# Patient Record
Sex: FEMALE | Race: BLACK OR AFRICAN AMERICAN | NOT HISPANIC OR LATINO | Employment: OTHER | URBAN - METROPOLITAN AREA
[De-identification: names, ages, dates, MRNs, and addresses within clinical notes are randomized per-mention and may not be internally consistent; named-entity substitution may affect disease eponyms.]

---

## 2018-06-23 ENCOUNTER — HOSPITAL ENCOUNTER (OUTPATIENT)
Facility: HOSPITAL | Age: 73
Discharge: HOME | End: 2018-06-26
Attending: INTERNAL MEDICINE | Admitting: INTERNAL MEDICINE
Payer: MEDICARE

## 2018-06-23 DIAGNOSIS — K92.2 GASTROINTESTINAL HEMORRHAGE, UNSPECIFIED GASTROINTESTINAL HEMORRHAGE TYPE: ICD-10-CM

## 2018-06-23 DIAGNOSIS — D50.0 IRON DEFICIENCY ANEMIA DUE TO CHRONIC BLOOD LOSS: Primary | ICD-10-CM

## 2018-06-23 DIAGNOSIS — D62 ACUTE BLOOD LOSS ANEMIA: ICD-10-CM

## 2018-06-23 DIAGNOSIS — R06.00 DYSPNEA: ICD-10-CM

## 2018-06-23 DIAGNOSIS — D64.9 ANEMIA: ICD-10-CM

## 2018-06-23 LAB
ABO + RH BLD: NORMAL
ALBUMIN SERPL BCP-MCNC: 3.5 G/DL
ALP SERPL-CCNC: 57 U/L
ALT SERPL W/O P-5'-P-CCNC: 12 U/L
ANION GAP SERPL CALC-SCNC: 10 MMOL/L
ANISOCYTOSIS BLD QL SMEAR: ABNORMAL
AST SERPL-CCNC: 28 U/L
BASOPHILS # BLD AUTO: ABNORMAL K/UL
BASOPHILS NFR BLD: 0 %
BILIRUB SERPL-MCNC: 0.1 MG/DL
BLD GP AB SCN CELLS X3 SERPL QL: NORMAL
BLD PROD TYP BPU: NORMAL
BLD PROD TYP BPU: NORMAL
BLOOD UNIT EXPIRATION DATE: NORMAL
BLOOD UNIT EXPIRATION DATE: NORMAL
BLOOD UNIT TYPE CODE: 6200
BLOOD UNIT TYPE CODE: 6200
BLOOD UNIT TYPE: NORMAL
BLOOD UNIT TYPE: NORMAL
BUN SERPL-MCNC: 8 MG/DL
CALCIUM SERPL-MCNC: 8.6 MG/DL
CHLORIDE SERPL-SCNC: 108 MMOL/L
CO2 SERPL-SCNC: 19 MMOL/L
CODING SYSTEM: NORMAL
CODING SYSTEM: NORMAL
CREAT SERPL-MCNC: 1.4 MG/DL
DIFFERENTIAL METHOD: ABNORMAL
DISPENSE STATUS: NORMAL
DISPENSE STATUS: NORMAL
EOSINOPHIL # BLD AUTO: ABNORMAL K/UL
EOSINOPHIL NFR BLD: 2 %
ERYTHROCYTE [DISTWIDTH] IN BLOOD BY AUTOMATED COUNT: 21.1 %
EST. GFR  (AFRICAN AMERICAN): 43.3 ML/MIN/1.73 M^2
EST. GFR  (NON AFRICAN AMERICAN): 37.6 ML/MIN/1.73 M^2
GLUCOSE SERPL-MCNC: 135 MG/DL
HCT VFR BLD AUTO: 15.7 %
HGB BLD-MCNC: 4.8 G/DL
HYPOCHROMIA BLD QL SMEAR: ABNORMAL
IMM GRANULOCYTES # BLD AUTO: ABNORMAL K/UL
IMM GRANULOCYTES NFR BLD AUTO: ABNORMAL %
INR PPP: 1.2
INR PPP: 1.2
IRON SERPL-MCNC: 7 UG/DL
LYMPHOCYTES # BLD AUTO: ABNORMAL K/UL
LYMPHOCYTES NFR BLD: 26 %
MCH RBC QN AUTO: 25.7 PG
MCHC RBC AUTO-ENTMCNC: 30.6 G/DL
MCV RBC AUTO: 84 FL
MONOCYTES # BLD AUTO: ABNORMAL K/UL
MONOCYTES NFR BLD: 1 %
NEUTROPHILS # BLD AUTO: ABNORMAL K/UL
NEUTROPHILS NFR BLD: 71 %
NRBC BLD-RTO: 0 /100 WBC
NUM UNITS TRANS PACKED RBC: NORMAL
NUM UNITS TRANS PACKED RBC: NORMAL
PLATELET # BLD AUTO: 513 K/UL
PMV BLD AUTO: 10 FL
POCT GLUCOSE: 172 MG/DL (ref 70–110)
POLYCHROMASIA BLD QL SMEAR: ABNORMAL
POTASSIUM SERPL-SCNC: 3.7 MMOL/L
PROT SERPL-MCNC: 7.3 G/DL
PROTHROMBIN TIME: 13.7 SEC
PROTHROMBIN TIME: 14.1 SEC
RBC # BLD AUTO: 1.87 M/UL
SATURATED IRON: 1 %
SODIUM SERPL-SCNC: 137 MMOL/L
TOTAL IRON BINDING CAPACITY: 491 UG/DL
TRANSFERRIN SERPL-MCNC: 332 MG/DL
VIT B12 SERPL-MCNC: 261 PG/ML
WBC # BLD AUTO: 9.81 K/UL

## 2018-06-23 PROCEDURE — 36430 TRANSFUSION BLD/BLD COMPNT: CPT

## 2018-06-23 PROCEDURE — 82728 ASSAY OF FERRITIN: CPT

## 2018-06-23 PROCEDURE — 99285 EMERGENCY DEPT VISIT HI MDM: CPT | Mod: 25

## 2018-06-23 PROCEDURE — G0378 HOSPITAL OBSERVATION PER HR: HCPCS

## 2018-06-23 PROCEDURE — 25000003 PHARM REV CODE 250: Performed by: INTERNAL MEDICINE

## 2018-06-23 PROCEDURE — 86920 COMPATIBILITY TEST SPIN: CPT

## 2018-06-23 PROCEDURE — 80053 COMPREHEN METABOLIC PANEL: CPT

## 2018-06-23 PROCEDURE — 71045 X-RAY EXAM CHEST 1 VIEW: CPT | Mod: 26,,, | Performed by: RADIOLOGY

## 2018-06-23 PROCEDURE — 85610 PROTHROMBIN TIME: CPT | Mod: 91

## 2018-06-23 PROCEDURE — P9016 RBC LEUKOCYTES REDUCED: HCPCS

## 2018-06-23 PROCEDURE — 83540 ASSAY OF IRON: CPT

## 2018-06-23 PROCEDURE — 63600175 PHARM REV CODE 636 W HCPCS: Performed by: INTERNAL MEDICINE

## 2018-06-23 PROCEDURE — 96374 THER/PROPH/DIAG INJ IV PUSH: CPT

## 2018-06-23 PROCEDURE — 71045 X-RAY EXAM CHEST 1 VIEW: CPT | Mod: TC,FY

## 2018-06-23 PROCEDURE — 36415 COLL VENOUS BLD VENIPUNCTURE: CPT

## 2018-06-23 PROCEDURE — 82607 VITAMIN B-12: CPT

## 2018-06-23 PROCEDURE — 96361 HYDRATE IV INFUSION ADD-ON: CPT

## 2018-06-23 PROCEDURE — 85025 COMPLETE CBC W/AUTO DIFF WBC: CPT

## 2018-06-23 PROCEDURE — 93005 ELECTROCARDIOGRAM TRACING: CPT

## 2018-06-23 PROCEDURE — 86850 RBC ANTIBODY SCREEN: CPT

## 2018-06-23 RX ORDER — FUROSEMIDE 10 MG/ML
20 INJECTION INTRAMUSCULAR; INTRAVENOUS ONCE
Status: COMPLETED | OUTPATIENT
Start: 2018-06-23 | End: 2018-06-23

## 2018-06-23 RX ORDER — LOSARTAN POTASSIUM 50 MG/1
50 TABLET ORAL DAILY
Status: ON HOLD | COMMUNITY
End: 2018-11-03

## 2018-06-23 RX ORDER — SODIUM CHLORIDE 9 MG/ML
1000 INJECTION, SOLUTION INTRAVENOUS
Status: COMPLETED | OUTPATIENT
Start: 2018-06-23 | End: 2018-06-23

## 2018-06-23 RX ORDER — POTASSIUM CHLORIDE 20 MEQ/1
20 TABLET, EXTENDED RELEASE ORAL ONCE
Status: COMPLETED | OUTPATIENT
Start: 2018-06-23 | End: 2018-06-23

## 2018-06-23 RX ORDER — SODIUM CHLORIDE 9 MG/ML
INJECTION, SOLUTION INTRAVENOUS CONTINUOUS
Status: DISCONTINUED | OUTPATIENT
Start: 2018-06-23 | End: 2018-06-26 | Stop reason: HOSPADM

## 2018-06-23 RX ORDER — ATORVASTATIN CALCIUM 20 MG/1
20 TABLET, FILM COATED ORAL DAILY
Status: ON HOLD | COMMUNITY
End: 2019-02-10 | Stop reason: HOSPADM

## 2018-06-23 RX ORDER — SODIUM CHLORIDE 9 MG/ML
1000 INJECTION, SOLUTION INTRAVENOUS
Status: DISCONTINUED | OUTPATIENT
Start: 2018-06-23 | End: 2018-06-23

## 2018-06-23 RX ORDER — HYDROCODONE BITARTRATE AND ACETAMINOPHEN 500; 5 MG/1; MG/1
TABLET ORAL
Status: DISCONTINUED | OUTPATIENT
Start: 2018-06-23 | End: 2018-06-26 | Stop reason: HOSPADM

## 2018-06-23 RX ORDER — AMLODIPINE BESYLATE 5 MG/1
5 TABLET ORAL DAILY
Status: ON HOLD | COMMUNITY
End: 2018-11-03

## 2018-06-23 RX ORDER — SUCRALFATE 1 G/1
1 TABLET ORAL
Status: DISCONTINUED | OUTPATIENT
Start: 2018-06-23 | End: 2018-06-26

## 2018-06-23 RX ADMIN — POTASSIUM CHLORIDE 20 MEQ: 1500 TABLET, EXTENDED RELEASE ORAL at 07:06

## 2018-06-23 RX ADMIN — SUCRALFATE 1 G: 1 TABLET ORAL at 09:06

## 2018-06-23 RX ADMIN — SODIUM CHLORIDE 1000 ML: 0.9 INJECTION, SOLUTION INTRAVENOUS at 11:06

## 2018-06-23 RX ADMIN — FUROSEMIDE 20 MG: 10 INJECTION, SOLUTION INTRAMUSCULAR; INTRAVENOUS at 09:06

## 2018-06-23 RX ADMIN — SUCRALFATE 1 G: 1 TABLET ORAL at 07:06

## 2018-06-23 NOTE — SUBJECTIVE & OBJECTIVE
Past Medical History:   Diagnosis Date    Cancer     kidney    Dementia     Encounter for blood transfusion     High cholesterol 2018    Hypertension     No Medication     Renal disorder     Wears dentures     Uppers       Past Surgical History:   Procedure Laterality Date     SECTION      EYE SURGERY      Rt eye Catarac    HYSTERECTOMY      KIDNEY SURGERY Left 2018    Left kidney removed due to cancer    lasix surgery         Review of patient's allergies indicates:   Allergen Reactions    Codeine Other (See Comments)     Pt shakes and hallucinates    Pcn [penicillins] Anxiety and Other (See Comments)       No current facility-administered medications on file prior to encounter.      No current outpatient prescriptions on file prior to encounter.     Family History     None        Social History Main Topics    Smoking status: Current Every Day Smoker     Packs/day: 1.00     Years: 50.00    Smokeless tobacco: Not on file      Comment: Trying to QUIT    Alcohol use 3.6 oz/week     6 Cans of beer per week      Comment: 0 past 2 weeks    Drug use: No    Sexual activity: No     Review of Systems   Constitutional: Positive for fatigue. Negative for activity change, appetite change, chills, diaphoresis, fever and unexpected weight change.   HENT: Negative for congestion, drooling, hearing loss and trouble swallowing.    Eyes: Negative for pain and visual disturbance.   Respiratory: Positive for shortness of breath. Negative for apnea, cough, choking, chest tightness and wheezing.    Cardiovascular: Negative for chest pain, palpitations and leg swelling.   Gastrointestinal: Negative for abdominal distention, abdominal pain, blood in stool, constipation, diarrhea, nausea and vomiting.   Endocrine: Negative for polydipsia, polyphagia and polyuria.   Genitourinary: Negative for difficulty urinating, dysuria and flank pain.   Musculoskeletal: Negative for arthralgias, back pain, gait  problem, joint swelling, neck pain and neck stiffness.   Skin: Negative for color change, rash and wound.   Allergic/Immunologic: Negative for environmental allergies, food allergies and immunocompromised state.   Neurological: Negative for dizziness, syncope, weakness, numbness and headaches.   Hematological: Negative for adenopathy.   Psychiatric/Behavioral: Negative for agitation, confusion and sleep disturbance. The patient is not nervous/anxious.    allergy and immuno negative history  Objective:     Vital Signs (Most Recent):  Temp: 97.5 °F (36.4 °C) (06/23/18 1504)  Pulse: 70 (06/23/18 1504)  Resp: 18 (06/23/18 1504)  BP: 135/63 (06/23/18 1504)  SpO2: 100 % (06/23/18 1504) Vital Signs (24h Range):  Temp:  [96.6 °F (35.9 °C)-98.5 °F (36.9 °C)] 97.5 °F (36.4 °C)  Pulse:  [65-76] 70  Resp:  [6-27] 18  SpO2:  [96 %-100 %] 100 %  BP: ()/(49-67) 135/63     Weight: 84.4 kg (186 lb)  Body mass index is 26.69 kg/m².    Physical Exam   Constitutional: She is oriented to person, place, and time. She appears well-developed and well-nourished.   HENT:   Head: Normocephalic and atraumatic.   Right Ear: External ear normal.   Left Ear: External ear normal.   Nose: Nose normal.   Eyes: Conjunctivae, EOM and lids are normal. Pupils are equal, round, and reactive to light.   Neck: Trachea normal, normal range of motion and full passive range of motion without pain. Neck supple.   Cardiovascular: Normal rate, regular rhythm, S1 normal, S2 normal, normal heart sounds, intact distal pulses and normal pulses.    Pulmonary/Chest: Effort normal and breath sounds normal.   Abdominal: Soft. Normal aorta and bowel sounds are normal.   Musculoskeletal: Normal range of motion.   Neurological: She is alert and oriented to person, place, and time. She has normal reflexes.   Skin: Skin is warm, dry and intact. There is pallor.   Psychiatric: Her speech is normal and behavior is normal. Cognition and memory are normal.   Moderate  dementia   Nursing note and vitals reviewed.        CRANIAL NERVES     CN III, IV, VI   Pupils are equal, round, and reactive to light.  Extraocular motions are normal.        Significant Labs: All pertinent labs within the past 24 hours have been reviewed.    Significant Imaging: I have reviewed and interpreted all pertinent imaging results/findings within the past 24 hours.

## 2018-06-23 NOTE — HOSPITAL COURSE
Blood,IVFs,surgery consult,monitor labs,protonix and carafate   6/24 awaiting labs after four units and surgery consult     6/25/18: Patient for EGD/ colonoscopy today                Plan dependent on results    6/26/18:  No obvious source of bleeding noted by Dr. Son.                   Discharge to home on Protonix and Carafate                 fU with PCP in 3-4 days

## 2018-06-23 NOTE — ED PROVIDER NOTES
Encounter Date: 2018       History     Chief Complaint   Patient presents with    Loss of Consciousness     Unresponsive in route to hospital    Dizziness     Patient was brought in by her daughter where she had sudden weakness and collapse in the passenger seat of a car.  She stated she was getting weak initially and started sweating profusely then passed out.  She did not complain of any chest pain or any other symptoms. Patient is not diabetic she does have a history of high blood pressure and high cholesterol is on medication for these.  She has not complained of any chest pain. She was seen in the automobile she was very pale she was brought to the emergency from.  She was oriented although week of speaking and moving all 4 extremities. Initial blood pressure was 90 over 60.  She was placed in Trendelenburg or she probably felt better had improvement in her color and elevated blood pressure to 106 over 60.  She is not known if she has any black tarry stools she does have mild dementia according to the daughter.  She lives alone although she is looked after by the family on a daily basis.  She has no history of GI bleed in the past.          Review of patient's allergies indicates:   Allergen Reactions    Codeine Other (See Comments)     Pt shakes and hallucinates    Pcn [penicillins] Anxiety and Other (See Comments)     Past Medical History:   Diagnosis Date    Cancer     Dementia     Encounter for blood transfusion     Hypertension     No Medication     Wears dentures     Uppers     Past Surgical History:   Procedure Laterality Date     SECTION      EYE SURGERY      Rt eye Catarac    HYSTERECTOMY      lasix surgery       No family history on file.  Social History   Substance Use Topics    Smoking status: Current Every Day Smoker     Packs/day: 1.00     Years: 50.00    Smokeless tobacco: Not on file      Comment: Trying to QUIT    Alcohol use 7.2 oz/week     12 Cans of beer per week       Comment: 0 past 2 weeks     Review of Systems   Constitutional: Negative for fever.   HENT: Negative for sore throat.    Respiratory: Negative for shortness of breath.    Cardiovascular: Negative for chest pain.   Gastrointestinal: Negative for nausea.   Genitourinary: Negative for dysuria.   Musculoskeletal: Negative for back pain.   Skin: Negative for rash.   Neurological: Negative for weakness.   Hematological: Does not bruise/bleed easily.   All other systems reviewed and are negative.      Physical Exam     Initial Vitals [06/23/18 1150]   BP Pulse Resp Temp SpO2   (!) 89/60 70 18 98.5 °F (36.9 °C) 100 %      MAP       --         Physical Exam    Vitals reviewed.  Constitutional: Vital signs are normal. She appears well-developed and well-nourished. She is diaphoretic. She is active and cooperative.   HENT:   Head: Normocephalic and atraumatic.   Eyes: Conjunctivae and lids are normal. Lids are everted and swept, no foreign bodies found.   Neck: Trachea normal, normal range of motion and full passive range of motion without pain. Neck supple.   Cardiovascular: Normal rate, regular rhythm, S1 normal, S2 normal, normal heart sounds, intact distal pulses and normal pulses.  No extrasystoles are present.   Pulmonary/Chest: Breath sounds normal.   Abdominal: Soft. Normal appearance and bowel sounds are normal.   Musculoskeletal: Normal range of motion.   Neurological: She is alert. She has normal reflexes. GCS eye subscore is 4. GCS verbal subscore is 5. GCS motor subscore is 6.   Skin: Skin is intact. Capillary refill takes less than 2 seconds. There is pallor.   Patient's skin is cool and pale on admission.   Psychiatric: She has a normal mood and affect. Her speech is normal and behavior is normal. Cognition and memory are normal.         ED Course   Procedures  Labs Reviewed   POCT GLUCOSE - Abnormal; Notable for the following:        Result Value    POCT Glucose 172 (*)     All other components within  normal limits   CBC W/ AUTO DIFFERENTIAL   COMPREHENSIVE METABOLIC PANEL   PROTIME-INR   TYPE & SCREEN     EKG Readings: (Independently Interpreted)   Initial Reading: No STEMI. Rhythm: Normal Sinus Rhythm. Ectopy: No Ectopy. Conduction: Normal. ST Segments: Normal ST Segments. T Waves: Normal. Axis: Normal. Clinical Impression: Sinus Bradycardia       Imaging Results    None             Additional MDM:   GI Bleeding: Stool - Hemoccult: Positive. Location of Bleeding: unknown.   Severity: The patient is orthostatic and requires fluid resuscitation. Consultants: Internal Medicine. The patient's condition was felt to be stable. Patient with severe anemia with hematocrit of 15% and hemoglobin of 4 will be transfused with 2 units of red blood cells and admitted to the hospital.  Dr. Son was notified for possible endoscopy and Dr. Rosario was consulted for admission and transfusion.  The patient's other chemistries were basically normal including the BUN and creatinine suggesting this may be a chronic anemia.                   Clinical Impression:   The primary encounter diagnosis was Iron deficiency anemia due to chronic blood loss. Diagnoses of Anemia, Gastrointestinal hemorrhage, unspecified gastrointestinal hemorrhage type, and Dyspnea were also pertinent to this visit.                             Miguel Lou MD  06/23/18 6368

## 2018-06-23 NOTE — ED TRIAGE NOTES
"1150 Pt arrived via pov with family. Family states,"Pt went unresponsive en route." Pt drowsy but responsive upon assistance from vehicle.  "

## 2018-06-23 NOTE — H&P
[facility] Medicine  History & Physical    Patient Name: Oriana Tobar  MRN: [id number]  Admission Date: 2018  Attending Physician: Buck Hudson MD   Primary Care Provider: Carine Baltazar NP         Patient information was obtained from patient, relative(s) and ER records.     Subjective:     Principal Problem:Acute blood loss anemia    Chief Complaint:   Chief Complaint   Patient presents with    Loss of Consciousness     Unresponsive in route to hospital    Dizziness        HPI: Daughter brought to the ER after patient became weak and passed out. Patient very poor historian with moderate dementia. Hx of black stools given after interview one week ago,patient has been asked this question multiple by care medical personal. H/H 4.8/15.7    Past Medical History:   Diagnosis Date    Cancer     kidney    Dementia     Encounter for blood transfusion     High cholesterol 2018    Hypertension     No Medication     Renal disorder     Wears dentures     Uppers       Past Surgical History:   Procedure Laterality Date     SECTION      EYE SURGERY      Rt eye Catarac    HYSTERECTOMY      KIDNEY SURGERY Left 2018    Left kidney removed due to cancer    lasix surgery         Review of patient's allergies indicates:   Allergen Reactions    Codeine Other (See Comments)     Pt shakes and hallucinates    Pcn [penicillins] Anxiety and Other (See Comments)       No current facility-administered medications on file prior to encounter.      No current outpatient prescriptions on file prior to encounter.     Family History     None        Social History Main Topics    Smoking status: Current Every Day Smoker     Packs/day: 1.00     Years: 50.00    Smokeless tobacco: Not on file      Comment: Trying to QUIT    Alcohol use 3.6 oz/week     6 Cans of beer per week      Comment: 0 past 2 weeks    Drug use: No    Sexual activity: No      Review of Systems   Constitutional: Positive for fatigue. Negative for activity change, appetite change, chills, diaphoresis, fever and unexpected weight change.   HENT: Negative for congestion, drooling, hearing loss and trouble swallowing.    Eyes: Negative for pain and visual disturbance.   Respiratory: Positive for shortness of breath. Negative for apnea, cough, choking, chest tightness and wheezing.    Cardiovascular: Negative for chest pain, palpitations and leg swelling.   Gastrointestinal: Negative for abdominal distention, abdominal pain, blood in stool, constipation, diarrhea, nausea and vomiting.   Endocrine: Negative for polydipsia, polyphagia and polyuria.   Genitourinary: Negative for difficulty urinating, dysuria and flank pain.   Musculoskeletal: Negative for arthralgias, back pain, gait problem, joint swelling, neck pain and neck stiffness.   Skin: Negative for color change, rash and wound.   Allergic/Immunologic: Negative for environmental allergies, food allergies and immunocompromised state.   Neurological: Negative for dizziness, syncope, weakness, numbness and headaches.   Hematological: Negative for adenopathy.   Psychiatric/Behavioral: Negative for agitation, confusion and sleep disturbance. The patient is not nervous/anxious.    allergy and immuno negative history  Objective:     Vital Signs (Most Recent):  Temp: 97.5 °F (36.4 °C) (06/23/18 1504)  Pulse: 70 (06/23/18 1504)  Resp: 18 (06/23/18 1504)  BP: 135/63 (06/23/18 1504)  SpO2: 100 % (06/23/18 1504) Vital Signs (24h Range):  Temp:  [96.6 °F (35.9 °C)-98.5 °F (36.9 °C)] 97.5 °F (36.4 °C)  Pulse:  [65-76] 70  Resp:  [6-27] 18  SpO2:  [96 %-100 %] 100 %  BP: ()/(49-67) 135/63     Weight: 84.4 kg (186 lb)  Body mass index is 26.69 kg/m².    Physical Exam   Constitutional: She is oriented to person, place, and time. She appears well-developed and well-nourished.   HENT:   Head: Normocephalic and atraumatic.   Right Ear: External  ear normal.   Left Ear: External ear normal.   Nose: Nose normal.   Eyes: Conjunctivae, EOM and lids are normal. Pupils are equal, round, and reactive to light.   Neck: Trachea normal, normal range of motion and full passive range of motion without pain. Neck supple.   Cardiovascular: Normal rate, regular rhythm, S1 normal, S2 normal, normal heart sounds, intact distal pulses and normal pulses.    Pulmonary/Chest: Effort normal and breath sounds normal.   Abdominal: Soft. Normal aorta and bowel sounds are normal.   Musculoskeletal: Normal range of motion.   Neurological: She is alert and oriented to person, place, and time. She has normal reflexes.   Skin: Skin is warm, dry and intact. There is pallor.   Psychiatric: Her speech is normal and behavior is normal. Cognition and memory are normal.   Moderate dementia   Nursing note and vitals reviewed.        CRANIAL NERVES     CN III, IV, VI   Pupils are equal, round, and reactive to light.  Extraocular motions are normal.        Significant Labs: All pertinent labs within the past 24 hours have been reviewed.    Significant Imaging: I have reviewed and interpreted all pertinent imaging results/findings within the past 24 hours.    Assessment/Plan:     * Acute blood loss anemia    Upper GI blood loss anemia,transfuse,IVFs,surgery consult,monitor labs            VTE Risk Mitigation     None             Buck Hudson MD  Department of Hospital Medicine   [facility] - Med Surg

## 2018-06-23 NOTE — HPI
Daughter brought to the ER after patient became weak and passed out. Patient very poor historian with moderate dementia. Hx of black stools given after interview one week ago,patient has been asked this question multiple by care medical personal. H/H 4.8/15.7

## 2018-06-24 LAB
ANION GAP SERPL CALC-SCNC: 8 MMOL/L
BASOPHILS # BLD AUTO: 0.07 K/UL
BASOPHILS NFR BLD: 0.8 %
BLD PROD TYP BPU: NORMAL
BLD PROD TYP BPU: NORMAL
BLOOD UNIT EXPIRATION DATE: NORMAL
BLOOD UNIT EXPIRATION DATE: NORMAL
BLOOD UNIT TYPE CODE: 6200
BLOOD UNIT TYPE CODE: 6200
BLOOD UNIT TYPE: NORMAL
BLOOD UNIT TYPE: NORMAL
BUN SERPL-MCNC: 8 MG/DL
CALCIUM SERPL-MCNC: 8.3 MG/DL
CHLORIDE SERPL-SCNC: 110 MMOL/L
CO2 SERPL-SCNC: 21 MMOL/L
CODING SYSTEM: NORMAL
CODING SYSTEM: NORMAL
CREAT SERPL-MCNC: 1.2 MG/DL
DIFFERENTIAL METHOD: ABNORMAL
DISPENSE STATUS: NORMAL
DISPENSE STATUS: NORMAL
EOSINOPHIL # BLD AUTO: 0.1 K/UL
EOSINOPHIL NFR BLD: 1.2 %
ERYTHROCYTE [DISTWIDTH] IN BLOOD BY AUTOMATED COUNT: 17.2 %
EST. GFR  (AFRICAN AMERICAN): 52.2 ML/MIN/1.73 M^2
EST. GFR  (NON AFRICAN AMERICAN): 45.3 ML/MIN/1.73 M^2
FERRITIN SERPL-MCNC: 11 NG/ML
GLUCOSE SERPL-MCNC: 115 MG/DL
HCT VFR BLD AUTO: 29.1 %
HGB BLD-MCNC: 9.9 G/DL
IMM GRANULOCYTES # BLD AUTO: 0.02 K/UL
IMM GRANULOCYTES NFR BLD AUTO: 0.2 %
LYMPHOCYTES # BLD AUTO: 2.3 K/UL
LYMPHOCYTES NFR BLD: 25.4 %
MCH RBC QN AUTO: 28.3 PG
MCHC RBC AUTO-ENTMCNC: 34 G/DL
MCV RBC AUTO: 83 FL
MONOCYTES # BLD AUTO: 0.8 K/UL
MONOCYTES NFR BLD: 8.8 %
NEUTROPHILS # BLD AUTO: 5.8 K/UL
NEUTROPHILS NFR BLD: 63.6 %
NRBC BLD-RTO: 0 /100 WBC
NUM UNITS TRANS PACKED RBC: NORMAL
NUM UNITS TRANS PACKED RBC: NORMAL
PLATELET # BLD AUTO: 365 K/UL
PMV BLD AUTO: 9.3 FL
POTASSIUM SERPL-SCNC: 3.9 MMOL/L
RBC # BLD AUTO: 3.5 M/UL
SODIUM SERPL-SCNC: 139 MMOL/L
WBC # BLD AUTO: 9.09 K/UL

## 2018-06-24 PROCEDURE — 80048 BASIC METABOLIC PNL TOTAL CA: CPT

## 2018-06-24 PROCEDURE — 36415 COLL VENOUS BLD VENIPUNCTURE: CPT

## 2018-06-24 PROCEDURE — 36430 TRANSFUSION BLD/BLD COMPNT: CPT

## 2018-06-24 PROCEDURE — P9016 RBC LEUKOCYTES REDUCED: HCPCS

## 2018-06-24 PROCEDURE — 25000003 PHARM REV CODE 250: Performed by: SURGERY

## 2018-06-24 PROCEDURE — 94761 N-INVAS EAR/PLS OXIMETRY MLT: CPT

## 2018-06-24 PROCEDURE — 99204 OFFICE O/P NEW MOD 45 MIN: CPT | Mod: ICN,,, | Performed by: SURGERY

## 2018-06-24 PROCEDURE — 25000003 PHARM REV CODE 250: Performed by: INTERNAL MEDICINE

## 2018-06-24 PROCEDURE — G0378 HOSPITAL OBSERVATION PER HR: HCPCS

## 2018-06-24 PROCEDURE — 85025 COMPLETE CBC W/AUTO DIFF WBC: CPT

## 2018-06-24 RX ORDER — POLYETHYLENE GLYCOL 3350, SODIUM SULFATE ANHYDROUS, SODIUM BICARBONATE, SODIUM CHLORIDE, POTASSIUM CHLORIDE 236; 22.74; 6.74; 5.86; 2.97 G/4L; G/4L; G/4L; G/4L; G/4L
4000 POWDER, FOR SOLUTION ORAL ONCE
Status: COMPLETED | OUTPATIENT
Start: 2018-06-24 | End: 2018-06-24

## 2018-06-24 RX ADMIN — POLYETHYLENE GLYCOL 3350, SODIUM SULFATE ANHYDROUS, SODIUM BICARBONATE, SODIUM CHLORIDE, POTASSIUM CHLORIDE 4000 ML: 236; 22.74; 6.74; 5.86; 2.97 POWDER, FOR SOLUTION ORAL at 02:06

## 2018-06-24 RX ADMIN — SUCRALFATE 1 G: 1 TABLET ORAL at 06:06

## 2018-06-24 RX ADMIN — SUCRALFATE 1 G: 1 TABLET ORAL at 09:06

## 2018-06-24 NOTE — ASSESSMENT & PLAN NOTE
Upper GI blood loss anemia,transfuse,IVFs,surgery consult,monitor labs  6/24 awaiting lab and consults,stable with no new complaints

## 2018-06-24 NOTE — PROGRESS NOTES
[facility] Medicine  Progress Note    Patient Name: Oriana Tobar  MRN: [id number]  Patient Class: OP- Observation   Admission Date: 6/23/2018  Length of Stay: 0 days  Attending Physician: Buck Hudson MD  Primary Care Provider: Carine Baltazar NP        Subjective:     Principal Problem:Acute blood loss anemia    HPI:  Daughter brought to the ER after patient became weak and passed out. Patient very poor historian with moderate dementia. Hx of black stools given after interview one week ago,patient has been asked this question multiple by care medical personal. H/H 4.8/15.7    Hospital Course:  Blood,IVFs,surgery consult,monitor labs,protonix and carafate   6/24 awaiting labs after four units and surgery consult     Interval History: no new complaints stable awiating lab and consult    Review of Systems   Constitutional: Negative for activity change, appetite change, chills, diaphoresis, fatigue, fever and unexpected weight change.   HENT: Negative for congestion, drooling, hearing loss and trouble swallowing.    Eyes: Negative for pain and visual disturbance.   Respiratory: Negative.  Negative for apnea, cough, choking, chest tightness, shortness of breath and wheezing.    Cardiovascular: Negative for chest pain, palpitations and leg swelling.   Gastrointestinal: Negative for abdominal distention, abdominal pain, blood in stool, constipation, diarrhea, nausea and vomiting.   Endocrine: Negative for polydipsia, polyphagia and polyuria.   Genitourinary: Negative for difficulty urinating, dysuria and flank pain.   Musculoskeletal: Negative for arthralgias, back pain, gait problem, joint swelling, neck pain and neck stiffness.   Skin: Negative for color change, rash and wound.   Allergic/Immunologic: Negative for environmental allergies, food allergies and immunocompromised state.   Neurological: Negative for dizziness, syncope, weakness, numbness and  headaches.   Hematological: Negative for adenopathy.   Psychiatric/Behavioral: Negative for agitation, confusion and sleep disturbance. The patient is not nervous/anxious.      Objective:     Vital Signs (Most Recent):  Temp: 97.2 °F (36.2 °C) (06/24/18 0719)  Pulse: 69 (06/24/18 0719)  Resp: 17 (06/24/18 0719)  BP: 136/71 (06/24/18 0719)  SpO2: 100 % (06/24/18 0719) Vital Signs (24h Range):  Temp:  [96.3 °F (35.7 °C)-98.9 °F (37.2 °C)] 97.2 °F (36.2 °C)  Pulse:  [61-78] 69  Resp:  [6-27] 17  SpO2:  [96 %-100 %] 100 %  BP: ()/(49-71) 136/71     Weight: 84.4 kg (186 lb)  Body mass index is 26.69 kg/m².    Intake/Output Summary (Last 24 hours) at 06/24/18 0741  Last data filed at 06/24/18 0615   Gross per 24 hour   Intake          4226.33 ml   Output             1175 ml   Net          3051.33 ml      Physical Exam   Constitutional: She is oriented to person, place, and time. She appears well-developed and well-nourished.   HENT:   Head: Normocephalic and atraumatic.   Right Ear: External ear normal.   Left Ear: External ear normal.   Nose: Nose normal.   Eyes: Conjunctivae, EOM and lids are normal. Pupils are equal, round, and reactive to light.   Neck: Trachea normal, normal range of motion and full passive range of motion without pain. Neck supple.   Cardiovascular: Normal rate, regular rhythm, S1 normal, S2 normal, normal heart sounds, intact distal pulses and normal pulses.    Pulmonary/Chest: Effort normal and breath sounds normal.   Abdominal: Soft. Normal aorta and bowel sounds are normal.   Musculoskeletal: Normal range of motion.   Neurological: She is alert and oriented to person, place, and time. She has normal reflexes.   Skin: Skin is warm, dry and intact.   Psychiatric: She has a normal mood and affect. Her speech is normal and behavior is normal. Judgment and thought content normal. Cognition and memory are normal.   Nursing note and vitals reviewed.      Significant Labs: All pertinent labs  within the past 24 hours have been reviewed.    Significant Imaging: I have reviewed and interpreted all pertinent imaging results/findings within the past 24 hours.    Assessment/Plan:      * Acute blood loss anemia    Upper GI blood loss anemia,transfuse,IVFs,surgery consult,monitor labs  6/24 awaiting lab and consults,stable with no new complaints            VTE Risk Mitigation     None              Buck Hudson MD  Department of Hospital Medicine   [facility] - Med Surg

## 2018-06-24 NOTE — CONSULTS
[facility] - Med Surg  General Surgery  Consult Note    Patient Name: Oriana Tobar  MRN: [id number]  Admission Date: 6/23/2018  Attending Physician: Buck Hudson MD   Consult Physician: Torres Son MD  Primary Care Provider: Carine Baltazar NP    Patient information was obtained from patient.     Subjective:     Chief Complaint/Reason for Admission: Symptomatic severe Iron deficiency Anemia requiring blood tx    History of Present Illness:  Oriana Tobar is a 72 y.o. female with a history of kidney cancer status post radical left nephrectomy with segmental colectomy of the splenic flexure back in 2016, dementia, hypertension, hypercholesterolemia presented to the hospital last night due to weakness and a passing out episode.  The patient is a very poor historian given the dementia.  Per the family on room patient has had blood per rectum for the last month.  Dark color.  No significant weight loss.  Last scopes were years ago and she is unsure of the findings.  Patient is unsure of why I colectomy was required at the time of nephrectomy.  Per the chart, the left renal mass was invading the colon which required resection at the time of radical nephrectomy.  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  Node negative. Patient is was admitted to the hospitalist service last night transfused with 4 units of red blood cells with appropriate response.  I as a surgeon on duty this morning was called in consultation for severe iron deficiency anemia which was symptomatic resulting in syncopal episode requiring 4 units of blood.    Review of patient's allergies indicates:   Allergen Reactions    Codeine Other (See Comments)     Pt shakes and hallucinates    Pcn [penicillins] Anxiety and Other (See Comments)       Past Medical History:   Diagnosis Date    Cancer     kidney     Dementia     Encounter for blood transfusion     High cholesterol 2018    Hypertension     No Medication     Renal disorder     Wears dentures     Uppers     Past Surgical History:   Procedure Laterality Date     SECTION      EYE SURGERY      Rt eye Catarac    HYSTERECTOMY      KIDNEY SURGERY Left 2018    Left kidney removed due to cancer    lasix surgery       Family History     None        Social History Main Topics    Smoking status: Current Every Day Smoker     Packs/day: 1.00     Years: 50.00    Smokeless tobacco: Not on file      Comment: Trying to QUIT    Alcohol use 3.6 oz/week     6 Cans of beer per week      Comment: 0 past 2 weeks    Drug use: No    Sexual activity: No     Review of Systems   Constitutional: Negative for activity change, appetite change, chills and fever.   HENT: Negative for congestion, dental problem and ear discharge.    Eyes: Negative for discharge and itching.   Respiratory: Negative for apnea, choking and chest tightness.    Cardiovascular: Negative for chest pain and leg swelling.   Gastrointestinal: Positive for anal bleeding. Negative for abdominal distention, abdominal pain, constipation, diarrhea and nausea.   Endocrine: Negative for cold intolerance and heat intolerance.   Genitourinary: Negative for difficulty urinating and dyspareunia.   Musculoskeletal: Negative for arthralgias and back pain.   Skin: Negative for color change and pallor.   Neurological: Positive for syncope and weakness. Negative for dizziness and facial asymmetry.   Hematological: Negative for adenopathy. Does not bruise/bleed easily.   Psychiatric/Behavioral: Negative for agitation and behavioral problems.     Objective:     Vital Signs (Most Recent):  Temp: 97.2 °F (36.2 °C) (18)  Pulse: 69 (18)  Resp: 17 (18)  BP: 136/71 (18)  SpO2: 100 % (18) Vital Signs (24h Range):  Temp:  [96.3 °F (35.7 °C)-98.9 °F (37.2 °C)]  97.2 °F (36.2 °C)  Pulse:  [61-78] 69  Resp:  [6-27] 17  SpO2:  [96 %-100 %] 100 %  BP: ()/(49-71) 136/71     Weight: 84.4 kg (186 lb)  Body mass index is 26.69 kg/m².    Physical Exam   Constitutional: She is oriented to person, place, and time. She appears well-developed and well-nourished. No distress.   HENT:   Head: Normocephalic and atraumatic.   Eyes: EOM are normal. Pupils are equal, round, and reactive to light.   Neck: Normal range of motion. Neck supple. No thyromegaly present.   Cardiovascular: Normal rate and regular rhythm.    Pulmonary/Chest: Effort normal and breath sounds normal.   Abdominal: Soft. Bowel sounds are normal. She exhibits no distension. There is no tenderness.       Musculoskeletal: Normal range of motion. She exhibits no edema or deformity.   Neurological: She is alert and oriented to person, place, and time. No cranial nerve deficit.   Skin: Skin is warm. Capillary refill takes less than 2 seconds. No erythema.   Psychiatric: She has a normal mood and affect. Her behavior is normal.       Significant Labs:  CBC:   Recent Labs  Lab 06/24/18  0825   WBC 9.09   RBC 3.50*   HGB 9.9*   HCT 29.1*   *   MCV 83   MCH 28.3   MCHC 34.0     BMP:   Recent Labs  Lab 06/24/18  0825   *      K 3.9      CO2 21*   BUN 8   CREATININE 1.2   CALCIUM 8.3*     CMP:   Recent Labs  Lab 06/23/18  1209 06/24/18  0825   * 115*   CALCIUM 8.6* 8.3*   ALBUMIN 3.5  --    PROT 7.3  --     139   K 3.7 3.9   CO2 19* 21*    110   BUN 8 8   CREATININE 1.4 1.2   ALKPHOS 57  --    ALT 12  --    AST 28  --    BILITOT 0.1  --      LFTs:   Recent Labs  Lab 06/23/18  1209   ALT 12   AST 28   ALKPHOS 57   BILITOT 0.1   PROT 7.3   ALBUMIN 3.5     Coagulation:   Recent Labs  Lab 06/23/18  1443   LABPROT 14.1*   INR 1.2     Specimen (12h ago through future)    None        No results for input(s): COLORU, CLARITYU, SPECGRAV, PHUR, PROTEINUA, GLUCOSEU, BILIRUBINCON, BLOODU, WBCU,  RBCU, BACTERIA, MUCUS, NITRITE, LEUKOCYTESUR, UROBILINOGEN, HYALINECASTS in the last 168 hours.    Significant Diagnostics:  CXR: I have reviewed all pertinent results/findings within the past 24 hours and my personal findings are:  No acute cardiopulmonary process seen on chest x-ray yesterday.    Assessment:   Oriana Tobar is a 72 y.o. female who presents with Acute blood loss anemia.    Active Diagnoses:    Diagnosis Date Noted POA    PRINCIPAL PROBLEM:  Acute blood loss anemia [D62] 01/14/2016 Yes    Anemia [D64.9] 01/27/2016 Yes      Problems Resolved During this Admission:    Diagnosis Date Noted Date Resolved POA     VTE Risk Mitigation     None          Medical Decision Making/Plan:  Patient has what appears to be severe acute iron deficiency anemia, likely of GI source given bloody stools for the last 1 month.  Patient has syncopal episode related to the severe anemia which brought her to the hospital.  Medical recommendations are for EGD and colonoscopy to rule out GI as a source for severe anemia.  Risk and benefits of EGD colonoscopy were discussed in depth with patient and family.  Will plan for prep today with GoLYTELY, clear liquid diet NPO after midnight and for EGD colonoscopy tomorrow.    Risk and benefits of EGD were discussed in depth.  Risk of EGD were discussed to include bleeding as well as perforation.  Patient understands that if the above procedural risks were to occur, he could need further intervention to include but not exclude a blood transfusion, repeat procedure, admission to the hospital, or even surgery which would likely require transfer to a higher level of care.   Risks and benefits of Colonoscopy were discussed in depth in clinic as well.  From a procedural standpoint, we discuss the benefits of colonoscopy to be finding colon cancers at early stages, including polyps which can be endoscopically resectable, to finding early stage colon cancers which can be better  treated with current medical and surgical therapies in order to give patients a longer survival, if found in these early stages.  From a standpoint of risks, the risk of bleeding and perforation of the colon were discussed.  I personally discussed that if complications of bleeding or perforation were to occur, the patient could need as little as a blood transfusion and as much as continued hospital admission, repeat procedure, or even surgery.  Total time spent today 45 min of which greater than half of that time spent in face-to-face counseling.    Torres Son MD  General Surgery  [facility] - Med Surg

## 2018-06-24 NOTE — PLAN OF CARE
Problem: Fall Risk (Adult)  Intervention: Monitor/Assist with Self Care   06/24/18 0144   Activity   Activity Assistance Provided assistance, 1 person   Daily Care Interventions   Self-Care Promotion independence encouraged   Functional Level Current   Ambulation 2 - assistive person   Transferring 2 - assistive person   Bathing 2 - assistive person   Dressing 0 - independent   Eating 0 - independent   Communication 0 - understands/communicates without difficulty   Swallowing 0 - swallows foods/liquids without difficulty

## 2018-06-24 NOTE — SUBJECTIVE & OBJECTIVE
Interval History: no new complaints stable awiating lab and consult    Review of Systems   Constitutional: Negative for activity change, appetite change, chills, diaphoresis, fatigue, fever and unexpected weight change.   HENT: Negative for congestion, drooling, hearing loss and trouble swallowing.    Eyes: Negative for pain and visual disturbance.   Respiratory: Negative.  Negative for apnea, cough, choking, chest tightness, shortness of breath and wheezing.    Cardiovascular: Negative for chest pain, palpitations and leg swelling.   Gastrointestinal: Negative for abdominal distention, abdominal pain, blood in stool, constipation, diarrhea, nausea and vomiting.   Endocrine: Negative for polydipsia, polyphagia and polyuria.   Genitourinary: Negative for difficulty urinating, dysuria and flank pain.   Musculoskeletal: Negative for arthralgias, back pain, gait problem, joint swelling, neck pain and neck stiffness.   Skin: Negative for color change, rash and wound.   Allergic/Immunologic: Negative for environmental allergies, food allergies and immunocompromised state.   Neurological: Negative for dizziness, syncope, weakness, numbness and headaches.   Hematological: Negative for adenopathy.   Psychiatric/Behavioral: Negative for agitation, confusion and sleep disturbance. The patient is not nervous/anxious.      Objective:     Vital Signs (Most Recent):  Temp: 97.2 °F (36.2 °C) (06/24/18 0719)  Pulse: 69 (06/24/18 0719)  Resp: 17 (06/24/18 0719)  BP: 136/71 (06/24/18 0719)  SpO2: 100 % (06/24/18 0719) Vital Signs (24h Range):  Temp:  [96.3 °F (35.7 °C)-98.9 °F (37.2 °C)] 97.2 °F (36.2 °C)  Pulse:  [61-78] 69  Resp:  [6-27] 17  SpO2:  [96 %-100 %] 100 %  BP: ()/(49-71) 136/71     Weight: 84.4 kg (186 lb)  Body mass index is 26.69 kg/m².    Intake/Output Summary (Last 24 hours) at 06/24/18 0741  Last data filed at 06/24/18 0615   Gross per 24 hour   Intake          4226.33 ml   Output             1175 ml   Net           3051.33 ml      Physical Exam   Constitutional: She is oriented to person, place, and time. She appears well-developed and well-nourished.   HENT:   Head: Normocephalic and atraumatic.   Right Ear: External ear normal.   Left Ear: External ear normal.   Nose: Nose normal.   Eyes: Conjunctivae, EOM and lids are normal. Pupils are equal, round, and reactive to light.   Neck: Trachea normal, normal range of motion and full passive range of motion without pain. Neck supple.   Cardiovascular: Normal rate, regular rhythm, S1 normal, S2 normal, normal heart sounds, intact distal pulses and normal pulses.    Pulmonary/Chest: Effort normal and breath sounds normal.   Abdominal: Soft. Normal aorta and bowel sounds are normal.   Musculoskeletal: Normal range of motion.   Neurological: She is alert and oriented to person, place, and time. She has normal reflexes.   Skin: Skin is warm, dry and intact.   Psychiatric: She has a normal mood and affect. Her speech is normal and behavior is normal. Judgment and thought content normal. Cognition and memory are normal.   Nursing note and vitals reviewed.      Significant Labs: All pertinent labs within the past 24 hours have been reviewed.    Significant Imaging: I have reviewed and interpreted all pertinent imaging results/findings within the past 24 hours.

## 2018-06-25 ENCOUNTER — ANESTHESIA (OUTPATIENT)
Dept: SURGERY | Facility: HOSPITAL | Age: 73
End: 2018-06-25
Payer: MEDICARE

## 2018-06-25 ENCOUNTER — ANESTHESIA EVENT (OUTPATIENT)
Dept: SURGERY | Facility: HOSPITAL | Age: 73
End: 2018-06-25
Payer: MEDICARE

## 2018-06-25 ENCOUNTER — SURGERY (OUTPATIENT)
Age: 73
End: 2018-06-25

## 2018-06-25 PROBLEM — D50.0 IRON DEFICIENCY ANEMIA DUE TO CHRONIC BLOOD LOSS: Status: ACTIVE | Noted: 2018-06-25

## 2018-06-25 PROCEDURE — 88305 TISSUE EXAM BY PATHOLOGIST: CPT | Mod: 26,,, | Performed by: PATHOLOGY

## 2018-06-25 PROCEDURE — 45384 COLONOSCOPY W/LESION REMOVAL: CPT | Mod: ,,, | Performed by: SURGERY

## 2018-06-25 PROCEDURE — 96361 HYDRATE IV INFUSION ADD-ON: CPT

## 2018-06-25 PROCEDURE — 37000009 HC ANESTHESIA EA ADD 15 MINS: Performed by: SURGERY

## 2018-06-25 PROCEDURE — 43239 EGD BIOPSY SINGLE/MULTIPLE: CPT | Performed by: SURGERY

## 2018-06-25 PROCEDURE — 43239 EGD BIOPSY SINGLE/MULTIPLE: CPT | Mod: 51,,, | Performed by: SURGERY

## 2018-06-25 PROCEDURE — 88305 TISSUE EXAM BY PATHOLOGIST: CPT | Mod: 59 | Performed by: PATHOLOGY

## 2018-06-25 PROCEDURE — 63600175 PHARM REV CODE 636 W HCPCS: Performed by: NURSE ANESTHETIST, CERTIFIED REGISTERED

## 2018-06-25 PROCEDURE — 99213 OFFICE O/P EST LOW 20 MIN: CPT | Mod: 57,,, | Performed by: SURGERY

## 2018-06-25 PROCEDURE — 25000003 PHARM REV CODE 250: Performed by: INTERNAL MEDICINE

## 2018-06-25 PROCEDURE — 45384 COLONOSCOPY W/LESION REMOVAL: CPT | Performed by: SURGERY

## 2018-06-25 PROCEDURE — 37000008 HC ANESTHESIA 1ST 15 MINUTES: Performed by: SURGERY

## 2018-06-25 PROCEDURE — 88342 IMHCHEM/IMCYTCHM 1ST ANTB: CPT | Mod: 26,,, | Performed by: PATHOLOGY

## 2018-06-25 PROCEDURE — 27201012 HC FORCEPS, HOT/COLD, DISP: Performed by: SURGERY

## 2018-06-25 PROCEDURE — D9220A PRA ANESTHESIA: Mod: CRNA,,, | Performed by: NURSE ANESTHETIST, CERTIFIED REGISTERED

## 2018-06-25 PROCEDURE — G0378 HOSPITAL OBSERVATION PER HR: HCPCS

## 2018-06-25 PROCEDURE — D9220A PRA ANESTHESIA: Mod: ANES,,, | Performed by: ANESTHESIOLOGY

## 2018-06-25 RX ORDER — MIDAZOLAM HYDROCHLORIDE 1 MG/ML
INJECTION, SOLUTION INTRAMUSCULAR; INTRAVENOUS
Status: DISCONTINUED | OUTPATIENT
Start: 2018-06-25 | End: 2018-06-25

## 2018-06-25 RX ORDER — PROPOFOL 10 MG/ML
VIAL (ML) INTRAVENOUS
Status: DISCONTINUED | OUTPATIENT
Start: 2018-06-25 | End: 2018-06-25

## 2018-06-25 RX ORDER — MEPERIDINE HYDROCHLORIDE 50 MG/ML
INJECTION INTRAMUSCULAR; INTRAVENOUS; SUBCUTANEOUS
Status: DISCONTINUED | OUTPATIENT
Start: 2018-06-25 | End: 2018-06-25

## 2018-06-25 RX ORDER — DIPHENHYDRAMINE HYDROCHLORIDE 50 MG/ML
12.5 INJECTION INTRAMUSCULAR; INTRAVENOUS
Status: DISCONTINUED | OUTPATIENT
Start: 2018-06-25 | End: 2018-06-25

## 2018-06-25 RX ORDER — ONDANSETRON 2 MG/ML
4 INJECTION INTRAMUSCULAR; INTRAVENOUS EVERY 10 MIN PRN
Status: DISCONTINUED | OUTPATIENT
Start: 2018-06-25 | End: 2018-06-25

## 2018-06-25 RX ORDER — SODIUM CHLORIDE, SODIUM LACTATE, POTASSIUM CHLORIDE, CALCIUM CHLORIDE 600; 310; 30; 20 MG/100ML; MG/100ML; MG/100ML; MG/100ML
125 INJECTION, SOLUTION INTRAVENOUS CONTINUOUS
Status: DISCONTINUED | OUTPATIENT
Start: 2018-06-25 | End: 2018-06-25

## 2018-06-25 RX ADMIN — PROPOFOL 50 MG: 10 INJECTION, EMULSION INTRAVENOUS at 04:06

## 2018-06-25 RX ADMIN — SODIUM CHLORIDE: 9 INJECTION, SOLUTION INTRAVENOUS at 04:06

## 2018-06-25 RX ADMIN — MEPERIDINE HYDROCHLORIDE 50 MG: 50 INJECTION INTRAMUSCULAR; INTRAVENOUS; SUBCUTANEOUS at 03:06

## 2018-06-25 RX ADMIN — PROPOFOL 20 MG: 10 INJECTION, EMULSION INTRAVENOUS at 03:06

## 2018-06-25 RX ADMIN — SODIUM CHLORIDE: 9 INJECTION, SOLUTION INTRAVENOUS at 09:06

## 2018-06-25 RX ADMIN — SUCRALFATE 1 G: 1 TABLET ORAL at 09:06

## 2018-06-25 RX ADMIN — PROPOFOL 50 MG: 10 INJECTION, EMULSION INTRAVENOUS at 03:06

## 2018-06-25 RX ADMIN — MIDAZOLAM HYDROCHLORIDE 2 MG: 1 INJECTION, SOLUTION INTRAMUSCULAR; INTRAVENOUS at 03:06

## 2018-06-25 RX ADMIN — SODIUM CHLORIDE: 9 INJECTION, SOLUTION INTRAVENOUS at 03:06

## 2018-06-25 RX ADMIN — PROPOFOL 30 MG: 10 INJECTION, EMULSION INTRAVENOUS at 03:06

## 2018-06-25 NOTE — PLAN OF CARE
06/25/18 1551   HOWELL Message   Medicare Outpatient and Observation Notification regarding financial responsibility Given to patient/caregiver;Explained to patient/caregiver;Signed/date by patient/caregiver   Date HOWELL was signed 06/25/18   Time HOWELL was signed [id number]

## 2018-06-25 NOTE — ANESTHESIA PREPROCEDURE EVALUATION
06/25/2018  Oriana Tobar is a 72 y.o., female.    Anesthesia Evaluation    I have reviewed the Patient Summary Reports.    I have reviewed the Nursing Notes.   I have reviewed the Medications.     Review of Systems  Anesthesia Hx:  No problems with previous Anesthesia  Neg history of prior surgery. Denies Family Hx of Anesthesia complications.   Denies Personal Hx of Anesthesia complications.   Social:  Smoker    Hematology/Oncology:  Hematology Normal   Oncology Normal     EENT/Dental:EENT/Dental Normal   Cardiovascular:   Hypertension, well controlled    Pulmonary:  Pulmonary Normal    Renal/:   Chronic Renal Disease    Musculoskeletal:  Musculoskeletal Normal    Neurological:  Dementia severe    Dermatological:  Skin Normal    Psych:   Psychiatric History Dementia         Physical Exam  General:  Well nourished    Airway/Jaw/Neck:  AIRWAY FINDINGS: Normal      Eyes/Ears/Nose:  EYES/EARS/NOSE FINDINGS: Normal   Dental:  DENTAL FINDINGS: Normal   Chest/Lungs:  Chest/Lungs Clear    Heart/Vascular:  Heart Findings: Normal Heart murmur: negative Vascular Findings: Normal    Abdomen:  Abdomen Findings: Normal    Musculoskeletal:  Musculoskeletal Findings: Normal   Skin:  Skin Findings: Normal    Mental Status:  Mental Status Findings:  Confusion         Anesthesia Plan  Type of Anesthesia, risks & benefits discussed:  Anesthesia Type:  MAC  Patient's Preference:   Intra-op Monitoring Plan: standard ASA monitors  Intra-op Monitoring Plan Comments:   Post Op Pain Control Plan:   Post Op Pain Control Plan Comments:   Induction:   IV  Beta Blocker:  Patient is not currently on a Beta-Blocker (No further documentation required).       Informed Consent: Patient understands risks and agrees with Anesthesia plan.  Questions answered. Anesthesia consent signed with patient.  ASA Score: 3     Day of Surgery  Review of History & Physical:    H&P update referred to the provider.         Ready For Surgery From Anesthesia Perspective.

## 2018-06-25 NOTE — PLAN OF CARE
"   06/25/18 1545   Discharge Assessment   Assessment Type Discharge Planning Assessment   Confirmed/corrected address and phone number on facesheet? Yes   Assessment information obtained from? Patient   Expected Length of Stay (days) 2   Communicated expected length of stay with patient/caregiver yes   Prior to hospitilization cognitive status: Alert/Oriented  (Patient appears to be a poor historian stating "I don't remember things sometimes")   Prior to hospitalization functional status: Independent   Current cognitive status: ( Patient appears to be a poor historian stating "I don't remember things sometimes")   Current Functional Status: Independent   Lives With child(samy), adult   Able to Return to Prior Arrangements yes   Is patient able to care for self after discharge? Yes  (Daughter assists patient with care)   Who are your caregiver(s) and their phone number(s)? Khang Wesley [phone] (per patient)   Patient's perception of discharge disposition home or selfcare   Readmission Within The Last 30 Days no previous admission in last 30 days   Patient currently being followed by outpatient case management? No   Patient currently receives any other outside agency services? No   Equipment Currently Used at Home none   Do you have any problems affording any of your prescribed medications? No   Is the patient taking medications as prescribed? yes   Does the patient have transportation home? Yes   Does the patient receive services at the Coumadin Clinic? No   Discharge Plan A Home   Patient/Family In Agreement With Plan yes   CM can not validate answers as patient appears somewhat confused. CM will try to round when family present to ensure correctness of information provided.  "

## 2018-06-25 NOTE — ANESTHESIA POSTPROCEDURE EVALUATION
"Anesthesia Post Evaluation    Patient: Oriana Tobar    Procedure(s) Performed: Procedure(s) (LRB):  COLONOSCOPY (N/A)  EGD (ESOPHAGOGASTRODUODENOSCOPY) (N/A)    Final Anesthesia Type: MAC  Patient location during evaluation: PACU  Patient participation: Yes- Able to Participate  Level of consciousness: awake and alert  Post-procedure vital signs: reviewed and stable  Pain management: adequate  Airway patency: patent  PONV status at discharge: No PONV  Anesthetic complications: no      Cardiovascular status: blood pressure returned to baseline  Respiratory status: unassisted  Hydration status: euvolemic  Follow-up not needed.        Visit Vitals  /73   Pulse 60   Temp 36.6 °C (97.9 °F) (Oral)   Resp 12   Ht 5' 10" (1.778 m)   Wt 84.4 kg (186 lb)   SpO2 98%   Breastfeeding? No   BMI 26.69 kg/m²       Pain/Randolph Score: Pain Assessment Performed: Yes (6/25/2018  4:57 PM)  Presence of Pain: denies (6/25/2018  4:57 PM)  Randolph Score: 10 (6/25/2018  4:55 PM)      "

## 2018-06-25 NOTE — PLAN OF CARE
Patient's daughter was called to update on patient status. Daughter said she was in the patient's room and had no questions.

## 2018-06-25 NOTE — PROGRESS NOTES
Patient without further bleeding overnight. Hes feeling better no weakness or syncope. Patient is to go for EGD and colonoscopy this morning. Treatment will depend on results.    Physical exam: lungs clear to Auscultation, heart regular rate and rhythm withsystolic ejection murmur Great to over six. Abdomen soft nontender nondistended, extremities without edema or cyanosis neurological exam vocally intact no gross motor or sensory  deficits.  Labs reviewed and stable. Hemoglobin and Amy could stable with no significant drop overnight.    Impression: acute blood loss anemia                         Plan:  patient is to go for EGD and colonoscopy to have a white bleeding source.  Recheck labs in the a.m. to evaluate further blood loss   2. Will recheck labs in the a.m. will recheck labs in the a.m.  3. Further treatment based on surgical findings aalna borden    I would like to thank Dr. Son for his consult in this case.    CARLOS AMBRIZ M.D. progress note June 25, 2018

## 2018-06-25 NOTE — NURSING
PT DISORIENTED AND CONFUSED THROUGHOUT THE NIGHT.  UNABLE TO UNDERSTAND IV LINE AND PURPOSE OF RECEIVING FLUIDS.  FLUIDS WERE STOPPED AT THE END OF DAY SHIFT DUE TO FREQUENCY OF OUTPUT S/P GOLYTELY.  0.9% NaCL AT 75mL/HR RESTARTED AT 0430.  PT TOLERATED WITHOUT COMPLAINT.

## 2018-06-25 NOTE — PLAN OF CARE
Problem: Fall Risk (Adult)  Intervention: Safety Promotion/Fall Prevention   06/25/18 0256   Safety Interventions   Safety Promotion/Fall Prevention assistive device/personal item within reach;diversional activities provided;side rails raised x 2

## 2018-06-25 NOTE — NURSING
PT RETURNED FROM PACU VIA STRETCHER, VS WNL, DAUGHTER AT SIDE, PT SITTING UP EATING DINNER TRAY, YAHIR WELL. CALL LIGHT WITHIN REACH. SSRN

## 2018-06-25 NOTE — PLAN OF CARE
06/23/18 1542   HOWELL Message   Medicare Outpatient and Observation Notification regarding financial responsibility Given to patient/caregiver;Explained to patient/caregiver;Signed/date by patient/caregiver   Date HOWELL was signed 06/23/18   Time HOWELL was signed [id number]

## 2018-06-25 NOTE — TRANSFER OF CARE
"Anesthesia Transfer of Care Note    Patient: Oriana Tobar    Procedure(s) Performed: Procedure(s) (LRB):  COLONOSCOPY (N/A)  EGD (ESOPHAGOGASTRODUODENOSCOPY) (N/A)    Patient location: PACU    Anesthesia Type: MAC    Transport from OR: Transported from OR on room air with adequate spontaneous ventilation    Post pain: adequate analgesia    Post assessment: no apparent anesthetic complications and tolerated procedure well    Post vital signs: stable    Level of consciousness: awake, alert and oriented    Nausea/Vomiting: no nausea/vomiting    Complications: none    Transfer of care protocol was followed      Last vitals:   Visit Vitals  BP (!) 160/70 (BP Location: Left arm, Patient Position: Lying)   Pulse 60   Temp 36.6 °C (97.9 °F) (Oral)   Resp 18   Ht 5' 10" (1.778 m)   Wt 84.4 kg (186 lb)   SpO2 100%   Breastfeeding? No   BMI 26.69 kg/m²     "

## 2018-06-25 NOTE — PROGRESS NOTES
[facility] - Med Surg  General Surgery  Daily Note    Patient Name: Oriana Tobar  MRN: [id number]  Admission Date: 6/23/2018  Attending Physician: Buck Hudson MD   Consult Physician: Torres Son MD  Primary Care Provider: Carine Baltazar NP    Subjective:     Principle Problem: Acute blood loss anemia    Last 24 hour history:  6/25/18  Patient tolerated p.o. bowel prep yesterday well.  She is running clean this morning.  No reported blood in bowel movements.  Ready for double endoscopy today for symptomatic severe acute blood loss anemia with iron deficiency.  No new complaints    Objective:     Vital Signs (Most Recent):  Temp: 97.9 °F (36.6 °C) (06/25/18 1148)  Pulse: 60 (06/25/18 1148)  Resp: 18 (06/25/18 1148)  BP: (!) 160/70 (06/25/18 1148)  SpO2: 100 % (06/25/18 1148) Vital Signs (24h Range):  Temp:  [96.4 °F (35.8 °C)-98 °F (36.7 °C)] 97.9 °F (36.6 °C)  Pulse:  [58-73] 60  Resp:  [15-18] 18  SpO2:  [97 %-100 %] 100 %  BP: (128-190)/(70-78) 160/70     Intake/Output last 24 hours:    Intake/Output Summary (Last 24 hours) at 06/25/18 1324  Last data filed at 06/24/18 2353   Gross per 24 hour   Intake              400 ml   Output                1 ml   Net              399 ml       I/O last 3 completed shifts:  In: 3906.3 [P.O.:1760; I.V.:750; Blood:1396.3]  Out: 1176 [Urine:1176]  No intake/output data recorded.    Weight: 84.4 kg (186 lb)  Body mass index is 26.69 kg/m².    Gen: Wd Wn female currently in NAD  Heent: Nc/At, MMM  Eyes: Perrl, Eomi  Cv: RRR, no  M/g/r  Lung: Non-labored breathing, clear bilaterally  Abd: Soft, non-tender, non-distended    Significant Labs:  CBC:   Recent Labs  Lab 06/24/18  0825   WBC 9.09   RBC 3.50*   HGB 9.9*   HCT 29.1*   *   MCV 83   MCH 28.3   MCHC 34.0     BMP:   Recent Labs  Lab 06/24/18  0825   *      K 3.9      CO2 21*   BUN 8   CREATININE 1.2   CALCIUM 8.3*     CMP:   Recent Labs  Lab  06/23/18  1209 06/24/18  0825   * 115*   CALCIUM 8.6* 8.3*   ALBUMIN 3.5  --    PROT 7.3  --     139   K 3.7 3.9   CO2 19* 21*    110   BUN 8 8   CREATININE 1.4 1.2   ALKPHOS 57  --    ALT 12  --    AST 28  --    BILITOT 0.1  --      LFTs:   Recent Labs  Lab 06/23/18  1209   ALT 12   AST 28   ALKPHOS 57   BILITOT 0.1   PROT 7.3   ALBUMIN 3.5     Coagulation:   Recent Labs  Lab 06/23/18  1443   LABPROT 14.1*   INR 1.2     Specimen (12h ago through future)    None        No results for input(s): COLORU, CLARITYU, SPECGRAV, PHUR, PROTEINUA, GLUCOSEU, BILIRUBINCON, BLOODU, WBCU, RBCU, BACTERIA, MUCUS, NITRITE, LEUKOCYTESUR, UROBILINOGEN, HYALINECASTS in the last 168 hours.    Cultures:    Microbiology Results (last 7 days)     ** No results found for the last 168 hours. **          Assessment:   Oriana Tobar is a 72 y.o. female who presents with Acute blood loss anemia.    Active Diagnoses:    Diagnosis Date Noted POA    PRINCIPAL PROBLEM:  Acute blood loss anemia [D62] 01/14/2016 Yes    Anemia [D64.9] 01/27/2016 Yes      Problems Resolved During this Admission:    Diagnosis Date Noted Date Resolved POA     VTE Risk Mitigation     None          Medical Decision Making/Plan:  Plan to proceed to the OR today with EGD and colonoscopy for acute blood loss anemia with iron deficiency in symptomatic require 4 units of red blood cells.  Patient tolerated prep well yesterday hemodynamically stable and hemoglobin hematocrit stable.  Risk and benefits have been discussed, please see note from yesterday.  To OR today.    Torres Son MD  General Surgery  [facility] - Med Surg

## 2018-06-26 LAB
ALBUMIN SERPL BCP-MCNC: 2.9 G/DL
ALP SERPL-CCNC: 51 U/L
ALT SERPL W/O P-5'-P-CCNC: 10 U/L
ANION GAP SERPL CALC-SCNC: 3 MMOL/L
AST SERPL-CCNC: 17 U/L
BASOPHILS # BLD AUTO: 0.08 K/UL
BASOPHILS NFR BLD: 0.8 %
BILIRUB SERPL-MCNC: 0.3 MG/DL
BUN SERPL-MCNC: 9 MG/DL
CALCIUM SERPL-MCNC: 8.2 MG/DL
CHLORIDE SERPL-SCNC: 112 MMOL/L
CO2 SERPL-SCNC: 22 MMOL/L
CREAT SERPL-MCNC: 1 MG/DL
DIFFERENTIAL METHOD: ABNORMAL
EOSINOPHIL # BLD AUTO: 0.3 K/UL
EOSINOPHIL NFR BLD: 2.5 %
ERYTHROCYTE [DISTWIDTH] IN BLOOD BY AUTOMATED COUNT: 18.5 %
EST. GFR  (AFRICAN AMERICAN): >60 ML/MIN/1.73 M^2
EST. GFR  (NON AFRICAN AMERICAN): 56.4 ML/MIN/1.73 M^2
GLUCOSE SERPL-MCNC: 100 MG/DL
HCT VFR BLD AUTO: 28.6 %
HGB BLD-MCNC: 9.2 G/DL
IMM GRANULOCYTES # BLD AUTO: 0.03 K/UL
IMM GRANULOCYTES NFR BLD AUTO: 0.3 %
LYMPHOCYTES # BLD AUTO: 2.6 K/UL
LYMPHOCYTES NFR BLD: 25.4 %
MCH RBC QN AUTO: 27.8 PG
MCHC RBC AUTO-ENTMCNC: 32.2 G/DL
MCV RBC AUTO: 86 FL
MONOCYTES # BLD AUTO: 1 K/UL
MONOCYTES NFR BLD: 9.3 %
NEUTROPHILS # BLD AUTO: 6.4 K/UL
NEUTROPHILS NFR BLD: 61.7 %
NRBC BLD-RTO: 0 /100 WBC
PLATELET # BLD AUTO: 340 K/UL
PMV BLD AUTO: 9.8 FL
POTASSIUM SERPL-SCNC: 4.1 MMOL/L
PROT SERPL-MCNC: 6.1 G/DL
RBC # BLD AUTO: 3.31 M/UL
SODIUM SERPL-SCNC: 137 MMOL/L
WBC # BLD AUTO: 10.38 K/UL

## 2018-06-26 PROCEDURE — 25000003 PHARM REV CODE 250: Performed by: INTERNAL MEDICINE

## 2018-06-26 PROCEDURE — 85025 COMPLETE CBC W/AUTO DIFF WBC: CPT

## 2018-06-26 PROCEDURE — 36415 COLL VENOUS BLD VENIPUNCTURE: CPT

## 2018-06-26 PROCEDURE — G0378 HOSPITAL OBSERVATION PER HR: HCPCS

## 2018-06-26 PROCEDURE — 80053 COMPREHEN METABOLIC PANEL: CPT

## 2018-06-26 RX ORDER — SUCRALFATE 1 G/10ML
1 SUSPENSION ORAL
Qty: 1 BOTTLE | Refills: 11 | Status: SHIPPED | OUTPATIENT
Start: 2018-06-26 | End: 2019-09-18 | Stop reason: SDUPTHER

## 2018-06-26 RX ORDER — SUCRALFATE 1 G/10ML
1 SUSPENSION ORAL
Status: DISCONTINUED | OUTPATIENT
Start: 2018-06-26 | End: 2018-06-26 | Stop reason: HOSPADM

## 2018-06-26 RX ORDER — PANTOPRAZOLE SODIUM 40 MG/1
40 TABLET, DELAYED RELEASE ORAL DAILY
Qty: 30 TABLET | Refills: 11 | Status: SHIPPED | OUTPATIENT
Start: 2018-06-26 | End: 2019-06-26

## 2018-06-26 RX ADMIN — SUCRALFATE 1 G: 1 TABLET ORAL at 06:06

## 2018-06-26 RX ADMIN — SUCRALFATE 1 G: 1 SUSPENSION ORAL at 11:06

## 2018-06-26 NOTE — PLAN OF CARE
Problem: Fall Risk (Adult)  Intervention: Safety Promotion/Fall Prevention   06/26/18 0250   Safety Interventions   Safety Promotion/Fall Prevention assistive device/personal item within reach;commode/urinal/bedpan at bedside;Fall Risk reviewed with patient/family;high risk medications identified;lighting adjusted;medications reviewed;nonskid shoes/socks when out of bed;side rails raised x 2;instructed to call staff for mobility

## 2018-06-26 NOTE — PROGRESS NOTES
[facility] Medicine  Progress Note    Patient Name: Oriana Tobar  MRN: [id number]  Patient Class: OP- Observation   Admission Date: 6/23/2018  Length of Stay: 0 days  Attending Physician: Buck Hudson MD  Primary Care Provider: Carine Baltazar NP        Subjective:     Principal Problem:Acute blood loss anemia    HPI:  Daughter brought to the ER after patient became weak and passed out. Patient very poor historian with moderate dementia. Hx of black stools given after interview one week ago,patient has been asked this question multiple by care medical personal. H/H 4.8/15.7    Hospital Course:  Blood,IVFs,surgery consult,monitor labs,protonix and carafate   6/24 awaiting labs after four units and surgery consult     6/25/18: Patient for EGD/ colonoscopy today                Plan dependent on results    6/26/18:  No obvious source of bleeding noted by Dr. Son.                   Discharge to home on Protonix and Carafate                 fU with PCP in 3-4 days    Interval History: Patient ready for discharge.  No obvious source found for bleeding on double scope    Review of Systems   Constitutional: Negative for activity change, appetite change, chills, diaphoresis, fatigue, fever and unexpected weight change.   HENT: Negative for congestion, drooling, hearing loss and trouble swallowing.    Eyes: Negative for pain and visual disturbance.   Respiratory: Negative.  Negative for apnea, cough, choking, chest tightness, shortness of breath and wheezing.    Cardiovascular: Negative for chest pain, palpitations and leg swelling.   Gastrointestinal: Negative for abdominal distention, abdominal pain, blood in stool, constipation, diarrhea, nausea and vomiting.   Endocrine: Negative for polydipsia, polyphagia and polyuria.   Genitourinary: Negative for difficulty urinating, dysuria and flank pain.   Musculoskeletal: Negative for arthralgias, back pain, gait  problem, joint swelling, neck pain and neck stiffness.   Skin: Negative for color change, rash and wound.   Allergic/Immunologic: Negative for environmental allergies, food allergies and immunocompromised state.   Neurological: Negative for dizziness, syncope, weakness, numbness and headaches.   Hematological: Negative for adenopathy.   Psychiatric/Behavioral: Negative for agitation, confusion and sleep disturbance. The patient is not nervous/anxious.      Objective:     Vital Signs (Most Recent):  Temp: 97.6 °F (36.4 °C) (06/26/18 0717)  Pulse: 67 (06/26/18 0717)  Resp: 16 (06/26/18 0717)  BP: (!) 147/70 (06/26/18 0717)  SpO2: 97 % (06/26/18 1015) Vital Signs (24h Range):  Temp:  [96.1 °F (35.6 °C)-98.3 °F (36.8 °C)] 97.6 °F (36.4 °C)  Pulse:  [53-76] 67  Resp:  [11-19] 16  SpO2:  [97 %-100 %] 97 %  BP: (119-153)/(63-73) 147/70     Weight: 84.4 kg (186 lb)  Body mass index is 26.69 kg/m².    Intake/Output Summary (Last 24 hours) at 06/26/18 1319  Last data filed at 06/26/18 1200   Gross per 24 hour   Intake             1873 ml   Output                0 ml   Net             1873 ml      Physical Exam   Constitutional: She is oriented to person, place, and time. She appears well-developed and well-nourished.   HENT:   Head: Normocephalic and atraumatic.   Right Ear: External ear normal.   Left Ear: External ear normal.   Nose: Nose normal.   Eyes: Conjunctivae, EOM and lids are normal. Pupils are equal, round, and reactive to light.   Neck: Trachea normal, normal range of motion and full passive range of motion without pain. Neck supple.   Cardiovascular: Normal rate, regular rhythm, S1 normal, S2 normal, normal heart sounds, intact distal pulses and normal pulses.    Pulmonary/Chest: Effort normal and breath sounds normal.   Abdominal: Soft. Normal aorta and bowel sounds are normal.   Musculoskeletal: Normal range of motion.   Neurological: She is alert and oriented to person, place, and time. She has normal  reflexes.   Skin: Skin is warm, dry and intact.   Psychiatric: She has a normal mood and affect. Her speech is normal and behavior is normal. Judgment and thought content normal. Cognition and memory are normal.   Nursing note and vitals reviewed.      Significant Labs: All pertinent labs within the past 24 hours have been reviewed.    Significant Imaging: I have reviewed and interpreted all pertinent imaging results/findings within the past 24 hours.    Assessment/Plan:      * Acute blood loss anemia    Upper GI blood loss anemia,transfuse,IVFs,surgery consult,monitor labs  6/24 awaiting lab and consults,stable with no new complaints    6/25/18 :  For EGD and Colonoscopy today    6/26/18:  No obvious source of bleeding found.                 Will discharge on Protonix and Carafate                 FU with PCp in 1 week            VTE Risk Mitigation     None              Tito Mathews MD  Department of Hospital Medicine   [facility] - Med Surg

## 2018-06-26 NOTE — NURSING
PT D/C PER MD ORDER, IV SITE REMOVED WITH CATH INTACT, GAUZE AND TAPE APPLIED, PT YAHIR WELL. D/C INSTRUCTIONS AND RX REVIEWED WITH DAUGHTER AT SIDE, VERB UNDERSTOOD. PT AWAITING RIDE TO HOME. SSRN

## 2018-06-26 NOTE — ASSESSMENT & PLAN NOTE
Upper GI blood loss anemia,transfuse,IVFs,surgery consult,monitor labs  6/24 awaiting lab and consults,stable with no new complaints    6/25/18 :  For EGD and Colonoscopy today    6/26/18:  No obvious source of bleeding found.                 Will discharge on Protonix and Carafate                 FU with PCp in 1 week

## 2018-06-26 NOTE — SUBJECTIVE & OBJECTIVE
Interval History: Patient ready for discharge.  No obvious source found for bleeding on double scope    Review of Systems   Constitutional: Negative for activity change, appetite change, chills, diaphoresis, fatigue, fever and unexpected weight change.   HENT: Negative for congestion, drooling, hearing loss and trouble swallowing.    Eyes: Negative for pain and visual disturbance.   Respiratory: Negative.  Negative for apnea, cough, choking, chest tightness, shortness of breath and wheezing.    Cardiovascular: Negative for chest pain, palpitations and leg swelling.   Gastrointestinal: Negative for abdominal distention, abdominal pain, blood in stool, constipation, diarrhea, nausea and vomiting.   Endocrine: Negative for polydipsia, polyphagia and polyuria.   Genitourinary: Negative for difficulty urinating, dysuria and flank pain.   Musculoskeletal: Negative for arthralgias, back pain, gait problem, joint swelling, neck pain and neck stiffness.   Skin: Negative for color change, rash and wound.   Allergic/Immunologic: Negative for environmental allergies, food allergies and immunocompromised state.   Neurological: Negative for dizziness, syncope, weakness, numbness and headaches.   Hematological: Negative for adenopathy.   Psychiatric/Behavioral: Negative for agitation, confusion and sleep disturbance. The patient is not nervous/anxious.      Objective:     Vital Signs (Most Recent):  Temp: 97.6 °F (36.4 °C) (06/26/18 0717)  Pulse: 67 (06/26/18 0717)  Resp: 16 (06/26/18 0717)  BP: (!) 147/70 (06/26/18 0717)  SpO2: 97 % (06/26/18 1015) Vital Signs (24h Range):  Temp:  [96.1 °F (35.6 °C)-98.3 °F (36.8 °C)] 97.6 °F (36.4 °C)  Pulse:  [53-76] 67  Resp:  [11-19] 16  SpO2:  [97 %-100 %] 97 %  BP: (119-153)/(63-73) 147/70     Weight: 84.4 kg (186 lb)  Body mass index is 26.69 kg/m².    Intake/Output Summary (Last 24 hours) at 06/26/18 1319  Last data filed at 06/26/18 1200   Gross per 24 hour   Intake             1873 ml    Output                0 ml   Net             1873 ml      Physical Exam   Constitutional: She is oriented to person, place, and time. She appears well-developed and well-nourished.   HENT:   Head: Normocephalic and atraumatic.   Right Ear: External ear normal.   Left Ear: External ear normal.   Nose: Nose normal.   Eyes: Conjunctivae, EOM and lids are normal. Pupils are equal, round, and reactive to light.   Neck: Trachea normal, normal range of motion and full passive range of motion without pain. Neck supple.   Cardiovascular: Normal rate, regular rhythm, S1 normal, S2 normal, normal heart sounds, intact distal pulses and normal pulses.    Pulmonary/Chest: Effort normal and breath sounds normal.   Abdominal: Soft. Normal aorta and bowel sounds are normal.   Musculoskeletal: Normal range of motion.   Neurological: She is alert and oriented to person, place, and time. She has normal reflexes.   Skin: Skin is warm, dry and intact.   Psychiatric: She has a normal mood and affect. Her speech is normal and behavior is normal. Judgment and thought content normal. Cognition and memory are normal.   Nursing note and vitals reviewed.      Significant Labs: All pertinent labs within the past 24 hours have been reviewed.    Significant Imaging: I have reviewed and interpreted all pertinent imaging results/findings within the past 24 hours.

## 2018-06-26 NOTE — [employer] PATIENT INSTRUCTIONS
Discharge Summary/Instructions after an Endoscopic Procedure  Patient Name: Oriana Tobar  Patient MRN: [id number]  Patient YOB: 1945 Monday, June 25, 2018  Torres Son MD  RESTRICTIONS:  During your procedure today, you received medications for sedation.  These   medications may affect your judgment, balance and coordination.  Therefore,   for 24 hours, you have the following restrictions:   - DO NOT drive a car, operate machinery, make legal/financial decisions,   sign important papers or drink alcohol.    ACTIVITY:  Today: no heavy lifting, straining or running due to procedural   sedation/anesthesia.  The following day: return to full activity including work.  DIET:  Eat and drink normally unless instructed otherwise.     TREATMENT FOR COMMON SIDE EFFECTS:  - Mild abdominal pain, nausea, belching, bloating or excessive gas:  rest,   eat lightly and use a heating pad.  - Sore Throat: treat with throat lozenges and/or gargle with warm salt   water.  - Because air was used during the procedure, expelling large amounts of air   from your rectum or belching is normal.  - If a bowel prep was taken, you may not have a bowel movement for 1-3 days.    This is normal.  SYMPTOMS TO WATCH FOR AND REPORT TO YOUR PHYSICIAN:  1. Abdominal pain or bloating, other than gas cramps.  2. Chest pain.  3. Back pain.  4. Signs of infection such as: chills or fever occurring within 24 hours   after the procedure.  5. Rectal bleeding, which would show as bright red, maroon, or black stools.   (A tablespoon of blood from the rectum is not serious, especially if   hemorrhoids are present.)  6. Vomiting.  7. Weakness or dizziness.  GO DIRECTLY TO THE NEAREST EMERGENCY ROOM IF YOU HAVE ANY OF THE FOLLOWING:      Difficulty breathing              Chills and/or fever over 101 F   Persistent vomiting and/or vomiting blood   Severe abdominal pain   Severe chest pain   Black, tarry stools   Bleeding- more than one  tablespoon   Any other symptom or condition that you feel may need urgent attention  Your doctor recommends these additional instructions:  If any biopsies were taken, your doctors clinic will contact you in 1 to 2   weeks with any results.  - Repeat colonoscopy in 5 years for surveillance.   - Return to my office in 2 weeks.   - [facility] for [facility].  For questions, problems or results please call your physician - Torres Son MD at Work:  [phone].  [facility] EMERGENCY ROOM PHONE NUMBER: [phone]  IF A COMPLICATION OR EMERGENCY SITUATION ARISES AND YOU ARE UNABLE TO REACH   YOUR PHYSICIAN - GO DIRECTLY TO THE EMERGENCY ROOM.  MD Torres Zhong MD  6/25/2018 4:39:37 PM  This report has been verified and signed electronically.  [employer]

## 2018-06-26 NOTE — [employer] PATIENT INSTRUCTIONS
Discharge Summary/Instructions after an Endoscopic Procedure  Patient Name: Oriana Tobar  Patient MRN: [id number]  Patient YOB: 1945 Monday, June 25, 2018  Torres Son MD  RESTRICTIONS:  During your procedure today, you received medications for sedation.  These   medications may affect your judgment, balance and coordination.  Therefore,   for 24 hours, you have the following restrictions:   - DO NOT drive a car, operate machinery, make legal/financial decisions,   sign important papers or drink alcohol.    ACTIVITY:  Today: no heavy lifting, straining or running due to procedural   sedation/anesthesia.  The following day: return to full activity including work.  DIET:  Eat and drink normally unless instructed otherwise.     TREATMENT FOR COMMON SIDE EFFECTS:  - Mild abdominal pain, nausea, belching, bloating or excessive gas:  rest,   eat lightly and use a heating pad.  - Sore Throat: treat with throat lozenges and/or gargle with warm salt   water.  - Because air was used during the procedure, expelling large amounts of air   from your rectum or belching is normal.  - If a bowel prep was taken, you may not have a bowel movement for 1-3 days.    This is normal.  SYMPTOMS TO WATCH FOR AND REPORT TO YOUR PHYSICIAN:  1. Abdominal pain or bloating, other than gas cramps.  2. Chest pain.  3. Back pain.  4. Signs of infection such as: chills or fever occurring within 24 hours   after the procedure.  5. Rectal bleeding, which would show as bright red, maroon, or black stools.   (A tablespoon of blood from the rectum is not serious, especially if   hemorrhoids are present.)  6. Vomiting.  7. Weakness or dizziness.  GO DIRECTLY TO THE NEAREST EMERGENCY ROOM IF YOU HAVE ANY OF THE FOLLOWING:      Difficulty breathing              Chills and/or fever over 101 F   Persistent vomiting and/or vomiting blood   Severe abdominal pain   Severe chest pain   Black, tarry stools   Bleeding- more than one  tablespoon   Any other symptom or condition that you feel may need urgent attention  Your doctor recommends these additional instructions:  If any biopsies were taken, your doctors clinic will contact you in 1 to 2   weeks with any results.  - [facility].   - Resume previous diet.   - Use Protonix (pantoprazole) 40 mg PO daily.   - Await pathology results.   - Return to my office in 2 weeks.  For questions, problems or results please call your physician - Torres Son MD at Work:  [phone].  [facility] EMERGENCY ROOM PHONE NUMBER: [phone]  IF A COMPLICATION OR EMERGENCY SITUATION ARISES AND YOU ARE UNABLE TO REACH   YOUR PHYSICIAN - GO DIRECTLY TO THE EMERGENCY ROOM.  MD Torres Zhong MD  6/25/2018 4:37:28 PM  This report has been verified and signed electronically.  [employer]

## 2018-06-26 NOTE — PLAN OF CARE
Problem: Patient Care Overview  Goal: Plan of Care Review  Outcome: Ongoing (interventions implemented as appropriate)  Pt is progressing with plan of care. Frequent rounds made to assess pain and safety. Pt OOB with assistance. Bed locked and at lowest position. Side rails up x 2. Call light within reach. Will continue to monitor.

## 2018-06-26 NOTE — DISCHARGE SUMMARY
[facility] Medicine  Discharge Summary      Patient Name: Oriana Tobar  MRN: [id number]  Admission Date: 6/23/2018  Hospital Length of Stay: 0 days  Discharge Date and Time:  06/26/2018 1:29 PM  Attending Physician: Adin Tejada MD   Discharging Provider: Tito Mathews MD  Primary Care Provider: Carine Baltazar NP      HPI:   Daughter brought to the ER after patient became weak and passed out. Patient very poor historian with moderate dementia. Hx of black stools given after interview one week ago,patient has been asked this question multiple by care medical personal. H/H 4.8/15.7    Procedure(s) (LRB):  COLONOSCOPY (N/A)  EGD (ESOPHAGOGASTRODUODENOSCOPY) (N/A)      Hospital Course:   Blood,IVFs,surgery consult,monitor labs,protonix and carafate   6/24 awaiting labs after four units and surgery consult     6/25/18: Patient for EGD/ colonoscopy today                Plan dependent on results    6/26/18:  No obvious source of bleeding noted by Dr. Son.                   Discharge to home on Protonix and Carafate                 fU with PCP in 3-4 days     Consults:   Consults         Status Ordering Provider     Inpatient consult to General Surgery  Once     Provider:  Torres Son MD    Completed ADIN TEJADA.          * Acute blood loss anemia    Upper GI blood loss anemia,transfuse,IVFs,surgery consult,monitor labs  6/24 awaiting lab and consults,stable with no new complaints    6/25/18 :  For EGD and Colonoscopy today    6/26/18:  No obvious source of bleeding found.                 Will discharge on Protonix and Carafate                 FU with PCp in 1 week            Final Active Diagnoses:    Diagnosis Date Noted POA    PRINCIPAL PROBLEM:  Acute blood loss anemia [D62] 01/14/2016 Yes    Iron deficiency anemia due to chronic blood loss [D50.0] 06/25/2018 Yes    Anemia [D64.9] 01/27/2016 Yes      Problems Resolved During  this Admission:    Diagnosis Date Noted Date Resolved POA       Discharged Condition: stable    Disposition:     Follow Up:  Follow-up Information     Carine Baltazar NP.    Specialty:  Family Medicine  Contact information:  [address] [place] [address] [address]  [place] [address] [phone]             Torres Son MD. Go on 7/11/2018.    Specialty:  General Surgery  Why:  appt time is 11:15  Contact information:  [address] [place] [address]  [phone]             Torres Son MD In 1 week.    Specialty:  General Surgery  Contact information:  [address] [place] [address]  [phone]                 Patient Instructions:   No discharge procedures on file.    Significant Diagnostic Studies: Labs: All labs within the past 24 hours have been reviewed    Pending Diagnostic Studies:     Procedure Component Value Units Date/Time    EKG 12-LEAD [[id number]]     Order Status:  Sent Lab Status:  No result          Medications:  Reconciled Home Medications:      Medication List      START taking these medications    pantoprazole 40 MG tablet  Commonly known as:  PROTONIX  Take 1 tablet (40 mg total) by mouth once daily.     sucralfate 100 mg/mL suspension  Commonly known as:  CARAFATE  Take 10 mLs (1 g total) by mouth 4 (four) times daily before meals and nightly.        CONTINUE taking these medications    amLODIPine 5 MG tablet  Commonly known as:  NORVASC  Take 5 mg by mouth once daily.     atorvastatin 20 MG tablet  Commonly known as:  LIPITOR  Take 20 mg by mouth once daily.     losartan 50 MG tablet  Commonly known as:  COZAAR  Take 50 mg by mouth once daily.            Indwelling Lines/Drains at time of discharge:   Lines/Drains/Airways     Airway                 Airway - Non-Surgical 06/25/18 1548 Nasal Cannula less than 1 day                Time spent on the discharge of patient: 40 minutes  Patient was seen and examined on the date of discharge and determined to be  suitable for discharge.         Tito Mathews MD  Department of Hospital Medicine  [facility] - Med Surg

## 2018-06-26 NOTE — PLAN OF CARE
06/26/18 1425   Final Note   Assessment Type Final Discharge Note   Discharge Disposition Home   What phone number can be called within the next 1-3 days to see how you are doing after discharge? [phone]   Hospital Follow Up  Appt(s) scheduled? Yes   Discharge plans and expectations educations in teach back method with documentation complete? Yes   CM rounded to advise patient of follow up appt date and time pt verbalized understanding. TERRIE spoke with her in wheelchair in hallway as she was preparing to leave Tammy Rn escorting pt to PO with family

## 2018-07-10 VITALS
OXYGEN SATURATION: 97 % | BODY MASS INDEX: 26.63 KG/M2 | TEMPERATURE: 98 F | SYSTOLIC BLOOD PRESSURE: 147 MMHG | HEART RATE: 67 BPM | DIASTOLIC BLOOD PRESSURE: 70 MMHG | HEIGHT: 70 IN | RESPIRATION RATE: 16 BRPM | WEIGHT: 186 LBS

## 2018-07-11 ENCOUNTER — TELEPHONE (OUTPATIENT)
Dept: GASTROENTEROLOGY | Facility: CLINIC | Age: 73
End: 2018-07-11

## 2018-07-11 ENCOUNTER — OFFICE VISIT (OUTPATIENT)
Dept: SURGERY | Facility: CLINIC | Age: 73
End: 2018-07-11
Payer: MEDICARE

## 2018-07-11 VITALS
WEIGHT: 134 LBS | HEART RATE: 82 BPM | BODY MASS INDEX: 20.31 KG/M2 | HEIGHT: 68 IN | TEMPERATURE: 98 F | OXYGEN SATURATION: 99 % | DIASTOLIC BLOOD PRESSURE: 63 MMHG | SYSTOLIC BLOOD PRESSURE: 115 MMHG | RESPIRATION RATE: 18 BRPM

## 2018-07-11 DIAGNOSIS — K92.1 HEMATOCHEZIA: ICD-10-CM

## 2018-07-11 DIAGNOSIS — K29.70 GASTRITIS, PRESENCE OF BLEEDING UNSPECIFIED, UNSPECIFIED CHRONICITY, UNSPECIFIED GASTRITIS TYPE: ICD-10-CM

## 2018-07-11 DIAGNOSIS — D12.6 TUBULAR ADENOMA OF COLON: Primary | ICD-10-CM

## 2018-07-11 PROCEDURE — 99213 OFFICE O/P EST LOW 20 MIN: CPT | Mod: S$GLB,,, | Performed by: SURGERY

## 2018-07-11 PROCEDURE — 3074F SYST BP LT 130 MM HG: CPT | Mod: CPTII,S$GLB,, | Performed by: SURGERY

## 2018-07-11 PROCEDURE — 3078F DIAST BP <80 MM HG: CPT | Mod: CPTII,S$GLB,, | Performed by: SURGERY

## 2018-07-11 NOTE — PROGRESS NOTES
[facility]  Follow-up    Subjective:       Patient ID: Oriana Tobar is a 73 y.o. female.    Chief Complaint: Follow-up (EGD Cscope)      HPI:  Oriana Tobar is a 73 y.o. female presents today for follow-up examination after EGD and colonoscopy after admission to the hospital for a gastrointestinal bleed with severe anemia requiring 4 units of red blood cell transfusion.  Patient underwent EGD and colonoscopy.  Gastritis was found in the antrum of the stomach.  Biopsy negative for Helicobacter pylori species.  Lower endoscopy showed 1 colon polyp, pathology consistent with tubular adenoma.  Patient presents today for repeat examination.  Since there hospital [facility] the patient had a recurrent episode of bleeding which resulted in admission to [facility] where double endoscopy was done once again which showed no evidence of active bleeding per the family.  Patient stated the doctors told her she needed a pill camera to evaluate her small intestine.    Review of Systems   Constitutional: Negative for activity change, appetite change, chills and fever.   HENT: Negative for congestion, dental problem and ear discharge.    Eyes: Negative for discharge and itching.   Respiratory: Negative for apnea, choking and chest tightness.    Cardiovascular: Negative for chest pain and leg swelling.   Gastrointestinal: Negative for abdominal distention, abdominal pain, anal bleeding, constipation, diarrhea and nausea.   Endocrine: Negative for cold intolerance and heat intolerance.   Genitourinary: Negative for difficulty urinating and dyspareunia.   Musculoskeletal: Negative for arthralgias and back pain.   Skin: Negative for color change and pallor.   Neurological: Negative for dizziness and facial asymmetry.   Hematological: Negative for adenopathy. Does not bruise/bleed easily.   Psychiatric/Behavioral: Negative for agitation and behavioral problems.       Objective:      Vitals:     "07/11/18 0819   BP: 115/63   Pulse: 82   Resp: 18   Temp: 97.5 °F (36.4 °C)   TempSrc: Oral   SpO2: 99%   Weight: 60.8 kg (134 lb)   Height: 5' 8" (1.727 m)     Physical Exam   Constitutional: She is oriented to person, place, and time. She appears well-developed and well-nourished. No distress.   HENT:   Head: Normocephalic and atraumatic.   Eyes: EOM are normal. Pupils are equal, round, and reactive to light.   Neck: Normal range of motion. Neck supple. No thyromegaly present.   Cardiovascular: Normal rate and regular rhythm.    Pulmonary/Chest: Effort normal and breath sounds normal.   Abdominal: Soft. Bowel sounds are normal. She exhibits no distension. There is no tenderness.   Musculoskeletal: Normal range of motion. She exhibits no edema or deformity.   Neurological: She is alert and oriented to person, place, and time. No cranial nerve deficit.   Skin: Skin is warm. Capillary refill takes less than 2 seconds. No erythema.   Psychiatric: She has a normal mood and affect. Her behavior is normal.       Lab Review:   CBC:   Lab Results   Component Value Date    WBC 10.38 06/26/2018    RBC 3.31 (L) 06/26/2018    HGB 9.2 (L) 06/26/2018    HCT 28.6 (L) 06/26/2018    HCT 23 (L) 01/27/2016     06/26/2018     BMP:   Lab Results   Component Value Date     06/26/2018     06/26/2018    K 4.1 06/26/2018     (H) 06/26/2018    CO2 22 (L) 06/26/2018    BUN 9 06/26/2018    CREATININE 1.0 06/26/2018    CALCIUM 8.2 (L) 06/26/2018     Diagnostics Review: Pathology reviewed from hospital stay. One tubular adenoma of the right colon.  Gastritis of th stomach without evidence of H pylori     Assessment:       1. Tubular adenoma of colon    2. Gastritis, presence of bleeding unspecified, unspecified chronicity, unspecified gastritis type        Plan:   Tubular adenoma of colon    Gastritis, presence of bleeding unspecified, unspecified chronicity, unspecified gastritis type      Medical Decision " Making/Counseling:  Patient has had a recurrent episode of hematochezia resulting in another hospital perform an EGD and colonoscopy which were negative. She also received 2 more units of blood.  She at this point is in need of a pill camera to evaluate her small intestine.  We here at [facility] do not have the ability for PillCams.  I will get the patient referred out to a sister [facility] for evaluation of pill camera given 2 episodes of GI bleed without evidence on EGD or colonoscopy of active bleeding.  From a standpoint of the tubular adenoma the colon, the patient will need a 5 year follow-up colonoscopy.  From a standpoint of the gastritis, continue pantoprazole and sucralfate therapy.  Follow up with surgery in [place]

## 2018-07-13 ENCOUNTER — OFFICE VISIT (OUTPATIENT)
Dept: GASTROENTEROLOGY | Facility: CLINIC | Age: 73
End: 2018-07-13
Payer: MEDICARE

## 2018-07-13 VITALS
WEIGHT: 176 LBS | HEART RATE: 86 BPM | SYSTOLIC BLOOD PRESSURE: 160 MMHG | HEIGHT: 68 IN | RESPIRATION RATE: 16 BRPM | BODY MASS INDEX: 26.67 KG/M2 | DIASTOLIC BLOOD PRESSURE: 81 MMHG

## 2018-07-13 DIAGNOSIS — K92.1 HEMATOCHEZIA: ICD-10-CM

## 2018-07-13 DIAGNOSIS — D50.0 IRON DEFICIENCY ANEMIA DUE TO CHRONIC BLOOD LOSS: Primary | ICD-10-CM

## 2018-07-13 DIAGNOSIS — D62 ACUTE BLOOD LOSS ANEMIA: ICD-10-CM

## 2018-07-13 DIAGNOSIS — F03.90 DEMENTIA WITHOUT BEHAVIORAL DISTURBANCE, UNSPECIFIED DEMENTIA TYPE: ICD-10-CM

## 2018-07-13 DIAGNOSIS — K57.31 DIVERTICULOSIS OF LARGE INTESTINE WITH HEMORRHAGE: ICD-10-CM

## 2018-07-13 PROCEDURE — 99999 PR PBB SHADOW E&M-EST. PATIENT-LVL III: CPT | Mod: PBBFAC,,, | Performed by: INTERNAL MEDICINE

## 2018-07-13 PROCEDURE — 3077F SYST BP >= 140 MM HG: CPT | Mod: CPTII,S$GLB,, | Performed by: INTERNAL MEDICINE

## 2018-07-13 PROCEDURE — 3079F DIAST BP 80-89 MM HG: CPT | Mod: CPTII,S$GLB,, | Performed by: INTERNAL MEDICINE

## 2018-07-13 PROCEDURE — 99204 OFFICE O/P NEW MOD 45 MIN: CPT | Mod: S$GLB,,, | Performed by: INTERNAL MEDICINE

## 2018-07-13 NOTE — PROGRESS NOTES
"Subjective:       Patient ID: Oriana Tobar is a 73 y.o. female.    This patient is new to me.  Referring MD:  Dr. Son for anemia.      Chief Complaint: Anemia    Patient seen for anemia, recent onset, acute, severe, associated with hematochezia per the patient's family member who related the history and was present for the interview.  She had onset of this a couple of weeks ago and had 2 episodes of hematochezia.  This has since resolved.  She was evaluated and transfused at [facility] and EGD and colonoscopy were done per notes and images by Dr. Son which were reviewed independently.  EGD showed gastritis. She had one adenoma and nonbleeding diverticulosis noted on colonoscopy.  Referral sent for small bowel evaluation for anemia.  No other acute complaints.  Patient has dementia at baseline but is able to answer yes/no questions.      Review of Systems   Constitutional: Negative for chills, fatigue and fever.   HENT: Negative for sore throat and trouble swallowing.    Respiratory: Negative for cough, shortness of breath and wheezing.    Cardiovascular: Negative for chest pain and palpitations.   Gastrointestinal: Positive for blood in stool. Negative for abdominal pain, nausea and vomiting.   Genitourinary: Negative for dysuria and hematuria.   Musculoskeletal: Negative for arthralgias and myalgias.   Skin: Negative for color change and rash.   Neurological: Negative for dizziness and headaches.   Hematological: Negative for adenopathy.   Psychiatric/Behavioral: Positive for confusion. The patient is not nervous/anxious.    All other systems reviewed and are negative.      Objective:       Vitals:    07/13/18 0838   BP: (!) 160/81   Pulse: 86   Resp: 16   Weight: 79.8 kg (176 lb)   Height: 5' 8" (1.727 m)       Physical Exam   Constitutional: She appears well-developed and well-nourished.   HENT:   Head: Normocephalic and atraumatic.   Eyes: Pupils are equal, round, and " reactive to light. No scleral icterus.   Neck: Normal range of motion.   Cardiovascular: Normal rate and regular rhythm.    No murmur heard.  Pulmonary/Chest: Effort normal and breath sounds normal. She has no wheezes.   Abdominal: Soft. Bowel sounds are normal. She exhibits no distension. There is no tenderness.   Musculoskeletal: She exhibits no edema or tenderness.   Lymphadenopathy:     She has no cervical adenopathy.   Neurological: She is alert.   + dementia     Skin: Skin is warm and dry. No rash noted.         Lab Results   Component Value Date    WBC 10.38 06/26/2018    HGB 9.2 (L) 06/26/2018    HCT 28.6 (L) 06/26/2018    MCV 86 06/26/2018     06/26/2018       Lab Results   Component Value Date    IRON 7 (L) 06/23/2018    TIBC 491 (H) 06/23/2018    FERRITIN 11 (L) 06/23/2018       Old records from Dr. Son reviewed and are as summarized above in the HPI.      Further history was obtained from the patient's family member who was present throughout the interview and states that she has not had further bleeding since above episode.  History is otherwise as above in the HPI.         Assessment:       1. Iron deficiency anemia due to chronic blood loss    2. Acute blood loss anemia    3. Diverticulosis of large intestine with hemorrhage    4. Hematochezia    5. Dementia without behavioral disturbance, unspecified dementia type        Plan:       1.  Schedule pillcam to complete workup of SONIA.  If negative, then source of anemia was likely hemorrhage secondary to diverticulosis.  2.  High fiber diet  3.  Further recommendations to follow after above.  4.  Communication will be sent to the referring MD, Dr. Son regarding my assessment and plan on this patient via EPIC.

## 2018-07-13 NOTE — LETTER
July 13, 2018      Torres Son MD  [address] [place] [address]           [facility] Gastro[facility]  [address]  [facility] [address]  Phone: [phone]          Patient: Oriana Tobar   MR Number: [id number]   YOB: 1945   Date of Visit: 7/13/2018       Dear Dr. Torres Son:    Thank you for referring Oriana Tobar to me for evaluation. Attached you will find relevant portions of my assessment and plan of care.    If you have questions, please do not hesitate to call me. I look forward to following Oriana Tobar along with you.    Sincerely,    Rocco Ross MD    Enclosure  CC:  No Recipients    If you would like to receive this communication electronically, please contact [email or web address] or [phone] to request more information on [facility] access.    For providers and/or their staff who would like to refer a patient to [facility], please contact us through our one-stop-shop provider referral line, [facility], at [phone].    If you feel you have received this communication in error or would no longer like to receive these types of communications, please e-mail [email or web address]

## 2018-08-06 ENCOUNTER — HOSPITAL ENCOUNTER (OUTPATIENT)
Facility: HOSPITAL | Age: 73
Discharge: HOME | End: 2018-08-06
Attending: INTERNAL MEDICINE | Admitting: INTERNAL MEDICINE
Payer: MEDICARE

## 2018-08-06 ENCOUNTER — SURGERY (OUTPATIENT)
Age: 73
End: 2018-08-06

## 2018-08-06 VITALS
WEIGHT: 176 LBS | RESPIRATION RATE: 17 BRPM | HEIGHT: 68 IN | OXYGEN SATURATION: 100 % | DIASTOLIC BLOOD PRESSURE: 67 MMHG | TEMPERATURE: 98 F | HEART RATE: 74 BPM | SYSTOLIC BLOOD PRESSURE: 142 MMHG | BODY MASS INDEX: 26.67 KG/M2

## 2018-08-06 DIAGNOSIS — D50.0 IRON DEFICIENCY ANEMIA DUE TO CHRONIC BLOOD LOSS: Primary | ICD-10-CM

## 2018-08-06 DIAGNOSIS — K92.1 HEMATOCHEZIA: ICD-10-CM

## 2018-08-06 PROCEDURE — 25000003 PHARM REV CODE 250: Performed by: INTERNAL MEDICINE

## 2018-08-06 PROCEDURE — 91110 GI TRC IMG INTRAL ESOPH-ILE: CPT | Mod: 26,,, | Performed by: INTERNAL MEDICINE

## 2018-08-06 PROCEDURE — 91110 GI TRC IMG INTRAL ESOPH-ILE: CPT | Performed by: INTERNAL MEDICINE

## 2018-08-06 RX ORDER — DEXTROMETHORPHAN/PSEUDOEPHED 2.5-7.5/.8
20 DROPS ORAL ONCE
Status: COMPLETED | OUTPATIENT
Start: 2018-08-06 | End: 2018-08-06

## 2018-08-06 RX ORDER — DEXTROMETHORPHAN/PSEUDOEPHED 2.5-7.5/.8
DROPS ORAL
Status: DISCONTINUED
Start: 2018-08-06 | End: 2018-08-06 | Stop reason: HOSPADM

## 2018-08-06 RX ADMIN — SIMETHICONE 20 MG: 20 SUSPENSION/ DROPS ORAL at 07:08

## 2018-08-07 NOTE — PROVATION PATIENT INSTRUCTIONS
Discharge Summary/Instructions after an Endoscopic Procedure  Patient Name: Oriana Tobar  Patient MRN: [id number]  Patient YOB: 1945 Monday, August 06, 2018  Rocco Ventura MD  RESTRICTIONS:  During your procedure today, you received medications for sedation.  These   medications may affect your judgment, balance and coordination.  Therefore,   for 24 hours, you have the following restrictions:   - DO NOT drive a car, operate machinery, make legal/financial decisions,   sign important papers or drink alcohol.    ACTIVITY:  Today: no heavy lifting, straining or running due to procedural   sedation/anesthesia.  The following day: return to full activity including work.  DIET:  Eat and drink normally unless instructed otherwise.     TREATMENT FOR COMMON SIDE EFFECTS:  - Mild abdominal pain, nausea, belching, bloating or excessive gas:  rest,   eat lightly and use a heating pad.  - Sore Throat: treat with throat lozenges and/or gargle with warm salt   water.  - Because air was used during the procedure, expelling large amounts of air   from your rectum or belching is normal.  - If a bowel prep was taken, you may not have a bowel movement for 1-3 days.    This is normal.  SYMPTOMS TO WATCH FOR AND REPORT TO YOUR PHYSICIAN:  1. Abdominal pain or bloating, other than gas cramps.  2. Chest pain.  3. Back pain.  4. Signs of infection such as: chills or fever occurring within 24 hours   after the procedure.  5. Rectal bleeding, which would show as bright red, maroon, or black stools.   (A tablespoon of blood from the rectum is not serious, especially if   hemorrhoids are present.)  6. Vomiting.  7. Weakness or dizziness.  GO DIRECTLY TO THE NEAREST EMERGENCY ROOM IF YOU HAVE ANY OF THE FOLLOWING:      Difficulty breathing              Chills and/or fever over 101 F   Persistent vomiting and/or vomiting blood   Severe abdominal pain   Severe chest pain   Black, tarry stools   Bleeding- more than one  tablespoon   Any other symptom or condition that you feel may need urgent attention  Your doctor recommends these additional instructions:  If any biopsies were taken, your doctors clinic will contact you in 1 to 2   weeks with any results.  1.  Will contact patient to review results and order cross sectional   imaging  2.  Consider referral to [facility] for deep enteroscopy to varinder site,   followed by surgical referral if no evidence of metastasis.  3.  Further recommendations to follow after above.  For questions, problems or results please call your physician - Rocco Ventura MD at Work:  [phone].  [facility], EMERGENCY ROOM PHONE NUMBER: [phone]  IF A COMPLICATION OR EMERGENCY SITUATION ARISES AND YOU ARE UNABLE TO REACH   YOUR PHYSICIAN - GO DIRECTLY TO THE EMERGENCY ROOM.  Rocco Ventura MD  8/7/2018 11:53:08 AM  This report has been verified and signed electronically.  [employer]

## 2018-08-08 ENCOUNTER — TELEPHONE (OUTPATIENT)
Dept: GASTROENTEROLOGY | Facility: CLINIC | Age: 73
End: 2018-08-08

## 2018-08-08 DIAGNOSIS — D49.0 SMALL BOWEL TUMOR: Primary | ICD-10-CM

## 2018-08-08 NOTE — TELEPHONE ENCOUNTER
Spoke with patient's daughter regarding findings on capsule endoscopy.  Also spoke with Dr. Santos regarding optimal imaging study to obtain.  Plan is for CT with PO and IV contrast.  Following this, and depending on findings, may need to refer to [facility] for enteroscopy.

## 2018-08-13 ENCOUNTER — HOSPITAL ENCOUNTER (OUTPATIENT)
Dept: RADIOLOGY | Facility: HOSPITAL | Age: 73
Discharge: HOME | End: 2018-08-13
Attending: INTERNAL MEDICINE
Payer: MEDICARE

## 2018-08-13 DIAGNOSIS — R93.3 ABNORMAL FINDING ON GI TRACT IMAGING: Primary | ICD-10-CM

## 2018-08-13 DIAGNOSIS — D49.0 SMALL BOWEL TUMOR: ICD-10-CM

## 2018-08-13 PROCEDURE — 74177 CT ABD & PELVIS W/CONTRAST: CPT | Mod: 26,,, | Performed by: RADIOLOGY

## 2018-08-13 PROCEDURE — 74177 CT ABD & PELVIS W/CONTRAST: CPT | Mod: TC

## 2018-08-13 PROCEDURE — 25500020 PHARM REV CODE 255: Performed by: INTERNAL MEDICINE

## 2018-08-13 RX ADMIN — IOHEXOL 30 ML: 300 INJECTION, SOLUTION INTRAVENOUS at 10:08

## 2018-08-13 RX ADMIN — IOHEXOL 50 ML: 350 INJECTION, SOLUTION INTRAVENOUS at 12:08

## 2018-08-14 ENCOUNTER — TELEPHONE (OUTPATIENT)
Dept: GASTROENTEROLOGY | Facility: CLINIC | Age: 73
End: 2018-08-14

## 2018-08-20 ENCOUNTER — TELEPHONE (OUTPATIENT)
Dept: GASTROENTEROLOGY | Facility: CLINIC | Age: 73
End: 2018-08-20

## 2018-08-21 ENCOUNTER — TELEPHONE (OUTPATIENT)
Dept: GASTROENTEROLOGY | Facility: CLINIC | Age: 73
End: 2018-08-21

## 2018-08-21 DIAGNOSIS — K63.89 MESENTERIC MASS: Primary | ICD-10-CM

## 2018-08-21 NOTE — TELEPHONE ENCOUNTER
"Spoke with patient's daughter by phone regarding results of CT scan.  Recommended CT guided biopsy of LUQ lesion after discussion with radiology as well as referral to surgical oncology at [facility].  She understands and agrees.     She did voice her concern and displeasure that the results of the CT scan were released so soon after being done and "before the doctor even had a chance to look at it."  She stated that she found that this caused a lot of anxiety and feels like "getting an email with information like that (on her birthday) could make someone want to kill themselves."  I discussed it with her at length and she understands that this is a system setting and that time was required to discuss the case with consultants and radiology and to formulate a plan before calling about it.  She thanked me and stated that she understood and that she was in agreement with the plan as discussed.    "

## 2018-08-22 ENCOUNTER — TELEPHONE (OUTPATIENT)
Dept: GASTROENTEROLOGY | Facility: CLINIC | Age: 73
End: 2018-08-22

## 2018-08-22 NOTE — TELEPHONE ENCOUNTER
Left message for patient to call office for ct scheduling and referral to surgical oncology at [facility]

## 2018-08-22 NOTE — TELEPHONE ENCOUNTER
----- Message from Yana Anaya sent at 8/22/2018  4:38 PM CDT -----  Returning your call.  Please call Tonny at [phone]

## 2018-08-24 DIAGNOSIS — K63.89 MESENTERIC MASS: Primary | ICD-10-CM

## 2018-08-24 NOTE — NURSING
Attempted to contact pt to go over instructions for procedure scheduled thursday Aug 30th. No answer/ LMOM will try again.

## 2018-08-28 ENCOUNTER — TELEPHONE (OUTPATIENT)
Dept: RADIOLOGY | Facility: HOSPITAL | Age: 73
End: 2018-08-28

## 2018-08-28 NOTE — NURSING
Attempted to contact patient to review pre procedure instructions for up coming procedure this week. Left voice mail for patient to contact radiology nurse.

## 2018-08-30 ENCOUNTER — HOSPITAL ENCOUNTER (OUTPATIENT)
Dept: RADIOLOGY | Facility: HOSPITAL | Age: 73
Discharge: HOME | End: 2018-08-30
Attending: RADIOLOGY
Payer: MEDICARE

## 2018-08-30 ENCOUNTER — HOSPITAL ENCOUNTER (OUTPATIENT)
Facility: HOSPITAL | Age: 73
Discharge: HOME | End: 2018-08-31
Attending: INTERNAL MEDICINE | Admitting: INTERNAL MEDICINE
Payer: MEDICARE

## 2018-08-30 ENCOUNTER — HOSPITAL ENCOUNTER (OUTPATIENT)
Dept: RADIOLOGY | Facility: HOSPITAL | Age: 73
Discharge: HOME | End: 2018-08-30
Attending: INTERNAL MEDICINE
Payer: MEDICARE

## 2018-08-30 VITALS
TEMPERATURE: 99 F | DIASTOLIC BLOOD PRESSURE: 59 MMHG | HEIGHT: 70 IN | WEIGHT: 186 LBS | SYSTOLIC BLOOD PRESSURE: 124 MMHG | BODY MASS INDEX: 26.63 KG/M2 | HEART RATE: 68 BPM | RESPIRATION RATE: 18 BRPM | OXYGEN SATURATION: 94 %

## 2018-08-30 DIAGNOSIS — C64.9 RENAL CELL CARCINOMA, UNSPECIFIED LATERALITY: ICD-10-CM

## 2018-08-30 DIAGNOSIS — I10 ESSENTIAL HYPERTENSION: Primary | ICD-10-CM

## 2018-08-30 DIAGNOSIS — F03.90 DEMENTIA WITHOUT BEHAVIORAL DISTURBANCE, UNSPECIFIED DEMENTIA TYPE: ICD-10-CM

## 2018-08-30 DIAGNOSIS — N28.89 KIDNEY MASS: Primary | ICD-10-CM

## 2018-08-30 DIAGNOSIS — Z90.5 STATUS POST NEPHRECTOMY: ICD-10-CM

## 2018-08-30 DIAGNOSIS — K63.89 MESENTERIC MASS: ICD-10-CM

## 2018-08-30 DIAGNOSIS — R58 RETROPERITONEAL HEMORRHAGE: ICD-10-CM

## 2018-08-30 DIAGNOSIS — R58 HEMORRHAGE: ICD-10-CM

## 2018-08-30 DIAGNOSIS — D50.0 IRON DEFICIENCY ANEMIA DUE TO CHRONIC BLOOD LOSS: ICD-10-CM

## 2018-08-30 LAB
ABO + RH BLD: NORMAL
ANISOCYTOSIS BLD QL SMEAR: ABNORMAL
BASOPHILS NFR BLD: 0 %
BLD GP AB SCN CELLS X3 SERPL QL: NORMAL
CK MB SERPL-MCNC: 0.5 NG/ML
CK MB SERPL-MCNC: 0.6 NG/ML
CK MB SERPL-RTO: 1.1 %
CK MB SERPL-RTO: 1.1 %
CK SERPL-CCNC: 46 U/L
CK SERPL-CCNC: 56 U/L
DACRYOCYTES BLD QL SMEAR: ABNORMAL
DIFFERENTIAL METHOD: ABNORMAL
EOSINOPHIL NFR BLD: 1 %
ERYTHROCYTE [DISTWIDTH] IN BLOOD BY AUTOMATED COUNT: 19.2 %
FOLATE SERPL-MCNC: 19.6 NG/ML
HCT VFR BLD AUTO: 25.1 %
HCT VFR BLD AUTO: 26.2 %
HGB BLD-MCNC: 7.6 G/DL
HGB BLD-MCNC: 7.7 G/DL
HYPOCHROMIA BLD QL SMEAR: ABNORMAL
IRON SERPL-MCNC: 94 UG/DL
LYMPHOCYTES NFR BLD: 17 %
MCH RBC QN AUTO: 22.7 PG
MCHC RBC AUTO-ENTMCNC: 29.6 G/DL
MCV RBC AUTO: 77 FL
MONOCYTES NFR BLD: 1 %
NEUTROPHILS NFR BLD: 81 %
OVALOCYTES BLD QL SMEAR: ABNORMAL
PLATELET # BLD AUTO: 579 K/UL
PLATELET BLD QL SMEAR: ABNORMAL
PMV BLD AUTO: 7.5 FL
POIKILOCYTOSIS BLD QL SMEAR: SLIGHT
RBC # BLD AUTO: 3.41 M/UL
SATURATED IRON: 27 %
SCHISTOCYTES BLD QL SMEAR: ABNORMAL
SCHISTOCYTES BLD QL SMEAR: PRESENT
TARGETS BLD QL SMEAR: ABNORMAL
TOTAL IRON BINDING CAPACITY: 348 UG/DL
TRANSFERRIN SERPL-MCNC: 235 MG/DL
TROPONIN I SERPL DL<=0.01 NG/ML-MCNC: <0.006 NG/ML
TROPONIN I SERPL DL<=0.01 NG/ML-MCNC: <0.006 NG/ML
TSH SERPL DL<=0.005 MIU/L-ACNC: 1.22 UIU/ML
VIT B12 SERPL-MCNC: 705 PG/ML
WBC # BLD AUTO: 11.2 K/UL

## 2018-08-30 PROCEDURE — 86901 BLOOD TYPING SEROLOGIC RH(D): CPT

## 2018-08-30 PROCEDURE — 85014 HEMATOCRIT: CPT | Mod: 91

## 2018-08-30 PROCEDURE — 85018 HEMOGLOBIN: CPT

## 2018-08-30 PROCEDURE — 85027 COMPLETE CBC AUTOMATED: CPT | Mod: 91

## 2018-08-30 PROCEDURE — 84443 ASSAY THYROID STIM HORMONE: CPT

## 2018-08-30 PROCEDURE — G0379 DIRECT REFER HOSPITAL OBSERV: HCPCS

## 2018-08-30 PROCEDURE — 82553 CREATINE MB FRACTION: CPT

## 2018-08-30 PROCEDURE — 82607 VITAMIN B-12: CPT

## 2018-08-30 PROCEDURE — 99220 PR INITIAL OBSERVATION CARE,LEVL III: CPT | Mod: ,,, | Performed by: INTERNAL MEDICINE

## 2018-08-30 PROCEDURE — 86920 COMPATIBILITY TEST SPIN: CPT

## 2018-08-30 PROCEDURE — 88342 IMHCHEM/IMCYTCHM 1ST ANTB: CPT | Mod: 26,,, | Performed by: PATHOLOGY

## 2018-08-30 PROCEDURE — 74176 CT ABD & PELVIS W/O CONTRAST: CPT | Mod: TC

## 2018-08-30 PROCEDURE — 82550 ASSAY OF CK (CPK): CPT

## 2018-08-30 PROCEDURE — 49180 BIOPSY ABDOMINAL MASS: CPT | Mod: ,,, | Performed by: RADIOLOGY

## 2018-08-30 PROCEDURE — 36415 COLL VENOUS BLD VENIPUNCTURE: CPT

## 2018-08-30 PROCEDURE — 77012 CT SCAN FOR NEEDLE BIOPSY: CPT | Mod: 26,,, | Performed by: RADIOLOGY

## 2018-08-30 PROCEDURE — 88305 TISSUE EXAM BY PATHOLOGIST: CPT | Performed by: PATHOLOGY

## 2018-08-30 PROCEDURE — 74176 CT ABD & PELVIS W/O CONTRAST: CPT | Mod: 26,,, | Performed by: RADIOLOGY

## 2018-08-30 PROCEDURE — 77012 CT SCAN FOR NEEDLE BIOPSY: CPT | Mod: TC

## 2018-08-30 PROCEDURE — 25000003 PHARM REV CODE 250: Performed by: INTERNAL MEDICINE

## 2018-08-30 PROCEDURE — 85007 BL SMEAR W/DIFF WBC COUNT: CPT | Mod: 91

## 2018-08-30 PROCEDURE — 63600175 PHARM REV CODE 636 W HCPCS

## 2018-08-30 PROCEDURE — 25000003 PHARM REV CODE 250: Performed by: NURSE PRACTITIONER

## 2018-08-30 PROCEDURE — 83540 ASSAY OF IRON: CPT

## 2018-08-30 PROCEDURE — 25000003 PHARM REV CODE 250

## 2018-08-30 PROCEDURE — 82746 ASSAY OF FOLIC ACID SERUM: CPT

## 2018-08-30 PROCEDURE — G0378 HOSPITAL OBSERVATION PER HR: HCPCS

## 2018-08-30 PROCEDURE — 88341 IMHCHEM/IMCYTCHM EA ADD ANTB: CPT | Mod: 26,,, | Performed by: PATHOLOGY

## 2018-08-30 PROCEDURE — 84484 ASSAY OF TROPONIN QUANT: CPT

## 2018-08-30 RX ORDER — PANTOPRAZOLE SODIUM 40 MG/1
40 TABLET, DELAYED RELEASE ORAL DAILY
Status: DISCONTINUED | OUTPATIENT
Start: 2018-08-31 | End: 2018-08-31 | Stop reason: HOSPADM

## 2018-08-30 RX ORDER — FERROUS SULFATE 325(65) MG
325 TABLET, DELAYED RELEASE (ENTERIC COATED) ORAL DAILY
Status: DISCONTINUED | OUTPATIENT
Start: 2018-08-31 | End: 2018-08-31 | Stop reason: HOSPADM

## 2018-08-30 RX ORDER — ATORVASTATIN CALCIUM 20 MG/1
20 TABLET, FILM COATED ORAL DAILY
Status: DISCONTINUED | OUTPATIENT
Start: 2018-08-31 | End: 2018-08-31 | Stop reason: HOSPADM

## 2018-08-30 RX ORDER — SUCRALFATE 1 G/10ML
1 SUSPENSION ORAL
Status: DISCONTINUED | OUTPATIENT
Start: 2018-08-30 | End: 2018-08-31 | Stop reason: HOSPADM

## 2018-08-30 RX ORDER — MIDAZOLAM HYDROCHLORIDE 2 MG/2ML
1 INJECTION, SOLUTION INTRAMUSCULAR; INTRAVENOUS
Status: DISCONTINUED | OUTPATIENT
Start: 2018-08-30 | End: 2018-08-31 | Stop reason: HOSPADM

## 2018-08-30 RX ORDER — LIDOCAINE HYDROCHLORIDE 10 MG/ML
INJECTION, SOLUTION EPIDURAL; INFILTRATION; INTRACAUDAL; PERINEURAL
Status: COMPLETED
Start: 2018-08-30 | End: 2018-08-30

## 2018-08-30 RX ORDER — FERROUS SULFATE 325(65) MG
325 TABLET, DELAYED RELEASE (ENTERIC COATED) ORAL DAILY
COMMUNITY
End: 2019-10-30 | Stop reason: SDUPTHER

## 2018-08-30 RX ORDER — ACETAMINOPHEN 325 MG/1
650 TABLET ORAL EVERY 6 HOURS PRN
Status: DISCONTINUED | OUTPATIENT
Start: 2018-08-30 | End: 2018-08-31 | Stop reason: HOSPADM

## 2018-08-30 RX ORDER — FENTANYL CITRATE 50 UG/ML
INJECTION, SOLUTION INTRAMUSCULAR; INTRAVENOUS
Status: COMPLETED
Start: 2018-08-30 | End: 2018-08-30

## 2018-08-30 RX ORDER — HYDROCODONE BITARTRATE AND ACETAMINOPHEN 500; 5 MG/1; MG/1
TABLET ORAL
Status: DISCONTINUED | OUTPATIENT
Start: 2018-08-30 | End: 2018-08-31 | Stop reason: HOSPADM

## 2018-08-30 RX ORDER — FENTANYL CITRATE 50 UG/ML
25 INJECTION, SOLUTION INTRAMUSCULAR; INTRAVENOUS
Status: DISCONTINUED | OUTPATIENT
Start: 2018-08-30 | End: 2018-08-31 | Stop reason: HOSPADM

## 2018-08-30 RX ORDER — MIDAZOLAM HYDROCHLORIDE 1 MG/ML
INJECTION, SOLUTION INTRAMUSCULAR; INTRAVENOUS
Status: COMPLETED
Start: 2018-08-30 | End: 2018-08-30

## 2018-08-30 RX ADMIN — MIDAZOLAM HYDROCHLORIDE 1 MG: 2 INJECTION, SOLUTION INTRAMUSCULAR; INTRAVENOUS at 11:08

## 2018-08-30 RX ADMIN — LIDOCAINE HYDROCHLORIDE: 10 INJECTION, SOLUTION EPIDURAL; INFILTRATION; INTRACAUDAL; PERINEURAL at 11:08

## 2018-08-30 RX ADMIN — SUCRALFATE 1 G: 1 SUSPENSION ORAL at 09:08

## 2018-08-30 RX ADMIN — FENTANYL CITRATE 25 MCG: 50 INJECTION INTRAMUSCULAR; INTRAVENOUS at 11:08

## 2018-08-30 RX ADMIN — SUCRALFATE 1 G: 1 SUSPENSION ORAL at 05:08

## 2018-08-30 RX ADMIN — FENTANYL CITRATE 25 MCG: 50 INJECTION, SOLUTION INTRAMUSCULAR; INTRAVENOUS at 11:08

## 2018-08-30 RX ADMIN — MIDAZOLAM HYDROCHLORIDE 1 MG: 1 INJECTION INTRAMUSCULAR; INTRAVENOUS at 11:08

## 2018-08-30 RX ADMIN — ACETAMINOPHEN 650 MG: 325 TABLET, FILM COATED ORAL at 09:08

## 2018-08-30 NOTE — H&P
PCP: Carine Baltazar NP    History & Physical    Chief Complaint: Retroperitoneal hemorrhage s/p CT guided retroperitoneal mass biopsy    History of Present Illness:  Patient is a 73 y.o. female admitted to Hospitalist Service from Radiology department with complaint of retroperitoneal hemorrhage s/p CT guided retroperitoneal mass biopsy. Patient reportedly has past medical history significant for Renal cell carcinoma s/p left nephrectomy, dementia, hypertension, hyperlipidemia, Chronic Kidney disease stage 3. Patient reportedly had metastatic lesions in the retro-peritoneaum and lungs  Which are being evaluated. Post procedure, patient's blood pressure dropped to 86/50 mmHg. A CT abdomen showed Left retroperitoneal mass status post biopsy, with mild to moderate post biopsy hemorrhage within the left upper quadrant. Patient was evaluated by Dr. Murillo who recommended hospital admission and observation. Patient denied chest pain, shortness of breath, abdominal pain, nausea, vomiting, headache, vision changes, focal neuro-deficits, cough or fever.    Past Medical History:   Diagnosis Date    Cancer     kidney    Dementia     Encounter for blood transfusion     High cholesterol 2018    Hypertension     No Medication     Renal disorder     Wears dentures     Uppers     Past Surgical History:   Procedure Laterality Date     SECTION      EYE SURGERY      Rt eye Catarac    HYSTERECTOMY      KIDNEY SURGERY Left 2018    Left kidney removed due to cancer    lasix surgery       No family history on file.  Social History     Tobacco Use    Smoking status: Current Every Day Smoker     Packs/day: 1.00     Years: 50.00     Pack years: 50.00    Tobacco comment: Trying to QUIT   Substance Use Topics    Alcohol use: Yes     Alcohol/week: 3.6 oz     Types: 6 Cans of beer per week     Comment: two days ago a drinkbeer    Drug use: No      Review of patient's allergies indicates:   Allergen  Reactions    Codeine Other (See Comments)     Pt shakes and hallucinates    Pcn [penicillins] Anxiety and Other (See Comments)     PTA Medications   Medication Sig    amLODIPine (NORVASC) 5 MG tablet Take 5 mg by mouth once daily.    atorvastatin (LIPITOR) 20 MG tablet Take 20 mg by mouth once daily.    ferrous sulfate 325 (65 FE) MG EC tablet Take 325 mg by mouth once daily.    losartan (COZAAR) 50 MG tablet Take 50 mg by mouth once daily.    pantoprazole (PROTONIX) 40 MG tablet Take 1 tablet (40 mg total) by mouth once daily.    sucralfate (CARAFATE) 100 mg/mL suspension Take 10 mLs (1 g total) by mouth 4 (four) times daily before meals and nightly.     Review of Systems:  Constitutional: no fever + possible chills  Eyes: no visual changes  Ears, nose, mouth, throat, and face: no nasal congestion or sore throat  Respiratory: no cough or shorness of breath  Cardiovascular: no chest pain or palpitations. + low BP  Gastrointestinal: no nausea or vomiting, no abdominal pain or change in bowel habits  Genitourinary: no hematuria or dysuria  Integument/breast: no rash or pruritis  Hematologic/lymphatic: no easy bruising or lymphadenopathy  Musculoskeletal: no arthralgias or myalgias  Neurological: no seizures or tremors.  Behavioral/Psych: no auditory or visual hallucinations  Endocrine: no heat or cold intolerance     OBJECTIVE:     Vital Signs (Most Recent)  Temp: 97.3 °F (36.3 °C) (08/30/18 1430)  Pulse: 66 (08/30/18 1430)  Resp: (!) 34 (08/30/18 1430)  BP: (!) 155/70 (08/30/18 1430)  SpO2: 100 % (08/30/18 1430)    Physical Exam:  General appearance: well developed, appears stated age  Head: normocephalic, atraumatic  Eyes:  conjunctivae/corneas clear. PERRL.  Nose: Nares normal. Septum midline.  Throat: lips, mucosa, and tongue normal; teeth and gums normal, no throat erythema.  Neck: supple, symmetrical, trachea midline, no JVD and thyroid not enlarged, symmetric, no tenderness/mass/nodules  Lungs:  clear  to auscultation bilaterally and normal respiratory effort  Chest wall: no tenderness  Heart: regular rate and rhythm, S1, S2 normal, no murmur, click, rub or gallop  Abdomen: soft, non-tender non-distented; bowel sounds normal; no masses,  no organomegaly  Extremities: no cyanosis, clubbing or edema. Left flank band-aid dressing in place. No skin discoloration of flank.  Pulses: 2+ and symmetric  Skin: Skin color, texture, turgor normal. No rashes or lesions.  Lymph nodes: Cervical, supraclavicular, and axillary nodes normal.  Neurologic: Normal strength and tone. No focal numbness or weakness. CNII-XII intact.      Laboratory:   CBC:   Recent Labs   Lab  08/30/18   0850  08/30/18   1315   WBC  8.60   --    RBC  3.73*   --    HGB  8.6*  7.6*   HCT  28.6*  25.1*   PLT  632*   --    MCV  77*   --    MCH  23.1*   --    MCHC  30.2*   --      CMP: No results for input(s): GLU, CALCIUM, ALBUMIN, PROT, NA, K, CO2, CL, BUN, CREATININE, ALKPHOS, ALT, AST, BILITOT in the last 168 hours.  Coagulation:   Recent Labs   Lab  08/30/18   0850   LABPROT  11.3   INR  1.1     Diagnostic Results:  CT abdomen and pelvis without contrast:  Left retroperitoneal mass status post biopsy, with mild to moderate post biopsy hemorrhage within the left upper quadrant.  Right adnexal mass suspicious for ovarian neoplasm.  Left ovarian dermoid.  Possible additional left adnexal mass which is separate from the dermoid is also concerning for neoplasm.  Two mesenteric masses suspicious for neoplastic deposits.  Numerous pulmonary nodules which are nonspecific however concerning for pulmonary metastatic disease given the abdominopelvic findings.  Multiple renal hypodensities some of which represent cysts while others are indeterminate and close attention on follow-up recommended.  Status post left nephrectomy.    CT guided retroperitoneal mass biopsy:  Status post CT-guided biopsy of a left retroperitoneal mass.  Small retroperitoneal hematoma  occurred.    Assessment/Plan:     Active Hospital Problems    Diagnosis  POA    *Retroperitoneal hemorrhage [R58] s/p CT guided retroperitoneal hemorrhage  Yes    Mesenteric mass [K63.9] - possible metastatic disease  Renal cell cancer [C64.9] s/p nephrectomy  Discussed with Dr. Santos.   Admit to ICU for closer monitoring.  Tele-monitoring.  CBC q 6 hrs.  Type and screen. Transfuse as needed.  Hold ASA.    Yes    Iron deficiency anemia due to chronic blood loss [D50.0]  Check Iron TIBC B12 and Folate.  Transfuse 2 units of pRBC.    Yes          Hypertension [I10]  Hold anti-HTN agents.  .  Yes    Dementia [F03.90]  Dementia is controlled currently. Continue home dementia meds and non-pharmacologic interventions to prevent delirium (No VS between 11PM-5AM, activity during day, opening blinds, providing glasses/hearing aids, and up in chair during daytime). Use PRN anti-psychotics to prevent behavior of self harm during sundowning, and avoid narcotics and benzos unless absolutely necessary. PRN anti-psychotics prescribed to avoid self harm behaviors.  Yes     DVT prophylaxis: Use SCD and TEDs. No anticoagulation due to retroperitoneal hemorrhage    Discussed with family members, answered all the questions.      Sol Lewis MD  Department of Hospital Medicine   [facility]

## 2018-08-30 NOTE — PLAN OF CARE
Problem: Patient Care Overview  Goal: Plan of Care Review  Outcome: Revised  Care plan reviewed and revised.  Patient safety maintained, patient uses call light for assistance. Bed alarm is on.  Patient has not been hypotensive or bradycardic since arrival to the ICU.   No new bleeding noted to bandage on left flank area.

## 2018-08-30 NOTE — PROGRESS NOTES
Patient arrived to ICU via gurney. Report received from Maribell SINGH, patient had gotten a Retroperitoneal Biopsy of a lesion on left flank area, became hypotensive and bradycardic.   Noted bandage to left flank area with very small amount of bleeding, not actively bleeding at this time.  Patient is confused to time/year/president.   Daughters were at bedside and Password is [place].   Daughter Maryjane phone number is [phone].   Patient VSS, watching TV, call light in reach. No belongings, no cell phone, no jewelry, no purse noted accompanying patient.

## 2018-08-30 NOTE — H&P
Radiology History & Physical      SUBJECTIVE:     Chief Complaint: abdominal mass    History of Present Illness:  Oriana Tobar is a 73 y.o. female who presents for left abdominal mass CT guided biopsy.  Past Medical History:   Diagnosis Date    Cancer     kidney    Dementia     Encounter for blood transfusion     High cholesterol 2018    Hypertension     No Medication     Renal disorder     Wears dentures     Uppers     Past Surgical History:   Procedure Laterality Date     SECTION      EYE SURGERY      Rt eye Catarac    HYSTERECTOMY      KIDNEY SURGERY Left 2018    Left kidney removed due to cancer    lasix surgery         Home Meds:   Prior to Admission medications    Medication Sig Start Date End Date Taking? Authorizing Provider   amLODIPine (NORVASC) 5 MG tablet Take 5 mg by mouth once daily.   Yes Historical Provider, MD   atorvastatin (LIPITOR) 20 MG tablet Take 20 mg by mouth once daily.   Yes Historical Provider, MD   ferrous sulfate 325 (65 FE) MG EC tablet Take 325 mg by mouth once daily.   Yes Historical Provider, MD   losartan (COZAAR) 50 MG tablet Take 50 mg by mouth once daily.   Yes Historical Provider, MD   pantoprazole (PROTONIX) 40 MG tablet Take 1 tablet (40 mg total) by mouth once daily. 18 Yes Tito Mathews MD   sucralfate (CARAFATE) 100 mg/mL suspension Take 10 mLs (1 g total) by mouth 4 (four) times daily before meals and nightly. 18   Tito Mathews MD     Anticoagulants/Antiplatelets: no anticoagulation    Allergies:   Review of patient's allergies indicates:   Allergen Reactions    Codeine Other (See Comments)     Pt shakes and hallucinates    Pcn [penicillins] Anxiety and Other (See Comments)     Sedation History:  no adverse reactions    Review of Systems:   Hematological: no known coagulopathies  Respiratory: no shortness of breath  Cardiovascular: negative  no chest pain  Gastrointestinal: no abdominal  pain  Genito-Urinary: negative  no dysuria  Musculoskeletal: negative  Neurological: no TIA or stroke symptoms  negative         OBJECTIVE:     Vital Signs (Most Recent)  Temp: 98.6 °F (37 °C) (08/30/18 0910)  Pulse: 83 (08/30/18 0910)  Resp: 16 (08/30/18 0910)  BP: (!) 142/71 (08/30/18 0910)    Physical Exam:  ASA: 2  Mallampati: 2    General: no acute distress  HEENT: normocephalic, atraumatic  Chest: unlabored breathing  Heart: regular heart rate  Abdomen: nondistended  Extremity: moves all extremities    Laboratory  Lab Results   Component Value Date    INR 1.1 08/30/2018       Lab Results   Component Value Date    WBC 8.60 08/30/2018    HGB 8.6 (L) 08/30/2018    HCT 28.6 (L) 08/30/2018    MCV 77 (L) 08/30/2018     (H) 08/30/2018      Lab Results   Component Value Date     (H) 08/13/2018     08/13/2018    K 3.4 (L) 08/13/2018     08/13/2018    CO2 25 08/13/2018    BUN <5 (L) 08/13/2018    CREATININE 1.1 08/13/2018    CREATININE 1.2 08/13/2018    CALCIUM 8.7 08/13/2018    MG 1.3 (L) 01/28/2016    ALT 10 06/26/2018    AST 17 06/26/2018    ALBUMIN 2.9 (L) 06/26/2018    BILITOT 0.3 06/26/2018       ASSESSMENT/PLAN:     Sedation Plan: moderate IV sedation  Patient will undergo CT guided left abdominal mass biopsy.    @SIG@

## 2018-08-30 NOTE — PROGRESS NOTES
Radiology notified that pt dizzy, nauseated and pulse and blood pressure lower than during procedure

## 2018-08-30 NOTE — OP NOTE
Radiology Post-Procedure Note    Pre Op Diagnosis: left retroperitoneal mass  Post Op Diagnosis: Same    Procedure: CT guided biopsy    Procedure performed by: Ramin Santos    Written Informed Consent Obtained: Yes  Specimen Removed: YES 2 18G core specimens were obtained  Estimated Blood Loss: less than 100     Findings:   Left retroperitoneal mass status post biopsy.  Small post-biopsy hematoma.    Patient tolerated procedure well.    @SIG@

## 2018-08-30 NOTE — NURSING
Received a phone call from Ramya, [facility] post op nurse, stating patient's blood pressure and heart rate dropped, patient also c/o nausea.  Notified .    1248- Arrived to [facility] post op, Dr. Santos at bedside. Patient's heart rate 48, SBP 80's. Patient awake, following commands, no complaints of pain voiced.      1250- patient transferred to CT for STAT CT abdomen and pelvis without contrast, and STAT H&H ordered. Patient connected to cardiac monitor, see flow sheet for vital signs.  Small post biopsy hematoma present,  notified , recommended to closely monitor patient over night.  notified, patient admitted to ICU for close observation of vital signs and H&H.     1350- Vital signs stable, patient more alert, family at bedside. Transferred via stretcher to ICU room 516, bedside report given to MIKE Rivera.

## 2018-08-30 NOTE — NURSING
Pt arrived via stretcher from DSU to CT room 1 for CT guided left upper intraperitoneal lesion biopsy . Procedure explained and consent obtained by Dr. Santos. Time out performed. Pt received Fentanyl 25 mcg and Versed 1 mg IV. Vital signs monitored and remained stable for duration of procedure. Biopsy collected and specimen submitted to lab for Pathology. Post CT abdomen stable. Bandage applied to patient's left lateral abdomen. CDI- Report called to post op nurse Ramya. Patient  transported back to [facility] post op for recovery.

## 2018-08-31 VITALS
SYSTOLIC BLOOD PRESSURE: 148 MMHG | RESPIRATION RATE: 22 BRPM | TEMPERATURE: 98 F | WEIGHT: 160.94 LBS | HEART RATE: 77 BPM | OXYGEN SATURATION: 100 % | DIASTOLIC BLOOD PRESSURE: 72 MMHG | BODY MASS INDEX: 22.53 KG/M2 | HEIGHT: 71 IN

## 2018-08-31 LAB
ALBUMIN SERPL BCP-MCNC: 2.7 G/DL
ALP SERPL-CCNC: 76 U/L
ALT SERPL W/O P-5'-P-CCNC: 6 U/L
ANION GAP SERPL CALC-SCNC: 8 MMOL/L
ANISOCYTOSIS BLD QL SMEAR: SLIGHT
AST SERPL-CCNC: 11 U/L
BASOPHILS # BLD AUTO: ABNORMAL K/UL
BASOPHILS NFR BLD: 0 %
BILIRUB SERPL-MCNC: 2 MG/DL
BUN SERPL-MCNC: 6 MG/DL
CALCIUM SERPL-MCNC: 8.8 MG/DL
CHLORIDE SERPL-SCNC: 107 MMOL/L
CHOLEST SERPL-MCNC: 116 MG/DL
CHOLEST/HDLC SERPL: 3 {RATIO}
CK MB SERPL-MCNC: 0.5 NG/ML
CK MB SERPL-RTO: 1 %
CK SERPL-CCNC: 48 U/L
CO2 SERPL-SCNC: 26 MMOL/L
CREAT SERPL-MCNC: 0.9 MG/DL
DIFFERENTIAL METHOD: ABNORMAL
EOSINOPHIL # BLD AUTO: ABNORMAL K/UL
EOSINOPHIL NFR BLD: 1 %
EOSINOPHIL NFR BLD: 1 %
EOSINOPHIL NFR BLD: 2 %
ERYTHROCYTE [DISTWIDTH] IN BLOOD BY AUTOMATED COUNT: 18.9 %
ERYTHROCYTE [DISTWIDTH] IN BLOOD BY AUTOMATED COUNT: 18.9 %
ERYTHROCYTE [DISTWIDTH] IN BLOOD BY AUTOMATED COUNT: 19.8 %
EST. GFR  (AFRICAN AMERICAN): >60 ML/MIN/1.73 M^2
EST. GFR  (NON AFRICAN AMERICAN): >60 ML/MIN/1.73 M^2
GLUCOSE SERPL-MCNC: 96 MG/DL
HCT VFR BLD AUTO: 22.9 %
HCT VFR BLD AUTO: 33.4 %
HCT VFR BLD AUTO: 33.4 %
HDLC SERPL-MCNC: 39 MG/DL
HDLC SERPL: 33.6 %
HGB BLD-MCNC: 10.4 G/DL
HGB BLD-MCNC: 10.4 G/DL
HGB BLD-MCNC: 7 G/DL
LDLC SERPL CALC-MCNC: 63.2 MG/DL
LYMPHOCYTES # BLD AUTO: ABNORMAL K/UL
LYMPHOCYTES NFR BLD: 15 %
LYMPHOCYTES NFR BLD: 15 %
LYMPHOCYTES NFR BLD: 23 %
MAGNESIUM SERPL-MCNC: 1.8 MG/DL
MCH RBC QN AUTO: 23 PG
MCH RBC QN AUTO: 24.5 PG
MCH RBC QN AUTO: 24.5 PG
MCHC RBC AUTO-ENTMCNC: 30.4 G/DL
MCHC RBC AUTO-ENTMCNC: 31.1 G/DL
MCHC RBC AUTO-ENTMCNC: 31.1 G/DL
MCV RBC AUTO: 76 FL
MCV RBC AUTO: 79 FL
MCV RBC AUTO: 79 FL
MONOCYTES # BLD AUTO: ABNORMAL K/UL
MONOCYTES NFR BLD: 0 %
MONOCYTES NFR BLD: 0 %
MONOCYTES NFR BLD: 5 %
NEUTROPHILS NFR BLD: 67 %
NEUTROPHILS NFR BLD: 76 %
NEUTROPHILS NFR BLD: 76 %
NEUTS BAND NFR BLD MANUAL: 3 %
NEUTS BAND NFR BLD MANUAL: 8 %
NEUTS BAND NFR BLD MANUAL: 8 %
NONHDLC SERPL-MCNC: 77 MG/DL
NRBC BLD-RTO: 0 /100 WBC
OVALOCYTES BLD QL SMEAR: ABNORMAL
PHOSPHATE SERPL-MCNC: 3 MG/DL
PLATELET # BLD AUTO: 459 K/UL
PLATELET # BLD AUTO: 459 K/UL
PLATELET # BLD AUTO: 493 K/UL
PLATELET BLD QL SMEAR: ABNORMAL
PMV BLD AUTO: 7.5 FL
PMV BLD AUTO: 7.5 FL
PMV BLD AUTO: 7.7 FL
POIKILOCYTOSIS BLD QL SMEAR: SLIGHT
POTASSIUM SERPL-SCNC: 3.3 MMOL/L
PROT SERPL-MCNC: 6.4 G/DL
RBC # BLD AUTO: 3.02 M/UL
RBC # BLD AUTO: 4.24 M/UL
RBC # BLD AUTO: 4.24 M/UL
SODIUM SERPL-SCNC: 141 MMOL/L
TRIGL SERPL-MCNC: 69 MG/DL
WBC # BLD AUTO: 8.3 K/UL
WBC # BLD AUTO: 8.3 K/UL
WBC # BLD AUTO: 9.8 K/UL

## 2018-08-31 PROCEDURE — 85007 BL SMEAR W/DIFF WBC COUNT: CPT

## 2018-08-31 PROCEDURE — 80061 LIPID PANEL: CPT

## 2018-08-31 PROCEDURE — G0378 HOSPITAL OBSERVATION PER HR: HCPCS

## 2018-08-31 PROCEDURE — 83735 ASSAY OF MAGNESIUM: CPT

## 2018-08-31 PROCEDURE — 25000003 PHARM REV CODE 250: Performed by: INTERNAL MEDICINE

## 2018-08-31 PROCEDURE — 99900104 DSU ONLY-NO CHARGE-EA ADD'L HR (STAT): Performed by: INTERNAL MEDICINE

## 2018-08-31 PROCEDURE — 82550 ASSAY OF CK (CPK): CPT

## 2018-08-31 PROCEDURE — 99217 PR OBSERVATION CARE DISCHARGE: CPT | Mod: ,,, | Performed by: INTERNAL MEDICINE

## 2018-08-31 PROCEDURE — P9016 RBC LEUKOCYTES REDUCED: HCPCS

## 2018-08-31 PROCEDURE — 84100 ASSAY OF PHOSPHORUS: CPT

## 2018-08-31 PROCEDURE — 36430 TRANSFUSION BLD/BLD COMPNT: CPT

## 2018-08-31 PROCEDURE — 85027 COMPLETE CBC AUTOMATED: CPT

## 2018-08-31 PROCEDURE — 99900103 DSU ONLY-NO CHARGE-INITIAL HR (STAT): Performed by: INTERNAL MEDICINE

## 2018-08-31 PROCEDURE — 80053 COMPREHEN METABOLIC PANEL: CPT

## 2018-08-31 PROCEDURE — 82553 CREATINE MB FRACTION: CPT

## 2018-08-31 RX ORDER — POTASSIUM CHLORIDE 20 MEQ/1
40 TABLET, EXTENDED RELEASE ORAL ONCE
Status: DISCONTINUED | OUTPATIENT
Start: 2018-08-31 | End: 2018-08-31 | Stop reason: HOSPADM

## 2018-08-31 RX ADMIN — SUCRALFATE 1 G: 1 SUSPENSION ORAL at 06:08

## 2018-08-31 NOTE — DISCHARGE SUMMARY
Discharge Summary  Hospital Medicine    Admit Date: 8/30/2018    Date and Time: 8/31/201810:48 AM    Discharge Attending Physician: Sol Lewis MD    Primary Care Physician: Carine Baltazar NP    Diagnoses:  Active Hospital Problems    Diagnosis  POA    *Retroperitoneal hemorrhage [R58] s/p CT guided retroperitoneal hemorrhage  Unknown    Mesenteric mass [K63.9] - possible metastatic disease  Yes    Iron deficiency anemia due to chronic blood loss [D50.0]  Acute blood loss anemia s/p 2 PRBC  Yes    Renal cell cancer [C64.9] s/p nephrectomy  Yes    Hypertension [I10]  Yes    Dementia [F03.90]  Yes        Discharged Condition: Good    Hospital Course:   Patient is a 73 y.o. female admitted to Hospitalist Service from Radiology department with complaint of retroperitoneal hemorrhage s/p CT guided retroperitoneal mass biopsy. Patient reportedly has past medical history significant for Renal cell carcinoma s/p left nephrectomy, dementia, hypertension, hyperlipidemia, Chronic Kidney disease stage 3. Patient reportedly had metastatic lesions in the retro-peritoneaum and lungs  Which are being evaluated. Post procedure, patient's blood pressure dropped to 86/50 mmHg. A CT abdomen showed Left retroperitoneal mass status post biopsy, with mild to moderate post biopsy hemorrhage within the left upper quadrant. Patient was evaluated by Dr. Murillo who recommended hospital admission and observation. Patient denied chest pain, shortness of breath, abdominal pain, nausea, vomiting, headache, vision changes, focal neuro-deficits, cough or fever. Patient was admitted to Hospitalist medicine service. Patient was managed and monitored in ICU. Patient received 2 units of PRBC. H/H is stable. Patient is without any symptoms. No tachycardia noted. BP normalized. Patient ambulating without any problems. Home health to be resumed who will check H/H on Monday and next week. Patient to continue close follow up with her oncologist  next week. Patient anxious to be discharged. Patient was discharged home in stable condition with following discharge plan of care.     Consults: None    Significant Diagnostic Studies:   CT abdomen and pelvis without contrast:  Left retroperitoneal mass status post biopsy, with mild to moderate post biopsy hemorrhage within the left upper quadrant.  Right adnexal mass suspicious for ovarian neoplasm.  Left ovarian dermoid.  Possible additional left adnexal mass which is separate from the dermoid is also concerning for neoplasm.  Two mesenteric masses suspicious for neoplastic deposits.  Numerous pulmonary nodules which are nonspecific however concerning for pulmonary metastatic disease given the abdominopelvic findings.  Multiple renal hypodensities some of which represent cysts while others are indeterminate and close attention on follow-up recommended.  Status post left nephrectomy.     CT guided retroperitoneal mass biopsy:  Status post CT-guided biopsy of a left retroperitoneal mass.  Small retroperitoneal hematoma occurred.    Microbiology Results (last 7 days)     ** No results found for the last 168 hours. **        Special Treatments/Procedures: as above  Disposition: Home or Self Care    Medications:  Reconciled Home Medications:   Current Discharge Medication List      CONTINUE these medications which have NOT CHANGED    Details   amLODIPine (NORVASC) 5 MG tablet Take 5 mg by mouth once daily.      atorvastatin (LIPITOR) 20 MG tablet Take 20 mg by mouth once daily.      ferrous sulfate 325 (65 FE) MG EC tablet Take 325 mg by mouth once daily.      losartan (COZAAR) 50 MG tablet Take 50 mg by mouth once daily.      pantoprazole (PROTONIX) 40 MG tablet Take 1 tablet (40 mg total) by mouth once daily.  Qty: 30 tablet, Refills: 11      sucralfate (CARAFATE) 100 mg/mL suspension Take 10 mLs (1 g total) by mouth 4 (four) times daily before meals and nightly.  Qty: 1 Bottle, Refills: 11           Discharge  Procedure Orders   Referral to Home health   Referral Priority: Routine Referral Type: Home Health   Referral Reason: Specialty Services Required   Requested Specialty: Home Health Services   Number of Visits Requested: 1     Diet Cardiac     Other restrictions (specify):   Order Comments: PLEASE OBSERVE FALL PRECAUTIONS     Call MD for:   Order Comments: For worsening symptoms, chest pain, shortness of breath, increased abdominal pain, high grade fever, stroke or stroke like symptoms, immediately go to the nearest Emergency Room or call 911 as soon as possible.     Follow-up Information     Carine Baltazar NP.    Specialty:  Family Medicine  Contact information:  [address] [place] [address] Dr  [place] [address] [phone]             Please follow up.    Contact information:  Follow up wioth your oncologist next week. Avioid use of NSAIDs such as Aspirin, motrin, Aleve, Ibuprofen for new week.

## 2018-08-31 NOTE — PLAN OF CARE
Problem: Patient Care Overview  Goal: Plan of Care Review  Outcome: Ongoing (interventions implemented as appropriate)  Alert. Vital signs stable. Out of bed x2 to void. Packed rbc X 2 units given. Tolerated well. Slept well during the night. Abdomen soft. Plan. Monitor vital signs. Repeat lab. D/C home if stable

## 2018-08-31 NOTE — PROGRESS NOTES
Order to d/c albarado catheter when discharged, patient did not have a albarado catheter.   Discharge instructions and follow up reviewed and discussed with patient and family member. Patient is getting dressed at this time. IV has been removed.

## 2018-09-03 LAB
BLD PROD TYP BPU: NORMAL
BLD PROD TYP BPU: NORMAL
BLOOD UNIT EXPIRATION DATE: NORMAL
BLOOD UNIT EXPIRATION DATE: NORMAL
BLOOD UNIT TYPE CODE: 6200
BLOOD UNIT TYPE CODE: 6200
BLOOD UNIT TYPE: NORMAL
BLOOD UNIT TYPE: NORMAL
CODING SYSTEM: NORMAL
CODING SYSTEM: NORMAL
DISPENSE STATUS: NORMAL
DISPENSE STATUS: NORMAL
NUM UNITS TRANS PACKED RBC: NORMAL
NUM UNITS TRANS PACKED RBC: NORMAL

## 2018-09-06 ENCOUNTER — PATIENT MESSAGE (OUTPATIENT)
Dept: GASTROENTEROLOGY | Facility: CLINIC | Age: 73
End: 2018-09-06

## 2018-09-07 ENCOUNTER — TELEPHONE (OUTPATIENT)
Dept: SURGERY | Facility: CLINIC | Age: 73
End: 2018-09-07

## 2018-09-11 ENCOUNTER — TELEPHONE (OUTPATIENT)
Dept: GASTROENTEROLOGY | Facility: CLINIC | Age: 73
End: 2018-09-11

## 2018-09-11 NOTE — TELEPHONE ENCOUNTER
Attempted to contact patient's family again regarding CT findings.  Left message with a family member for Parisa to call me back.  Will await her call to discuss results.

## 2018-09-12 DIAGNOSIS — C64.9 MALIGNANT NEOPLASM OF KIDNEY, UNSPECIFIED LATERALITY: Primary | ICD-10-CM

## 2018-09-17 ENCOUNTER — TELEPHONE (OUTPATIENT)
Dept: HEMATOLOGY/ONCOLOGY | Facility: CLINIC | Age: 73
End: 2018-09-17

## 2018-09-17 NOTE — TELEPHONE ENCOUNTER
Patient called at both numbers. Primary not working and Home no voicemail. I will continue to call patient.

## 2018-09-19 ENCOUNTER — PATIENT MESSAGE (OUTPATIENT)
Dept: GASTROENTEROLOGY | Facility: CLINIC | Age: 73
End: 2018-09-19

## 2018-09-28 ENCOUNTER — HOSPITAL ENCOUNTER (EMERGENCY)
Facility: HOSPITAL | Age: 73
Discharge: HOME | End: 2018-09-28
Attending: EMERGENCY MEDICINE
Payer: MEDICARE

## 2018-09-28 VITALS
HEIGHT: 71 IN | HEART RATE: 76 BPM | BODY MASS INDEX: 23.1 KG/M2 | DIASTOLIC BLOOD PRESSURE: 63 MMHG | OXYGEN SATURATION: 100 % | SYSTOLIC BLOOD PRESSURE: 110 MMHG | TEMPERATURE: 98 F | WEIGHT: 165 LBS | RESPIRATION RATE: 18 BRPM

## 2018-09-28 DIAGNOSIS — N39.0 URINARY TRACT INFECTION WITHOUT HEMATURIA, SITE UNSPECIFIED: Primary | ICD-10-CM

## 2018-09-28 DIAGNOSIS — D64.9 ANEMIA, UNSPECIFIED TYPE: ICD-10-CM

## 2018-09-28 LAB
ABO + RH BLD: NORMAL
ANION GAP SERPL CALC-SCNC: 13 MMOL/L
ANISOCYTOSIS BLD QL SMEAR: ABNORMAL
BACTERIA #/AREA URNS HPF: ABNORMAL /HPF
BASOPHILS # BLD AUTO: ABNORMAL K/UL
BASOPHILS NFR BLD: 0 %
BILIRUB UR QL STRIP: NEGATIVE
BLD GP AB SCN CELLS X3 SERPL QL: NORMAL
BUN SERPL-MCNC: 11 MG/DL
CALCIUM SERPL-MCNC: 8.9 MG/DL
CHLORIDE SERPL-SCNC: 102 MMOL/L
CLARITY UR: CLEAR
CO2 SERPL-SCNC: 20 MMOL/L
COLOR UR: YELLOW
CREAT SERPL-MCNC: 1.3 MG/DL
DIFFERENTIAL METHOD: ABNORMAL
EOSINOPHIL # BLD AUTO: ABNORMAL K/UL
EOSINOPHIL NFR BLD: 2 %
ERYTHROCYTE [DISTWIDTH] IN BLOOD BY AUTOMATED COUNT: 18 %
EST. GFR  (AFRICAN AMERICAN): 47 ML/MIN/1.73 M^2
EST. GFR  (NON AFRICAN AMERICAN): 40.8 ML/MIN/1.73 M^2
GLUCOSE SERPL-MCNC: 137 MG/DL
GLUCOSE UR QL STRIP: NEGATIVE
HCT VFR BLD AUTO: 27.8 %
HGB BLD-MCNC: 8.3 G/DL
HGB UR QL STRIP: NEGATIVE
IMM GRANULOCYTES # BLD AUTO: ABNORMAL K/UL
IMM GRANULOCYTES NFR BLD AUTO: ABNORMAL %
KETONES UR QL STRIP: NEGATIVE
LEUKOCYTE ESTERASE UR QL STRIP: ABNORMAL
LYMPHOCYTES # BLD AUTO: ABNORMAL K/UL
LYMPHOCYTES NFR BLD: 21 %
MCH RBC QN AUTO: 24 PG
MCHC RBC AUTO-ENTMCNC: 29.9 G/DL
MCV RBC AUTO: 80 FL
MICROSCOPIC COMMENT: ABNORMAL
MONOCYTES # BLD AUTO: ABNORMAL K/UL
MONOCYTES NFR BLD: 4 %
NEUTROPHILS # BLD AUTO: ABNORMAL K/UL
NEUTROPHILS NFR BLD: 72 %
NEUTS BAND NFR BLD MANUAL: 1 %
NITRITE UR QL STRIP: NEGATIVE
NRBC BLD-RTO: 0 /100 WBC
PH UR STRIP: 6 [PH] (ref 5–8)
PLATELET # BLD AUTO: 471 K/UL
PLATELET BLD QL SMEAR: ABNORMAL
PMV BLD AUTO: 9.4 FL
POIKILOCYTOSIS BLD QL SMEAR: SLIGHT
POLYCHROMASIA BLD QL SMEAR: ABNORMAL
POTASSIUM SERPL-SCNC: 3.9 MMOL/L
PROT UR QL STRIP: NEGATIVE
RBC # BLD AUTO: 3.46 M/UL
RBC #/AREA URNS HPF: 4 /HPF (ref 0–4)
SODIUM SERPL-SCNC: 135 MMOL/L
SP GR UR STRIP: 1.01 (ref 1–1.03)
SQUAMOUS #/AREA URNS HPF: 6 /HPF
URN SPEC COLLECT METH UR: ABNORMAL
UROBILINOGEN UR STRIP-ACNC: NEGATIVE EU/DL
WBC # BLD AUTO: 8.82 K/UL
WBC #/AREA URNS HPF: 100 /HPF (ref 0–5)

## 2018-09-28 PROCEDURE — 80048 BASIC METABOLIC PNL TOTAL CA: CPT

## 2018-09-28 PROCEDURE — 85025 COMPLETE CBC W/AUTO DIFF WBC: CPT

## 2018-09-28 PROCEDURE — 99283 EMERGENCY DEPT VISIT LOW MDM: CPT | Mod: 25

## 2018-09-28 PROCEDURE — 96365 THER/PROPH/DIAG IV INF INIT: CPT

## 2018-09-28 PROCEDURE — 36415 COLL VENOUS BLD VENIPUNCTURE: CPT

## 2018-09-28 PROCEDURE — 63600175 PHARM REV CODE 636 W HCPCS: Performed by: EMERGENCY MEDICINE

## 2018-09-28 PROCEDURE — 25000003 PHARM REV CODE 250: Performed by: EMERGENCY MEDICINE

## 2018-09-28 PROCEDURE — 86850 RBC ANTIBODY SCREEN: CPT

## 2018-09-28 PROCEDURE — 87086 URINE CULTURE/COLONY COUNT: CPT

## 2018-09-28 PROCEDURE — 96361 HYDRATE IV INFUSION ADD-ON: CPT

## 2018-09-28 PROCEDURE — 81000 URINALYSIS NONAUTO W/SCOPE: CPT

## 2018-09-28 RX ORDER — CIPROFLOXACIN 500 MG/1
500 TABLET ORAL 2 TIMES DAILY
Qty: 20 TABLET | Refills: 0 | Status: SHIPPED | OUTPATIENT
Start: 2018-09-28 | End: 2018-10-08

## 2018-09-28 RX ADMIN — CEFTRIAXONE 1 G: 1 INJECTION, SOLUTION INTRAVENOUS at 12:09

## 2018-09-28 RX ADMIN — SODIUM CHLORIDE 1000 ML: 9 INJECTION, SOLUTION INTRAVENOUS at 10:09

## 2018-09-28 NOTE — ED PROVIDER NOTES
Encounter Date: 2018       History     Chief Complaint   Patient presents with    Dizziness    Fatigue     73-year-old female with history of renal cancer presents with complaints of fatigue and near syncope with progressive symptoms causing concern for anemia.    She had a postprocedural anemia during biopsy in the past weeks requiring transfusion of packed red blood cells    She denies any fever chills  Denies any chest pain shortness breath productive cough  Denies any abdominal pain nausea vomiting diarrhea  She does have some dementia that prevents complete reliance upon review of systems    Poor intake due to above           Review of patient's allergies indicates:   Allergen Reactions    Codeine Other (See Comments)     Pt shakes and hallucinates    Pcn [penicillins] Anxiety and Other (See Comments)     Past Medical History:   Diagnosis Date    Cancer     kidney    Dementia     Encounter for blood transfusion     High cholesterol 2018    Hypertension     No Medication     Renal disorder     Wears dentures     Uppers     Past Surgical History:   Procedure Laterality Date     SECTION      COLONOSCOPY N/A 2018    Procedure: COLONOSCOPY;  Surgeon: Torres Son MD;  Location: [facility];  Service: Endoscopy;  Laterality: N/A;    COLONOSCOPY N/A 2018    [facility]formed [facility] END[facility]    CYSTOSCOPY WITH RETROGRADE PYELOGRAM Left 1/15/2016    Performed by Maribell Chacon MD at [facility] OR    EGD (ESOPHAGOGASTRODUODENOSCOPY) N/A 2018    [facility] by [facility] at [facility]    ESOPHAGOGASTRODUODENOSCOPY N/A 2018    Procedure: EGD (ESOPHAGOGASTRODUODENOSCOPY);  Surgeon: Torres Son MD;  Location: [facility];  Service: Endoscopy;  Laterality: N/A;    EYE SURGERY      Rt eye Catarac    HYSTERECTOMY      IMAGING, GI TRACT, INTRALUMINAL, VIA CAPSULE N/A 2018    Performed by Rocco Ross MD at [facility]  ENDO    INTRALUMINAL GASTROINTESTINAL TRACT IMAGING VIA CAPSULE N/A 8/6/2018    Procedure: IMAGING, GI TRACT, INTRALUMINAL, VIA CAPSULE;  Surgeon: Rocco Ross MD;  Location: [facility];  Service: Endoscopy;  Laterality: N/A;    KIDNEY SURGERY Left 06/23/2018    Left kidney removed due to cancer    lasix surgery      NEPHRECTOMY-RADICAL   Left 1/27/2016    Performed by [facility] OR    REPAIR-HERNIA-VENTRAL N/A 1/27/2016    Performed by Marco A Randhawa MD at [facility] OR    RESECTION-BOWEL  1/27/2016    Performed by Marco A [facility]hawa MD at [facility] OR    URETEROSCOPY  1/15/2016    Performed by [facility]ell Chacon [facility] at [facility] OR     History reviewed. No pertinent family history.  Social History     Tobacco Use    Smoking status: Current Every Day Smoker     Packs/day: 1.00     Years: 50.00     Pack years: 50.00    Smokeless tobacco: Never Used    Tobacco comment: Trying to QUIT   Substance Use Topics    Alcohol use: Yes     Alcohol/week: 3.6 oz     Types: 6 Cans of beer per week     Comment: two days ago a drinkbeer    Drug use: No     Review of Systems   Unable to perform ROS: Dementia       Physical Exam     Initial Vitals [09/28/18 0927]   BP Pulse Resp Temp SpO2   110/61 89 18 97.9 °F (36.6 °C) 99 %      MAP       --         Physical Exam    Nursing note and vitals reviewed.  Constitutional: She appears well-developed and well-nourished. She is not diaphoretic. No distress.   HENT:   Head: Normocephalic and atraumatic.   Nose: Nose normal.   Mouth/Throat: Mucous membranes are dry. No oropharyngeal exudate.   Eyes: Conjunctivae and EOM are normal. Pupils are equal, round, and reactive to light. Right eye exhibits no discharge. Left eye exhibits no discharge. No scleral icterus.   Neck: Normal range of motion. Neck supple.   Cardiovascular: Normal rate, regular rhythm, normal heart sounds and intact distal pulses. Exam reveals no gallop and no friction rub.    No murmur  heard.  Pulmonary/Chest: Breath sounds normal. No respiratory distress. She has no wheezes. She has no rhonchi. She has no rales.   Abdominal: Soft. Bowel sounds are normal. She exhibits no distension and no mass. There is no tenderness. There is no rebound and no guarding.   Musculoskeletal: Normal range of motion. She exhibits no edema or tenderness.   Lymphadenopathy:     She has no cervical adenopathy.   Neurological: She is alert. No cranial nerve deficit or sensory deficit. GCS score is 15. GCS eye subscore is 4. GCS verbal subscore is 5. GCS motor subscore is 6.   Skin: Skin is warm and dry. Capillary refill takes less than 2 seconds. No rash noted. No erythema. No pallor.         ED Course   Procedures  Labs Reviewed   CBC W/ AUTO DIFFERENTIAL - Abnormal; Notable for the following components:       Result Value    RBC 3.46 (*)     Hemoglobin 8.3 (*)     Hematocrit 27.8 (*)     MCV 80 (*)     MCH 24.0 (*)     MCHC 29.9 (*)     RDW 18.0 (*)     Platelets 471 (*)     Platelet Estimate Increased (*)     All other components within normal limits   BASIC METABOLIC PANEL - Abnormal; Notable for the following components:    Sodium 135 (*)     CO2 20 (*)     Glucose 137 (*)     eGFR if  47.0 (*)     eGFR if non  40.8 (*)     All other components within normal limits   URINALYSIS, REFLEX TO URINE CULTURE - Abnormal; Notable for the following components:    Leukocytes, UA 3+ (*)     All other components within normal limits    Narrative:     Preferred Collection Type->Urine, Clean Catch   URINALYSIS MICROSCOPIC - Abnormal; Notable for the following components:    WBC,  (*)     Bacteria, UA Many (*)     All other components within normal limits    Narrative:     Preferred Collection Type->Urine, Clean Catch   CULTURE, URINE   TYPE & SCREEN          Imaging Results    None          Medical Decision Making:   ED Management:    Anemia - chronic and likely related to underlying  neoplasm  UTI -     In ED patient received 1 L of IV fluids  1 g of IV Rocephin    Patient is stable to discharge home on Cipro pending urine culture    She is ambulated to the to the restroom - without difficulty    The family understands plan of care expect resolution and follow-up                      Clinical Impression:   The primary encounter diagnosis was Urinary tract infection without hematuria, site unspecified. A diagnosis of Anemia, unspecified type was also pertinent to this visit.                             Davy Eddy MD  09/28/18 3618

## 2018-09-30 LAB
BACTERIA UR CULT: NORMAL
BACTERIA UR CULT: NORMAL

## 2018-10-10 ENCOUNTER — TELEPHONE (OUTPATIENT)
Dept: CARDIOTHORACIC SURGERY | Facility: CLINIC | Age: 73
End: 2018-10-10

## 2018-10-10 NOTE — TELEPHONE ENCOUNTER
Spoke with pt and referred call to daughter to reschedule appointment.  Unable to schedule at this time.

## 2018-10-11 ENCOUNTER — TELEPHONE (OUTPATIENT)
Dept: HEMATOLOGY/ONCOLOGY | Facility: CLINIC | Age: 73
End: 2018-10-11

## 2018-10-16 ENCOUNTER — TELEPHONE (OUTPATIENT)
Dept: HEMATOLOGY/ONCOLOGY | Facility: CLINIC | Age: 73
End: 2018-10-16

## 2018-10-17 ENCOUNTER — HOSPITAL ENCOUNTER (OUTPATIENT)
Facility: HOSPITAL | Age: 73
Discharge: HOME HEALTH | End: 2018-10-18
Attending: EMERGENCY MEDICINE | Admitting: INTERNAL MEDICINE
Payer: MEDICARE

## 2018-10-17 DIAGNOSIS — D64.9 SYMPTOMATIC ANEMIA: Primary | ICD-10-CM

## 2018-10-17 PROBLEM — C16.9 GASTRIC CARCINOMA: Status: ACTIVE | Noted: 2018-10-17

## 2018-10-17 LAB
ABO + RH BLD: NORMAL
ALBUMIN SERPL BCP-MCNC: 2.8 G/DL
ALP SERPL-CCNC: 57 U/L
ALT SERPL W/O P-5'-P-CCNC: 7 U/L
ANION GAP SERPL CALC-SCNC: 6 MMOL/L
AST SERPL-CCNC: 15 U/L
BASOPHILS # BLD AUTO: ABNORMAL K/UL
BASOPHILS NFR BLD: 0 %
BILIRUB SERPL-MCNC: 0.2 MG/DL
BILIRUB UR QL STRIP: NEGATIVE
BLD GP AB SCN CELLS X3 SERPL QL: NORMAL
BUN SERPL-MCNC: 14 MG/DL
CALCIUM SERPL-MCNC: 8.3 MG/DL
CHLORIDE SERPL-SCNC: 110 MMOL/L
CLARITY UR: CLEAR
CO2 SERPL-SCNC: 24 MMOL/L
COLOR UR: YELLOW
CREAT SERPL-MCNC: 1.2 MG/DL
DIFFERENTIAL METHOD: ABNORMAL
EOSINOPHIL # BLD AUTO: ABNORMAL K/UL
EOSINOPHIL NFR BLD: 1 %
ERYTHROCYTE [DISTWIDTH] IN BLOOD BY AUTOMATED COUNT: 17.2 %
EST. GFR  (AFRICAN AMERICAN): 51.8 ML/MIN/1.73 M^2
EST. GFR  (NON AFRICAN AMERICAN): 44.9 ML/MIN/1.73 M^2
GLUCOSE SERPL-MCNC: 116 MG/DL
GLUCOSE UR QL STRIP: NEGATIVE
HCT VFR BLD AUTO: 16.7 %
HGB BLD-MCNC: 4.8 G/DL
HGB UR QL STRIP: NEGATIVE
IMM GRANULOCYTES # BLD AUTO: ABNORMAL K/UL
IMM GRANULOCYTES NFR BLD AUTO: ABNORMAL %
KETONES UR QL STRIP: NEGATIVE
LEUKOCYTE ESTERASE UR QL STRIP: ABNORMAL
LYMPHOCYTES # BLD AUTO: ABNORMAL K/UL
LYMPHOCYTES NFR BLD: 27 %
MCH RBC QN AUTO: 23.4 PG
MCHC RBC AUTO-ENTMCNC: 28.7 G/DL
MCV RBC AUTO: 82 FL
MICROSCOPIC COMMENT: NORMAL
MONOCYTES # BLD AUTO: ABNORMAL K/UL
MONOCYTES NFR BLD: 0 %
NEUTROPHILS NFR BLD: 68 %
NEUTS BAND NFR BLD MANUAL: 4 %
NITRITE UR QL STRIP: NEGATIVE
NRBC BLD-RTO: 0 /100 WBC
PH UR STRIP: 7 [PH] (ref 5–8)
PLATELET # BLD AUTO: 538 K/UL
PMV BLD AUTO: 9.7 FL
POCT GLUCOSE: 140 MG/DL (ref 70–110)
POTASSIUM SERPL-SCNC: 3.3 MMOL/L
PROT SERPL-MCNC: 6.3 G/DL
PROT UR QL STRIP: NEGATIVE
RBC # BLD AUTO: 2.05 M/UL
SODIUM SERPL-SCNC: 140 MMOL/L
SP GR UR STRIP: 1.01 (ref 1–1.03)
SQUAMOUS #/AREA URNS HPF: 1 /HPF
URN SPEC COLLECT METH UR: ABNORMAL
UROBILINOGEN UR STRIP-ACNC: NEGATIVE EU/DL
WBC # BLD AUTO: 11 K/UL
WBC #/AREA URNS HPF: 3 /HPF (ref 0–5)

## 2018-10-17 PROCEDURE — 85027 COMPLETE CBC AUTOMATED: CPT

## 2018-10-17 PROCEDURE — 36430 TRANSFUSION BLD/BLD COMPNT: CPT

## 2018-10-17 PROCEDURE — 86920 COMPATIBILITY TEST SPIN: CPT | Mod: 59

## 2018-10-17 PROCEDURE — 63600175 PHARM REV CODE 636 W HCPCS

## 2018-10-17 PROCEDURE — 25000003 PHARM REV CODE 250

## 2018-10-17 PROCEDURE — 82962 GLUCOSE BLOOD TEST: CPT

## 2018-10-17 PROCEDURE — 36415 COLL VENOUS BLD VENIPUNCTURE: CPT

## 2018-10-17 PROCEDURE — 99285 EMERGENCY DEPT VISIT HI MDM: CPT | Mod: 25

## 2018-10-17 PROCEDURE — G0378 HOSPITAL OBSERVATION PER HR: HCPCS

## 2018-10-17 PROCEDURE — 25000003 PHARM REV CODE 250: Performed by: EMERGENCY MEDICINE

## 2018-10-17 PROCEDURE — 80053 COMPREHEN METABOLIC PANEL: CPT

## 2018-10-17 PROCEDURE — 86901 BLOOD TYPING SEROLOGIC RH(D): CPT

## 2018-10-17 PROCEDURE — 25000003 PHARM REV CODE 250: Performed by: INTERNAL MEDICINE

## 2018-10-17 PROCEDURE — 81000 URINALYSIS NONAUTO W/SCOPE: CPT

## 2018-10-17 PROCEDURE — 96361 HYDRATE IV INFUSION ADD-ON: CPT

## 2018-10-17 PROCEDURE — P9016 RBC LEUKOCYTES REDUCED: HCPCS

## 2018-10-17 PROCEDURE — 85007 BL SMEAR W/DIFF WBC COUNT: CPT

## 2018-10-17 PROCEDURE — C9113 INJ PANTOPRAZOLE SODIUM, VIA: HCPCS

## 2018-10-17 RX ORDER — SUCRALFATE 1 G/10ML
1 SUSPENSION ORAL
Status: DISCONTINUED | OUTPATIENT
Start: 2018-10-17 | End: 2018-10-18 | Stop reason: HOSPADM

## 2018-10-17 RX ORDER — ONDANSETRON 2 MG/ML
4 INJECTION INTRAMUSCULAR; INTRAVENOUS EVERY 6 HOURS PRN
Status: DISCONTINUED | OUTPATIENT
Start: 2018-10-17 | End: 2018-10-18 | Stop reason: HOSPADM

## 2018-10-17 RX ORDER — SODIUM CHLORIDE 9 MG/ML
INJECTION, SOLUTION INTRAVENOUS CONTINUOUS
Status: DISCONTINUED | OUTPATIENT
Start: 2018-10-17 | End: 2018-10-18 | Stop reason: HOSPADM

## 2018-10-17 RX ORDER — FERROUS SULFATE 220 (44)/5
325 ELIXIR ORAL 2 TIMES DAILY
Status: DISCONTINUED | OUTPATIENT
Start: 2018-10-17 | End: 2018-10-18 | Stop reason: HOSPADM

## 2018-10-17 RX ORDER — AMLODIPINE BESYLATE 2.5 MG/1
5 TABLET ORAL DAILY
Status: DISCONTINUED | OUTPATIENT
Start: 2018-10-18 | End: 2018-10-18 | Stop reason: HOSPADM

## 2018-10-17 RX ORDER — LOSARTAN POTASSIUM 25 MG/1
50 TABLET ORAL DAILY
Status: DISCONTINUED | OUTPATIENT
Start: 2018-10-18 | End: 2018-10-18 | Stop reason: HOSPADM

## 2018-10-17 RX ORDER — ATORVASTATIN CALCIUM 10 MG/1
20 TABLET, FILM COATED ORAL DAILY
Status: DISCONTINUED | OUTPATIENT
Start: 2018-10-18 | End: 2018-10-18 | Stop reason: HOSPADM

## 2018-10-17 RX ORDER — FERROUS SULFATE 300 MG/5ML
LIQUID (ML) ORAL
Status: COMPLETED
Start: 2018-10-17 | End: 2018-10-17

## 2018-10-17 RX ORDER — HYDROCODONE BITARTRATE AND ACETAMINOPHEN 500; 5 MG/1; MG/1
TABLET ORAL
Status: DISCONTINUED | OUTPATIENT
Start: 2018-10-17 | End: 2018-10-18 | Stop reason: HOSPADM

## 2018-10-17 RX ORDER — PANTOPRAZOLE SODIUM 40 MG/10ML
INJECTION, POWDER, LYOPHILIZED, FOR SOLUTION INTRAVENOUS
Status: COMPLETED
Start: 2018-10-17 | End: 2018-10-17

## 2018-10-17 RX ADMIN — SODIUM CHLORIDE, POTASSIUM CHLORIDE, SODIUM LACTATE AND CALCIUM CHLORIDE 1000 ML: 600; 310; 30; 20 INJECTION, SOLUTION INTRAVENOUS at 04:10

## 2018-10-17 RX ADMIN — SODIUM CHLORIDE: 0.9 INJECTION, SOLUTION INTRAVENOUS at 06:10

## 2018-10-17 RX ADMIN — MINERAL SUPPLEMENT IRON 300 MG / 5 ML STRENGTH LIQUID 100 PER BOX UNFLAVORED 300 MG: at 08:10

## 2018-10-17 RX ADMIN — PANTOPRAZOLE SODIUM: 40 INJECTION, POWDER, FOR SOLUTION INTRAVENOUS at 09:10

## 2018-10-17 RX ADMIN — SUCRALFATE 1 G: 1 SUSPENSION ORAL at 08:10

## 2018-10-17 NOTE — HOSPITAL COURSE
Admit to telemetry for transfusion of 3 units packed red blood cells otherwise treat symptomatically and repeat labs in the a.m..  \  10/18/2018 this patient tolerated her transfusion well.  She is sitting up on bedside in no acute distress.  Eating and tolerating her diet and has tolerated transfusion 3 units packed red blood cells well.  She denies shortness of breath denies chest pain denies diaphoresis in the denies palpitations.  Patient will be discharged home for follow-up with her hematologist in 2 weeks.  Will attempt to get this appointment sooner at [facility].

## 2018-10-17 NOTE — SUBJECTIVE & OBJECTIVE
Past Medical History:   Diagnosis Date    Cancer     kidney    Dementia     Encounter for blood transfusion     High cholesterol 2018    Hypertension     No Medication     Renal disorder     Wears dentures     Uppers       Past Surgical History:   Procedure Laterality Date     SECTION      COLONOSCOPY N/A 2018    Procedure: COLONOSCOPY;  Surgeon: Torres Son MD;  Location: [facility];  Service: Endoscopy;  Laterality: N/A;    COLONOSCOPY N/A 2018    Performed by [facility] Son [facility] at [facility] ENDO    CYSTOSCOPY WITH RETROGRADE PYELOGRAM Left 1/15/2016    Performed by Maribell [facility]acon [facility] at [facility] OR    EGD (ESOPHAGOGASTRODUODENOSCOPY) N/A 2018    Performed by [facility] Son [facility] at [facility] ENDO    ESOPHAGOGASTRODUODENOSCOPY N/A 2018    Procedure: EGD (ESOPHAGOGASTRODUODENOSCOPY);  Surgeon: Torres Son MD;  Location: [facility];  Service: Endoscopy;  Laterality: N/A;    EYE SURGERY      Rt eye Catarac    HYSTERECTOMY      IMAGING, GI TRACT, INTRALUMINAL, VIA CAPSULE N/A 2018    Performed by [facility] at [facility]    INTRALUMINAL GASTROINTESTINAL TRACT IMAGING VIA CAPSULE N/A 2018    Procedure: IMAGING, GI TRACT, INTRALUMINAL, VIA CAPSULE;  Surgeon: Rocco Ross MD;  Location: [facility];  Service: Endoscopy;  Laterality: N/A;    KIDNEY SURGERY Left 2018    Left kidney removed due to cancer    lasix surgery      NEPHRECTOMY-RADICAL   Left 2016    Performed by [facility] at [facility]    REPAIR-HERNIA-VENTRAL N/A 2016    Performed by Marco A Randhawa MD at [facility] OR    RESECTION-BOWEL  2016    Performed by Marco A [facility]awa MD at [facility] OR    URETEROSCOPY  1/15/2016    Performed by [facility]ell [facility]acon MD at [facility] OR       Review of patient's allergies indicates:   Allergen Reactions    Codeine Other (See Comments)     Pt shakes and hallucinates    Pcn [penicillins] Anxiety and  Other (See Comments)       No current facility-administered medications on file prior to encounter.      Current Outpatient Medications on File Prior to Encounter   Medication Sig    amLODIPine (NORVASC) 5 MG tablet Take 5 mg by mouth once daily.    atorvastatin (LIPITOR) 20 MG tablet Take 20 mg by mouth once daily.    ferrous sulfate 325 (65 FE) MG EC tablet Take 325 mg by mouth once daily.    losartan (COZAAR) 50 MG tablet Take 50 mg by mouth once daily.    pantoprazole (PROTONIX) 40 MG tablet Take 1 tablet (40 mg total) by mouth once daily.    sucralfate (CARAFATE) 100 mg/mL suspension Take 10 mLs (1 g total) by mouth 4 (four) times daily before meals and nightly.     Family History     None        Tobacco Use    Smoking status: Current Every Day Smoker     Packs/day: 1.00     Years: 50.00     Pack years: 50.00    Smokeless tobacco: Never Used    Tobacco comment: Trying to QUIT   Substance and Sexual Activity    Alcohol use: Yes     Alcohol/week: 3.6 oz     Types: 6 Cans of beer per week     Comment: two days ago a drinkbeer    Drug use: No    Sexual activity: No     Review of Systems   Constitutional: Positive for fatigue. Negative for activity change, appetite change and fever.   HENT: Negative for congestion, ear discharge, mouth sores, nosebleeds, rhinorrhea, sinus pressure, sinus pain and tinnitus.    Eyes: Negative.  Negative for pain, redness and itching.   Respiratory: Positive for chest tightness and shortness of breath. Negative for apnea, cough, choking, wheezing and stridor.    Cardiovascular: Positive for palpitations. Negative for chest pain and leg swelling.   Gastrointestinal: Positive for blood in stool, diarrhea and nausea. Negative for abdominal distention, abdominal pain, anal bleeding, constipation and vomiting.   Endocrine: Negative.    Genitourinary: Negative for difficulty urinating, dysuria, flank pain, frequency and urgency.   Musculoskeletal: Negative for arthralgias, back  pain, gait problem and myalgias.   Skin: Negative for color change and pallor.   Allergic/Immunologic: Negative.    Neurological: Positive for dizziness, weakness and light-headedness. Negative for facial asymmetry and headaches.   Hematological: Negative for adenopathy. Does not bruise/bleed easily.   Psychiatric/Behavioral: Positive for confusion. The patient is nervous/anxious.      Objective:     Vital Signs (Most Recent):  Temp: 97.8 °F (36.6 °C) (10/17/18 1733)  Pulse: 75 (10/17/18 1733)  Resp: 18 (10/17/18 1733)  BP: 121/61 (10/17/18 1733)  SpO2: 100 % (10/17/18 1733) Vital Signs (24h Range):  Temp:  [97.8 °F (36.6 °C)-98 °F (36.7 °C)] 97.8 °F (36.6 °C)  Pulse:  [75-88] 75  Resp:  [18-20] 18  SpO2:  [100 %] 100 %  BP: (114-121)/(55-61) 121/61     Weight: 74.8 kg (165 lb)  Body mass index is 23.01 kg/m².    Physical Exam   Constitutional: She is oriented to person, place, and time. She appears well-developed and well-nourished.   HENT:   Head: Normocephalic and atraumatic.   Eyes: EOM are normal. Pupils are equal, round, and reactive to light.   Neck: Normal range of motion. Neck supple. No tracheal deviation present. No thyromegaly present.   Cardiovascular: Normal rate, regular rhythm and normal heart sounds.   Pulmonary/Chest: Effort normal and breath sounds normal. No respiratory distress. She has no wheezes.   Abdominal: Soft. Bowel sounds are normal. She exhibits no distension. There is no tenderness. There is no rebound and no guarding.   Musculoskeletal: Normal range of motion.   Lymphadenopathy:     She has no cervical adenopathy.   Neurological: She is alert and oriented to person, place, and time.   Skin: Skin is warm and dry. Capillary refill takes less than 2 seconds.   Psychiatric: She has a normal mood and affect. Her behavior is normal. Judgment and thought content normal.   Nursing note and vitals reviewed.        CRANIAL NERVES     CN III, IV, VI   Pupils are equal, round, and reactive to  light.  Extraocular motions are normal.        Significant Labs: All pertinent labs within the past 24 hours have been reviewed.    Significant Imaging: I have reviewed and interpreted all pertinent imaging results/findings within the past 24 hours.

## 2018-10-17 NOTE — H&P
[facility] - ED  Hospital Medicine  History & Physical    Patient Name: Oriana Tobar  MRN: [id number]  Admission Date: 10/17/2018  Attending Physician: Tito Mathews MD  Primary Care Provider: Carine Baltazar NP         Patient information was obtained from patient and ER records.     Subjective:     Principal Problem:Symptomatic anemia    Chief Complaint:   Chief Complaint   Patient presents with    Dizziness    Weakness        HPI: This is a 73-year-old female presented to the emergency department complaining of dizziness and weakness today.  Patient denies any pain and states through her daughter that she woke up today and felt weak and became dizzy and then was brought to the emergency department for evaluation.  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  The patient has undergone recent biopsy and also had of complications of a small retroperitoneal bleed which was drained by CT-guided means.  Patient is schedule with her hematologist on November 11th to determine treatment for this gastric carcinoma.  Current lab shows a hemoglobin of 4.8 hematocrit 16.7 and white blood cell count was 11.  Two weeks ago hemoglobin hematocrit were 8.3 and 27.8 patient also complains of some melenic stool which was noted home but no bright red blood per rectum.  She denies any nausea and vomiting.  Patient otherwise has a past medical history of gastric carcinoma renal mass hypokalemia hypertension, dementia, partial colectomy, partial left nephrectomy, iron deficiency anemia, hematochezia, retroperitoneal hemorrhage, and dementia.  Patient will be admitted for transfusion of 3 units packed red blood cells and repeat labs in the a.m..  Surgery will be consulted for subsequent need for any intervention.  However I feel like with a known diagnosis of gastric carcinoma this is the most likely source.  She is scheduled for  appointment on  for this    Past Medical History:   Diagnosis Date    Cancer     kidney    Dementia     Encounter for blood transfusion     High cholesterol 2018    Hypertension     No Medication     Renal disorder     Wears dentures     Uppers       Past Surgical History:   Procedure Laterality Date     SECTION      COLONOSCOPY N/A 2018    Procedure: COLONOSCOPY;  Surgeon: Torres Son MD;  Location: [facility];  Service: Endoscopy;  Laterality: N/A;    COLONOSCOPY N/A 2018    Performed by Torres Son MD at [facility] ENDO    CYSTOSCOPY WITH RETROGRADE PYELOGRAM Left 1/15/2016    Performed by [facility]ell [facility]acon MD at [facility] OR    EGD (ESOPHAGOGASTRODUODENOSCOPY) N/A 2018    Performed by Torres Son MD at [facility] ENDO    ESOPHAGOGASTRODUODENOSCOPY N/A 2018    Procedure: EGD (ESOPHAGOGASTRODUODENOSCOPY);  Surgeon: Torres Son MD;  Location: [facility];  Service: Endoscopy;  Laterality: N/A;    EYE SURGERY      Rt eye Catarac    HYSTERECTOMY      IMAGING, GI TRACT, INTRALUMINAL, VIA CAPSULE N/A 2018    Performed by Rocco Ross MD at [facility] ENDO    INTRALUMINAL GASTROINTESTINAL TRACT IMAGING VIA CAPSULE N/A 2018    Procedure: IMAGING, GI TRACT, INTRALUMINAL, VIA CAPSULE;  Surgeon: Rocco Ross MD;  Location: [facility];  Service: Endoscopy;  Laterality: N/A;    KIDNEY SURGERY Left 2018    Left kidney removed due to cancer    lasix surgery      NEPHRECTOMY-RADICAL   Left 2016    Performed by [facility]acon MD at [facility] OR    REPAIR-HERNIA-VENTRAL N/A 2016    Performed by Marco A Randhawa MD at [facility] OR    RESECTION-BOWEL  2016    Performed by Marco A [facility]hawa MD at [facility] OR    URETEROSCOPY  1/15/2016    Performed by [facility]ell [facility]acon MD at [facility] OR       Review of patient's allergies indicates:   Allergen Reactions    Codeine Other (See Comments)     Pt shakes and  hallucinates    Pcn [penicillins] Anxiety and Other (See Comments)       No current facility-administered medications on file prior to encounter.      Current Outpatient Medications on File Prior to Encounter   Medication Sig    amLODIPine (NORVASC) 5 MG tablet Take 5 mg by mouth once daily.    atorvastatin (LIPITOR) 20 MG tablet Take 20 mg by mouth once daily.    ferrous sulfate 325 (65 FE) MG EC tablet Take 325 mg by mouth once daily.    losartan (COZAAR) 50 MG tablet Take 50 mg by mouth once daily.    pantoprazole (PROTONIX) 40 MG tablet Take 1 tablet (40 mg total) by mouth once daily.    sucralfate (CARAFATE) 100 mg/mL suspension Take 10 mLs (1 g total) by mouth 4 (four) times daily before meals and nightly.     Family History     None        Tobacco Use    Smoking status: Current Every Day Smoker     Packs/day: 1.00     Years: 50.00     Pack years: 50.00    Smokeless tobacco: Never Used    Tobacco comment: Trying to QUIT   Substance and Sexual Activity    Alcohol use: Yes     Alcohol/week: 3.6 oz     Types: 6 Cans of beer per week     Comment: two days ago a drinkbeer    Drug use: No    Sexual activity: No     Review of Systems   Constitutional: Positive for fatigue. Negative for activity change, appetite change and fever.   HENT: Negative for congestion, ear discharge, mouth sores, nosebleeds, rhinorrhea, sinus pressure, sinus pain and tinnitus.    Eyes: Negative.  Negative for pain, redness and itching.   Respiratory: Positive for chest tightness and shortness of breath. Negative for apnea, cough, choking, wheezing and stridor.    Cardiovascular: Positive for palpitations. Negative for chest pain and leg swelling.   Gastrointestinal: Positive for blood in stool, diarrhea and nausea. Negative for abdominal distention, abdominal pain, anal bleeding, constipation and vomiting.   Endocrine: Negative.    Genitourinary: Negative for difficulty urinating, dysuria, flank pain, frequency and urgency.    Musculoskeletal: Negative for arthralgias, back pain, gait problem and myalgias.   Skin: Negative for color change and pallor.   Allergic/Immunologic: Negative.    Neurological: Positive for dizziness, weakness and light-headedness. Negative for facial asymmetry and headaches.   Hematological: Negative for adenopathy. Does not bruise/bleed easily.   Psychiatric/Behavioral: Positive for confusion. The patient is nervous/anxious.      Objective:     Vital Signs (Most Recent):  Temp: 97.8 °F (36.6 °C) (10/17/18 1733)  Pulse: 75 (10/17/18 1733)  Resp: 18 (10/17/18 1733)  BP: 121/61 (10/17/18 1733)  SpO2: 100 % (10/17/18 1733) Vital Signs (24h Range):  Temp:  [97.8 °F (36.6 °C)-98 °F (36.7 °C)] 97.8 °F (36.6 °C)  Pulse:  [75-88] 75  Resp:  [18-20] 18  SpO2:  [100 %] 100 %  BP: (114-121)/(55-61) 121/61     Weight: 74.8 kg (165 lb)  Body mass index is 23.01 kg/m².    Physical Exam   Constitutional: She is oriented to person, place, and time. She appears well-developed and well-nourished.   HENT:   Head: Normocephalic and atraumatic.   Eyes: EOM are normal. Pupils are equal, round, and reactive to light.   Neck: Normal range of motion. Neck supple. No tracheal deviation present. No thyromegaly present.   Cardiovascular: Normal rate, regular rhythm and normal heart sounds.   Pulmonary/Chest: Effort normal and breath sounds normal. No respiratory distress. She has no wheezes.   Abdominal: Soft. Bowel sounds are normal. She exhibits no distension. There is no tenderness. There is no rebound and no guarding.   Musculoskeletal: Normal range of motion.   Lymphadenopathy:     She has no cervical adenopathy.   Neurological: She is alert and oriented to person, place, and time.   Skin: Skin is warm and dry. Capillary refill takes less than 2 seconds.   Psychiatric: She has a normal mood and affect. Her behavior is normal. Judgment and thought content normal.   Nursing note and vitals reviewed.        CRANIAL NERVES     CN III, IV,  VI   Pupils are equal, round, and reactive to light.  Extraocular motions are normal.        Significant Labs: All pertinent labs within the past 24 hours have been reviewed.    Significant Imaging: I have reviewed and interpreted all pertinent imaging results/findings within the past 24 hours.    Assessment/Plan:     * Symptomatic anemia    Admit for transfusion of 3 units packed red blood cells  Consult general surgery non emergently for need for any further diagnostic or therapeutic procedure.  Recheck labs in the morning after transfusion  Treat symptomatically or as clinically indicated by clinical course.  Continue home medications            VTE Risk Mitigation (From admission, onward)    None             Tito Mathews MD  Department of Hospital Medicine   [facility] - ED

## 2018-10-17 NOTE — ASSESSMENT & PLAN NOTE
Admit for transfusion of 3 units packed red blood cells  Consult general surgery non emergently for need for any further diagnostic or therapeutic procedure.  Recheck labs in the morning after transfusion  Treat symptomatically or as clinically indicated by clinical course.  Continue home medications

## 2018-10-17 NOTE — NURSING
Arrived to the floor at 1755 via W/C escorted by MIKE Conroy. 1st unit of blood is running. NADN. Vitals taken

## 2018-10-17 NOTE — HPI
This is a 73-year-old female presented to the emergency department complaining of dizziness and weakness today.  Patient denies any pain and states through her daughter that she woke up today and felt weak and became dizzy and then was brought to the emergency department for evaluation.  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  The patient has undergone recent biopsy and also had of complications of a small retroperitoneal bleed which was drained by CT-guided means.  Patient is schedule with her hematologist on November 11th to determine treatment for this gastric carcinoma.  Current lab shows a hemoglobin of 4.8 hematocrit 16.7 and white blood cell count was 11.  Two weeks ago hemoglobin hematocrit were 8.3 and 27.8 patient also complains of some melenic stool which was noted home but no bright red blood per rectum.  She denies any nausea and vomiting.  Patient otherwise has a past medical history of gastric carcinoma renal mass hypokalemia hypertension, dementia, partial colectomy, partial left nephrectomy, iron deficiency anemia, hematochezia, retroperitoneal hemorrhage, and dementia.  Patient will be admitted for transfusion of 3 units packed red blood cells and repeat labs in the a.m..  Surgery will be consulted for subsequent need for any intervention.  However I feel like with a known diagnosis of gastric carcinoma this is the most likely source.  She is scheduled for appointment on November 11th for this

## 2018-10-18 VITALS
WEIGHT: 168.81 LBS | DIASTOLIC BLOOD PRESSURE: 63 MMHG | OXYGEN SATURATION: 100 % | BODY MASS INDEX: 23.63 KG/M2 | HEART RATE: 67 BPM | SYSTOLIC BLOOD PRESSURE: 138 MMHG | HEIGHT: 71 IN | TEMPERATURE: 98 F | RESPIRATION RATE: 18 BRPM

## 2018-10-18 LAB
ALBUMIN SERPL BCP-MCNC: 2.3 G/DL
ALP SERPL-CCNC: 42 U/L
ALT SERPL W/O P-5'-P-CCNC: 7 U/L
ANION GAP SERPL CALC-SCNC: 5 MMOL/L
AST SERPL-CCNC: 12 U/L
BASOPHILS # BLD AUTO: 0.06 K/UL
BASOPHILS NFR BLD: 0.5 %
BILIRUB SERPL-MCNC: 0.7 MG/DL
BLD PROD TYP BPU: NORMAL
BLOOD UNIT EXPIRATION DATE: NORMAL
BLOOD UNIT TYPE CODE: 6200
BLOOD UNIT TYPE: NORMAL
BUN SERPL-MCNC: 14 MG/DL
CALCIUM SERPL-MCNC: 7.6 MG/DL
CHLORIDE SERPL-SCNC: 112 MMOL/L
CO2 SERPL-SCNC: 23 MMOL/L
CODING SYSTEM: NORMAL
CREAT SERPL-MCNC: 1 MG/DL
DIFFERENTIAL METHOD: ABNORMAL
DISPENSE STATUS: NORMAL
EOSINOPHIL # BLD AUTO: 0.2 K/UL
EOSINOPHIL NFR BLD: 1.4 %
ERYTHROCYTE [DISTWIDTH] IN BLOOD BY AUTOMATED COUNT: 15.8 %
EST. GFR  (AFRICAN AMERICAN): >60 ML/MIN/1.73 M^2
EST. GFR  (NON AFRICAN AMERICAN): 56 ML/MIN/1.73 M^2
GLUCOSE SERPL-MCNC: 88 MG/DL
HCT VFR BLD AUTO: 22.9 %
HGB BLD-MCNC: 7.3 G/DL
IMM GRANULOCYTES # BLD AUTO: 0.04 K/UL
IMM GRANULOCYTES NFR BLD AUTO: 0.4 %
LYMPHOCYTES # BLD AUTO: 2.7 K/UL
LYMPHOCYTES NFR BLD: 24.3 %
MCH RBC QN AUTO: 25.9 PG
MCHC RBC AUTO-ENTMCNC: 31.9 G/DL
MCV RBC AUTO: 81 FL
MONOCYTES # BLD AUTO: 1 K/UL
MONOCYTES NFR BLD: 8.9 %
NEUTROPHILS # BLD AUTO: 7.1 K/UL
NEUTROPHILS NFR BLD: 64.5 %
NRBC BLD-RTO: 0 /100 WBC
NUM UNITS TRANS PACKED RBC: NORMAL
PLATELET # BLD AUTO: 382 K/UL
PMV BLD AUTO: 9.6 FL
POTASSIUM SERPL-SCNC: 3.3 MMOL/L
PROT SERPL-MCNC: 5.1 G/DL
RBC # BLD AUTO: 2.82 M/UL
SODIUM SERPL-SCNC: 140 MMOL/L
WBC # BLD AUTO: 11.07 K/UL

## 2018-10-18 PROCEDURE — 36415 COLL VENOUS BLD VENIPUNCTURE: CPT

## 2018-10-18 PROCEDURE — 25000003 PHARM REV CODE 250: Performed by: INTERNAL MEDICINE

## 2018-10-18 PROCEDURE — 80053 COMPREHEN METABOLIC PANEL: CPT

## 2018-10-18 PROCEDURE — 96374 THER/PROPH/DIAG INJ IV PUSH: CPT

## 2018-10-18 PROCEDURE — 90662 IIV NO PRSV INCREASED AG IM: CPT | Performed by: INTERNAL MEDICINE

## 2018-10-18 PROCEDURE — G0378 HOSPITAL OBSERVATION PER HR: HCPCS

## 2018-10-18 PROCEDURE — C9113 INJ PANTOPRAZOLE SODIUM, VIA: HCPCS | Performed by: INTERNAL MEDICINE

## 2018-10-18 PROCEDURE — 36430 TRANSFUSION BLD/BLD COMPNT: CPT

## 2018-10-18 PROCEDURE — 85025 COMPLETE CBC W/AUTO DIFF WBC: CPT

## 2018-10-18 PROCEDURE — 90471 IMMUNIZATION ADMIN: CPT | Performed by: INTERNAL MEDICINE

## 2018-10-18 PROCEDURE — P9016 RBC LEUKOCYTES REDUCED: HCPCS

## 2018-10-18 PROCEDURE — 63600175 PHARM REV CODE 636 W HCPCS: Performed by: INTERNAL MEDICINE

## 2018-10-18 PROCEDURE — G0008 ADMIN INFLUENZA VIRUS VAC: HCPCS | Performed by: INTERNAL MEDICINE

## 2018-10-18 RX ADMIN — ATORVASTATIN CALCIUM 20 MG: 10 TABLET, FILM COATED ORAL at 09:10

## 2018-10-18 RX ADMIN — LOSARTAN POTASSIUM 50 MG: 25 TABLET ORAL at 09:10

## 2018-10-18 RX ADMIN — DEXTROSE MONOHYDRATE 8 MG/HR: 50 INJECTION, SOLUTION INTRAVENOUS at 07:10

## 2018-10-18 RX ADMIN — AMLODIPINE BESYLATE 5 MG: 2.5 TABLET ORAL at 09:10

## 2018-10-18 RX ADMIN — INFLUENZA A VIRUS A/MICHIGAN/45/2015 X-275 (H1N1) ANTIGEN (FORMALDEHYDE INACTIVATED), INFLUENZA A VIRUS A/SINGAPORE/INFIMH-16-0019/2016 IVR-186 (H3N2) ANTIGEN (FORMALDEHYDE INACTIVATED), AND INFLUENZA B VIRUS B/MARYLAND/15/2016 BX-69A (A B/COLORADO/6/2017-LIKE VIRUS) ANTIGEN (FORMALDEHYDE INACTIVATED) 0.5 ML: 60; 60; 60 INJECTION, SUSPENSION INTRAMUSCULAR at 11:10

## 2018-10-18 RX ADMIN — SUCRALFATE 1 G: 1 SUSPENSION ORAL at 07:10

## 2018-10-18 NOTE — PLAN OF CARE
10/18/18 1028   Final Note   Assessment Type Final Discharge Note   Discharge Disposition Home-Health   What phone number can be called within the next 1-3 days to see how you are doing after discharge? [phone]   Hospital Follow Up  Appt(s) scheduled? Yes   Discharge plans and expectations educations in teach back method with documentation complete? Yes   Verbal & written follow up appointments given to patient. Attempt to move appointment sooner with Dr Louis but doctor is on vacation. States she's on a waiting list for a sooner appointment. Patient & granddaughter notified. Demonstrated understanding by verbal feedback. Home health set up with [facility]. Denies any other discharge needs at this time.

## 2018-10-18 NOTE — ED PROVIDER NOTES
Encounter Date: 10/17/2018       History     Chief Complaint   Patient presents with    Dizziness    Weakness     73-year-old female with complaints of weakness dizziness and black tarry stool  Onset of symptoms have been in the past 2 days  In past month she has been diagnosed with a gastrointestinal carcinoma and has had prior symptomatic anemia requiring multiple transfusions  Additionally she has had urinary tract infections  Patient's history of dementia precludes review of systems and she provides little history of present illness          Review of patient's allergies indicates:   Allergen Reactions    Codeine Other (See Comments)     Pt shakes and hallucinates    Pcn [penicillins] Anxiety and Other (See Comments)     Past Medical History:   Diagnosis Date    Cancer     kidney    Dementia     Encounter for blood transfusion     High cholesterol 2018    Hypertension     No Medication     Renal disorder     Wears dentures     Uppers     Past Surgical History:   Procedure Laterality Date     SECTION      COLONOSCOPY N/A 2018    Procedure: COLONOSCOPY;  Surgeon: Torres Son MD;  Location: [facility];  Service: Endoscopy;  Laterality: N/A;    COLONOSCOPY N/A 2018    [facility] ENDO    CYSTOSCOPY WITH RETROGRADE PYELOGRAM Left 1/15/2016    Performed by [facility] at [facility] OR    EGD (ESOPHAGOGASTRODUODENOSCOPY) N/A 2018    Performed by [facility] at [facility] ENDO    ESOPHAGOGASTRODUODENOSCOPY N/A 2018    Procedure: EGD (ESOPHAGOGASTRODUODENOSCOPY);  Surgeon: Torres Son MD;  Location: [facility];  Service: Endoscopy;  Laterality: N/A;    EYE SURGERY      Rt eye Catarac    HYSTERECTOMY      IMAGING, GI TRACT, INTRALUMINAL, VIA CAPSULE N/A 2018    [facility]    INTRALUMINAL GASTROINTESTINAL TRACT IMAGING VIA CAPSULE N/A 2018    Procedure:  IMAGING, GI TRACT, INTRALUMINAL, VIA CAPSULE;  Surgeon: Rocco Ross MD;  Location: [facility] ENDO;  Service: Endoscopy;  Laterality: N/A;    KIDNEY SURGERY Left 06/23/2018    Left kidney removed due to cancer    lasix surgery      NEPHRECTOMY-RADICAL   Left 1/27/2016    [facility] OR    REPAIR-HERNIA-VENTRAL N/A 1/27/2016    [facility] by Marco A [facility]awa MD at [facility] OR    RESECTION-BOWEL  1/27/2016    [facility] by [facility] MD at [facility] OR    URETEROSCOPY  1/15/2016    Performed by [facility] at [facility] OR     History reviewed. No pertinent family history.  Social History     Tobacco Use    Smoking status: Current Every Day Smoker     Packs/day: 1.00     Years: 50.00     Pack years: 50.00    Smokeless tobacco: Never Used    Tobacco comment: Trying to QUIT   Substance Use Topics    Alcohol use: Yes     Alcohol/week: 3.6 oz     Types: 6 Cans of beer per week     Comment: two days ago a drinkbeer    Drug use: No     Review of Systems   Unable to perform ROS: Dementia       Physical Exam     Initial Vitals [10/17/18 1513]   BP Pulse Resp Temp SpO2   (!) 114/55 88 20 98 °F (36.7 °C) 100 %      MAP       --         Physical Exam    Nursing note and vitals reviewed.  Constitutional: She appears well-developed and well-nourished.   HENT:   Head: Normocephalic and atraumatic.   Mouth/Throat: Mucous membranes are dry.   Cardiovascular: Normal rate, regular rhythm and normal heart sounds.   Pulmonary/Chest: Breath sounds normal.   Abdominal: Soft. She exhibits no distension and no mass. There is no tenderness. There is no rebound and no guarding.   Musculoskeletal: Normal range of motion. She exhibits no edema or tenderness.   Neurological: She is alert. She has normal strength. GCS score is 15. GCS eye subscore is 4. GCS verbal subscore is 5. GCS motor subscore is 6.   Skin: Skin is dry. Capillary refill takes less than 2 seconds.         ED Course    Procedures  Labs Reviewed   CBC W/ AUTO DIFFERENTIAL - Abnormal; Notable for the following components:       Result Value    RBC 2.05 (*)     Hemoglobin 4.8 (*)     Hematocrit 16.7 (*)     MCH 23.4 (*)     MCHC 28.7 (*)     RDW 17.2 (*)     Platelets 538 (*)     Mono% 0.0 (*)     All other components within normal limits    Narrative:        hgb/hct critical result(s) called and verbal readback obtained   from   sruthi @[facility], 10/17/2018 17:15   COMPREHENSIVE METABOLIC PANEL - Abnormal; Notable for the following components:    Potassium 3.3 (*)     Glucose 116 (*)     Calcium 8.3 (*)     Albumin 2.8 (*)     ALT 7 (*)     Anion Gap 6 (*)     eGFR if  51.8 (*)     eGFR if non  44.9 (*)     All other components within normal limits   POCT GLUCOSE - Abnormal; Notable for the following components:    POCT Glucose 140 (*)     All other components within normal limits   TYPE & SCREEN          Imaging Results    None          Medical Decision Making:   Initial Assessment:   History gastrointestinal carcinoma  Symptomatic anemia  Dementia    Patient is in need of admission for packed red blood cell transfusion  Her daughter provides consent  She is admitted to the hospitalist in stable condition  Clinical Tests:   Lab Tests: Ordered and Reviewed  Other:   I have discussed this case with another health care provider.       <> Summary of the Discussion: Dr Garcia         Blood will be transfused on the floor                   Clinical Impression:   The encounter diagnosis was Symptomatic anemia.      Disposition:   Disposition: Admitted  Condition: Serious                        Davy Eddy MD  10/18/18 0703

## 2018-10-18 NOTE — PLAN OF CARE
10/18/18 1045   HOWELL Message   Medicare Outpatient and Observation Notification regarding financial responsibility Given to patient/caregiver;Explained to patient/caregiver;Signed/date by patient/caregiver   Date HOWELL was signed 10/18/18   Time HOWELL was signed 0972

## 2018-10-18 NOTE — PLAN OF CARE
Problem: Patient Care Overview  Goal: Plan of Care Review  Outcome: Ongoing (interventions implemented as appropriate)  LATE ENTRY

## 2018-10-18 NOTE — NURSING
1132-DISCHARGE INSTRUCTIONS PROVIDED AND REVIEW WITH PT AND PT DAUGHTER AND GRANDDAUGHTER. BOTH VOICED UNDERSTANDING. ARLETTERN   1140-D/C 22G TO LEFT HAND, 20G TO RIGHT HAND, CATHS INTACT, PRESSURE DRESSING APPLIED TO SITES. PT TOLERATED WELL. MIKE CHONG   1150-DISCHARGE PT AT THIS TIME PER MD ORDERS. ASSISTED IN WHEELCHAIR, FAMILY AT SIDE. PT DENIES ANY COMPLAINTS. RESP EVEN AND UNLABORED. NAD NOTED. NO FURTHER NEEDS NOTED. PT DENIES COMPLAINTS. MIKE CHONG

## 2018-10-18 NOTE — PLAN OF CARE
10/18/18 1025   Post-Acute Status   Post-Acute Authorization Home Health/Hospice   Home Health/Hospice Status Referrals Sent   Spoke to Leann with [facility]. Clinicals & orders faxed. States someone will call her tomorrow to set up appointment.

## 2018-10-18 NOTE — DISCHARGE SUMMARY
[facility]  Hospital Medicine  Discharge Summary      Patient Name: Oriana Tobar  MRN: [id number]  Admission Date: 10/17/2018  Hospital Length of Stay: 0 days  Discharge Date and Time:  10/18/2018 9:38 AM  Attending Physician: Tito Mathews MD   Discharging Provider: Tito Mathews MD  Primary Care Provider: Carine Baltazar NP      HPI:   This is a 73-year-old female presented to the emergency department complaining of dizziness and weakness today.  Patient denies any pain and states through her daughter that she woke up today and felt weak and became dizzy and then was brought to the emergency department for evaluation.  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  The patient has undergone recent biopsy and also had of complications of a small retroperitoneal bleed which was drained by CT-guided means.  Patient is schedule with her hematologist on November 11th to determine treatment for this gastric carcinoma.  Current lab shows a hemoglobin of 4.8 hematocrit 16.7 and white blood cell count was 11.  Two weeks ago hemoglobin hematocrit were 8.3 and 27.8 patient also complains of some melenic stool which was noted home but no bright red blood per rectum.  She denies any nausea and vomiting.  Patient otherwise has a past medical history of gastric carcinoma renal mass hypokalemia hypertension, dementia, partial colectomy, partial left nephrectomy, iron deficiency anemia, hematochezia, retroperitoneal hemorrhage, and dementia.  Patient will be admitted for transfusion of 3 units packed red blood cells and repeat labs in the a.m..  Surgery will be consulted for subsequent need for any intervention.  However I feel like with a known diagnosis of gastric carcinoma this is the most likely source.  She is scheduled for appointment on November 11th for this    * No surgery found *      Hospital Course:    Admit to telemetry for transfusion of 3 units packed red blood cells otherwise treat symptomatically and repeat labs in the a.m..  \ /18/2018 this patient tolerated her transfusion well.  She is sitting up on bedside in no acute distress.  Eating and tolerating her diet and has tolerated transfusion 3 units packed red blood cells well.  She denies shortness of breath denies chest pain denies diaphoresis in the denies palpitations.  Patient will be discharged home for follow-up with her hematologist in 2 weeks.  Will attempt to get this appointment sooner at [facility].     Consults:     No new Assessment & Plan notes have been filed under this hospital service since the last note was generated.  Service: Hospital Medicine    Final Active Diagnoses:    Diagnosis Date Noted POA    PRINCIPAL PROBLEM:  Symptomatic anemia [D64.9] 10/17/2018 Yes    Gastric carcinoma [C16.9] 10/17/2018 Unknown      Problems Resolved During this Admission:       Discharged Condition: good    Disposition:     Follow Up:    Patient Instructions:      CBC auto differential   Standing Status: Future Standing Exp. Date: 12/17/19   Order Comments: CBC weekly for the next 3 weeks     Skilled Nurse to evaluate patient and develop plan of care to be approved by MD     Skilled Nurse to complete comprehensive assessment including vital signs   Order Comments: Instruct on disease process and signs and symptoms of complications to report to MD. Review/verify medication list sent home with the patient at time of discharge and instruct patient/caregiver as needed. Frequency may be adjusted depending on start of care date.       Significant Diagnostic Studies: Labs: All labs within the past 24 hours have been reviewed    Pending Diagnostic Studies:     None         Medications:  Reconciled Home Medications:      Medication List      CONTINUE taking these medications    amLODIPine 5 MG tablet  Commonly known as:  NORVASC  Take 5 mg by mouth once  daily.     atorvastatin 20 MG tablet  Commonly known as:  LIPITOR  Take 20 mg by mouth once daily.     ferrous sulfate 325 (65 FE) MG EC tablet  Take 325 mg by mouth once daily.     losartan 50 MG tablet  Commonly known as:  COZAAR  Take 50 mg by mouth once daily.     pantoprazole 40 MG tablet  Commonly known as:  PROTONIX  Take 1 tablet (40 mg total) by mouth once daily.     sucralfate 100 mg/mL suspension  Commonly known as:  CARAFATE  Take 10 mLs (1 g total) by mouth 4 (four) times daily before meals and nightly.            Indwelling Lines/Drains at time of discharge:   Lines/Drains/Airways     Airway                 Airway - Non-Surgical 06/25/18 1548 Nasal Cannula 114 days                Time spent on the discharge of patient: 30 minutes  Patient was seen and examined on the date of discharge and determined to be suitable for discharge.         Tito Mathews MD  Department of Hospital Medicine  [facility] - Med Surg

## 2018-10-18 NOTE — PLAN OF CARE
10/18/18 0953   Discharge Assessment   Assessment Type Discharge Planning Assessment   Confirmed/corrected address and phone number on facesheet? Yes   Assessment information obtained from? Patient   Expected Length of Stay (days) 2   Communicated expected length of stay with patient/caregiver yes   Prior to hospitilization cognitive status: Alert/Oriented   Prior to hospitalization functional status: Independent   Current cognitive status: Alert/Oriented   Current Functional Status: Independent   Lives With other relative(s)   Able to Return to Prior Arrangements yes   Is patient able to care for self after discharge? Yes   Who are your caregiver(s) and their phone number(s)? Zoey Pardo granddaughter [phone] or Parisa Tobar daughter [phone]   Patient's perception of discharge disposition home health   Readmission Within The Last 30 Days no previous admission in last 30 days   Patient currently being followed by outpatient case management? Yes   If yes, name of outpatient case management following: insurance company assigned oupatient case management   Patient currently receives any other outside agency services? Yes   How many hours a day does the patient receive services? 5   Name and contact number of agency or person providing outside services Nursing Management   Is it the patient/care giver preference to resume care with the current outside agency? Yes   Equipment Currently Used at Home none   Do you have any problems affording any of your prescribed medications? No   Is the patient taking medications as prescribed? yes   Does the patient have transportation home? Yes   Transportation Available family or friend will provide   Does the patient receive services at the [facility]? No   Discharge Plan A Home Health   Patient/Family In Agreement With Plan yes   Patient lives at home with her granddaughter who helps take care of her. She also gets services from Nursing Management Mon-Fri  from 2-7pm. States would like home health to have home health; thinks she used [facility] for her . Denies any other discharge needs at this time.

## 2018-10-22 ENCOUNTER — HOSPITAL ENCOUNTER (INPATIENT)
Facility: HOSPITAL | Age: 73
LOS: 3 days | Discharge: HOME | DRG: 812 | End: 2018-10-25
Attending: EMERGENCY MEDICINE | Admitting: INTERNAL MEDICINE
Payer: MEDICARE

## 2018-10-22 DIAGNOSIS — E87.6 HYPOKALEMIA: ICD-10-CM

## 2018-10-22 DIAGNOSIS — D64.9 ANEMIA, UNSPECIFIED TYPE: Primary | ICD-10-CM

## 2018-10-22 DIAGNOSIS — R53.83 FATIGUE, UNSPECIFIED TYPE: ICD-10-CM

## 2018-10-22 DIAGNOSIS — D64.9 SYMPTOMATIC ANEMIA: ICD-10-CM

## 2018-10-22 DIAGNOSIS — D50.0 IRON DEFICIENCY ANEMIA DUE TO CHRONIC BLOOD LOSS: ICD-10-CM

## 2018-10-22 DIAGNOSIS — F03.90 DEMENTIA WITHOUT BEHAVIORAL DISTURBANCE, UNSPECIFIED DEMENTIA TYPE: ICD-10-CM

## 2018-10-22 DIAGNOSIS — R53.83 FATIGUE: ICD-10-CM

## 2018-10-22 DIAGNOSIS — I10 ESSENTIAL HYPERTENSION: ICD-10-CM

## 2018-10-22 DIAGNOSIS — Z90.5 STATUS POST NEPHRECTOMY: ICD-10-CM

## 2018-10-22 DIAGNOSIS — C64.9 RENAL CELL CARCINOMA, UNSPECIFIED LATERALITY: ICD-10-CM

## 2018-10-22 DIAGNOSIS — D62 ACUTE BLOOD LOSS ANEMIA: ICD-10-CM

## 2018-10-22 LAB
ABO + RH BLD: NORMAL
ALBUMIN SERPL BCP-MCNC: 2.7 G/DL
ALP SERPL-CCNC: 44 U/L
ALT SERPL W/O P-5'-P-CCNC: 6 U/L
ANION GAP SERPL CALC-SCNC: 8 MMOL/L
AST SERPL-CCNC: 7 U/L
BASOPHILS NFR BLD: 0 %
BILIRUB SERPL-MCNC: 0.2 MG/DL
BLD GP AB SCN CELLS X3 SERPL QL: NORMAL
BLD PROD TYP BPU: NORMAL
BLOOD UNIT EXPIRATION DATE: NORMAL
BLOOD UNIT TYPE CODE: 6200
BLOOD UNIT TYPE: NORMAL
BUN SERPL-MCNC: 29 MG/DL
CALCIUM SERPL-MCNC: 8.9 MG/DL
CHLORIDE SERPL-SCNC: 109 MMOL/L
CO2 SERPL-SCNC: 22 MMOL/L
CODING SYSTEM: NORMAL
CREAT SERPL-MCNC: 1 MG/DL
DIFFERENTIAL METHOD: ABNORMAL
DISPENSE STATUS: NORMAL
EOSINOPHIL NFR BLD: 1 %
ERYTHROCYTE [DISTWIDTH] IN BLOOD BY AUTOMATED COUNT: 17.9 %
EST. GFR  (AFRICAN AMERICAN): >60 ML/MIN/1.73 M^2
EST. GFR  (NON AFRICAN AMERICAN): 56 ML/MIN/1.73 M^2
GLUCOSE SERPL-MCNC: 110 MG/DL
HCT VFR BLD AUTO: 18.3 %
HGB BLD-MCNC: 5.7 G/DL
INR PPP: 1.1
LYMPHOCYTES NFR BLD: 24 %
MCH RBC QN AUTO: 25.9 PG
MCHC RBC AUTO-ENTMCNC: 31.4 G/DL
MCV RBC AUTO: 83 FL
MONOCYTES NFR BLD: 4 %
NEUTROPHILS NFR BLD: 71 %
NEUTS BAND NFR BLD MANUAL: 1 %
NUM UNITS TRANS PACKED RBC: NORMAL
PLATELET # BLD AUTO: 498 K/UL
PLATELET BLD QL SMEAR: ABNORMAL
PMV BLD AUTO: 7.3 FL
POTASSIUM SERPL-SCNC: 3.5 MMOL/L
PROT SERPL-MCNC: 5.9 G/DL
PROTHROMBIN TIME: 10.6 SEC
RBC # BLD AUTO: 2.22 M/UL
SODIUM SERPL-SCNC: 139 MMOL/L
WBC # BLD AUTO: 15 K/UL

## 2018-10-22 PROCEDURE — 93010 ELECTROCARDIOGRAM REPORT: CPT | Mod: ,,, | Performed by: INTERNAL MEDICINE

## 2018-10-22 PROCEDURE — P9016 RBC LEUKOCYTES REDUCED: HCPCS

## 2018-10-22 PROCEDURE — 86850 RBC ANTIBODY SCREEN: CPT

## 2018-10-22 PROCEDURE — 85007 BL SMEAR W/DIFF WBC COUNT: CPT

## 2018-10-22 PROCEDURE — 36430 TRANSFUSION BLD/BLD COMPNT: CPT

## 2018-10-22 PROCEDURE — 94761 N-INVAS EAR/PLS OXIMETRY MLT: CPT

## 2018-10-22 PROCEDURE — 99223 1ST HOSP IP/OBS HIGH 75: CPT | Mod: AI,,, | Performed by: INTERNAL MEDICINE

## 2018-10-22 PROCEDURE — 86920 COMPATIBILITY TEST SPIN: CPT

## 2018-10-22 PROCEDURE — 99222 1ST HOSP IP/OBS MODERATE 55: CPT | Mod: ,,, | Performed by: INTERNAL MEDICINE

## 2018-10-22 PROCEDURE — 80053 COMPREHEN METABOLIC PANEL: CPT

## 2018-10-22 PROCEDURE — 85610 PROTHROMBIN TIME: CPT

## 2018-10-22 PROCEDURE — 85027 COMPLETE CBC AUTOMATED: CPT

## 2018-10-22 PROCEDURE — 27000221 HC OXYGEN, UP TO 24 HOURS

## 2018-10-22 PROCEDURE — 36415 COLL VENOUS BLD VENIPUNCTURE: CPT

## 2018-10-22 PROCEDURE — 63600175 PHARM REV CODE 636 W HCPCS: Performed by: INTERNAL MEDICINE

## 2018-10-22 PROCEDURE — 93005 ELECTROCARDIOGRAM TRACING: CPT

## 2018-10-22 PROCEDURE — 20000000 HC ICU ROOM

## 2018-10-22 PROCEDURE — 25000003 PHARM REV CODE 250: Performed by: EMERGENCY MEDICINE

## 2018-10-22 PROCEDURE — 99285 EMERGENCY DEPT VISIT HI MDM: CPT | Mod: 25

## 2018-10-22 RX ORDER — PANTOPRAZOLE SODIUM 40 MG/10ML
40 INJECTION, POWDER, LYOPHILIZED, FOR SOLUTION INTRAVENOUS DAILY
Status: DISCONTINUED | OUTPATIENT
Start: 2018-10-23 | End: 2018-10-25 | Stop reason: HOSPADM

## 2018-10-22 RX ORDER — ONDANSETRON 2 MG/ML
4 INJECTION INTRAMUSCULAR; INTRAVENOUS EVERY 8 HOURS PRN
Status: DISCONTINUED | OUTPATIENT
Start: 2018-10-22 | End: 2018-10-25 | Stop reason: HOSPADM

## 2018-10-22 RX ORDER — FUROSEMIDE 10 MG/ML
20 INJECTION INTRAMUSCULAR; INTRAVENOUS
Status: DISCONTINUED | OUTPATIENT
Start: 2018-10-22 | End: 2018-10-25 | Stop reason: HOSPADM

## 2018-10-22 RX ORDER — ACETAMINOPHEN 325 MG/1
650 TABLET ORAL EVERY 6 HOURS PRN
Status: DISCONTINUED | OUTPATIENT
Start: 2018-10-22 | End: 2018-10-25 | Stop reason: HOSPADM

## 2018-10-22 RX ORDER — AMOXICILLIN 250 MG
1 CAPSULE ORAL DAILY PRN
Status: DISCONTINUED | OUTPATIENT
Start: 2018-10-22 | End: 2018-10-25 | Stop reason: HOSPADM

## 2018-10-22 RX ADMIN — FUROSEMIDE 20 MG: 10 INJECTION, SOLUTION INTRAMUSCULAR; INTRAVENOUS at 11:10

## 2018-10-22 RX ADMIN — SODIUM CHLORIDE 1000 ML: 0.9 INJECTION, SOLUTION INTRAVENOUS at 01:10

## 2018-10-22 NOTE — PROGRESS NOTES
Attempted to place a second IV with no success. Will wait till after blood transfusion to try again.

## 2018-10-22 NOTE — H&P
PCP: Carine Baltazar NP    History & Physical    Chief Complaint: Black stools and dizziness    History of Present Illness:  Patient is a 73 y.o. female admitted to Hospitalist Service from [facility] ER with complaints of dizziness and black stools. Patient reportedly has past medical history significant for Renal cell carcinoma s/p left nephrectomy, dementia, hypertension, hyperlipidemia, Chronic Kidney disease stage 3 and small bowel tumor. Patient recently underwent retroperitoneal hemorrhage s/p CT guided retroperitoneal mass biopsy (on 18).  Patient reportedly had metastatic lesions in the retro-peritoneaum and lungs. Patient was recently hospitalized at [facility] on 10-17-18-10-18-18 for symptomatic anemia dn received 3 units of PRBC. Part of the history obtained from the daughter. Patient is weak and dizzy for few days and regularly having black bowel movement. Patient denied any abdominal pain. Patient follows with Dr. Ross, Patient denied chest pain, shortness of breath, headache, vision changes, focal neuro-deficits, cough or fever.    Past Medical History:   Diagnosis Date    Cancer     kidney    Dementia     Encounter for blood transfusion     High cholesterol 2018    Hypertension     No Medication     Renal disorder     Wears dentures     Uppers     Past Surgical History:   Procedure Laterality Date     SECTION      COLONOSCOPY N/A 2018    Procedure: COLONOSCOPY;  Surgeon: Torres Son MD;  Location: [facility];  Service: Endoscopy;  Laterality: N/A;    COLONOSCOPY N/A 2018    Performed by Torres Son MD at [facility] ENDO    CYSTOSCOPY WITH RETROGRADE PYELOGRAM Left 1/15/2016    Performed by [facility]ell [facility]acon [facility] at [facility]    EGD (ESOPHAGOGASTRODUODENOSCOPY) N/A 2018    Performed by Torres Son [facility] at [facility] ENDO    ESOPHAGOGASTRODUODENOSCOPY N/A 2018    Procedure: EGD  "(ESOPHAGOGASTRODUODENOSCOPY);  Surgeon: Torres Son MD;  [facility] END[facility];  Service: Endoscopy;  Laterality: N/A;    EYE SURGERY      Rt eye Catarac    HYSTERECTOMY      IMAGING, GI TRACT, INTRALUMINAL, VIA CAPSULE N/A 8/6/2018    Performed by Rocco Ross MD at [facility] ENDO    INTRALUMINAL GASTROINTESTINAL TRACT IMAGING VIA CAPSULE N/A 8/6/2018    Procedure: IMAGING, GI TRACT, INTRALUMINAL, VIA CAPSULE;  Surgeon: Rocco Ross MD;  Location: [facility] ENDO;  Service: Endoscopy;  Laterality: N/A;    KIDNEY SURGERY Left 06/23/2018    Left kidney removed due to cancer    lasix surgery      NEPHRECTOMY-RADICAL   Left 1/27/2016    Performed by [facility]ell [facility]acon [facility] at [facility] OR    REPAIR-HERNIA-VENTRAL N/A 1/27/2016    Performed by Marco A Randhawa MD at [facility] OR    RESECTION-BOWEL  1/27/2016    Performed by Marco A Randhawa MD at [facility] OR    URETEROSCOPY  1/15/2016    Performed by Maribell Chacon MD at [facility] OR     History reviewed. No pertinent family history.  Social History     Tobacco Use    Smoking status: Current Every Day Smoker     Packs/day: 1.00     Years: 50.00     Pack years: 50.00    Smokeless tobacco: Never Used    Tobacco comment: Trying to QUIT   Substance Use Topics    Alcohol use: Yes     Alcohol/week: 1.8 oz     Types: 3 Cans of beer per week     Comment: "about three beer a day"    Drug use: No      Review of patient's allergies indicates:   Allergen Reactions    Codeine Other (See Comments)     Pt shakes and hallucinates    Pcn [penicillins] Anxiety and Other (See Comments)     PTA Medications   Medication Sig    amLODIPine (NORVASC) 5 MG tablet Take 5 mg by mouth once daily.    atorvastatin (LIPITOR) 20 MG tablet Take 20 mg by mouth once daily.    ferrous sulfate 325 (65 FE) MG EC tablet Take 325 mg by mouth once daily.    losartan (COZAAR) 50 MG tablet Take 50 mg by mouth once daily.    pantoprazole (PROTONIX) 40 MG tablet Take 1 tablet (40 mg total) by " mouth once daily.    sucralfate (CARAFATE) 100 mg/mL suspension Take 10 mLs (1 g total) by mouth 4 (four) times daily before meals and nightly.     Review of Systems: Limited due to dementia.  Constitutional: no fever or chills  Eyes: no visual changes  Ears, nose, mouth, throat, and face: no nasal congestion or sore throat  Respiratory: no cough or shorness of breath  Cardiovascular: no chest pain or palpitations  Gastrointestinal: see HPI  Genitourinary: no hematuria or dysuria  Integument/breast: no rash or pruritis  Hematologic/lymphatic: no easy bruising or lymphadenopathy  Musculoskeletal: no arthralgias or myalgias  Neurological: no seizures or tremors.  Behavioral/Psych: no auditory or visual hallucinations  Endocrine: no heat or cold intolerance     OBJECTIVE:     Vital Signs (Most Recent)  Temp: 98.2 °F (36.8 °C) (10/22/18 1545)  Pulse: 69 (10/22/18 1615)  Resp: 17 (10/22/18 1615)  BP: (!) 115/59 (10/22/18 1615)  SpO2: 100 % (10/22/18 1615)    Physical Exam:  General appearance: well developed, appears stated age  Head: normocephalic, atraumatic  Eyes: Pallor conjunctivae/corneas clear. PERRL.  Nose: Nares normal. Septum midline.  Throat: lips, mucosa, and tongue normal; teeth and gums normal, no throat erythema.  Neck: supple, symmetrical, trachea midline, no JVD and thyroid not enlarged, symmetric, no tenderness/mass/nodules  Lungs:  clear to auscultation bilaterally and normal respiratory effort  Chest wall: no tenderness  Heart: regular rate and rhythm, S1, S2 normal, no murmur, click, rub or gallop  Abdomen: soft, non-tender non-distented; bowel sounds normal; no masses,  no organomegaly  Extremities: no cyanosis, clubbing or edema.   Pulses: 2+ and symmetric  Skin: Skin color, texture, turgor normal. No rashes or lesions.  Lymph nodes: Cervical, supraclavicular, and axillary nodes normal.  Neurologic: Normal strength and tone. No focal numbness or weakness. CNII-XII intact.      Laboratory:   CBC:    Recent Labs   Lab 10/22/18  1210   WBC 15.00*   RBC 2.22*   HGB 5.7*   HCT 18.3*   *   MCV 83   MCH 25.9*   MCHC 31.4*     CMP:   Recent Labs   Lab 10/22/18  1301      CALCIUM 8.9   ALBUMIN 2.7*   PROT 5.9*      K 3.5   CO2 22*      BUN 29*   CREATININE 1.0   ALKPHOS 44*   ALT 6*   AST 7*   BILITOT 0.2     Coagulation:   Recent Labs   Lab 10/22/18  1210   LABPROT 10.6   INR 1.1     Recent Labs   Lab 10/17/18  2140   COLORU Yellow   SPECGRAV 1.010   PHUR 7.0   PROTEINUA Negative   NITRITE Negative   LEUKOCYTESUR Trace*   UROBILINOGEN Negative     Diagnostic Results:   Capsule endoscopy (08-06-18):  1. Small bowel passage time as above  2. Apparent small bowel tumor, likely in the mid jejunum, as noted above with no active bleeding.    Colonoscopy (06-25-18):  Diverticulosis in the ascending colon. There was no evidence of diverticular bleeding.  One 5 mm polyp in the ascending colon, removed with a hot biopsy forceps. Resected and retrieved.    EGD (06-25-18):  - Normal esophagus.                        - Gastritis. Biopsied.                        - Normal duodenal bulb and second portion of the                         duodenum.    Assessment/Plan:      Severe symptomatic anemia  Iron deficiency anemia due to chronic blood loss [D50.0]  Acute GI blood loss anemia  Small bowel tumor  Admit to ICU.  Check Iron TIBC B12 and Folate.  Transfuse 4 units of pRBC. Follow CBC.  Consult GI specialist and general surgeon.     Yes    SHAMAR  Continue IVF hydration and follow BMP.      Yes    Mesenteric mass [K63.9] - metastatic disease  Renal cell cancer [C64.9] s/p nephrectomy  Under care of oncologist.   Yes              Hypertension [I10]  Hold anti-HTN agents.    Moderate Malnutrition  Nutrition consulted. Encourage maximal PO intake. Diet supplementation ordered per nutrition approval. Will encourage PO and monitor closely for weight changes.  .   Yes    Dementia [F03.90]  Dementia is  controlled currently. Continue home dementia meds and non-pharmacologic interventions to prevent delirium (No VS between 11PM-5AM, activity during day, opening blinds, providing glasses/hearing aids, and up in chair during daytime). Use PRN anti-psychotics to prevent behavior of self harm during sundowning, and avoid narcotics and benzos unless absolutely necessary. PRN anti-psychotics prescribed to avoid self harm behaviors.   Yes     DVT prophylaxis: Use SCD and TEDs. Avoiding anticoagulation due to GI bleeding.    Discussed with patient's daughter who confirms full code status.    Sol Lewis MD  Department of Hospital Medicine   [facility]

## 2018-10-22 NOTE — PROGRESS NOTES
Patient arrived from the ED via gurney, assisted to ICU bed, is awake alert and oriented, with some forgetfulness noted. .

## 2018-10-22 NOTE — ED NOTES
LOC: The patient is awake, alert and aware of environment with an appropriate affect, the patient is oriented x 3 and speaking appropriately.  APPEARANCE: Patient resting comfortably and in no acute distress, patient is clean and well groomed, patient's clothing is properly fastened.  SKIN: The skin is warm and dry, patient has normal skin turgor and moist mucus membranes, skin intact, no breakdown or brusing noted.  MUSKULOSKELETAL: Patient moving all extremities well, no obvious swelling or deformities noted.  RESPIRATORY: Airway is open and patent, respirations are spontaneous, patient has a normal effort and rate. Breath sounds are clear and equal bilaterally.  CARDIAC: Normal heart sounds. No peripheral edema.  ABDOMEN: Soft and tender to palpation, no distention noted. Bowel sounds present. Bloody stool with melena noted by family  NEURO: No neuro deficits, hand grasp equal, no drift noted, no facial droop noted. Speech is clear.

## 2018-10-22 NOTE — PLAN OF CARE
Problem: Patient Care Overview  Goal: Plan of Care Review  Outcome: Ongoing (interventions implemented as appropriate)  Care plan reviewed and revised.  Patient safety maintained.   No active bleeding noted, and no melana since admission. Denies abdominal pain.   Patient on second unit of PRBC's out of 4 to be given. No adverse reaction.\  Dr. Ross consulted.  Dr. Randhawa consulted.

## 2018-10-22 NOTE — PROGRESS NOTES
Dr. Lewis updated on consults. Patient to remain NPO and 2 more units of blood to be added for total of 4 units of blood to be given.

## 2018-10-22 NOTE — ED PROVIDER NOTES
Encounter Date: 10/22/2018    SCRIBE #1 NOTE: IMarleny, am scribing for, and in the presence of, Branden Castellon MD.       History     Chief Complaint   Patient presents with    Dizziness    Melena     started last week        Time seen by provider: 12:14 PM on 10/22/2018    Oriana Tobar is a 73 y.o. female with pmhx of Dementia, HTN, HLD, & Kidney Cancer with gastric carcinoma mets who presents to the ED for evaluation of dizziness and dark stools. Last week (10/17-10/18), the patient was admitted to [facility] ED for symptomatic anemia, where she received a blood transfusion during her stay. The pt is seeing Dr. Ventura, as she has a bleed in her lower stomach according to family. Since last week, the pt has been experiencing dizziness, SOB, abdominal pain, and dark, tarry stools.   The patient denies any other symptoms at this time. SHx: Hysterectomy, Left Kidney Surgery, EGD, Colonoscopy. Current everyday smoker (1 ppd). Allergies: Codeine, PCN.       The history is provided by the patient.     Review of patient's allergies indicates:   Allergen Reactions    Codeine Other (See Comments)     Pt shakes and hallucinates    Pcn [penicillins] Anxiety and Other (See Comments)     Past Medical History:   Diagnosis Date    Cancer     kidney    Dementia     Encounter for blood transfusion     High cholesterol 2018    Hypertension     No Medication     Renal disorder     Wears dentures     Uppers     Past Surgical History:   Procedure Laterality Date     SECTION      COLONOSCOPY N/A 2018    Procedure: COLONOSCOPY;  Surgeon: Torres Son MD;  Location: [facility];  Service: Endoscopy;  Laterality: N/A;    COLONOSCOPY N/A 2018    Performed by [facility] Son [facility] at [facility] ENDO    CYSTOSCOPY WITH RETROGRADE PYELOGRAM Left 1/15/2016    Performed by [facility]ell [facility]acon [facility] at [facility] OR    EGD (ESOPHAGOGASTRODUODENOSCOPY) N/A 2018     Performed by [facility] at [facility]O    ESOPHAGOGASTRODUODENOSCOPY N/A 6/25/2018    Procedure: EGD (ESOPHAGOGASTRODUODENOSCOPY);  Surgeon: Torres Son MD;  Location: [facility] ENDO;  Service: Endoscopy;  Laterality: N/A;    EYE SURGERY      Rt eye Catarac    HYSTERECTOMY      IMAGING, GI TRACT, INTRALUMINAL, VIA CAPSULE N/A 8/6/2018    Performed by Rocco Ross MD at [facility] ENDO    INTRALUMINAL GASTROINTESTINAL TRACT IMAGING VIA CAPSULE N/A 8/6/2018    Procedure: IMAGING, GI TRACT, INTRALUMINAL, VIA CAPSULE;  Surgeon: Rocco Ross MD;  Location: [facility] ENDO;  Service: Endoscopy;  Laterality: N/A;    KIDNEY SURGERY Left 06/23/2018    Left kidney removed due to cancer    lasix surgery      NEPHRECTOMY-RADICAL   Left 1/27/2016    Performed by [facility]ell [facility]acon MD at [facility] OR    REPAIR-HERNIA-VENTRAL N/A 1/27/2016    Performed by Marco A Randhawa MD at [facility] OR    RESECTION-BOWEL  1/27/2016    Performed by Marco A [facility]hawa MD at [facility] OR    URETEROSCOPY  1/15/2016    Performed by Maribell [facility]acon MD at [facility] OR     History reviewed. No pertinent family history.  Social History     Tobacco Use    Smoking status: Current Every Day Smoker     Packs/day: 1.00     Years: 50.00     Pack years: 50.00    Smokeless tobacco: Never Used    Tobacco comment: Trying to QUIT   Substance Use Topics    Alcohol use: Yes     Alcohol/week: 3.6 oz     Types: 6 Cans of beer per week     Comment: two days ago a drinkbeer    Drug use: No     Review of Systems   Respiratory: Positive for shortness of breath.    Gastrointestinal: Positive for abdominal pain and blood in stool (black, tarry stools).   Neurological: Positive for dizziness.   All other systems reviewed and are negative.      Physical Exam     Initial Vitals [10/22/18 1026]   BP Pulse Resp Temp SpO2   (!) 107/56 103 20 98.2 °F (36.8 °C) 100 %      MAP       --         Physical Exam    Nursing note and vitals reviewed.  Constitutional:  She appears well-developed and well-nourished. She appears lethargic. She is not diaphoretic. No distress.   HENT:   Head: Normocephalic and atraumatic.   Eyes: EOM are normal.   Neck: Normal range of motion. Neck supple.   Cardiovascular: Normal rate, regular rhythm and normal heart sounds. Exam reveals no gallop and no friction rub.    No murmur heard.  Pulmonary/Chest: Breath sounds normal. No respiratory distress. She has no wheezes. She has no rhonchi. She has no rales.   Abdominal: She exhibits no distension. There is no tenderness. There is no rebound.   Genitourinary:   Genitourinary Comments: Black stools   Musculoskeletal: Normal range of motion.   Neurological: She is oriented to person, place, and time. She appears lethargic.   Skin: Skin is warm and dry.   Psychiatric: She has a normal mood and affect. Her behavior is normal. Judgment and thought content normal.         ED Course   Critical Care  Date/Time: 10/22/2018 2:13 PM  Performed by: Branden Castellon MD  Authorized by: Sol Lewis MD   Direct patient critical care time: 14 minutes  Additional history critical care time: 7 minutes  Ordering / reviewing critical care time: 12 minutes  Documentation critical care time: 9 minutes  Consulting other physicians critical care time: 10 minutes  Total critical care time (exclusive of procedural time) : 52 minutes  Critical care was necessary to treat or prevent imminent or life-threatening deterioration of the following conditions: Anemia.  Critical care was time spent personally by me on the following activities: review of old charts, pulse oximetry, ordering and review of laboratory studies, obtaining history from patient or surrogate, evaluation of patient's response to treatment, examination of patient, ordering and performing treatments and interventions, ordering and review of radiographic studies and re-evaluation of patient's condition.        Labs Reviewed   CBC W/ AUTO DIFFERENTIAL - Abnormal;  Notable for the following components:       Result Value    WBC 15.00 (*)     RBC 2.22 (*)     Hemoglobin 5.7 (*)     Hematocrit 18.3 (*)     MCH 25.9 (*)     MCHC 31.4 (*)     RDW 17.9 (*)     Platelets 498 (*)     MPV 7.3 (*)     All other components within normal limits    Narrative:     H/H critical result(s) called and verbal readback obtained from   Amy Sanders , 10/22/2018 12:32   COMPREHENSIVE METABOLIC PANEL   PROTIME-INR   TYPE & SCREEN   PREPARE RBC SOFT        ECG Results          EKG 12-lead (Preliminary result)  Result time 10/22/18 12:36:22    ED Interpretation by Branden Castellon MD (10/22/18 12:36:22)    Sinus rhythm 1st degree AV block 62 beats per minute no ST elevation or depression or T-wave inversion                            Imaging Results    None          Medical Decision Making:   History:   Old Medical Records: I decided to obtain old medical records.  Independently Interpreted Test(s):   I have ordered and independently interpreted EKG Reading(s) - see prior notes  Clinical Tests:   Lab Tests: Ordered and Reviewed  Medical Tests: Ordered and Reviewed            Scribe Attestation:   Scribe #1: I performed the above scribed service and the documentation accurately describes the services I performed. I attest to the accuracy of the note.    I, Dr. Castellon, personally performed the services described in this documentation. All medical record entries made by the scribe were at my direction and in my presence.  I have reviewed the chart and agree that the record reflects my personal performance and is accurate and complete.2:12 PM 10/22/2018            ED Course as of Oct 22 1359   Mon Oct 22, 2018   1208 BP: (!) 107/56 [EF]   1208 Temp: 98.2 °F (36.8 °C) [EF]   1208 Temp src: Oral [EF]   1208 Pulse: 103 [EF]   1208 Resp: 20 [EF]   1208 SpO2: 100 % [EF]   1209 Impression:          1. Small bowel passage time as above                       2. 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 Will contact patient to review results and order                        cross sectional imaging                       2. Consider referral to [facility] for deep                        enteroscopy to varinder site, followed by surgical                        referral if no evidence of metastasis.                       3. Further recommendations to follow after above.  [EF]   1242 Dr. Ross of GI currently in a procedure he will review the chart and call me back.  [EF]   1254 Dr. Lewis by phone to admit  [EF]   1259 Bedside rectal exam with chaperone lucilla black stool  [EF]   1354 BP: (!) 116/55 [EF]   1354 BP: 139/63 [EF]      ED Course User Index  [EF] Branden Castellon MD     Clinical Impression:     1. Fatigue              Case discussed with Dr. Ross of gastroenterology who knows the patient well. He states that the patient has a small bowel mass which she is unable to reach.  He recommends consult General surgery.  This information was passed on to Dr. Lewis the admitting physician.  The patient is in the ICU at this time. 2:01 PM    73-year-old with a history of GI bleeds presents to the emergency room with black stool according to the family.  Patient is also lethargic and weak.  Hemoglobin of 5.7.  Black stools in the ER.  Case discussed with Dr. Ross who recommends consulting general surgery.  Patient admitted to the ICU.  Blood transfusion order.               Branden Castellon MD  10/22/18 4283

## 2018-10-23 PROBLEM — E44.0 MALNUTRITION OF MODERATE DEGREE: Status: ACTIVE | Noted: 2018-10-23

## 2018-10-23 LAB
ALBUMIN SERPL BCP-MCNC: 2.6 G/DL
ALP SERPL-CCNC: 42 U/L
ALT SERPL W/O P-5'-P-CCNC: 5 U/L
ANION GAP SERPL CALC-SCNC: 9 MMOL/L
AST SERPL-CCNC: 10 U/L
BASOPHILS # BLD AUTO: 0 K/UL
BASOPHILS NFR BLD: 0.1 %
BILIRUB SERPL-MCNC: 1.6 MG/DL
BUN SERPL-MCNC: 25 MG/DL
CALCIUM SERPL-MCNC: 8.7 MG/DL
CHLORIDE SERPL-SCNC: 109 MMOL/L
CO2 SERPL-SCNC: 23 MMOL/L
CREAT SERPL-MCNC: 0.9 MG/DL
DIFFERENTIAL METHOD: ABNORMAL
EOSINOPHIL # BLD AUTO: 0.1 K/UL
EOSINOPHIL NFR BLD: 1.3 %
ERYTHROCYTE [DISTWIDTH] IN BLOOD BY AUTOMATED COUNT: 15.9 %
EST. GFR  (AFRICAN AMERICAN): >60 ML/MIN/1.73 M^2
EST. GFR  (NON AFRICAN AMERICAN): >60 ML/MIN/1.73 M^2
GLUCOSE SERPL-MCNC: 98 MG/DL
HCT VFR BLD AUTO: 32.5 %
HGB BLD-MCNC: 10.5 G/DL
HGB BLD-MCNC: 10.8 G/DL
HGB BLD-MCNC: 11.2 G/DL
LYMPHOCYTES # BLD AUTO: 2.4 K/UL
LYMPHOCYTES NFR BLD: 23.4 %
MAGNESIUM SERPL-MCNC: 1.6 MG/DL
MCH RBC QN AUTO: 28.6 PG
MCHC RBC AUTO-ENTMCNC: 33.3 G/DL
MCV RBC AUTO: 86 FL
MONOCYTES # BLD AUTO: 0.7 K/UL
MONOCYTES NFR BLD: 6.8 %
NEUTROPHILS # BLD AUTO: 7 K/UL
NEUTROPHILS NFR BLD: 68.4 %
PHOSPHATE SERPL-MCNC: 3.3 MG/DL
PLATELET # BLD AUTO: 358 K/UL
PMV BLD AUTO: 7.2 FL
POTASSIUM SERPL-SCNC: 3.4 MMOL/L
PROT SERPL-MCNC: 5.8 G/DL
RBC # BLD AUTO: 3.78 M/UL
SODIUM SERPL-SCNC: 141 MMOL/L
WBC # BLD AUTO: 10.3 K/UL

## 2018-10-23 PROCEDURE — 20000000 HC ICU ROOM

## 2018-10-23 PROCEDURE — 85018 HEMOGLOBIN: CPT

## 2018-10-23 PROCEDURE — 36415 COLL VENOUS BLD VENIPUNCTURE: CPT

## 2018-10-23 PROCEDURE — 25500020 PHARM REV CODE 255

## 2018-10-23 PROCEDURE — 99223 1ST HOSP IP/OBS HIGH 75: CPT | Mod: ,,, | Performed by: SURGERY

## 2018-10-23 PROCEDURE — C9113 INJ PANTOPRAZOLE SODIUM, VIA: HCPCS | Performed by: INTERNAL MEDICINE

## 2018-10-23 PROCEDURE — 97802 MEDICAL NUTRITION INDIV IN: CPT

## 2018-10-23 PROCEDURE — 84100 ASSAY OF PHOSPHORUS: CPT

## 2018-10-23 PROCEDURE — 94761 N-INVAS EAR/PLS OXIMETRY MLT: CPT

## 2018-10-23 PROCEDURE — 83735 ASSAY OF MAGNESIUM: CPT

## 2018-10-23 PROCEDURE — 99223 1ST HOSP IP/OBS HIGH 75: CPT | Mod: ,,, | Performed by: INTERNAL MEDICINE

## 2018-10-23 PROCEDURE — 80053 COMPREHEN METABOLIC PANEL: CPT

## 2018-10-23 PROCEDURE — 99232 SBSQ HOSP IP/OBS MODERATE 35: CPT | Mod: ,,, | Performed by: INTERNAL MEDICINE

## 2018-10-23 PROCEDURE — 85025 COMPLETE CBC W/AUTO DIFF WBC: CPT

## 2018-10-23 PROCEDURE — 99233 SBSQ HOSP IP/OBS HIGH 50: CPT | Mod: ,,, | Performed by: INTERNAL MEDICINE

## 2018-10-23 PROCEDURE — 63600175 PHARM REV CODE 636 W HCPCS: Performed by: INTERNAL MEDICINE

## 2018-10-23 RX ORDER — POTASSIUM CHLORIDE 7.45 MG/ML
10 INJECTION INTRAVENOUS
Status: COMPLETED | OUTPATIENT
Start: 2018-10-23 | End: 2018-10-23

## 2018-10-23 RX ORDER — SODIUM CHLORIDE 9 MG/ML
INJECTION, SOLUTION INTRAVENOUS
Status: DISPENSED
Start: 2018-10-23 | End: 2018-10-24

## 2018-10-23 RX ADMIN — IOHEXOL 75 ML: 350 INJECTION, SOLUTION INTRAVENOUS at 04:10

## 2018-10-23 RX ADMIN — POTASSIUM CHLORIDE 10 MEQ: 7.46 INJECTION, SOLUTION INTRAVENOUS at 01:10

## 2018-10-23 RX ADMIN — POTASSIUM CHLORIDE 10 MEQ: 7.46 INJECTION, SOLUTION INTRAVENOUS at 05:10

## 2018-10-23 RX ADMIN — PANTOPRAZOLE SODIUM 40 MG: 40 INJECTION, POWDER, FOR SOLUTION INTRAVENOUS at 08:10

## 2018-10-23 RX ADMIN — POTASSIUM CHLORIDE 10 MEQ: 7.46 INJECTION, SOLUTION INTRAVENOUS at 11:10

## 2018-10-23 RX ADMIN — POTASSIUM CHLORIDE 10 MEQ: 7.46 INJECTION, SOLUTION INTRAVENOUS at 03:10

## 2018-10-23 RX ADMIN — FUROSEMIDE 20 MG: 10 INJECTION, SOLUTION INTRAMUSCULAR; INTRAVENOUS at 03:10

## 2018-10-23 RX ADMIN — IOHEXOL 30 ML: 350 INJECTION, SOLUTION INTRAVENOUS at 04:10

## 2018-10-23 NOTE — CONSULTS
[facility]  General Surgery  Consult Note    Patient Name: Oriana Tobar  MRN: [id number]  Code Status: Full Code  Admission Date: 10/22/2018  Hospital Length of Stay: 1 days  Attending Physician: Sol Lewis MD  Primary Care Provider: Carine Baltazar NP    Patient information was obtained from past medical records and ER records.     Inpatient consult to General Surgery  Consult performed by: Marco A Randhawa MD  Consult ordered by: Rocco Ross MD        Subjective:     Principal Problem: Acute blood loss anemia    History of Present Illness:  73 y.o. female with anemia, remote onset, severe, chronic with acute worsening, with associated fatigue, with no alleviating/exacerbating factors.  Patient has dementia but history obtained from her daughter at the bedside.  In addition, I am quite familiar with this patient from prior workup.  She had EGD and colonoscopy in [place] which were unrevealing for cause of anemia.  Pillcam with me in 08/2018 showed ulcerated small bowel tumor with oozing which is the cause of her dark stool and recurrent/persistent anemia.  She was referred to medical and surgical oncology at [facility] as she was found to have mesenteric masses which were biopsied and consistent with recurrence of known past renal cell cancer.  In addition, she has adnexal mass which could represent metastatic disease versus ovarian primary malignancy.  No emesis currently.  No acute GI complaints but with chronic dark stool secondary to small bowel ulcerated tumor.  Has not gotten any chemotherapy, just blood transfusions.  Last CT scan 8/30/18           No current facility-administered medications on file prior to encounter.      Current Outpatient Medications on File Prior to Encounter   Medication Sig    amLODIPine (NORVASC) 5 MG tablet Take 5 mg by mouth once daily.    atorvastatin (LIPITOR) 20 MG tablet Take 20 mg by mouth once daily.    ferrous sulfate 325 (65  FE) MG EC tablet Take 325 mg by mouth once daily.    losartan (COZAAR) 50 MG tablet Take 50 mg by mouth once daily.    pantoprazole (PROTONIX) 40 MG tablet Take 1 tablet (40 mg total) by mouth once daily.    sucralfate (CARAFATE) 100 mg/mL suspension Take 10 mLs (1 g total) by mouth 4 (four) times daily before meals and nightly.       Review of patient's allergies indicates:   Allergen Reactions    Codeine Other (See Comments)     Pt shakes and hallucinates    Pcn [penicillins] Anxiety and Other (See Comments)       Past Medical History:   Diagnosis Date    Cancer     kidney    Dementia     Encounter for blood transfusion     High cholesterol 2018    Hypertension     No Medication     Renal disorder     Wears dentures     Uppers     Past Surgical History:   Procedure Laterality Date     SECTION      COLONOSCOPY N/A 2018    Procedure: COLONOSCOPY;  Surgeon: Torres Son MD;  [facility];  Service: Endoscopy;  Laterality: N/A;    COLONOSCOPY N/A 2018    Performed by Torres Son MD at [facility] ENDO    CYSTOSCOPY WITH RETROGRADE PYELOGRAM Left 1/15/2016    [facility]    EGD (ESOPHAGOGASTRODUODENOSCOPY) N/A 2018    Performed by Torres Son MD at [facility] ENDO    ESOPHAGOGASTRODUODENOSCOPY N/A 2018    Procedure: EGD (ESOPHAGOGASTRODUODENOSCOPY);  Surgeon: Torres Son MD;  Location: [facility];  Service: Endoscopy;  Laterality: N/A;    EYE SURGERY      Rt eye Catarac    HYSTERECTOMY      IMAGING, GI TRACT, INTRALUMINAL, VIA CAPSULE N/A 2018    [facility]    INTRALUMINAL GASTROINTESTINAL TRACT IMAGING VIA CAPSULE N/A 2018    Procedure: IMAGING, GI TRACT, INTRALUMINAL, VIA CAPSULE;  Surgeon: Rocco Ross MD;  Location: [facility];  Service: Endoscopy;  Laterality: N/A;    KIDNEY SURGERY Left 2018    Left kidney removed due to cancer  "   lasix surgery      NEPHRECTOMY-RADICAL   Left 1/27/2016    [facility] OR    REPAIR-HERNIA-VENTRAL N/A 1/27/2016    Performed by Marco A Randhawa MD at [facility] OR    RESECTION-BOWEL  1/27/2016    Performed by Marco A [facility]awa MD at [facility] OR    URETEROSCOPY  1/15/2016    Performed by [facility]ell [facility]acon [facility] at [facility] OR     Family History     None        Tobacco Use    Smoking status: Current Every Day Smoker     Packs/day: 1.00     Years: 50.00     Pack years: 50.00    Smokeless tobacco: Never Used    Tobacco comment: Trying to QUIT   Substance and Sexual Activity    Alcohol use: Yes     Alcohol/week: 1.8 oz     Types: 3 Cans of beer per week     Comment: "about three beer a day"    Drug use: No    Sexual activity: No     Review of Systems   Unable to perform ROS: Dementia     Objective:     Vital Signs (Most Recent):  Temp: 97.9 °F (36.6 °C) (10/23/18 1200)  Pulse: 63 (10/23/18 1500)  Resp: (!) 28 (10/23/18 1500)  BP: 139/61 (10/23/18 1500)  SpO2: 100 % (10/23/18 1500) Vital Signs (24h Range):  Temp:  [97.8 °F (36.6 °C)-98.5 °F (36.9 °C)] 97.9 °F (36.6 °C)  Pulse:  [58-79] 63  Resp:  [15-31] 28  SpO2:  [99 %-100 %] 100 %  BP: (108-139)/(55-68) 139/61     Weight: 70.4 kg (155 lb 3.3 oz)  Body mass index is 23.6 kg/m².    Physical Exam   Constitutional: She is oriented to person, place, and time. She appears well-developed and well-nourished. No distress.   HENT:   Head: Normocephalic and atraumatic.   Right Ear: External ear normal.   Left Ear: External ear normal.   Eyes: Conjunctivae are normal. Pupils are equal, round, and reactive to light. Right eye exhibits no discharge. Left eye exhibits no discharge.   Neck: No tracheal deviation present. No thyromegaly present.   Cardiovascular: Normal rate and regular rhythm.   Pulmonary/Chest: Effort normal. No respiratory distress.   Abdominal: Soft. She exhibits no distension. There is no tenderness. There is no guarding. "   Musculoskeletal: She exhibits no edema or tenderness.   Lymphadenopathy:     She has no cervical adenopathy.   Neurological: She is alert and oriented to person, place, and time. No cranial nerve deficit.   Skin: Skin is warm and dry. No rash noted. She is not diaphoretic. No pallor.   Psychiatric: She has a normal mood and affect. Her behavior is normal. Judgment and thought content normal.   Nursing note and vitals reviewed.      Significant Labs:  CBC:   Recent Labs   Lab 10/23/18  0456 10/23/18  1310   WBC 10.30  --    RBC 3.78*  --    HGB 10.8* 11.2*   HCT 32.5*  --    *  --    MCV 86  --    MCH 28.6  --    MCHC 33.3  --      CMP:   Recent Labs   Lab 10/23/18  0456   GLU 98   CALCIUM 8.7   ALBUMIN 2.6*   PROT 5.8*      K 3.4*   CO2 23      BUN 25*   CREATININE 0.9   ALKPHOS 42*   ALT 5*   AST 10   BILITOT 1.6*     Coagulation:   Recent Labs   Lab 10/22/18  1210   LABPROT 10.6   INR 1.1       Significant Diagnostics:  I have reviewed all pertinent imaging results/findings within the past 24 hours.    Assessment/Plan:     * Acute blood loss anemia    Bleeding most likely secondary to invasion of bowel by metastatic disease.  Doubt much would be accomplished with surgical exploration, but will be willing to do so if patient and family agree to it.  Discussed with Dr. Lewis.  Will get new CT scan.  Await Dr. Irene's opinion.       VTE Risk Mitigation (From admission, onward)        Ordered     IP VTE HIGH RISK PATIENT  Once      10/22/18 1402     Place RORY hose  Until discontinued      10/22/18 1402          Thank you for your consult. I will follow-up with patient. Please contact us if you have any additional questions.    Marco A Randhawa MD  General Surgery  [facility]

## 2018-10-23 NOTE — ASSESSMENT & PLAN NOTE
Contributing Nutrition Diagnosis  Moderate chronic malnutrition    Related to (etiology):   Increased energy expenditure d/t mets. CA    Signs and Symptoms (as evidenced by):   1) 11% wt loss x 3 months 2) mild fat wasting @ triceps and mild temporal wasting     Interventions/Recommendations (treatment strategy):  1) Advance PO diet or initiate nutrition support to meet > 75% estimated needs in < 4 days    Nutrition Diagnosis Status:   New

## 2018-10-23 NOTE — PROGRESS NOTES
10/23/18 0800   Patient Assessment/Suction   Level of Consciousness (AVPU) alert   Respiratory Effort Normal;Unlabored   PRE-TX-O2-ETCO2   O2 Device (Oxygen Therapy) room air   SpO2 99 %   Pulse Oximetry Type Continuous   $ Pulse Oximetry - Multiple Charge Pulse Oximetry - Multiple   Pulse 62   Resp 17   BP (!) 119/59

## 2018-10-23 NOTE — ASSESSMENT & PLAN NOTE
Bleeding most likely secondary to invasion of bowel by metastatic disease.  Doubt much would be accomplished with surgical exploration, but will be willing to do so if patient and family agree to it.  Discussed with Dr. Lewis.  Will get new CT scan.  Await Dr. Irene's opinion.

## 2018-10-23 NOTE — CONSULTS
[facility]  Hematology/Oncology  Consult Note    Patient Name: Oriana Tobar  MRN: [id number]  Admission Date: 10/22/2018  Hospital Length of Stay: 1 days  Code Status: Full Code   Attending Provider: Sol Lewis MD  Consulting Provider: Lauren Irene MD  Primary Care Physician: Carine Baltazar NP  Principal Problem:Acute blood loss anemia  October 23 from Dr Lewis for malignancy   Consults  Subjective:     HPI:   This is a 73-year-old female who presented to the emergency room on October 22nd it with symptomatic anemia and a history of GI bleed.  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  A 1.9 cm mass noted within the central mesentery with another 1.4 cm nodule within the left khadar abdomen, numerous nodules at the lung bases the largest of which is along the pleura of the right lower lobe, a right adnexal mass was noted suspicious for possible ovarian neoplasm and a possible left adnexal mass separate from the dermoid also concerning for neoplasm.  Pt had a biopsy of the left upper quadrant intraperitoneal mass August 30, 2018 which revealed findings consistent with past renal cell carcinoma,  Since then she has been experiencing increasing shortness of breath associated with melena dizziness and abdominal pain. At the time of presentation white count was 50523 with a hemoglobin of 5.7 and a platelet count of 498 K. she has received blood transfusion since admit and her hemoglobin is now 10.5 which had dropped from 11.2 at 1:10 p.m..  Her bilirubin increased to 1.6 post transfusion  Patient has been seen by Dr Randhawa for blood loss possibly associated with a  small bowel tumor    Past medical history significant for dementia, hysterectomy, recurrent renal cell carcinoma( originally diagnosed in 2016 status post left nephrectomy) with metastatic disease , positive tobacco use    Repeat CT of abdomen and pelvis with contrast has been ordered to assess for any changes since her last scan from     Medications:  Continuous Infusions:  Scheduled Meds:   pantoprazole  40 mg Intravenous Daily    sodium chloride 0.9%         PRN Meds:acetaminophen, furosemide, ondansetron, pneumoc 13-bhavna conj-dip cr(PF), senna-docusate 8.6-50 mg     Review of patient's allergies indicates:   Allergen Reactions    Codeine Other (See Comments)     Pt shakes and hallucinates    Pcn [penicillins] Anxiety and Other (See Comments)        Past Medical History:   Diagnosis Date    Cancer     kidney    Dementia     Encounter for blood transfusion     High cholesterol 2018    Hypertension     No Medication     Renal disorder     Wears dentures     Uppers     Past Surgical History:   Procedure Laterality Date     SECTION      COLONOSCOPY N/A 2018    Procedure: COLONOSCOPY;  Surgeon: Torres Son MD;  Location: [facility];  Service: Endoscopy;  Laterality: N/A;    COLONOSCOPY N/A 2018    [facility]formed by [facility]O    CYSTOSCOPY WITH RETROGRADE PYELOGRAM Left 1/15/2016    [facility]    EGD (ESOPHAGOGASTRODUODENOSCOPY) N/A 2018    [facility]formed by [facility] at [facility] ENDO    ESOPHAGOGASTRODUODENOSCOPY N/A 2018    Procedure: EGD (ESOPHAGOGASTRODUODENOSCOPY);  Surgeon: Torres Son MD;  Location: [facility];  Service: Endoscopy;  Laterality: N/A;    EYE SURGERY      Rt eye Catarac    HYSTERECTOMY      IMAGING, GI TRACT, INTRALUMINAL, VIA CAPSULE N/A 2018    [facility]    INTRALUMINAL GASTROINTESTINAL TRACT  "IMAGING VIA CAPSULE N/A 8/6/2018    Procedure: IMAGING, GI TRACT, INTRALUMINAL, VIA CAPSULE;  Surgeon: Rocco Ross MD;  Location: [facility];  Service: Endoscopy;  Laterality: N/A;    KIDNEY SURGERY Left 06/23/2018    Left kidney removed due to cancer    lasix surgery      NEPHRECTOMY-RADICAL   Left 1/27/2016    [facility] at [facility] OR    REPAIR-HERNIA-VENTRAL N/A 1/27/2016    Performed by Marco A [facility]awa MD at [facility] OR    RESECTION-BOWEL  1/27/2016    Performed by [facility] at [facility] OR    URETEROSCOPY  1/15/2016    Performed by [facility] at [facility] OR     Family History     None        Tobacco Use    Smoking status: Current Every Day Smoker     Packs/day: 1.00     Years: 50.00     Pack years: 50.00    Smokeless tobacco: Never Used    Tobacco comment: Trying to QUIT   Substance and Sexual Activity    Alcohol use: Yes     Alcohol/week: 1.8 oz     Types: 3 Cans of beer per week     Comment: "about three beer a day"    Drug use: No    Sexual activity: No       Review of Systems       Review of Systems:  General: Weight gain: No, Weight Loss: y Fatigue: y  Fever: No, Chills: No, Night Sweats: No, Insomnia: No, Excessive sleeping: No   Respiratory:  Cough: No, Shortness of Breath: intermittent   Wheezing: No, Excessive Snoring: No, Coughing up blood: No  Endocrine: Heat Intolerance: No, Cold Intolerance: No,   Excessive Thirst: No, Excessive Hunger: No,   Eyes:  Blurred Vision: No, Double Vision: No,   Light Sensitivity: No, Eye pain: No  Musculoskeletal: Muscle Aches/Pain: No, Joint Pain/Swelling: No, Muscle Cramps: No, Muscle Weakness: No, Neck Pain: No, Back Pain: No   Neurological: Difficulty Walking/Falls: Yes, Headache Migraine: No, Dizziness/Vertigo: No, Fainting: No, Difficulty with Speech: No, Weakness: No, Tingling/Numbness: No, Tremors: No,  Memory Problems: y Seizures: No, Difficulty Swallowing: No, Altered Taste: No.  Cardiovascular: Chest Pain: " No, Shortness of Breath: y,   Palpitations: No,  Gastrointestinal: Nausea/Vomiting: No, Constipation: No, Diarrhea: No, Bloody Stools: y  Psych/Cog:  Depression: No, Anxiety: No, Hallucinations: No, Problems Concentrating: No  : Frequent Urination: No, Incontinence: No, Blood of Urine: No, Urinary Infections: No, Changes in Sex Drive: No   ENT:Hearing Loss: No, Earache: No, Ringing in Ears: No,   Facial Pain: No, Chronic Congestion: No   Immune: Seasonal Allergies: No, Hives and/or Rashes: No  The remainder of the review of twelve body systems was reviewed and normal      Objective:     Vital Signs (Most Recent):  Temp: 97.9 °F (36.6 °C) (10/23/18 1200)  Pulse: 63 (10/23/18 1500)  Resp: (!) 28 (10/23/18 1500)  BP: 139/61 (10/23/18 1500)  SpO2: 100 % (10/23/18 1500) Vital Signs (24h Range):  Temp:  [97.8 °F (36.6 °C)-98.5 °F (36.9 °C)] 97.9 °F (36.6 °C)  Pulse:  [58-79] 63  Resp:  [15-31] 28  SpO2:  [99 %-100 %] 100 %  BP: (108-139)/(55-68) 139/61     Weight: 70.4 kg (155 lb 3.3 oz)  Body mass index is 23.6 kg/m².  Body surface area is 1.84 meters squared.      Intake/Output Summary (Last 24 hours) at 10/23/2018 1714  Last data filed at 10/23/2018 1200  Gross per 24 hour   Intake 1393.75 ml   Output 3950 ml   Net -2556.25 ml       Physical Exam  Constitutional: oriented to person, place, and time.   HENT:   Head: Normocephalic and atraumatic.   Right Ear: External ear normal.   Left Ear: External ear normal.   Nose: Nose normal.   Mouth/Throat: Oropharynx is clear and moist.   Eyes: Conjunctivae and EOM are normal. Pupils are equal, round, and reactive to light.   Neck: Normal range of motion. Neck supple. No thyromegaly present.   Cardiovascular: Normal rate, regular rhythm, normal heart sounds  No murmur heard.  Pulmonary/Chest: Effort normal and breath sounds normal.  no wheezes.  no rales.   Abdominal: Soft. Bowel sounds are normal.  no mass. There is no tenderness. There is no rebound.   Musculoskeletal:  Normal range of motion.  no edema or tenderness.   Lymphadenopathy:    no cervical adenopathy.   Neurological: alert and oriented to person, place,  Skin: Skin is warm and dry. No rash noted.   Psychiatric: normal mood and affect.   behavior is normal.   Vitals reviewed.    Significant Labs:     Lab Results   Component Value Date    WBC 10.30 10/23/2018    HGB 10.5 (L) 10/23/2018    HCT 32.5 (L) 10/23/2018    MCV 86 10/23/2018     (H) 10/23/2018         Diagnostic Results:  Noted and old records reviewed including prior path from 2016 showing clear cell carcinoma of the kidney with positive extension beyond Gerota's  fascia.  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 New second primary ie ovarian carcinoma or possible mets from RCC  Lengthy discussion as to the differential and staging of her disease   It is suspected that the pulmonary nodules are metastatic disease : stage 4  She is contemplating not proceeding with surgery since this would not change her prognosis as far as from a malignant stsand point  Will discuss with Dr Randhawa in am whether  he believes surgery could help prevent further bleeding. The only benefit from onc view is more tissue is better for molecular profiling and would add to a more accurate issue diagnosis as far as the adnexal masses are concerned.  Anemia stable after blood transfusions  Reassess in am         Thank you for your consult.    Lauren Irene MD  Hematology/Oncology  [facility]

## 2018-10-23 NOTE — PLAN OF CARE
10/23/18 1050   Discharge Assessment   Assessment Type Discharge Planning Assessment   Confirmed/corrected address and phone number on facesheet? Yes   Assessment information obtained from? Other  (pt's daughterMaryjane at the bedside )   Expected Length of Stay (days) 4   Communicated expected length of stay with patient/caregiver yes   Prior to hospitilization cognitive status: Alert/Oriented   Prior to hospitalization functional status: Independent   Current cognitive status: Alert/Oriented   Lives With grandchild(samy)  (lives with grand-daughterZoey and her )   Able to Return to Prior Arrangements unable to determine at this time (comments)   Is patient able to care for self after discharge? Unable to determine at this time (comments)   Patient's perception of discharge disposition admitted as an inpatient   Readmission Within The Last 30 Days other (see comments)  (pt was discharged from [facility] on 10/18/18 for the same dx: Anemia )   Patient currently being followed by outpatient case management? No   Patient currently receives any other outside agency services? No   Equipment Currently Used at Home wheelchair;hospital bed;walker, rolling   Do you have any problems affording any of your prescribed medications? No  (pharm: [facility] in [place] )   Is the patient taking medications as prescribed? yes   Does the patient have transportation home? Yes   Transportation Available family or friend will provide   Does the patient receive services at the [facility]? No   Discharge Plan A Home Health   Patient/Family In Agreement With Plan yes   Does the patient have transportation to healthcare appointments? Yes     The pt's daughterMaryjane has POA; I made a copy and placed in pt's blue folder. The pt is active with [facility]; Maryjane signed pt choice/disclosure and placed in pt's blue folder.   The pt has an appt with PCP, Carine Baltazar NP on 10/30/18 and Dr Louis, oncology on  11/9/18.....MIKE Houston CM

## 2018-10-23 NOTE — PROGRESS NOTES
Progress Note  Hospital Medicine  Patient Name:Oriana Tobar  MRN:  [id number]  Patient Class: IP- Inpatient  Admit Date: 10/22/2018  Length of Stay: 1 days  Expected Discharge Date:   Attending Physician: Sol Lewis MD  Primary Care Provider:  Carine Baltazar NP    SUBJECTIVE:     Principal Problem: Acute blood loss anemia  Initial history of present illness: Patient is a 73 y.o. female admitted to Hospitalist Service from [facility] ER with complaints of dizziness and black stools. Patient reportedly has past medical history significant for Renal cell carcinoma s/p left nephrectomy, dementia, hypertension, hyperlipidemia, Chronic Kidney disease stage 3 and small bowel tumor. Patient recently underwent retroperitoneal hemorrhage s/p CT guided retroperitoneal mass biopsy (on 08-30-18).  Patient reportedly had metastatic lesions in the retro-peritoneaum and lungs. Patient was recently hospitalized at [facility] on 10-17-18-10-18-18 for symptomatic anemia dn received 3 units of PRBC. Part of the history obtained from the daughter. Patient is weak and dizzy for few days and regularly having black bowel movement. Patient denied any abdominal pain. Patient follows with Dr. Ross, Patient denied chest pain, shortness of breath, headache, vision changes, focal neuro-deficits, cough or fever.    PMH/PSH/SH/FH/Meds: reviewed.    Symptoms/Review of Systems: In ICU, patient received 4 units of pRBC last night. No shortness of breath, cough, chest pain or headache, fever or abdominal pain.     Diet:  NPO  Activity level: Up with assistance  Pain:  Patient reports no pain.       OBJECTIVE:   Vital Signs (Most Recent):      Temp: 98 °F (36.7 °C) (10/23/18 0300)  Pulse: 61 (10/23/18 0600)  Resp: 17 (10/23/18 0600)  BP: 131/60 (10/23/18 0600)  SpO2: 99 % (10/23/18 0600)       Vital Signs Range (Last 24H):  Temp:  [97.8 °F (36.6 °C)-98.5 °F (36.9 °C)]   Pulse:  []   Resp:   [15-47]   BP: (100-139)/(55-69)   SpO2:  [95 %-100 %]     Weight: 70.4 kg (155 lb 3.3 oz)  Body mass index is 21.65 kg/m².    Intake/Output Summary (Last 24 hours) at 10/23/2018 0910  Last data filed at 10/23/2018 0700  Gross per 24 hour   Intake 1393.75 ml   Output 2950 ml   Net -1556.25 ml     Physical Examination:  General appearance: well developed, appears stated age  Head: normocephalic, atraumatic  Eyes: Pallor conjunctivae/corneas clear. PERRL.  Nose: Nares normal. Septum midline.  Throat: lips, mucosa, and tongue normal; teeth and gums normal, no throat erythema.  Neck: supple, symmetrical, trachea midline, no JVD and thyroid not enlarged, symmetric, no tenderness/mass/nodules  Lungs:  clear to auscultation bilaterally and normal respiratory effort  Chest wall: no tenderness  Heart: regular rate and rhythm, S1, S2 normal, no murmur, click, rub or gallop  Abdomen: soft, non-tender non-distented; bowel sounds normal; no masses,  no organomegaly  Extremities: no cyanosis, clubbing or edema.   Pulses: 2+ and symmetric  Skin: Skin color, texture, turgor normal. No rashes or lesions.  Lymph nodes: Cervical, supraclavicular, and axillary nodes normal.  Neurologic: Normal strength and tone. No focal numbness or weakness. CNII-XII intact.      CBC:  Recent Labs   Lab 10/18/18  0447 10/22/18  1210 10/23/18  0456   WBC 11.07 15.00* 10.30   RBC 2.82* 2.22* 3.78*   HGB 7.3* 5.7* 10.8*   HCT 22.9* 18.3* 32.5*   * 498* 358*   MCV 81* 83 86   MCH 25.9* 25.9* 28.6   MCHC 31.9* 31.4* 33.3   BMP  Recent Labs   Lab 10/18/18  0447 10/22/18  1301 10/23/18  0456   GLU 88 110 98    139 141   K 3.3* 3.5 3.4*   * 109 109   CO2 23 22* 23   BUN 14 29* 25*   CREATININE 1.0 1.0 0.9   CALCIUM 7.6* 8.9 8.7   MG  --   --  1.6      Diagnostic Results:  Microbiology Results (last 7 days)     ** No results found for the last 168 hours. **         Capsule endoscopy (08-06-18):  1. Small bowel passage time as above  2.  Apparent small bowel tumor, likely in the mid jejunum, as noted above with no active bleeding.     Colonoscopy (06-25-18):  Diverticulosis in the ascending colon. There was no evidence of diverticular bleeding.  One 5 mm polyp in the ascending colon, removed with a hot biopsy forceps. Resected and retrieved.     EGD (06-25-18):  - Normal esophagus.                        - Gastritis. Biopsied.                        - Normal duodenal bulb and second portion of the                         duodenum.    Assessment/Plan:      Severe symptomatic anemia  Iron deficiency anemia due to chronic blood loss [D50.0]  Acute GI blood loss anemia s/p 4 PRBC  Small bowel tumor  Continue ICU monitoring.  Follow CBC.  Follow surgery and GI recommendations.      Yes    SHAMAR  Continue IVF hydration and follow BMP.      Yes    Mesenteric mass [K63.9] - metastatic disease  Renal cell cancer [C64.9] s/p nephrectomy  Under care of oncologist. Will consult Dr. Irene for her assistance due to multiple primary cancers and metastatic disease management.   Yes              Hypertension [I10]  Hold anti-HTN agents.    Hypokalemia  Replete KCl. Follow BMP.     Moderate Malnutrition  Encourage maximal PO intake. Diet supplementation ordered per nutrition approval. Will encourage PO and monitor closely for weight changes.  .   Yes    Dementia [F03.90]  Dementia is controlled currently. Continue home dementia meds and non-pharmacologic interventions to prevent delirium (No VS between 11PM-5AM, activity during day, opening blinds, providing glasses/hearing aids, and up in chair during daytime). Use PRN anti-psychotics to prevent behavior of self harm during sundowning, and avoid narcotics and benzos unless absolutely necessary. PRN anti-psychotics prescribed to avoid self harm behaviors.   Yes      DVT prophylaxis: Use SCD and TEDs. Avoiding anticoagulation due to GI bleeding.     Discussed with patient's daughter who confirms full code status.  Discussed with multiple family members,     Sol Lewis MD  Department of Hospital Medicine   [facility]

## 2018-10-23 NOTE — SUBJECTIVE & OBJECTIVE
No current facility-administered medications on file prior to encounter.      Current Outpatient Medications on File Prior to Encounter   Medication Sig    amLODIPine (NORVASC) 5 MG tablet Take 5 mg by mouth once daily.    atorvastatin (LIPITOR) 20 MG tablet Take 20 mg by mouth once daily.    ferrous sulfate 325 (65 FE) MG EC tablet Take 325 mg by mouth once daily.    losartan (COZAAR) 50 MG tablet Take 50 mg by mouth once daily.    pantoprazole (PROTONIX) 40 MG tablet Take 1 tablet (40 mg total) by mouth once daily.    sucralfate (CARAFATE) 100 mg/mL suspension Take 10 mLs (1 g total) by mouth 4 (four) times daily before meals and nightly.       Review of patient's allergies indicates:   Allergen Reactions    Codeine Other (See Comments)     Pt shakes and hallucinates    Pcn [penicillins] Anxiety and Other (See Comments)       Past Medical History:   Diagnosis Date    Cancer     kidney    Dementia     Encounter for blood transfusion     High cholesterol 2018    Hypertension     No Medication     Renal disorder     Wears dentures     Uppers     Past Surgical History:   Procedure Laterality Date     SECTION      COLONOSCOPY N/A 2018    Procedure: COLONOSCOPY;  Surgeon: Torres Son MD;  Location: [facility] ENDO;  Service: Endoscopy;  Laterality: N/A;    COLONOSCOPY N/A 2018    Performed by Torres Son MD at [facility] END[facility]    CYSTOSCOPY WITH RETROGRADE PYELOGRAM Left 1/15/2016    [facility]    EGD (ESOPHAGOGASTRODUODENOSCOPY) N/A 2018    Performed by Torres Son MD at [facility]    ESOPHAGOGASTRODUODENOSCOPY N/A 2018    Procedure: EGD (ESOPHAGOGASTRODUODENOSCOPY);  Surgeon: Torres Son MD;  Location: [facility] ENDO;  Service: Endoscopy;  Laterality: N/A;    EYE SURGERY      Rt eye Catarac    HYSTERECTOMY      IMAGING, GI TRACT, INTRALUMINAL, VIA CAPSULE N/A 2018    Performed by Rocco STUART  "MD Vandana at [facility] ENDO    INTRALUMINAL GASTROINTESTINAL TRACT IMAGING VIA CAPSULE N/A 8/6/2018    Procedure: IMAGING, GI TRACT, INTRALUMINAL, VIA CAPSULE;  Surgeon: Rocco Ross MD;  Location: [facility] ENDO;  Service: Endoscopy;  Laterality: N/A;    KIDNEY SURGERY Left 06/23/2018    Left kidney removed due to cancer    lasix surgery      NEPHRECTOMY-RADICAL   Left 1/27/2016    Performed by [facility] at [facility] OR    REPAIR-HERNIA-VENTRAL N/A 1/27/2016    Performed by Marco [facility]hawa MD at [facility] OR    RESECTION-BOWEL  1/27/2016    Performed by [facility]awa MD at [facility] OR    URETEROSCOPY  1/15/2016    Performed by [facility]ell [facility]acon MD at [facility] OR     Family History     None        Tobacco Use    Smoking status: Current Every Day Smoker     Packs/day: 1.00     Years: 50.00     Pack years: 50.00    Smokeless tobacco: Never Used    Tobacco comment: Trying to QUIT   Substance and Sexual Activity    Alcohol use: Yes     Alcohol/week: 1.8 oz     Types: 3 Cans of beer per week     Comment: "about three beer a day"    Drug use: No    Sexual activity: No     Review of Systems   Unable to perform ROS: Dementia     Objective:     Vital Signs (Most Recent):  Temp: 97.9 °F (36.6 °C) (10/23/18 1200)  Pulse: 63 (10/23/18 1500)  Resp: (!) 28 (10/23/18 1500)  BP: 139/61 (10/23/18 1500)  SpO2: 100 % (10/23/18 1500) Vital Signs (24h Range):  Temp:  [97.8 °F (36.6 °C)-98.5 °F (36.9 °C)] 97.9 °F (36.6 °C)  Pulse:  [58-79] 63  Resp:  [15-31] 28  SpO2:  [99 %-100 %] 100 %  BP: (108-139)/(55-68) 139/61     Weight: 70.4 kg (155 lb 3.3 oz)  Body mass index is 23.6 kg/m².    Physical Exam   Constitutional: She is oriented to person, place, and time. She appears well-developed and well-nourished. No distress.   HENT:   Head: Normocephalic and atraumatic.   Right Ear: External ear normal.   Left Ear: External ear normal.   Eyes: Conjunctivae are normal. Pupils are equal, round, and reactive to light. Right eye " exhibits no discharge. Left eye exhibits no discharge.   Neck: No tracheal deviation present. No thyromegaly present.   Cardiovascular: Normal rate and regular rhythm.   Pulmonary/Chest: Effort normal. No respiratory distress.   Abdominal: Soft. She exhibits no distension. There is no tenderness. There is no guarding.   Musculoskeletal: She exhibits no edema or tenderness.   Lymphadenopathy:     She has no cervical adenopathy.   Neurological: She is alert and oriented to person, place, and time. No cranial nerve deficit.   Skin: Skin is warm and dry. No rash noted. She is not diaphoretic. No pallor.   Psychiatric: She has a normal mood and affect. Her behavior is normal. Judgment and thought content normal.   Nursing note and vitals reviewed.      Significant Labs:  CBC:   Recent Labs   Lab 10/23/18  0456 10/23/18  1310   WBC 10.30  --    RBC 3.78*  --    HGB 10.8* 11.2*   HCT 32.5*  --    *  --    MCV 86  --    MCH 28.6  --    MCHC 33.3  --      CMP:   Recent Labs   Lab 10/23/18  0456   GLU 98   CALCIUM 8.7   ALBUMIN 2.6*   PROT 5.8*      K 3.4*   CO2 23      BUN 25*   CREATININE 0.9   ALKPHOS 42*   ALT 5*   AST 10   BILITOT 1.6*     Coagulation:   Recent Labs   Lab 10/22/18  1210   LABPROT 10.6   INR 1.1       Significant Diagnostics:  I have reviewed all pertinent imaging results/findings within the past 24 hours.

## 2018-10-23 NOTE — PLAN OF CARE
Problem: Patient Care Overview  Goal: Plan of Care Review  No bowel movements this shift. Received 4 units of PRBC without any problems. Hgb this am 10.8. Urinating well. Remains NPO. To be seen by  (surgery) today.

## 2018-10-23 NOTE — PLAN OF CARE
Problem: Patient Care Overview  Goal: Plan of Care Review  Outcome: Ongoing (interventions implemented as appropriate)  Care plan reviewed. Safety maintained.  Family at bedside most of shift, assisting patient as needed for bedpan.  Patient with no GI bleeding, Dr. Irene has been consulted.  Patient seen by Dr. Randhawa and Dr. Thomas today.  Patient had CT of abdomen with oral and IV contrast. Waiting for results.  [place] diet ordered.  Q 4 hour Hemaglobin continues.

## 2018-10-23 NOTE — PROGRESS NOTES
1515: Oral contrast started.   1545: Patient finished first cup of contrast.  1605: Second cup of contrast started.

## 2018-10-23 NOTE — PLAN OF CARE
Problem: Nutrition, Imbalanced: Inadequate Oral Intake (Adult)  Intervention: Promote/Optimize Nutrition   Intervention: Sodium reduced diet      Recommendation:  1) Advance PO diet to goal of no added sodium as soon as medically able + Boost Plus BID   2) If unable to advance diet in < 4 days initiate TF     Goals: 1) Advance PO diet or initiate TF in < 4 days  Nutrition Goal Status: new  Communication of RD Recs: (POC, sticky note)

## 2018-10-23 NOTE — PROGRESS NOTES
" [facility]  Adult Nutrition  Progress Note    SUMMARY    Intervention: Sodium reduced diet     Recommendation:  1) Advance PO diet to goal of no added sodium as soon as medically able + Boost Plus BID   2) If unable to advance diet in < 4 days initiate TF    Goals: 1) Advance PO diet or initiate TF in < 4 days  Nutrition Goal Status: new  Communication of BRISSA Recs: (POC, sticky note)    Reason for Assessment    Reason for Assessment: identified at risk by screening criteria  Diagnosis: (Acute blood loss anemia)  Relevant Medical History: renal cell carcinoma s/p nephrectomy + mets. lesions to lungs and retroperitoneum (tumor in jejunum), HTN, HLD, CKD 3, admit for symptomatic anemia s/p tumor bx. and hemorrhage 8/30/18  Interdisciplinary Rounds: attended    General Information Comments: 74 y/o female with mets. CA to lungs and jejunum, acute GI bleed, and SHAMAR. Per GI food able to pass at this time, and diverticulosis of colon, poor prognosis recommending comfort care discussion. At this time pt states she is hungry, she was eating 2-3 meals daily at home, slowly losing wt x 2 years, taking protein shakes per daughter (unable to remember brand). NFPE done today,  mild fat wasting at tricep evident, pt appears thin but nourished.     Nutrition Discharge Planning: To be determined    Nutrition Risk Screen    Nutrition Risk Screen: no indicators present    Nutrition/Diet History    Patient Reported Diet/Restrictions/Preferences: low salt  Do you have any cultural, spiritual, Islam conflicts, given your current situation?: Caodaism  Food Allergies: NKFA(or intolerances)  Factors Affecting Nutritional Intake: NPO    Anthropometrics    Temp: 97.9 °F (36.6 °C)  Height Method: Measured  Height: 5' 8"(Office 7/13/18)  Height (inches): 68 in  Weight Method: Bed Scale  Weight: 70.4 kg (155 lb 3.3 oz)  Weight (lb): 155.21 lb  Ideal Body Weight (IBW), Female: 140 lb  % Ideal Body Weight, Female (lb): " 110.86 lb  BMI (Calculated): 23.6  BMI Grade: 18.5-24.9 - normal(borderline underweight for age)  Weight Loss: unintentional  Usual Body Weight (UBW), k.8 kg(18)  % Usual Body Weight: 88.4  % Weight Change From Usual Weight: -11.78 %       Lab/Procedures/Meds    Pertinent Labs Reviewed: reviewed  BMP  Lab Results   Component Value Date     10/23/2018    K 3.4 (L) 10/23/2018     10/23/2018    CO2 23 10/23/2018    BUN 25 (H) 10/23/2018    CREATININE 0.9 10/23/2018    CALCIUM 8.7 10/23/2018    ANIONGAP 9 10/23/2018    ESTGFRAFRICA >60 10/23/2018    EGFRNONAA >60 10/23/2018     Lab Results   Component Value Date    ALBUMIN 2.6 (L) 10/23/2018       Pertinent Medications Reviewed: reviewed  Pertinent Medications Comments: KCl, lasix, zofran, senna    Physical Findings/Assessment    Overall Physical Appearance: advanced age, loss of subcutaneous fat, nourished(Mild fat/muscle wasting @ tricep and temples, pt appears thin but no other wasting evident)  Oral/Mouth Cavity: dental applicance present (specify)  Skin: intact(Baljit = 18)    Estimated/Assessed Needs    Weight Used For Calorie Calculations: 70.4 kg (155 lb 3.3 oz)  Energy Calorie Requirements (kcal): [place] Jeor ( x 1.4 wt gain/mets. CA) = 1775 kcal  Energy Need Method: Hill-St Jeor  Protein Requirements: 1.2 g protein/kg (mets. CA) = 85 g protein  Weight Used For Protein Calculations: 70.4 kg (155 lb 3.3 oz)  Fluid Requirements (mL): 1775 ml  Fluid Need Method: RDA Method  RDA Method (mL): 0  CHO Requirement: N/A      Nutrition Prescription Ordered    Current Diet Order: NPO  Nutrition Order Comments: x 1    Evaluation of Received Nutrient/Fluid Intake    Energy Calories Required: not meeting needs  Protein Required: not meeting needs  Fluid Required: not meeting needs  Tolerance: other (see comments)(NPO)  % Intake of Estimated Energy Needs: 0%  % Meal Intake: 0%    Nutrition Risk    Level of Risk/Frequency of Follow-up: moderate -  high 2 x weekly    Assessment and Plan    Malnutrition of moderate degree    Contributing Nutrition Diagnosis  Moderate chronic malnutrition    Related to (etiology):   Increased energy expenditure d/t mets. CA    Signs and Symptoms (as evidenced by):   1) 11% wt loss x 3 months 2) mild fat wasting @ triceps and mild temporal wasting     Interventions/Recommendations (treatment strategy):  1) Advance PO diet or initiate nutrition support to meet > 75% estimated needs in < 4 days    Nutrition Diagnosis Status:   New            Monitor and Evaluation    Food and Nutrient Intake: energy intake  Food and Nutrient Adminstration: diet order  Physical Activity and Function: nutrition-related ADLs and IADLs  Anthropometric Measurements: weight  Biochemical Data, Medical Tests and Procedures: electrolyte and renal panel, gastrointestinal profile  Nutrition-Focused Physical Findings: overall appearance     Nutrition Follow-Up    RD Follow-up?: Yes

## 2018-10-23 NOTE — PROGRESS NOTES
10/22/18 1938   Patient Assessment/Suction   Level of Consciousness (AVPU) alert   Respiratory Effort Normal;Unlabored   PRE-TX-O2-ETCO2   O2 Device (Oxygen Therapy) nasal cannula   Flow (L/min) 1   SpO2 100 %   Pulse Oximetry Type Continuous   Pulse 65   Resp 20   BP (!) 116/57   removed NC at this time. Pt sats were maintaining at 100% on 1L. Will continue to monitor.

## 2018-10-23 NOTE — HPI
73 y.o. female with anemia, remote onset, severe, chronic with acute worsening, with associated fatigue, with no alleviating/exacerbating factors.  Patient has dementia but history obtained from her daughter at the bedside.  In addition, I am quite familiar with this patient from prior workup.  She had EGD and colonoscopy in [place] which were unrevealing for cause of anemia.  Pillcam with me in 08/2018 showed ulcerated small bowel tumor with oozing which is the cause of her dark stool and recurrent/persistent anemia.  She was referred to medical and surgical oncology at [facility] as she was found to have mesenteric masses which were biopsied and consistent with recurrence of known past renal cell cancer.  In addition, she has adnexal mass which could represent metastatic disease versus ovarian primary malignancy.  No emesis currently.  No acute GI complaints but with chronic dark stool secondary to small bowel ulcerated tumor.  Has not gotten any chemotherapy, just blood transfusions.  Last CT scan 8/30/18

## 2018-10-23 NOTE — CONSULTS
[facility]     CC: Anemia    HPI 73 y.o. female with anemia, remote onset, severe, chronic with acute worsening, with associated fatigue, with no alleviating/exacerbating factors.  Patient has dementia but history obtained from her daughter at the bedside.  In addition, I am quite familiar with this patient from prior workup.  She had EGD and colonoscopy in [place] which were unrevealing for cause of anemia.  Pillcam with me in 2018 showed ulcerated small bowel tumor with oozing which is the cause of her dark stool and recurrent/persistent anemia.  She was referred to medical and surgical oncology at [facility] as she was found to have mesenteric masses which were biopsied and consistent with recurrence of known past renal cell cancer.  In addition, she has adnexal mass which could represent metastatic disease versus ovarian primary malignancy.  No emesis currently.  No acute GI complaints but with chronic dark stool secondary to small bowel ulcerated tumor.    Past Medical History:   Diagnosis Date    Cancer     kidney    Dementia     Encounter for blood transfusion     High cholesterol 2018    Hypertension     No Medication     Renal disorder     Wears dentures     Uppers       Past Surgical History:   Procedure Laterality Date     SECTION      COLONOSCOPY N/A 2018    Procedure: COLONOSCOPY;  Surgeon: Torres Son MD;  Location: [facility];  Service: Endoscopy;  Laterality: N/A;    COLONOSCOPY N/A 2018    Performed by [facility]O    CYSTOSCOPY WITH RETROGRADE PYELOGRAM Left 1/15/2016    [facility]    EGD (ESOPHAGOGASTRODUODENOSCOPY) N/A 2018    Performed by [facility] at [facility]    ESOPHAGOGASTRODUODENOSCOPY N/A 2018    Procedure: EGD (ESOPHAGOGASTRODUODENOSCOPY);  Surgeon: Torres Son MD;  Location: [facility];  Service: Endoscopy;   "Laterality: N/A;    EYE SURGERY      Rt eye Catarac    HYSTERECTOMY      IMAGING, GI TRACT, INTRALUMINAL, VIA CAPSULE N/A 8/6/2018    Performed by Rocco Ross [facility] at [facility]O    INTRALUMINAL GASTROINTESTINAL TRACT IMAGING VIA CAPSULE N/A 8/6/2018    Procedure: IMAGING, GI TRACT, INTRALUMINAL, VIA CAPSULE;  Surgeon: Rocco Ross MD;  Location: [facility] ENDO;  Service: Endoscopy;  Laterality: N/A;    KIDNEY SURGERY Left 06/23/2018    Left kidney removed due to cancer    lasix surgery      NEPHRECTOMY-RADICAL   Left 1/27/2016    [facility] at [facility] OR    REPAIR-HERNIA-VENTRAL N/A 1/27/2016    Performed by Marco A [facility]hawa MD at [facility] OR    RESECTION-BOWEL  1/27/2016    [facility] by [facility]awa [facility] at [facility] OR    URETEROSCOPY  1/15/2016    Performed by [facility]acon [facility] at [facility] OR       Social History     Tobacco Use    Smoking status: Current Every Day Smoker     Packs/day: 1.00     Years: 50.00     Pack years: 50.00    Smokeless tobacco: Never Used    Tobacco comment: Trying to QUIT   Substance Use Topics    Alcohol use: Yes     Alcohol/week: 1.8 oz     Types: 3 Cans of beer per week     Comment: "about three beer a day"    Drug use: No       History reviewed. No pertinent family history.    Review of Systems    Unable to obtain secondary to dementia    Physical Examination  BP (!) 115/58   Pulse 68   Temp 98.1 °F (36.7 °C) (Oral)   Resp (!) 23   Ht 5' 11" (1.803 m)   Wt 76.8 kg (169 lb 5 oz)   SpO2 100%   Breastfeeding? No   BMI 23.61 kg/m²   General appearance: alert, cooperative, no distress  HENT: Normocephalic, atraumatic, neck symmetrical, no nasal discharge   Eyes: conjunctivae/corneas clear, PERRL, EOM's intact, sclera anicteric  Lungs: clear to auscultation bilaterally, no dullness to percussion bilaterally, symmetric expansion, breathing unlabored  Heart: regular rate and rhythm without rub; no displacement of the PMI   Abdomen: obese, NT + " BS  Extremities: extremities symmetric; no clubbing, cyanosis, or edema  Integument: Skin color, texture, turgor normal; no rashes; hair distrubution normal, no jaundice  Neurologic: Alert and awake, + Dementia, no focal sensory or motor neurologic deficits  Psychiatric: no pressured speech; normal affect; no evidence of impaired cognition, no anxiety/depression     Labs:  Lab Results   Component Value Date    WBC 15.00 (H) 10/22/2018    HGB 5.7 (LL) 10/22/2018    HCT 18.3 (LL) 10/22/2018    MCV 83 10/22/2018     (H) 10/22/2018       CMP  Sodium   Date Value Ref Range Status   10/22/2018 139 136 - 145 mmol/L Final     Potassium   Date Value Ref Range Status   10/22/2018 3.5 3.5 - 5.1 mmol/L Final     Chloride   Date Value Ref Range Status   10/22/2018 109 95 - 110 mmol/L Final     CO2   Date Value Ref Range Status   10/22/2018 22 (L) 23 - 29 mmol/L Final     Glucose   Date Value Ref Range Status   10/22/2018 110 70 - 110 mg/dL Final     BUN, Bld   Date Value Ref Range Status   10/22/2018 29 (H) 8 - 23 mg/dL Final     Creatinine   Date Value Ref Range Status   10/22/2018 1.0 0.5 - 1.4 mg/dL Final     Calcium   Date Value Ref Range Status   10/22/2018 8.9 8.7 - 10.5 mg/dL Final     Total Protein   Date Value Ref Range Status   10/22/2018 5.9 (L) 6.0 - 8.4 g/dL Final     Albumin   Date Value Ref Range Status   10/22/2018 2.7 (L) 3.5 - 5.2 g/dL Final     Total Bilirubin   Date Value Ref Range Status   10/22/2018 0.2 0.1 - 1.0 mg/dL Final     Comment:     For infants and newborns, interpretation of results should be based  on gestational age, weight and in agreement with clinical  observations.  Premature Infant recommended reference ranges:  Up to 24 hours.............<8.0 mg/dL  Up to 48 hours............<12.0 mg/dL  3-5 days..................<15.0 mg/dL  6-29 days.................<15.0 mg/dL       Alkaline Phosphatase   Date Value Ref Range Status   10/22/2018 44 (L) 55 - 135 U/L Final     AST   Date Value Ref  Range Status   10/22/2018 7 (L) 10 - 40 U/L Final     ALT   Date Value Ref Range Status   10/22/2018 6 (L) 10 - 44 U/L Final     Anion Gap   Date Value Ref Range Status   10/22/2018 8 8 - 16 mmol/L Final     eGFR if    Date Value Ref Range Status   10/22/2018 >60 >60 mL/min/1.73 m^2 Final     eGFR if non    Date Value Ref Range Status   10/22/2018 56 (A) >60 mL/min/1.73 m^2 Final     Comment:     Calculation used to obtain the estimated glomerular filtration  rate (eGFR) is the CKD-EPI equation.            Imaging:  Past CT was independently visualized and reviewed by me and showed multiple intra-abdominal masses suspicious for recurrent renal cell carcinoma as well as possible ovarian primary tumor.    I have personally reviewed these images    Assessment:   1.  Anemia - acute on chronic  2.  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  Renal cell carcinoma  4.  Right adnexal mass  5.  Small bowel tumor  6.  Dementia    Plan:  1.  Transfuse PRN  2.  Monitor for any severe bleeding  3.  Referral to surgery as source of blood loss is small bowel tumor which cannot be accessed endoscopically  4.  Consult oncology for further assistance given possibility of multiple primary tumors  5.  Further recommendations to follow after above  6.  Communication will be sent to the referring MD, Dr. Lewis regarding my assessment and plan on this patient via EPIC.      Rocco Ventura MD  [facility]  [address], [address]  [place] [address]  Office: [phone]  Fax: [phone]

## 2018-10-23 NOTE — PROGRESS NOTES
"[facility]    CC: Anemia,     HPI 73 y.o. female with widely metastatic RCC including small bowel ulcerated lesion that is likely a met, admitted with worsening anemia and dark stool, likely oozing from known small bowel lesion. She denies abdominal pain or blood in stool, though is confused at times. Per nursing no blood in stool on most recent shift. Responded to blood transfusion    Past Medical History:   Diagnosis Date    Cancer     kidney    Dementia     Encounter for blood transfusion     High cholesterol 06/23/2018    Hypertension     No Medication     Renal disorder     Wears dentures     Uppers         Review of Systems  General ROS: negative for - chills, fever or weight loss  Cardiovascular ROS: no chest pain or dyspnea on exertion  Gastrointestinal ROS: denies abdominal pain, denies blood in stool, no vomiting    Physical Examination  BP (!) 122/59   Pulse 65   Temp 98.5 °F (36.9 °C) (Oral)   Resp 18   Ht 5' 11" (1.803 m)   Wt 70.4 kg (155 lb 3.3 oz)   SpO2 100%   Breastfeeding? No   BMI 21.65 kg/m²   General appearance: alert, cooperative, no distress  HENT: Normocephalic, atraumatic, neck symmetrical, no nasal discharge, sclera anicteric   Lungs: clear to auscultation bilaterally, symmetric chest wall expansion bilaterally  Heart: regular rate and rhythm without rub; no displacement of the PMI   Abdomen: soft, nontender, mild distention, BS normoactive  Extremities: extremities symmetric; no clubbing, cyanosis, or edema        Labs:  Lab Results   Component Value Date    WBC 10.30 10/23/2018    HGB 10.8 (L) 10/23/2018    HCT 32.5 (L) 10/23/2018    MCV 86 10/23/2018     (H) 10/23/2018         Assessment:   73 y.o. female with widely metastatic RCC including small bowel ulcerated lesion that is likely a met, who presented with worsening anemia and dark stool. She has not had further bleeding and responded appropriately to blood    Plan:  -Patient will likely need " periodic blood transfusions as well as aggressive iron supplementation (consider IV iron) if small bowel lesion not removed  -Overall prognosis very poor, would consider comfort care  -Nothing further to offer from GI perspective, please call with questions    Cassandra Thomas MD  [facility]  [address], [address]  [place] [address]  Office: [phone]  Fax: [phone]

## 2018-10-24 LAB
ALBUMIN SERPL BCP-MCNC: 2.6 G/DL
ALP SERPL-CCNC: 43 U/L
ALT SERPL W/O P-5'-P-CCNC: 8 U/L
ANION GAP SERPL CALC-SCNC: 5 MMOL/L
AST SERPL-CCNC: 10 U/L
BASOPHILS # BLD AUTO: 0.1 K/UL
BASOPHILS NFR BLD: 1.5 %
BILIRUB SERPL-MCNC: 0.7 MG/DL
BUN SERPL-MCNC: 15 MG/DL
CALCIUM SERPL-MCNC: 8.8 MG/DL
CHLORIDE SERPL-SCNC: 109 MMOL/L
CO2 SERPL-SCNC: 25 MMOL/L
CREAT SERPL-MCNC: 0.9 MG/DL
DIFFERENTIAL METHOD: ABNORMAL
EOSINOPHIL # BLD AUTO: 0.2 K/UL
EOSINOPHIL NFR BLD: 2.9 %
ERYTHROCYTE [DISTWIDTH] IN BLOOD BY AUTOMATED COUNT: 16.1 %
EST. GFR  (AFRICAN AMERICAN): >60 ML/MIN/1.73 M^2
EST. GFR  (NON AFRICAN AMERICAN): >60 ML/MIN/1.73 M^2
GLUCOSE SERPL-MCNC: 93 MG/DL
HCT VFR BLD AUTO: 31.7 %
HGB BLD-MCNC: 10.4 G/DL
HGB BLD-MCNC: 10.7 G/DL
HGB BLD-MCNC: 9.5 G/DL
HGB BLD-MCNC: 9.5 G/DL
LYMPHOCYTES # BLD AUTO: 1.8 K/UL
LYMPHOCYTES NFR BLD: 20.7 %
MAGNESIUM SERPL-MCNC: 1.8 MG/DL
MCH RBC QN AUTO: 28.3 PG
MCHC RBC AUTO-ENTMCNC: 32.9 G/DL
MCV RBC AUTO: 86 FL
MONOCYTES # BLD AUTO: 0.8 K/UL
MONOCYTES NFR BLD: 9.1 %
NEUTROPHILS # BLD AUTO: 5.6 K/UL
NEUTROPHILS NFR BLD: 65.8 %
PHOSPHATE SERPL-MCNC: 3.1 MG/DL
PLATELET # BLD AUTO: 404 K/UL
PMV BLD AUTO: 7.4 FL
POTASSIUM SERPL-SCNC: 3.9 MMOL/L
PROT SERPL-MCNC: 5.9 G/DL
RBC # BLD AUTO: 3.68 M/UL
SODIUM SERPL-SCNC: 139 MMOL/L
WBC # BLD AUTO: 8.5 K/UL

## 2018-10-24 PROCEDURE — 84100 ASSAY OF PHOSPHORUS: CPT

## 2018-10-24 PROCEDURE — 99233 SBSQ HOSP IP/OBS HIGH 50: CPT | Mod: ,,, | Performed by: INTERNAL MEDICINE

## 2018-10-24 PROCEDURE — 85018 HEMOGLOBIN: CPT

## 2018-10-24 PROCEDURE — 94761 N-INVAS EAR/PLS OXIMETRY MLT: CPT

## 2018-10-24 PROCEDURE — 36415 COLL VENOUS BLD VENIPUNCTURE: CPT

## 2018-10-24 PROCEDURE — 63600175 PHARM REV CODE 636 W HCPCS: Performed by: INTERNAL MEDICINE

## 2018-10-24 PROCEDURE — 85025 COMPLETE CBC W/AUTO DIFF WBC: CPT

## 2018-10-24 PROCEDURE — 11000001 HC ACUTE MED/SURG PRIVATE ROOM

## 2018-10-24 PROCEDURE — C9113 INJ PANTOPRAZOLE SODIUM, VIA: HCPCS | Performed by: INTERNAL MEDICINE

## 2018-10-24 PROCEDURE — 83735 ASSAY OF MAGNESIUM: CPT

## 2018-10-24 PROCEDURE — 80053 COMPREHEN METABOLIC PANEL: CPT

## 2018-10-24 PROCEDURE — 85018 HEMOGLOBIN: CPT | Mod: 91

## 2018-10-24 RX ADMIN — PANTOPRAZOLE SODIUM 40 MG: 40 INJECTION, POWDER, FOR SOLUTION INTRAVENOUS at 09:10

## 2018-10-24 NOTE — PROGRESS NOTES
Progress Note  Hospital Medicine  Patient Name:Oriana Tobar  MRN:  [id number]  Patient Class: IP- Inpatient  Admit Date: 10/22/2018  Length of Stay: 2 days  Expected Discharge Date:   Attending Physician: Sol Lewis MD  Primary Care Provider:  Carine Baltazar NP    SUBJECTIVE:     Principal Problem: Acute blood loss anemia  Initial history of present illness: Patient is a 73 y.o. female admitted to Hospitalist Service from [facility] ER with complaints of dizziness and black stools. Patient reportedly has past medical history significant for Renal cell carcinoma s/p left nephrectomy, dementia, hypertension, hyperlipidemia, Chronic Kidney disease stage 3 and small bowel tumor. Patient recently underwent retroperitoneal hemorrhage s/p CT guided retroperitoneal mass biopsy (on 08-30-18).  Patient reportedly had metastatic lesions in the retro-peritoneaum and lungs. Patient was recently hospitalized at [facility] on 10-17-18-10-18-18 for symptomatic anemia dn received 3 units of PRBC. Part of the history obtained from the daughter. Patient is weak and dizzy for few days and regularly having black bowel movement. Patient denied any abdominal pain. Patient follows with Dr. Ross, Patient denied chest pain, shortness of breath, headache, vision changes, focal neuro-deficits, cough or fever.    PMH/PSH/SH/FH/Meds: reviewed.    Symptoms/Review of Systems: In ICU, looking and feeling better. Hgb is stable. One black BM reported last night. No shortness of breath, cough, chest pain or headache, fever or abdominal pain.     Diet:  cardiac  Activity level: Up with assistance  Pain:  Patient reports no pain.       OBJECTIVE:   Vital Signs (Most Recent):      Temp: 98.2 °F (36.8 °C) (10/24/18 0800)  Pulse: 73 (10/24/18 0800)  Resp: (!) 21 (10/24/18 0800)  BP: 128/71 (10/24/18 0800)  SpO2: 100 % (10/24/18 0800)       Vital Signs Range (Last 24H):  Temp:  [97.3 °F (36.3 °C)-98.2  °F (36.8 °C)]   Pulse:  [60-73]   Resp:  [15-28]   BP: (114-166)/(57-75)   SpO2:  [97 %-100 %]     Weight: 73.1 kg (161 lb 2.5 oz)  Body mass index is 24.5 kg/m².    Intake/Output Summary (Last 24 hours) at 10/24/2018 0922  Last data filed at 10/24/2018 0600  Gross per 24 hour   Intake 580 ml   Output 3225 ml   Net -2645 ml     Physical Examination:  General appearance: well developed, appears stated age  Head: normocephalic, atraumatic  Eyes: Pallor conjunctivae/corneas clear. PERRL.  Nose: Nares normal. Septum midline.  Throat: lips, mucosa, and tongue normal; teeth and gums normal, no throat erythema.  Neck: supple, symmetrical, trachea midline, no JVD and thyroid not enlarged, symmetric, no tenderness/mass/nodules  Lungs:  clear to auscultation bilaterally and normal respiratory effort  Chest wall: no tenderness  Heart: regular rate and rhythm, S1, S2 normal, no murmur, click, rub or gallop  Abdomen: soft, non-tender non-distented; bowel sounds normal; no masses,  no organomegaly  Extremities: no cyanosis, clubbing or edema.   Pulses: 2+ and symmetric  Skin: Skin color, texture, turgor normal. No rashes or lesions.  Lymph nodes: Cervical, supraclavicular, and axillary nodes normal.  Neurologic: Normal strength and tone. No focal numbness or weakness. CNII-XII intact.      CBC:  Recent Labs   Lab 10/22/18  1210 10/23/18  0456  10/23/18  1630 10/24/18  0014 10/24/18  0627   WBC 15.00* 10.30  --   --   --  8.50   RBC 2.22* 3.78*  --   --   --  3.68*   HGB 5.7* 10.8*   < > 10.5* 10.7* 10.4*   HCT 18.3* 32.5*  --   --   --  31.7*   * 358*  --   --   --  404*   MCV 83 86  --   --   --  86   MCH 25.9* 28.6  --   --   --  28.3   MCHC 31.4* 33.3  --   --   --  32.9    < > = values in this interval not displayed.   BMP  Recent Labs   Lab 10/22/18  1301 10/23/18  0456 10/24/18  0627    98 93    141 139   K 3.5 3.4* 3.9    109 109   CO2 22* 23 25   BUN 29* 25* 15   CREATININE 1.0 0.9 0.9   CALCIUM  8.9 8.7 8.8   MG  --  1.6 1.8      Diagnostic Results:  Microbiology Results (last 7 days)     ** No results found for the last 168 hours. **         Capsule endoscopy (08-06-18):  1. Small bowel passage time as above  2. Apparent small bowel tumor, likely in the mid jejunum, as noted above with no active bleeding.     Colonoscopy (06-25-18):  Diverticulosis in the ascending colon. There was no evidence of diverticular bleeding.  One 5 mm polyp in the ascending colon, removed with a hot biopsy forceps. Resected and retrieved.     EGD (06-25-18):  - Normal esophagus.                        - Gastritis. Biopsied.                        - Normal duodenal bulb and second portion of the                         duodenum.    CT abdomen:   1. Findings concerning for disease progression with enlargement of mass along the left nephrectomy bed and enlargement of target and non target peritoneal implants.  2. Pulmonary nodules are unchanged but remain concerning for metastatic disease.  3. Large bilateral adnexal masses which could reflect primary or metastatic neoplasia.  4. Additional left ovarian dermoid.  5. Multiple liver cysts.  6. Cholelithiasis.    Assessment/Plan:      Severe symptomatic anemia  Iron deficiency anemia due to chronic blood loss [D50.0]  Acute GI blood loss anemia s/p 4 PRBC  Small bowel tumor  Follow CBC.  Follow surgery and GI recommendations.      Yes    SHAMAR - resolved  Follow BMP.      Yes    Mesenteric mass [K63.9] - metastatic disease  Renal cell cancer [C64.9] s/p nephrectomy  Under care of oncologist. Will consult Dr. Irene for her assistance due to multiple primary cancers and metastatic disease management.   Yes              Hypertension [I10]  Hold anti-HTN agents.    Hypokalemia  Replete KCl. Follow BMP.     Moderate Malnutrition  Encourage maximal PO intake. Diet supplementation ordered per nutrition approval. Will encourage PO and monitor closely for weight changes.  .   Yes    Dementia  [F03.90]  Dementia is controlled currently. Continue home dementia meds and non-pharmacologic interventions to prevent delirium (No VS between 11PM-5AM, activity during day, opening blinds, providing glasses/hearing aids, and up in chair during daytime). Use PRN anti-psychotics to prevent behavior of self harm during sundowning, and avoid narcotics and benzos unless absolutely necessary. PRN anti-psychotics prescribed to avoid self harm behaviors.   Yes      DVT prophylaxis: Use SCD and TEDs. Avoiding anticoagulation due to GI bleeding.     Discussed with patient and her daughter. Family discussed with Dr. Irene who recommends comfort/hospice care. Consult SW to assist family with home hospice arrangements as per their request. Time spent in care of the patient, counseling and coordination of care (Greater than 50% spent in direct face to face contact): 35 min.     Sol Lewis MD  Department of Hospital Medicine   [facility]

## 2018-10-24 NOTE — PROGRESS NOTES
Patient transferred to room 321, via wheelchair. All belongings gathered by daughter. Report given to Crystal.

## 2018-10-24 NOTE — NURSING
bedisde report received from lawson Rivera oriented to room, VSS, bed locked and in low position, will cont to monitor.

## 2018-10-24 NOTE — PLAN OF CARE
Problem: Patient Care Overview  Goal: Plan of Care Review  Getting up and down to bedside commode with standby assist and tolerating well. One melena stool this shift with stable Hgb this AM. Slight pain to LLQ abdomen with palpation.

## 2018-10-24 NOTE — PROGRESS NOTES
10/23/18 2041   Patient Assessment/Suction   Level of Consciousness (AVPU) alert   Respiratory Effort Normal;Unlabored   PRE-TX-O2-ETCO2   O2 Device (Oxygen Therapy) room air   SpO2 100 %   Pulse Oximetry Type Continuous   Pulse 70   Resp (!) 24

## 2018-10-24 NOTE — PLAN OF CARE
Problem: Patient Care Overview  Goal: Plan of Care Review  Outcome: Ongoing (interventions implemented as appropriate)  Care plan reviewed. Safety maintained.  Family at bedside most of day. Patient uses BSC.   Patient without melena today. Had melena for HS shift.  Patient on bland diet,   Dr. Irene to come see patient today.  Possible surgery by Dr. Randhawa on Thursday. Patient and family to decide what they would like to do.  Patient to be transferred to room 321.

## 2018-10-24 NOTE — PLAN OF CARE
Problem: Patient Care Overview  Goal: Plan of Care Review  AAOx2, disoriented to situation, with some confusion, needs reminders. Tele assist placed in room for safety, order placed, cardiac monitoring continued. Denies pain, good comfort level established, remains on bland diet. TEDs in place. IV intact, saline locked. POC reviewed with pt and daughter. Verbalized understanding. Pt needs reminders. Bed locked and in low position, call light within reach, will continue to monitor.

## 2018-10-24 NOTE — PLAN OF CARE
I met with the pts family at bedside and provided them with a senior resource guide for hospice resources. She stated that she will review it and have a decision in the morning. Enriqueta Batres LMSW     10/24/18 7630   Discharge Assessment   Assessment Type Discharge Planning Reassessment

## 2018-10-25 VITALS
DIASTOLIC BLOOD PRESSURE: 62 MMHG | HEART RATE: 79 BPM | WEIGHT: 161.19 LBS | RESPIRATION RATE: 18 BRPM | HEIGHT: 68 IN | OXYGEN SATURATION: 100 % | TEMPERATURE: 97 F | SYSTOLIC BLOOD PRESSURE: 139 MMHG | BODY MASS INDEX: 24.43 KG/M2

## 2018-10-25 LAB
ALBUMIN SERPL BCP-MCNC: 2.5 G/DL
ALP SERPL-CCNC: 44 U/L
ALT SERPL W/O P-5'-P-CCNC: 7 U/L
ANION GAP SERPL CALC-SCNC: 5 MMOL/L
AST SERPL-CCNC: 15 U/L
BASOPHILS # BLD AUTO: 0.1 K/UL
BASOPHILS NFR BLD: 0.7 %
BILIRUB SERPL-MCNC: 0.4 MG/DL
BUN SERPL-MCNC: 18 MG/DL
CALCIUM SERPL-MCNC: 8.8 MG/DL
CHLORIDE SERPL-SCNC: 109 MMOL/L
CO2 SERPL-SCNC: 24 MMOL/L
CREAT SERPL-MCNC: 1 MG/DL
DIFFERENTIAL METHOD: ABNORMAL
EOSINOPHIL # BLD AUTO: 0.1 K/UL
EOSINOPHIL NFR BLD: 1.6 %
ERYTHROCYTE [DISTWIDTH] IN BLOOD BY AUTOMATED COUNT: 16.9 %
EST. GFR  (AFRICAN AMERICAN): >60 ML/MIN/1.73 M^2
EST. GFR  (NON AFRICAN AMERICAN): 56 ML/MIN/1.73 M^2
GLUCOSE SERPL-MCNC: 104 MG/DL
HCT VFR BLD AUTO: 28.9 %
HGB BLD-MCNC: 10.3 G/DL
HGB BLD-MCNC: 11.4 G/DL
HGB BLD-MCNC: 9.6 G/DL
LYMPHOCYTES # BLD AUTO: 1.7 K/UL
LYMPHOCYTES NFR BLD: 18.7 %
MAGNESIUM SERPL-MCNC: 2.1 MG/DL
MCH RBC QN AUTO: 28.9 PG
MCHC RBC AUTO-ENTMCNC: 33.2 G/DL
MCV RBC AUTO: 87 FL
MONOCYTES # BLD AUTO: 0.8 K/UL
MONOCYTES NFR BLD: 8.7 %
NEUTROPHILS # BLD AUTO: 6.4 K/UL
NEUTROPHILS NFR BLD: 70.3 %
PHOSPHATE SERPL-MCNC: 3.7 MG/DL
PLATELET # BLD AUTO: 388 K/UL
PMV BLD AUTO: 7.5 FL
POTASSIUM SERPL-SCNC: 3.7 MMOL/L
PROT SERPL-MCNC: 5.6 G/DL
RBC # BLD AUTO: 3.33 M/UL
SODIUM SERPL-SCNC: 138 MMOL/L
WBC # BLD AUTO: 9.1 K/UL

## 2018-10-25 PROCEDURE — 80053 COMPREHEN METABOLIC PANEL: CPT

## 2018-10-25 PROCEDURE — 84100 ASSAY OF PHOSPHORUS: CPT

## 2018-10-25 PROCEDURE — 94761 N-INVAS EAR/PLS OXIMETRY MLT: CPT

## 2018-10-25 PROCEDURE — 36415 COLL VENOUS BLD VENIPUNCTURE: CPT

## 2018-10-25 PROCEDURE — 99239 HOSP IP/OBS DSCHRG MGMT >30: CPT | Mod: ,,, | Performed by: INTERNAL MEDICINE

## 2018-10-25 PROCEDURE — 85018 HEMOGLOBIN: CPT

## 2018-10-25 PROCEDURE — 63600175 PHARM REV CODE 636 W HCPCS: Performed by: INTERNAL MEDICINE

## 2018-10-25 PROCEDURE — 83735 ASSAY OF MAGNESIUM: CPT

## 2018-10-25 PROCEDURE — C9113 INJ PANTOPRAZOLE SODIUM, VIA: HCPCS | Performed by: INTERNAL MEDICINE

## 2018-10-25 PROCEDURE — 85025 COMPLETE CBC W/AUTO DIFF WBC: CPT

## 2018-10-25 RX ADMIN — PANTOPRAZOLE SODIUM 40 MG: 40 INJECTION, POWDER, FOR SOLUTION INTRAVENOUS at 08:10

## 2018-10-25 NOTE — PLAN OF CARE
Problem: Patient Care Overview  Goal: Plan of Care Review  Outcome: Revised  Pt is awake and alert, intermittent confusion noted throughout night, reoriented as needed.  IV saline locked, no redness or swelling at site.  Cardiac monitoring in place, NSR.  AVASYS in use for dementia.  VSS, in NAD, pt remains afebrile.  Pt remains free from injury.  Bed in low position, wheels locked, call light within reach.  Pt verbalized understanding of POC.  Will continue to monitor.

## 2018-10-25 NOTE — NURSING
Discharge instructions given to patient she and daughter verbalized understanding discontinued piv in right hand and cardiac monitor. All questions and concerns answered appts scheduled. Pt left via wheelchair at 1645. Relinquished care.

## 2018-10-25 NOTE — PLAN OF CARE
Patient and her daughter are declining hospice services at this time.   The pt is discharging home with resumption of [facility].  I will send the referral via [facility].  Spoke with Angela at [facility] in [facility] to notify her the pt is discharging home today with resumption of services. ...MIKE Houston       10/25/18 9126   Post-Acute Status   Post-Acute Authorization Home Health/Hospice   Home Health/Hospice Status Referrals Sent

## 2018-10-25 NOTE — DISCHARGE SUMMARY
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 female admitted to Hospitalist Service from [facility] ER with complaints of dizziness and black stools. Patient reportedly has past medical history significant for Renal cell carcinoma s/p left nephrectomy, dementia, hypertension, hyperlipidemia, Chronic Kidney disease stage 3 and small bowel tumor. Patient recently underwent retroperitoneal hemorrhage s/p CT guided retroperitoneal mass biopsy (on 08-30-18).  Patient reportedly had metastatic lesions in the retro-peritoneaum and lungs. Patient was recently hospitalized at [facility] on 10-17-18-10-18-18 for symptomatic anemia and received 3 units of PRBC. Part of the history obtained from the daughter. Patient was weak and dizzy for few days and regularly having black bowel movement. Patient denied any abdominal pain. Patient follows with Dr. Ross, Patient denied chest pain, shortness of breath, headache, vision changes, focal neuro-deficits, cough or fever. Patient was admitted to Hospitalist medicine service. Patient was evaluated by Dr. Ross and Dr. Randhawa. Patient received  4 units of PRBC and managed in ICU. Dr. Irene from Oncology service. Dr. Irene recommended hospice care for home as patient's tumor burden is increasing with metastatic disease. Patient decided not to have instestinal surgery. Patient and her daughter wish to continue with home health and periodic CBC check and PRBC transfusion as needed. If patient's condition worsens, then family will consider home hospice at that time. Patient was discharged home in stable condition with following discharge plan of care. Total time with the patient was 30 minutes and greater than 50% was spent in counseling and coordination of care. The assessment and plan have been discussed at length. Physicians' notes reviewed. Labs and procedure reviewed.     Consults: Dr. Ross, Dr. Thomas, Dr. Irene, Dr. Randhawa    Significant Diagnostic Studies:   Capsule endoscopy (08-06-18):  1. Small bowel passage time as above  2. Apparent small bowel tumor, likely in the mid jejunum, as noted above with no active bleeding.     Colonoscopy (06-25-18):  Diverticulosis in the ascending colon. There was no evidence of diverticular bleeding.  One 5 mm polyp in the ascending colon, removed with a hot biopsy forceps. Resected and retrieved.     EGD (06-25-18):  - Normal esophagus.                        - Gastritis. Biopsied.                        - Normal duodenal bulb and second portion of the                         duodenum.     CT abdomen:   1. Findings concerning for disease progression with enlargement of mass along the left nephrectomy bed and enlargement of target and non target peritoneal implants.  2. Pulmonary nodules are unchanged but remain concerning for metastatic disease.  3. Large bilateral adnexal masses which could reflect primary or metastatic neoplasia.  4. Additional left ovarian dermoid.  5. Multiple liver cysts.  6. Cholelithiasis.    Microbiology Results (last 7 days)     ** No results found for the last 168 hours. **        Special  Treatments/Procedures: None  Disposition: Home or Self Care    Medications:  Reconciled Home Medications:   Current Discharge Medication List      CONTINUE these medications which have NOT CHANGED    Details   amLODIPine (NORVASC) 5 MG tablet Take 5 mg by mouth once daily.      atorvastatin (LIPITOR) 20 MG tablet Take 20 mg by mouth once daily.      ferrous sulfate 325 (65 FE) MG EC tablet Take 325 mg by mouth once daily.      losartan (COZAAR) 50 MG tablet Take 50 mg by mouth once daily.      pantoprazole (PROTONIX) 40 MG tablet Take 1 tablet (40 mg total) by mouth once daily.  Qty: 30 tablet, Refills: 11      sucralfate (CARAFATE) 100 mg/mL suspension Take 10 mLs (1 g total) by mouth 4 (four) times daily before meals and nightly.  Qty: 1 Bottle, Refills: 11           Discharge Procedure Orders   Ambulatory referral to Home Health   Referral Priority: Routine Referral Type: Home Health   Referral Reason: Specialty Services Required   Requested Specialty: Home Health Services   Number of Visits Requested: 1     Diet Cardiac     Other restrictions (specify):   Order Comments: PLEASE OBSERVE FALL PRECAUTIONS     Call MD for:   Order Comments: For worsening symptoms, chest pain, shortness of breath, increased abdominal pain, high grade fever, stroke or stroke like symptoms, immediately go to the nearest Emergency Room or call 911 as soon as possible.     Follow-up Information     [facility].    Specialty:  Home Health Services  Contact information:  [address] [address] [address]  [phone]             Carine Baltazar NP In 1 week.    Specialty:  Family Medicine  Contact information:  [address] [place] [address]   [facility] [place] [address] [phone]             Lauren Irene MD In 1 week.    Specialty:  Hematology and Oncology  Contact information:  [address] [facility] [address]  [phone]

## 2018-10-25 NOTE — PLAN OF CARE
10/24/18 1921   PRE-TX-O2-ETCO2   O2 Device (Oxygen Therapy) room air   SpO2 100 %   Pulse Oximetry Type Intermittent

## 2018-10-25 NOTE — PLAN OF CARE
Spoke with Angela who states her [facility] [employer] access is not working; she is requesting we fax the referral manually.   Faxed as request to fax#[phone]....MIKE Houston       10/25/18 2236   Discharge Reassessment   Assessment Type Discharge Planning Reassessment

## 2018-10-26 ENCOUNTER — TELEPHONE (OUTPATIENT)
Dept: HEMATOLOGY/ONCOLOGY | Facility: CLINIC | Age: 73
End: 2018-10-26

## 2018-10-26 NOTE — TELEPHONE ENCOUNTER
----- Message from Alvarado BERRIOS Friever sent at 10/26/2018  9:45 AM CDT -----  Contact: Yahaira/[facility]  Yahaira called in regarding the attached patient.  Yahaira stated patient was discharged yesterday 10/25 and family is telling Yahaira that patient has to have a CBC drawn every 4 days and Yahaira needs an order for this.    Yhaaira's call back number is [phone]

## 2018-10-29 ENCOUNTER — HOSPITAL ENCOUNTER (OUTPATIENT)
Facility: HOSPITAL | Age: 73
Discharge: HOME HEALTH | End: 2018-10-30
Attending: INTERNAL MEDICINE | Admitting: INTERNAL MEDICINE
Payer: MEDICARE

## 2018-10-29 DIAGNOSIS — D64.9 ANEMIA: Primary | ICD-10-CM

## 2018-10-29 LAB
ABO + RH BLD: NORMAL
BLD GP AB SCN CELLS X3 SERPL QL: NORMAL
HGB BLD-MCNC: 5 G/DL

## 2018-10-29 PROCEDURE — G0379 DIRECT REFER HOSPITAL OBSERV: HCPCS

## 2018-10-29 PROCEDURE — 86920 COMPATIBILITY TEST SPIN: CPT | Mod: 59

## 2018-10-29 PROCEDURE — 36415 COLL VENOUS BLD VENIPUNCTURE: CPT

## 2018-10-29 PROCEDURE — 85018 HEMOGLOBIN: CPT | Mod: 91

## 2018-10-29 PROCEDURE — P9016 RBC LEUKOCYTES REDUCED: HCPCS

## 2018-10-29 PROCEDURE — 94760 N-INVAS EAR/PLS OXIMETRY 1: CPT

## 2018-10-29 PROCEDURE — 94761 N-INVAS EAR/PLS OXIMETRY MLT: CPT

## 2018-10-29 PROCEDURE — 86901 BLOOD TYPING SEROLOGIC RH(D): CPT

## 2018-10-29 PROCEDURE — 36430 TRANSFUSION BLD/BLD COMPNT: CPT

## 2018-10-29 PROCEDURE — 25000003 PHARM REV CODE 250: Performed by: INTERNAL MEDICINE

## 2018-10-29 PROCEDURE — G0378 HOSPITAL OBSERVATION PER HR: HCPCS

## 2018-10-29 RX ORDER — AMOXICILLIN 250 MG
1 CAPSULE ORAL DAILY PRN
Status: DISCONTINUED | OUTPATIENT
Start: 2018-10-29 | End: 2018-10-30 | Stop reason: HOSPADM

## 2018-10-29 RX ORDER — SODIUM CHLORIDE 0.9 % (FLUSH) 0.9 %
10 SYRINGE (ML) INJECTION
Status: DISCONTINUED | OUTPATIENT
Start: 2018-10-29 | End: 2018-10-30 | Stop reason: HOSPADM

## 2018-10-29 RX ORDER — LOSARTAN POTASSIUM 25 MG/1
50 TABLET ORAL DAILY
Status: DISCONTINUED | OUTPATIENT
Start: 2018-10-29 | End: 2018-10-30 | Stop reason: HOSPADM

## 2018-10-29 RX ORDER — ATORVASTATIN CALCIUM 10 MG/1
20 TABLET, FILM COATED ORAL DAILY
Status: DISCONTINUED | OUTPATIENT
Start: 2018-10-29 | End: 2018-10-30 | Stop reason: HOSPADM

## 2018-10-29 RX ORDER — ACETAMINOPHEN 325 MG/1
650 TABLET ORAL
Status: DISCONTINUED | OUTPATIENT
Start: 2018-10-29 | End: 2018-10-30 | Stop reason: HOSPADM

## 2018-10-29 RX ORDER — FUROSEMIDE 10 MG/ML
20 INJECTION INTRAMUSCULAR; INTRAVENOUS
Status: DISCONTINUED | OUTPATIENT
Start: 2018-10-29 | End: 2018-10-30 | Stop reason: HOSPADM

## 2018-10-29 RX ORDER — PANTOPRAZOLE SODIUM 40 MG/10ML
40 INJECTION, POWDER, LYOPHILIZED, FOR SOLUTION INTRAVENOUS DAILY
Status: DISCONTINUED | OUTPATIENT
Start: 2018-10-29 | End: 2018-10-30 | Stop reason: HOSPADM

## 2018-10-29 RX ORDER — HYDROCODONE BITARTRATE AND ACETAMINOPHEN 500; 5 MG/1; MG/1
TABLET ORAL ONCE
Status: DISCONTINUED | OUTPATIENT
Start: 2018-10-29 | End: 2018-10-30 | Stop reason: HOSPADM

## 2018-10-29 RX ORDER — SUCRALFATE 1 G/10ML
1 SUSPENSION ORAL
Status: DISCONTINUED | OUTPATIENT
Start: 2018-10-29 | End: 2018-10-30 | Stop reason: HOSPADM

## 2018-10-29 RX ORDER — FERROUS SULFATE 300 MG/5ML
300 LIQUID (ML) ORAL 2 TIMES DAILY
Status: DISCONTINUED | OUTPATIENT
Start: 2018-10-29 | End: 2018-10-30 | Stop reason: HOSPADM

## 2018-10-29 RX ORDER — AMLODIPINE BESYLATE 2.5 MG/1
5 TABLET ORAL DAILY
Status: DISCONTINUED | OUTPATIENT
Start: 2018-10-29 | End: 2018-10-30 | Stop reason: HOSPADM

## 2018-10-29 RX ORDER — DIPHENHYDRAMINE HCL 25 MG
25 CAPSULE ORAL
Status: DISCONTINUED | OUTPATIENT
Start: 2018-10-29 | End: 2018-10-30 | Stop reason: HOSPADM

## 2018-10-29 RX ORDER — ONDANSETRON 2 MG/ML
4 INJECTION INTRAMUSCULAR; INTRAVENOUS EVERY 8 HOURS PRN
Status: DISCONTINUED | OUTPATIENT
Start: 2018-10-29 | End: 2018-10-30 | Stop reason: HOSPADM

## 2018-10-29 RX ORDER — ACETAMINOPHEN 325 MG/1
650 TABLET ORAL EVERY 6 HOURS PRN
Status: DISCONTINUED | OUTPATIENT
Start: 2018-10-29 | End: 2018-10-30 | Stop reason: HOSPADM

## 2018-10-29 RX ADMIN — SUCRALFATE 1 G: 1 SUSPENSION ORAL at 09:10

## 2018-10-29 RX ADMIN — SUCRALFATE 1 G: 1 SUSPENSION ORAL at 05:10

## 2018-10-29 RX ADMIN — MINERAL SUPPLEMENT IRON 300 MG / 5 ML STRENGTH LIQUID 100 PER BOX UNFLAVORED 300 MG: at 09:10

## 2018-10-29 NOTE — PLAN OF CARE
10/29/18 1415   Discharge Assessment   Assessment Type Discharge Planning Assessment   Confirmed/corrected address and phone number on facesheet? Yes   Assessment information obtained from? Patient   Expected Length of Stay (days) 2   Communicated expected length of stay with patient/caregiver yes   Prior to hospitilization cognitive status: Alert/Oriented   Prior to hospitalization functional status: Needs Assistance   Current cognitive status: Alert/Oriented   Current Functional Status: Needs Assistance   Lives With grandchild(samy)   Able to Return to Prior Arrangements yes   Is patient able to care for self after discharge? No   Who are your caregiver(s) and their phone number(s)? Parisa Tobar daughter [phone]; Zoey Pardo granddaughter [phone]    Patient's perception of discharge disposition home health   Readmission Within The Last 30 Days current reason for admission unrelated to previous admission   If yes, most recent facility name: [facility]    Patient currently being followed by outpatient case management? Yes   If yes, name of outpatient case management following: insurance company assigned oupatient case management   Patient currently receives any other outside agency services? Yes   How many hours a day does the patient receive services? 5   Name and contact number of agency or person providing outside services [facility] [employer] cooks, sits 2pm-7pm M-F   Is it the patient/care giver preference to resume care with the current outside agency? Yes   Equipment Currently Used at Home hospital bed;wheelchair;walker, rolling;shower chair   Do you have any problems affording any of your prescribed medications? No   Is the patient taking medications as prescribed? yes   Does the patient have transportation home? Yes   Transportation Available family or friend will provide   Does the patient receive services at the [facility]? No   Discharge Plan A Home with  family   Patient/Family In Agreement With Plan yes   Readmission Questionnaire   At the time of your discharge, did someone talk to you about what your health problems were? Yes   At the time of discharge, did someone talk to you about what to watch out for regarding worsening of your health problem? Yes   At the time of discharge, did someone talk to you about what to do if you experienced worsening of your health problem? Yes   At the time of discharge, did someone talk to you about which medication to take when you left the hospital and which ones to stop taking? Yes   At the time of discharge, did someone talk to you about when and where to follow up with a doctor after you left the hospital? Yes   What do you believe caused you to be sick enough to be re-admitted? blood cound is low   How often do you need to have someone help you when you read instructions, pamphlets, or other written material from your doctor or pharmacy? Never   Do you have problems taking your medications as prescribed? No   Do you have any problems affording any of  your prescribed medications? No   Do you have problems obtaining/receiving your medications? No   Does the patient have transportation to healthcare appointments? Yes   Living Arrangements house   Does the patient have family/friends to help with healtcare needs after discharge? yes   Does your caregiver provide all the help you need? Yes   Are you currently feeling confused? No   Are you currently having problems thinking? No   Are you currently having memory problems? Yes   Have you felt down, depressed, or hopeless? 0   Have you felt little interest or pleasure in doing things? 0   In the last 7 days, my sleep quality was: good   Patient lives at home & her grand daughter & her  live with her. She has home health with [facility] & a caregiver with [facility] stays with her M-F 2-7:00pm. Wants to continue receiving services from both. Preference form signed.  Has appointment for tomorrow with Carine Baltazar. Per patient's daughter that one needs to be changed. Will continue to follow for discharge needs.

## 2018-10-29 NOTE — NURSING
PT TOLERATING BLOOD TRANSFUSION WELL, NO DISTRESS NOTED, FAMILY AT BEDSIDE. CALL LIGHT WITHIN REACH.

## 2018-10-29 NOTE — HOSPITAL COURSE
Admit this patient for transfusion of 4 units packed red blood cells.  Patient is admitted for observation specifically for this transfusion and will be discharged to home when complete and patient is stable    October 30, 2018:  This patient tolerated transfusion well overnight no fever or chills and this morning states feeling well and having no other difficulties.  Other labs showed a white blood cell count 93561 H&H is 9.2 and 27 up from 5 and 19 platelets 274 comprehensive metabolic metabolic panel shows sodium 137 potassium 3.5 BUN 24 creatinine 0.9 no other significant lab values noted. Phosphorus normal at to 3.2

## 2018-10-29 NOTE — SUBJECTIVE & OBJECTIVE
Past Medical History:   Diagnosis Date    Cancer     kidney    Dementia     Encounter for blood transfusion     High cholesterol 2018    Hypertension     No Medication     Renal disorder     Wears dentures     Uppers       Past Surgical History:   Procedure Laterality Date     SECTION      COLONOSCOPY N/A 2018    Procedure: COLONOSCOPY;  Surgeon: Torres Son MD;  Location: [facility];  Service: Endoscopy;  Laterality: N/A;    COLONOSCOPY N/A 2018    Performed by [facility] at [facility] ENDO    CYSTOSCOPY WITH RETROGRADE PYELOGRAM Left 1/15/2016    Performed by Maribell [facility]acon MD at [facility] OR    EGD (ESOPHAGOGASTRODUODENOSCOPY) N/A 2018    Performed by [facility] Son [facility] at [facility] ENDO    ESOPHAGOGASTRODUODENOSCOPY N/A 2018    Procedure: EGD (ESOPHAGOGASTRODUODENOSCOPY);  Surgeon: Torres Son MD;  Location: [facility];  Service: Endoscopy;  Laterality: N/A;    EYE SURGERY      Rt eye Catarac    HYSTERECTOMY      IMAGING, GI TRACT, INTRALUMINAL, VIA CAPSULE N/A 2018    Per[facility] by [facility] at [facility]    INTRALUMINAL GASTROINTESTINAL TRACT IMAGING VIA CAPSULE N/A 2018    Procedure: IMAGING, GI TRACT, INTRALUMINAL, VIA CAPSULE;  Surgeon: Rocco Ross MD;  Location: [facility];  Service: Endoscopy;  Laterality: N/A;    KIDNEY SURGERY Left 2018    Left kidney removed due to cancer    lasix surgery      NEPHRECTOMY-RADICAL   Left 2016    Performed by [facility] at [facility] OR    REPAIR-HERNIA-VENTRAL N/A 2016    Performed by Marco A Randhawa MD at [facility] OR    RESECTION-BOWEL  2016    Performed by Marco A [facility]hawa MD at [facility] OR    URETEROSCOPY  1/15/2016    Performed by [facility]ell [facility]acon MD at [facility] OR       Review of patient's allergies indicates:   Allergen Reactions    Codeine Other (See Comments)     Pt shakes and hallucinates    Pcn [penicillins] Anxiety and  "Other (See Comments)       No current facility-administered medications on file prior to encounter.      Current Outpatient Medications on File Prior to Encounter   Medication Sig    amLODIPine (NORVASC) 5 MG tablet Take 5 mg by mouth once daily.    atorvastatin (LIPITOR) 20 MG tablet Take 20 mg by mouth once daily.    ferrous sulfate 325 (65 FE) MG EC tablet Take 325 mg by mouth once daily.    losartan (COZAAR) 50 MG tablet Take 50 mg by mouth once daily.    pantoprazole (PROTONIX) 40 MG tablet Take 1 tablet (40 mg total) by mouth once daily.    sucralfate (CARAFATE) 100 mg/mL suspension Take 10 mLs (1 g total) by mouth 4 (four) times daily before meals and nightly.     Family History     None        Tobacco Use    Smoking status: Current Every Day Smoker     Packs/day: 1.00     Years: 50.00     Pack years: 50.00    Smokeless tobacco: Never Used    Tobacco comment: Trying to QUIT   Substance and Sexual Activity    Alcohol use: Yes     Alcohol/week: 1.8 oz     Types: 3 Cans of beer per week     Comment: "about three beer a day"    Drug use: No    Sexual activity: No     Review of Systems   Constitutional: Positive for fatigue. Negative for activity change, appetite change and fever.   HENT: Negative for congestion, ear discharge, mouth sores, nosebleeds, rhinorrhea, sinus pressure, sinus pain and tinnitus.    Eyes: Negative.  Negative for pain, redness and itching.   Respiratory: Positive for shortness of breath. Negative for apnea, cough, choking, chest tightness, wheezing and stridor.    Cardiovascular: Positive for palpitations. Negative for chest pain and leg swelling.   Gastrointestinal: Negative for abdominal distention, abdominal pain, anal bleeding, blood in stool, constipation and diarrhea.   Endocrine: Negative.    Genitourinary: Negative for difficulty urinating, flank pain, frequency and urgency.   Musculoskeletal: Positive for arthralgias. Negative for back pain, gait problem and myalgias. "   Skin: Negative for color change and pallor.   Allergic/Immunologic: Negative.    Neurological: Positive for light-headedness. Negative for dizziness, facial asymmetry, weakness and headaches.   Hematological: Negative for adenopathy. Does not bruise/bleed easily.     Objective:     Vital Signs (Most Recent):  Pulse: 77 (10/29/18 1503)  BP: (!) 118/58 (10/29/18 1310)  SpO2: 98 % (10/29/18 1503) Vital Signs (24h Range):  Pulse:  [77-89] 77  SpO2:  [98 %-100 %] 98 %  BP: (118)/(58) 118/58        There is no height or weight on file to calculate BMI.    Physical Exam   Constitutional: She is oriented to person, place, and time. She appears well-developed and well-nourished.   HENT:   Head: Normocephalic and atraumatic.   Eyes: EOM are normal. Pupils are equal, round, and reactive to light.   Neck: Normal range of motion. Neck supple. No tracheal deviation present. No thyromegaly present.   Pulmonary/Chest: Effort normal and breath sounds normal.   Abdominal: Soft. Bowel sounds are normal. She exhibits distension. There is tenderness. There is guarding. There is no rebound.   Musculoskeletal: Normal range of motion.   Lymphadenopathy:     She has no cervical adenopathy.   Neurological: She is alert and oriented to person, place, and time.   Skin: Skin is warm and dry. Capillary refill takes less than 2 seconds.   Psychiatric: She has a normal mood and affect. Her behavior is normal. Judgment and thought content normal.         CRANIAL NERVES     CN III, IV, VI   Pupils are equal, round, and reactive to light.  Extraocular motions are normal.        Significant Labs:   Recent Lab Results       10/29/18  1525   10/29/18  1144        Immature Granulocytes   0.9     Immature Grans (Abs)   0.14  Comment:  Mild elevation in immature granulocytes is non specific and   can be seen in a variety of conditions including stress response,   acute inflammation, trauma and pregnancy. Correlation with other   laboratory and clinical  findings is essential.       Unit Blood Type Code 6200[P]        6200[P]        6200[P]        6200[P]       Unit Expiration [id number][P]        [id number][P]        [id number][P]        [id number][P]       Unit Blood Type A POS[P]        A POS[P]        A POS[P]        A POS[P]       Baso #   0.09     Basophil%   0.6     CODING SYSTEM AMTT876[P]        TFYT839[P]        GIVX548[P]        PEIG190[P]       Differential Method   Automated     DISPENSE STATUS ISSUED[P]        CROSSMATCHED[P]        CROSSMATCHED[P]        CROSSMATCHED[P]       Eos #   0.2     Eosinophil%   1.2     Gran # (ANC)   11.2     Gran%   69.5     Group & Rh A POS       Hematocrit   19.4  Comment:  hgb 6.0 nad hct19.1 critical result(s) called and verbal readback   obtained from Dr. Mathews , 10/29/2018 12:24       Hemoglobin 5.0 6.0     Indirect Michael GEL NEG       Lymph #   3.5     Lymph%   21.9     MCH   28.4     MCHC   30.9     MCV   92     Mono #   1.0     Mono%   5.9     MPV   9.6     nRBC   0     Platelets   377     PRODUCT CODE H0471E18[P]        E4524D21[P]        O8804K29[P]        Z4137V78[P]       RBC   2.11     RDW   15.9     UNIT NUMBER [id number][P]        [id number][P]        [id number][P]        [id number][P]       WBC   16.15         All pertinent labs within the past 24 hours have been reviewed.    Significant Imaging: I have reviewed and interpreted all pertinent imaging results/findings within the past 24 hours.

## 2018-10-29 NOTE — PLAN OF CARE
10/29/18 1657   Discharge Reassessment   Assessment Type Discharge Planning Assessment   Provided patient/caregiver education on the expected discharge date and the discharge plan Yes   Do you have any problems affording any of your prescribed medications? No   Discharge Plan A Home Health   Patient choice form signed by patient/caregiver Yes    received call from [facility] suggesting that patient would benefit more from hospice. Spoke to patient's daughter Parisa who stated the doctor had asked if they would want hospice & they have decided they do not want hospice at this time but to continue with home health.  notified.

## 2018-10-29 NOTE — PLAN OF CARE
10/29/18 1434   HOWELL Message   Medicare Outpatient and Observation Notification regarding financial responsibility Given to patient/caregiver;Explained to patient/caregiver;Signed/date by patient/caregiver   Date HOWELL was signed 10/29/18   Time HOWELL was signed [id number]

## 2018-10-29 NOTE — HPI
This is a 73-year-old female that came in today for an outpatient CBC which showed a hemoglobin of 6 and hematocrit of 19.  The patient has a renal cell carcinoma and was recently admitted for colonoscopy.  She has been transfusion dependent over the last couple of weeks.  Which means that she has gotten 4 units of blood every 3 4-5 days..  The laboratory contacted me as soon as her results came out today from the outpatient lab and advised of her low hemoglobin hematocrit.  I immediately called this patient and had them come to the hospital for direct admission for transfusion of 4 units packed red blood cells.  The patient is comfortable and denies any shortness of breath or chest pain but states that she is extremely fatigued and shortness of breath on exertion.  Patient has no other significant complaints and is admitted specifically for this transfusion.

## 2018-10-29 NOTE — H&P
[facility] Medicine  History & Physical    Patient Name: Oriana Tobar  MRN: [id number]  Admission Date: 10/29/2018  Attending Physician: Tito Mathews MD  Primary Care Provider: Carine Baltazar NP         Patient information was obtained from patient and ER records.     Subjective:     Principal Problem:<principal problem not specified>    Chief Complaint:   Chief Complaint   Patient presents with    Anemia        HPI: This is a 73-year-old female that came in today for an outpatient CBC which showed a hemoglobin of 6 and hematocrit of 19.  The patient has a renal cell carcinoma and was recently admitted for colonoscopy.  She has been transfusion dependent over the last couple of weeks.  Which means that she has gotten 4 units of blood every 3 4-5 days..  The laboratory contacted me as soon as her results came out today from the outpatient lab and advised of her low hemoglobin hematocrit.  I immediately called this patient and had them come to the hospital for direct admission for transfusion of 4 units packed red blood cells.  The patient is comfortable and denies any shortness of breath or chest pain but states that she is extremely fatigued and shortness of breath on exertion.  Patient has no other significant complaints and is admitted specifically for this transfusion.    Past Medical History:   Diagnosis Date    Cancer     kidney    Dementia     Encounter for blood transfusion     High cholesterol 2018    Hypertension     No Medication     Renal disorder     Wears dentures     Uppers       Past Surgical History:   Procedure Laterality Date     SECTION      COLONOSCOPY N/A 2018    Procedure: COLONOSCOPY;  Surgeon: Torres Son MD;  Location: [facility];  Service: Endoscopy;  Laterality: N/A;    COLONOSCOPY N/A 2018    [facility] END[facility]    CYSTOSCOPY WITH RETROGRADE PYELOGRAM  Left 1/15/2016    Per[facility] by [facility] at [facility] OR    EGD (ESOPHAGOGASTRODUODENOSCOPY) N/A 6/25/2018    Performed by [facility] at [facility] ENDO    ESOPHAGOGASTRODUODENOSCOPY N/A 6/25/2018    Procedure: EGD (ESOPHAGOGASTRODUODENOSCOPY);  Surgeon: Torres Son MD;  Location: [facility] ENDO;  Service: Endoscopy;  Laterality: N/A;    EYE SURGERY      Rt eye Catarac    HYSTERECTOMY      IMAGING, GI TRACT, INTRALUMINAL, VIA CAPSULE N/A 8/6/2018    Performed by Rocco Ross MD at [facility] ENDO    INTRALUMINAL GASTROINTESTINAL TRACT IMAGING VIA CAPSULE N/A 8/6/2018    Procedure: IMAGING, GI TRACT, INTRALUMINAL, VIA CAPSULE;  Surgeon: Rocco Ross MD;  Location: [facility] ENDO;  Service: Endoscopy;  Laterality: N/A;    KIDNEY SURGERY Left 06/23/2018    Left kidney removed due to cancer    lasix surgery      NEPHRECTOMY-RADICAL   Left 1/27/2016    Performed by [facility] at [facility] OR    REPAIR-HERNIA-VENTRAL N/A 1/27/2016    Performed by Marco A Randhawa MD at [facility] OR    RESECTION-BOWEL  1/27/2016    Performed by Marco A [facility]hawa MD at [facility] OR    URETEROSCOPY  1/15/2016    Performed by [facility]ell Chacon MD at [facility] OR       Review of patient's allergies indicates:   Allergen Reactions    Codeine Other (See Comments)     Pt shakes and hallucinates    Pcn [penicillins] Anxiety and Other (See Comments)       No current facility-administered medications on file prior to encounter.      Current Outpatient Medications on File Prior to Encounter   Medication Sig    amLODIPine (NORVASC) 5 MG tablet Take 5 mg by mouth once daily.    atorvastatin (LIPITOR) 20 MG tablet Take 20 mg by mouth once daily.    ferrous sulfate 325 (65 FE) MG EC tablet Take 325 mg by mouth once daily.    losartan (COZAAR) 50 MG tablet Take 50 mg by mouth once daily.    pantoprazole (PROTONIX) 40 MG tablet Take 1 tablet (40 mg total) by mouth once daily.    sucralfate (CARAFATE) 100 mg/mL  "suspension Take 10 mLs (1 g total) by mouth 4 (four) times daily before meals and nightly.     Family History     None        Tobacco Use    Smoking status: Current Every Day Smoker     Packs/day: 1.00     Years: 50.00     Pack years: 50.00    Smokeless tobacco: Never Used    Tobacco comment: Trying to QUIT   Substance and Sexual Activity    Alcohol use: Yes     Alcohol/week: 1.8 oz     Types: 3 Cans of beer per week     Comment: "about three beer a day"    Drug use: No    Sexual activity: No     Review of Systems   Constitutional: Positive for fatigue. Negative for activity change, appetite change and fever.   HENT: Negative for congestion, ear discharge, mouth sores, nosebleeds, rhinorrhea, sinus pressure, sinus pain and tinnitus.    Eyes: Negative.  Negative for pain, redness and itching.   Respiratory: Positive for shortness of breath. Negative for apnea, cough, choking, chest tightness, wheezing and stridor.    Cardiovascular: Positive for palpitations. Negative for chest pain and leg swelling.   Gastrointestinal: Negative for abdominal distention, abdominal pain, anal bleeding, blood in stool, constipation and diarrhea.   Endocrine: Negative.    Genitourinary: Negative for difficulty urinating, flank pain, frequency and urgency.   Musculoskeletal: Positive for arthralgias. Negative for back pain, gait problem and myalgias.   Skin: Negative for color change and pallor.   Allergic/Immunologic: Negative.    Neurological: Positive for light-headedness. Negative for dizziness, facial asymmetry, weakness and headaches.   Hematological: Negative for adenopathy. Does not bruise/bleed easily.     Objective:     Vital Signs (Most Recent):  Pulse: 77 (10/29/18 1503)  BP: (!) 118/58 (10/29/18 1310)  SpO2: 98 % (10/29/18 1503) Vital Signs (24h Range):  Pulse:  [77-89] 77  SpO2:  [98 %-100 %] 98 %  BP: (118)/(58) 118/58        There is no height or weight on file to calculate BMI.    Physical Exam   Constitutional: " She is oriented to person, place, and time. She appears well-developed and well-nourished.   HENT:   Head: Normocephalic and atraumatic.   Eyes: EOM are normal. Pupils are equal, round, and reactive to light.   Neck: Normal range of motion. Neck supple. No tracheal deviation present. No thyromegaly present.   Pulmonary/Chest: Effort normal and breath sounds normal.   Abdominal: Soft. Bowel sounds are normal. She exhibits distension. There is tenderness. There is guarding. There is no rebound.   Musculoskeletal: Normal range of motion.   Lymphadenopathy:     She has no cervical adenopathy.   Neurological: She is alert and oriented to person, place, and time.   Skin: Skin is warm and dry. Capillary refill takes less than 2 seconds.   Psychiatric: She has a normal mood and affect. Her behavior is normal. Judgment and thought content normal.         CRANIAL NERVES     CN III, IV, VI   Pupils are equal, round, and reactive to light.  Extraocular motions are normal.        Significant Labs:   Recent Lab Results       10/29/18  1525   10/29/18  1144        Immature Granulocytes   0.9     Immature Grans (Abs)   0.14  Comment:  Mild elevation in immature granulocytes is non specific and   can be seen in a variety of conditions including stress response,   acute inflammation, trauma and pregnancy. Correlation with other   laboratory and clinical findings is essential.       Unit Blood Type Code 6200[P]        6200[P]        6200[P]        6200[P]       Unit Expiration [id number][P]        [id number][P]        [id number][P]        [id number][P]       Unit Blood Type A POS[P]        A POS[P]        A POS[P]        A POS[P]       Baso #   0.09     Basophil%   0.6     CODING SYSTEM QIEP950[P]        CNIN416[P]        KKKT556[P]        ANXV803[P]       Differential Method   Automated     DISPENSE STATUS ISSUED[P]        CROSSMATCHED[P]        CROSSMATCHED[P]        CROSSMATCHED[P]       Eos #   0.2     Eosinophil%   1.2      Gran # (ANC)   11.2     Gran%   69.5     Group & Rh A POS       Hematocrit   19.4  Comment:  hgb 6.0 nad hct19.1 critical result(s) called and verbal readback   obtained from Dr. Mathews , 10/29/2018 12:24       Hemoglobin 5.0 6.0     Indirect Michael GEL NEG       Lymph #   3.5     Lymph%   21.9     MCH   28.4     MCHC   30.9     MCV   92     Mono #   1.0     Mono%   5.9     MPV   9.6     nRBC   0     Platelets   377     PRODUCT CODE T4231U71[P]        S5824Y15[P]        S2622J13[P]        E9377A60[P]       RBC   2.11     RDW   15.9     UNIT NUMBER [id number][P]        [id number][P]        [id number][P]        [id number][P]       WBC   16.15         All pertinent labs within the past 24 hours have been reviewed.    Significant Imaging: I have reviewed and interpreted all pertinent imaging results/findings within the past 24 hours.    Assessment/Plan:     Anemia    1.  Transfuse 4 units packed red blood cells  2.  Premedicate prior to transfusion with Benadryl and Tylenol  3.  Treat otherwise symptomatically during transfusion  4.  When transfusion complete in the a.m. will discharge after rechecking her CBC prior to discharge         VTE Risk Mitigation (From admission, onward)        Ordered     Place RORY hose  Until discontinued      10/29/18 1331             Tito Mathews MD  Department of Hospital Medicine   [facility]

## 2018-10-29 NOTE — ASSESSMENT & PLAN NOTE
1.  Transfuse 4 units packed red blood cells  2.  Premedicate prior to transfusion with Benadryl and Tylenol  3.  Treat otherwise symptomatically during transfusion  4.  When transfusion complete in the a.m. will discharge after rechecking her CBC prior to discharge

## 2018-10-30 VITALS
HEART RATE: 77 BPM | DIASTOLIC BLOOD PRESSURE: 61 MMHG | SYSTOLIC BLOOD PRESSURE: 129 MMHG | WEIGHT: 162.63 LBS | TEMPERATURE: 98 F | RESPIRATION RATE: 16 BRPM | HEIGHT: 71 IN | OXYGEN SATURATION: 99 % | BODY MASS INDEX: 22.77 KG/M2

## 2018-10-30 LAB
ALBUMIN SERPL BCP-MCNC: 2.7 G/DL
ALP SERPL-CCNC: 34 U/L
ALT SERPL W/O P-5'-P-CCNC: 8 U/L
ANION GAP SERPL CALC-SCNC: 3 MMOL/L
AST SERPL-CCNC: 13 U/L
BASOPHILS # BLD AUTO: 0.1 K/UL
BASOPHILS NFR BLD: 0.9 %
BILIRUB SERPL-MCNC: 0.8 MG/DL
BLD PROD TYP BPU: NORMAL
BLOOD UNIT EXPIRATION DATE: NORMAL
BLOOD UNIT TYPE CODE: 6200
BLOOD UNIT TYPE: NORMAL
BUN SERPL-MCNC: 24 MG/DL
CALCIUM SERPL-MCNC: 8.2 MG/DL
CHLORIDE SERPL-SCNC: 111 MMOL/L
CO2 SERPL-SCNC: 23 MMOL/L
CODING SYSTEM: NORMAL
CREAT SERPL-MCNC: 0.9 MG/DL
DIFFERENTIAL METHOD: ABNORMAL
DISPENSE STATUS: NORMAL
EOSINOPHIL # BLD AUTO: 0.2 K/UL
EOSINOPHIL NFR BLD: 2 %
ERYTHROCYTE [DISTWIDTH] IN BLOOD BY AUTOMATED COUNT: 15.6 %
EST. GFR  (AFRICAN AMERICAN): >60 ML/MIN/1.73 M^2
EST. GFR  (NON AFRICAN AMERICAN): >60 ML/MIN/1.73 M^2
GLUCOSE SERPL-MCNC: 109 MG/DL
HCT VFR BLD AUTO: 27.7 %
HGB BLD-MCNC: 9.2 G/DL
IMM GRANULOCYTES # BLD AUTO: 0.06 K/UL
IMM GRANULOCYTES NFR BLD AUTO: 0.5 %
LYMPHOCYTES # BLD AUTO: 2.5 K/UL
LYMPHOCYTES NFR BLD: 21.8 %
MCH RBC QN AUTO: 28.2 PG
MCHC RBC AUTO-ENTMCNC: 33.2 G/DL
MCV RBC AUTO: 85 FL
MONOCYTES # BLD AUTO: 0.8 K/UL
MONOCYTES NFR BLD: 7.3 %
NEUTROPHILS # BLD AUTO: 7.8 K/UL
NEUTROPHILS NFR BLD: 67.5 %
NRBC BLD-RTO: 0 /100 WBC
NUM UNITS TRANS PACKED RBC: NORMAL
PHOSPHATE SERPL-MCNC: 3.2 MG/DL
PLATELET # BLD AUTO: 274 K/UL
PMV BLD AUTO: 9.6 FL
POTASSIUM SERPL-SCNC: 3.5 MMOL/L
PROT SERPL-MCNC: 5.4 G/DL
RBC # BLD AUTO: 3.26 M/UL
SODIUM SERPL-SCNC: 137 MMOL/L
WBC # BLD AUTO: 11.49 K/UL

## 2018-10-30 PROCEDURE — 84100 ASSAY OF PHOSPHORUS: CPT

## 2018-10-30 PROCEDURE — 90670 PCV13 VACCINE IM: CPT | Performed by: INTERNAL MEDICINE

## 2018-10-30 PROCEDURE — G0378 HOSPITAL OBSERVATION PER HR: HCPCS

## 2018-10-30 PROCEDURE — 36430 TRANSFUSION BLD/BLD COMPNT: CPT

## 2018-10-30 PROCEDURE — 25000003 PHARM REV CODE 250: Performed by: INTERNAL MEDICINE

## 2018-10-30 PROCEDURE — 80053 COMPREHEN METABOLIC PANEL: CPT

## 2018-10-30 PROCEDURE — 63600175 PHARM REV CODE 636 W HCPCS: Performed by: INTERNAL MEDICINE

## 2018-10-30 PROCEDURE — 85025 COMPLETE CBC W/AUTO DIFF WBC: CPT

## 2018-10-30 PROCEDURE — P9016 RBC LEUKOCYTES REDUCED: HCPCS

## 2018-10-30 PROCEDURE — 90471 IMMUNIZATION ADMIN: CPT | Performed by: INTERNAL MEDICINE

## 2018-10-30 PROCEDURE — G0009 ADMIN PNEUMOCOCCAL VACCINE: HCPCS | Performed by: INTERNAL MEDICINE

## 2018-10-30 RX ADMIN — SUCRALFATE 1 G: 1 SUSPENSION ORAL at 05:10

## 2018-10-30 RX ADMIN — ATORVASTATIN CALCIUM 20 MG: 10 TABLET, FILM COATED ORAL at 08:10

## 2018-10-30 RX ADMIN — MINERAL SUPPLEMENT IRON 300 MG / 5 ML STRENGTH LIQUID 100 PER BOX UNFLAVORED 300 MG: at 08:10

## 2018-10-30 RX ADMIN — LOSARTAN POTASSIUM 50 MG: 25 TABLET ORAL at 08:10

## 2018-10-30 RX ADMIN — PNEUMOCOCCAL 13-VALENT CONJUGATE VACCINE 0.5 ML: 2.2; 2.2; 2.2; 2.2; 2.2; 4.4; 2.2; 2.2; 2.2; 2.2; 2.2; 2.2; 2.2 INJECTION, SUSPENSION INTRAMUSCULAR at 10:10

## 2018-10-30 RX ADMIN — AMLODIPINE BESYLATE 5 MG: 2.5 TABLET ORAL at 08:10

## 2018-10-30 NOTE — PLAN OF CARE
Problem: Fall Risk (Adult)  Goal: Identify Related Risk Factors and Signs and Symptoms  Related risk factors and signs and symptoms are identified upon initiation of Human Response Clinical Practice Guideline (CPG)  Outcome: Ongoing (interventions implemented as appropriate)   10/30/18 0604   Fall Risk   Related Risk Factors (Fall Risk) sleep pattern alteration   Signs and Symptoms (Fall Risk) presence of risk factors

## 2018-10-30 NOTE — PLAN OF CARE
Problem: Patient Care Overview  Goal: Interdisciplinary Rounds/Family Conf   10/30/18 0604   Interdisciplinary Rounds/Family Conf   Participants nursing

## 2018-10-30 NOTE — NURSING
Report rec'd from MARU Moser RN in stable condition.  Admitted as OP for low H&H and to received 4 units of blood and to be discharged.  VSS & WNL, resp easy/even/unlabored, mucous membranes pink.  Mild systemic pain reported, denies need for pain med.  IV to left AC with first unit of blood running at 75ml/hr at this time.  SR up x 2, brakes on, CB in reach, in NAD.  Will continue to monitor for s/s of abnorms.  Family members x 2 at bedside with good support at this time.

## 2018-10-30 NOTE — ASSESSMENT & PLAN NOTE
1.  Transfuse 4 units packed red blood cells  2.  Premedicate prior to transfusion with Benadryl and Tylenol  3.  Treat otherwise symptomatically during transfusion  4.  When transfusion complete in the a.m. will discharge after rechecking her CBC prior to discharge    October 30, 2018  1.  Discharged to home  2.  Follow up with oncologist on Tuesday as scheduled  3.  Continue same home medications  4.  Continue home health which was ordered prior to her discharge

## 2018-10-30 NOTE — NURSING
Discharge orders received and reviewed with patient and family. Pt/family verbalized understanding. T#7 dc'ed and returned to monitor technician. Pt belongings with patient. Case management verbalized discharge approval. Pt transported off unit to pt POV via WC.

## 2018-10-30 NOTE — PLAN OF CARE
Problem: Patient Care Overview  Goal: Plan of Care Review  Outcome: Ongoing (interventions implemented as appropriate)   10/30/18 5706   Coping/Psychosocial   Plan Of Care Reviewed With patient

## 2018-10-30 NOTE — PLAN OF CARE
10/30/18 1202   Final Note   Assessment Type Final Discharge Note   Spoke to daughter about keeping [facility]. States doesn't want to stay with them as they didn't show compassion at the end. Wants to go with [facility].

## 2018-10-30 NOTE — PLAN OF CARE
10/30/18 1200   Final Note   Assessment Type Final Discharge Note   Anticipated Discharge Disposition Home-Health   What phone number can be called within the next 1-3 days to see how you are doing after discharge? [phone]   Hospital Follow Up  Appt(s) scheduled? Yes   Discharge plans and expectations educations in teach back method with documentation complete? Yes   Verbal & written follow up appointment given to patient & family. Demonstrated understanding by verbal feedback. Denies any other discharge needs at this time.

## 2018-10-30 NOTE — PLAN OF CARE
Spoke to patient & daughter about home health as [facility] called  & said they would not take her back as home health patient. Patient states does not want to be in hospice. Patient's daughter said they are not ready for hospice for her. They will see oncologist on Tuesday & will discuss treatment options. Preference form signed.

## 2018-10-30 NOTE — DISCHARGE SUMMARY
[facility] Surg  Hospital Medicine  Discharge Summary      Patient Name: Oriana Tobar  MRN: [id number]  Admission Date: 10/29/2018  Hospital Length of Stay: 0 days  Discharge Date and Time:  10/30/2018 11:01 AM  Attending Physician: Tito Mathews MD   Discharging Provider: Tito Mathews MD  Primary Care Provider: Carine Baltazar NP      HPI:   This is a 73-year-old female that came in today for an outpatient CBC which showed a hemoglobin of 6 and hematocrit of 19.  The patient has a renal cell carcinoma and was recently admitted for colonoscopy.  She has been transfusion dependent over the last couple of weeks.  Which means that she has gotten 4 units of blood every 3 4-5 days..  The laboratory contacted me as soon as her results came out today from the outpatient lab and advised of her low hemoglobin hematocrit.  I immediately called this patient and had them come to the hospital for direct admission for transfusion of 4 units packed red blood cells.  The patient is comfortable and denies any shortness of breath or chest pain but states that she is extremely fatigued and shortness of breath on exertion.  Patient has no other significant complaints and is admitted specifically for this transfusion.    * No surgery found *      Hospital Course:   Admit this patient for transfusion of 4 units packed red blood cells.  Patient is admitted for observation specifically for this transfusion and will be discharged to home when complete and patient is stable    October 30, 2018:  This patient tolerated transfusion well overnight no fever or chills and this morning states feeling well and having no other difficulties.  Other labs showed a white blood cell count 43996 H&H is 9.2 and 27 up from 5 and 19 platelets 274 comprehensive metabolic metabolic panel shows sodium 137 potassium 3.5 BUN 24 creatinine 0.9 no other significant lab values noted. Phosphorus normal at to 3.2      Consults:   Consults (From admission, onward)        Status Ordering Provider     IP consult to case management  Once     Provider:  (Not yet assigned)    Acknowledged CARLOS AMBRIZ          * Anemia    1.  Transfuse 4 units packed red blood cells  2.  Premedicate prior to transfusion with Benadryl and Tylenol  3.  Treat otherwise symptomatically during transfusion  4.  When transfusion complete in the a.m. will discharge after rechecking her CBC prior to discharge    October 30, 2018  1.  Discharged to home  2.  Follow up with oncologist on Tuesday as scheduled  3.  Continue same home medications  4.  Continue home health which was ordered prior to her discharge         Final Active Diagnoses:    Diagnosis Date Noted POA    PRINCIPAL PROBLEM:  Anemia [D64.9] 10/22/2018 Yes      Problems Resolved During this Admission:       Discharged Condition: good    Disposition: Home-Health Care [facility]    Follow Up:  Follow-up Information     Carine Baltazar NP. Go on 11/15/2018.    Specialty:  Family Medicine  Why:  appointment time: 7:20am  Contact information:  [address] [place] [address] [address]  [place] [address] [phone]                 Patient Instructions:      CBC auto differential   Standing Status: Future Standing Exp. Date: 12/29/19     Skilled Nurse to evaluate patient and develop plan of care to be approved by MD     Skilled Nurse to complete comprehensive assessment including vital signs   Order Comments: Instruct on disease process and signs and symptoms of complications to report to MD. Review/verify medication list sent home with the patient at time of discharge and instruct patient/caregiver as needed. Frequency may be adjusted depending on start of care date.     Notify Physician     Order Specific Question Answer Comments   Systolic Blood Pressure SBP less than or equal to 90    Systolic Blood Pressure SBP greater than or equal to 160    Diastolic Blood Pressure DBP less than or equal to 50    Diastolic  Blood Pressure DBP greater than or equal to 90    Heart rate greater than 120    Heart rate less than 50    Temperature (F) greater than 101      Prepare RBC 4 Units; Emergent   Standing Status: Future Standing Exp. Date: 11/29/19     Order Specific Question Answer Comments   Number of Units 4 Units    Reason to prepare multiple units (e.g. Emergency): Emergent    Purpose of Preparation: Pending Transfusion        Significant Diagnostic Studies: Labs: All labs within the past 24 hours have been reviewed    Pending Diagnostic Studies:     Procedure Component Value Units Date/Time    Hemoglobin [[id number]]     Order Status:  Sent Lab Status:  No result     Specimen:  Blood          Medications:  Reconciled Home Medications:      Medication List      CONTINUE taking these medications    amLODIPine 5 MG tablet  Commonly known as:  NORVASC  Take 5 mg by mouth once daily.     atorvastatin 20 MG tablet  Commonly known as:  LIPITOR  Take 20 mg by mouth once daily.     ferrous sulfate 325 (65 FE) MG EC tablet  Take 325 mg by mouth once daily.     losartan 50 MG tablet  Commonly known as:  COZAAR  Take 50 mg by mouth once daily.     pantoprazole 40 MG tablet  Commonly known as:  PROTONIX  Take 1 tablet (40 mg total) by mouth once daily.     sucralfate 100 mg/mL suspension  Commonly known as:  CARAFATE  Take 10 mLs (1 g total) by mouth 4 (four) times daily before meals and nightly.            Indwelling Lines/Drains at time of discharge:   Lines/Drains/Airways          None          Time spent on the discharge of patient: 30 minutes  Patient was seen and examined on the date of discharge and determined to be suitable for discharge.         Tito Mathews MD  Department of Hospital Medicine  [facility] S[facility]

## 2018-11-01 ENCOUNTER — TELEPHONE (OUTPATIENT)
Dept: HEMATOLOGY/ONCOLOGY | Facility: CLINIC | Age: 73
End: 2018-11-01

## 2018-11-01 NOTE — TELEPHONE ENCOUNTER
Spoke with maribell about any labs before F/U appt. She asked if there were to be any drawn before appt  Informed her that the MD will have the labs from the hospital discharge. From there the MD will choose a treatment plan which includes labs draws. Maribell agreed and thanked me for the call.

## 2018-11-01 NOTE — TELEPHONE ENCOUNTER
----- Message from Rika Kim sent at 11/1/2018  3:28 PM CDT -----  Contact: meliza with [employer] [phone]            Name of Who is Calling: meliza      What is the request in detail: needs orders for labs. Call meliza      Can the clinic reply by [facility]: no      What Number to Call Back [facility]: meliza with [employer] [phone]

## 2018-11-03 ENCOUNTER — HOSPITAL ENCOUNTER (OUTPATIENT)
Facility: HOSPITAL | Age: 73
Discharge: HOME | End: 2018-11-04
Attending: FAMILY MEDICINE | Admitting: INTERNAL MEDICINE
Payer: MEDICARE

## 2018-11-03 DIAGNOSIS — D50.0 IRON DEFICIENCY ANEMIA DUE TO CHRONIC BLOOD LOSS: Primary | ICD-10-CM

## 2018-11-03 DIAGNOSIS — C64.9 RENAL CELL CARCINOMA, UNSPECIFIED LATERALITY: ICD-10-CM

## 2018-11-03 LAB
ABO + RH BLD: NORMAL
ALBUMIN SERPL BCP-MCNC: 2.5 G/DL
ALP SERPL-CCNC: 33 U/L
ALT SERPL W/O P-5'-P-CCNC: 9 U/L
ANION GAP SERPL CALC-SCNC: 7 MMOL/L
AST SERPL-CCNC: 14 U/L
BACTERIA #/AREA URNS HPF: NORMAL /HPF
BASOPHILS # BLD AUTO: 0.03 K/UL
BASOPHILS NFR BLD: 0.2 %
BILIRUB SERPL-MCNC: 0.2 MG/DL
BILIRUB UR QL STRIP: NEGATIVE
BLD GP AB SCN CELLS X3 SERPL QL: NORMAL
BUN SERPL-MCNC: 36 MG/DL
CALCIUM SERPL-MCNC: 8.5 MG/DL
CHLORIDE SERPL-SCNC: 110 MMOL/L
CLARITY UR: CLEAR
CO2 SERPL-SCNC: 20 MMOL/L
COLOR UR: YELLOW
CREAT SERPL-MCNC: 1.1 MG/DL
DIFFERENTIAL METHOD: ABNORMAL
EOSINOPHIL # BLD AUTO: 0.3 K/UL
EOSINOPHIL NFR BLD: 1.6 %
ERYTHROCYTE [DISTWIDTH] IN BLOOD BY AUTOMATED COUNT: 15.3 %
EST. GFR  (AFRICAN AMERICAN): 57.6 ML/MIN/1.73 M^2
EST. GFR  (NON AFRICAN AMERICAN): 49.9 ML/MIN/1.73 M^2
GLUCOSE SERPL-MCNC: 145 MG/DL
GLUCOSE UR QL STRIP: NEGATIVE
HCT VFR BLD AUTO: 14.1 %
HGB BLD-MCNC: 4.3 G/DL
HGB UR QL STRIP: ABNORMAL
IMM GRANULOCYTES # BLD AUTO: 0.1 K/UL
IMM GRANULOCYTES NFR BLD AUTO: 0.6 %
KETONES UR QL STRIP: NEGATIVE
LEUKOCYTE ESTERASE UR QL STRIP: ABNORMAL
LYMPHOCYTES # BLD AUTO: 2.7 K/UL
LYMPHOCYTES NFR BLD: 16.3 %
MCH RBC QN AUTO: 27.4 PG
MCHC RBC AUTO-ENTMCNC: 30.5 G/DL
MCV RBC AUTO: 90 FL
MICROSCOPIC COMMENT: NORMAL
MONOCYTES # BLD AUTO: 1 K/UL
MONOCYTES NFR BLD: 6 %
NEUTROPHILS # BLD AUTO: 12.6 K/UL
NEUTROPHILS NFR BLD: 75.3 %
NITRITE UR QL STRIP: NEGATIVE
NRBC BLD-RTO: 0 /100 WBC
PH UR STRIP: 6 [PH] (ref 5–8)
PLATELET # BLD AUTO: 353 K/UL
PMV BLD AUTO: 9.5 FL
POTASSIUM SERPL-SCNC: 3 MMOL/L
PROT SERPL-MCNC: 5.4 G/DL
PROT UR QL STRIP: NEGATIVE
RBC # BLD AUTO: 1.57 M/UL
RBC #/AREA URNS HPF: 2 /HPF (ref 0–4)
SODIUM SERPL-SCNC: 137 MMOL/L
SP GR UR STRIP: <=1.005 (ref 1–1.03)
URN SPEC COLLECT METH UR: ABNORMAL
UROBILINOGEN UR STRIP-ACNC: NEGATIVE EU/DL
WBC # BLD AUTO: 16.72 K/UL
WBC #/AREA URNS HPF: 4 /HPF (ref 0–5)

## 2018-11-03 PROCEDURE — 86850 RBC ANTIBODY SCREEN: CPT

## 2018-11-03 PROCEDURE — 87040 BLOOD CULTURE FOR BACTERIA: CPT

## 2018-11-03 PROCEDURE — 25000003 PHARM REV CODE 250: Performed by: FAMILY MEDICINE

## 2018-11-03 PROCEDURE — 25000003 PHARM REV CODE 250

## 2018-11-03 PROCEDURE — 80053 COMPREHEN METABOLIC PANEL: CPT

## 2018-11-03 PROCEDURE — 81000 URINALYSIS NONAUTO W/SCOPE: CPT

## 2018-11-03 PROCEDURE — P9016 RBC LEUKOCYTES REDUCED: HCPCS

## 2018-11-03 PROCEDURE — G0378 HOSPITAL OBSERVATION PER HR: HCPCS

## 2018-11-03 PROCEDURE — 36430 TRANSFUSION BLD/BLD COMPNT: CPT

## 2018-11-03 PROCEDURE — 36415 COLL VENOUS BLD VENIPUNCTURE: CPT

## 2018-11-03 PROCEDURE — 99285 EMERGENCY DEPT VISIT HI MDM: CPT | Mod: 25

## 2018-11-03 PROCEDURE — 86920 COMPATIBILITY TEST SPIN: CPT

## 2018-11-03 PROCEDURE — 85025 COMPLETE CBC W/AUTO DIFF WBC: CPT

## 2018-11-03 RX ORDER — DEXTROSE MONOHYDRATE, SODIUM CHLORIDE, AND POTASSIUM CHLORIDE 50; 1.49; 4.5 G/1000ML; G/1000ML; G/1000ML
INJECTION, SOLUTION INTRAVENOUS CONTINUOUS
Status: DISCONTINUED | OUTPATIENT
Start: 2018-11-03 | End: 2018-11-04 | Stop reason: HOSPADM

## 2018-11-03 RX ORDER — POTASSIUM CHLORIDE 20 MEQ/1
20 TABLET, EXTENDED RELEASE ORAL
Status: COMPLETED | OUTPATIENT
Start: 2018-11-03 | End: 2018-11-03

## 2018-11-03 RX ORDER — ONDANSETRON 2 MG/ML
4 INJECTION INTRAMUSCULAR; INTRAVENOUS EVERY 8 HOURS PRN
Status: DISCONTINUED | OUTPATIENT
Start: 2018-11-03 | End: 2018-11-04 | Stop reason: HOSPADM

## 2018-11-03 RX ORDER — PANTOPRAZOLE SODIUM 40 MG/1
40 TABLET, DELAYED RELEASE ORAL DAILY
Status: DISCONTINUED | OUTPATIENT
Start: 2018-11-04 | End: 2018-11-04 | Stop reason: HOSPADM

## 2018-11-03 RX ORDER — HYDROCODONE BITARTRATE AND ACETAMINOPHEN 500; 5 MG/1; MG/1
TABLET ORAL ONCE
Status: COMPLETED | OUTPATIENT
Start: 2018-11-03 | End: 2018-11-03

## 2018-11-03 RX ORDER — POTASSIUM CHLORIDE 20 MEQ/1
TABLET, EXTENDED RELEASE ORAL
Status: COMPLETED
Start: 2018-11-03 | End: 2018-11-03

## 2018-11-03 RX ADMIN — POTASSIUM CHLORIDE 20 MEQ: 20 TABLET, EXTENDED RELEASE ORAL at 10:11

## 2018-11-03 RX ADMIN — SODIUM CHLORIDE: 9 INJECTION, SOLUTION INTRAVENOUS at 08:11

## 2018-11-03 RX ADMIN — POTASSIUM CHLORIDE 20 MEQ: 1500 TABLET, EXTENDED RELEASE ORAL at 10:11

## 2018-11-04 VITALS
TEMPERATURE: 97 F | HEART RATE: 69 BPM | SYSTOLIC BLOOD PRESSURE: 147 MMHG | HEIGHT: 71 IN | RESPIRATION RATE: 18 BRPM | DIASTOLIC BLOOD PRESSURE: 67 MMHG | BODY MASS INDEX: 24.92 KG/M2 | WEIGHT: 178 LBS | OXYGEN SATURATION: 100 %

## 2018-11-04 PROBLEM — C26.9 GI MALIGNANCY: Status: ACTIVE | Noted: 2018-11-04

## 2018-11-04 LAB
ANION GAP SERPL CALC-SCNC: 4 MMOL/L
BASOPHILS # BLD AUTO: 0.1 K/UL
BASOPHILS NFR BLD: 0.7 %
BLD PROD TYP BPU: NORMAL
BLOOD UNIT EXPIRATION DATE: NORMAL
BLOOD UNIT TYPE CODE: 6200
BLOOD UNIT TYPE: NORMAL
BUN SERPL-MCNC: 27 MG/DL
CALCIUM SERPL-MCNC: 8.2 MG/DL
CHLORIDE SERPL-SCNC: 114 MMOL/L
CO2 SERPL-SCNC: 22 MMOL/L
CODING SYSTEM: NORMAL
CREAT SERPL-MCNC: 1 MG/DL
DIFFERENTIAL METHOD: ABNORMAL
DISPENSE STATUS: NORMAL
EOSINOPHIL # BLD AUTO: 0.3 K/UL
EOSINOPHIL NFR BLD: 1.9 %
ERYTHROCYTE [DISTWIDTH] IN BLOOD BY AUTOMATED COUNT: 14.6 %
EST. GFR  (AFRICAN AMERICAN): >60 ML/MIN/1.73 M^2
EST. GFR  (NON AFRICAN AMERICAN): 56 ML/MIN/1.73 M^2
GLUCOSE SERPL-MCNC: 133 MG/DL
HCT VFR BLD AUTO: 28.7 %
HGB BLD-MCNC: 9.4 G/DL
IMM GRANULOCYTES # BLD AUTO: 0.09 K/UL
IMM GRANULOCYTES NFR BLD AUTO: 0.6 %
LYMPHOCYTES # BLD AUTO: 2.4 K/UL
LYMPHOCYTES NFR BLD: 17 %
MCH RBC QN AUTO: 28.4 PG
MCHC RBC AUTO-ENTMCNC: 32.8 G/DL
MCV RBC AUTO: 87 FL
MONOCYTES # BLD AUTO: 0.9 K/UL
MONOCYTES NFR BLD: 6.3 %
NEUTROPHILS # BLD AUTO: 10.2 K/UL
NEUTROPHILS NFR BLD: 73.5 %
NRBC BLD-RTO: 0 /100 WBC
NUM UNITS TRANS PACKED RBC: NORMAL
PLATELET # BLD AUTO: 308 K/UL
PMV BLD AUTO: 9.3 FL
POTASSIUM SERPL-SCNC: 3.5 MMOL/L
RBC # BLD AUTO: 3.31 M/UL
SODIUM SERPL-SCNC: 140 MMOL/L
WBC # BLD AUTO: 13.87 K/UL

## 2018-11-04 PROCEDURE — 85025 COMPLETE CBC W/AUTO DIFF WBC: CPT

## 2018-11-04 PROCEDURE — 80048 BASIC METABOLIC PNL TOTAL CA: CPT

## 2018-11-04 PROCEDURE — 36430 TRANSFUSION BLD/BLD COMPNT: CPT

## 2018-11-04 PROCEDURE — 36415 COLL VENOUS BLD VENIPUNCTURE: CPT

## 2018-11-04 PROCEDURE — G0378 HOSPITAL OBSERVATION PER HR: HCPCS

## 2018-11-04 PROCEDURE — 25000003 PHARM REV CODE 250: Performed by: FAMILY MEDICINE

## 2018-11-04 PROCEDURE — P9016 RBC LEUKOCYTES REDUCED: HCPCS

## 2018-11-04 RX ADMIN — POTASSIUM CHLORIDE, DEXTROSE MONOHYDRATE AND SODIUM CHLORIDE: 150; 5; 450 INJECTION, SOLUTION INTRAVENOUS at 08:11

## 2018-11-04 RX ADMIN — PANTOPRAZOLE SODIUM 40 MG: 40 TABLET, DELAYED RELEASE ORAL at 08:11

## 2018-11-04 NOTE — HOSPITAL COURSE
Blood transfusion follow up in [place] with Oncology.  11/04/2018 patient has received 4 units of blood.  Discussed with family options available to them.  Will discharge patient home.

## 2018-11-04 NOTE — DISCHARGE SUMMARY
[facility]  Hospital Medicine  Discharge Summary      Patient Name: Oriana Tobar  MRN: [id number]  Admission Date: 11/3/2018  Hospital Length of Stay: 0 days  Discharge Date and Time:  11/04/2018 11:48 AM  Attending Physician: Adin Tejada MD   Discharging Provider: Adin Tejada MD  Primary Care Provider: Carine Baltazar NP      HPI:   Chronic blood loss anemia the patient is anemic at this time patient has a small-bowel GI malignancy see reports patient has had a PillCam per Dr. Ross in [place].  The patient's see the oncologist on Tuesday.  The patient has required multiple blood transfusions in the past.    * No surgery found *      Hospital Course:   Blood transfusion follow up in [place] with Oncology.  11/04/2018 patient has received 4 units of blood.  Discussed with family options available to them.  Will discharge patient home.     Consults:   Consults (From admission, onward)        Status Ordering Provider     Inpatient consult to Dietitian  Once     Provider:  (Not yet assigned)    ADIN Blackwell     IP consult to case management  Once     Provider:  (Not yet assigned)    Acknowledged ADIN TEJADA          No new Assessment & Plan notes have been filed under this hospital service since the last note was generated.  Service: Hospital Medicine    Final Active Diagnoses:    Diagnosis Date Noted POA    PRINCIPAL PROBLEM:  Iron deficiency anemia due to chronic blood loss [D50.0] 06/25/2018 Yes    GI malignancy [C26.9] 11/04/2018 Yes    Hematuria [R31.9] 01/13/2016 Yes      Problems Resolved During this Admission:       Discharged Condition: fair    Disposition:     Follow Up:  Follow-up Information     Carine Baltazar NP.    Specialty:  Family Medicine  Contact information:  [address] [place] [address] Dr  [place] [address] [phone]                 Patient Instructions:      Prepare RBC 4 Units; severe anemia      Order Specific Question Answer Comments   Number of Units 4 Units    Reason to prepare multiple units (e.g. Emergency): severe anemia    Purpose of Preparation: Pending Transfusion        Significant Diagnostic Studies: Labs:   BMP:   Recent Labs   Lab 11/03/18 1845 11/04/18  0859   * 133*    140   K 3.0* 3.5    114*   CO2 20* 22*   BUN 36* 27*   CREATININE 1.1 1.0   CALCIUM 8.5* 8.2*   , CMP   Recent Labs   Lab 11/03/18 1845 11/04/18  0859    140   K 3.0* 3.5    114*   CO2 20* 22*   * 133*   BUN 36* 27*   CREATININE 1.1 1.0   CALCIUM 8.5* 8.2*   PROT 5.4*  --    ALBUMIN 2.5*  --    BILITOT 0.2  --    ALKPHOS 33*  --    AST 14  --    ALT 9*  --    ANIONGAP 7* 4*   ESTGFRAFRICA 57.6* >60.0   EGFRNONAA 49.9* 56.0*   , CBC   Recent Labs   Lab 11/03/18 1845 11/04/18  0859   WBC 16.72* 13.87*   HGB 4.3* 9.4*   HCT 14.1* 28.7*   * 308    and All labs within the past 24 hours have been reviewed    Pending Diagnostic Studies:     None         Medications:  Reconciled Home Medications:      Medication List      CONTINUE taking these medications    atorvastatin 20 MG tablet  Commonly known as:  LIPITOR  Take 20 mg by mouth once daily.     ferrous sulfate 325 (65 FE) MG EC tablet  Take 325 mg by mouth once daily.     pantoprazole 40 MG tablet  Commonly known as:  PROTONIX  Take 1 tablet (40 mg total) by mouth once daily.     sucralfate 100 mg/mL suspension  Commonly known as:  CARAFATE  Take 10 mLs (1 g total) by mouth 4 (four) times daily before meals and nightly.        STOP taking these medications    amLODIPine 5 MG tablet  Commonly known as:  NORVASC     losartan 50 MG tablet  Commonly known as:  COZAAR            Indwelling Lines/Drains at time of discharge:   Lines/Drains/Airways          None          Time spent on the discharge of patient: 35 minutes  Patient was seen and examined on the date of discharge and determined to be suitable for discharge.         Buck NOLASCO  MD Becca  Department of Hospital Medicine  [facility] - Med Surg

## 2018-11-04 NOTE — PROGRESS NOTES
Stopped blood transfusion.  Saline infusing.  Pt eating breakfast.  Alert and oriented.  Speech is clear. Respirations even and unlabored.  Will continue to monitor.  KHADRA RN

## 2018-11-04 NOTE — ED NOTES
Tele monitor 28 placed on patient. Patient to be taken to room 115 via stretcher. No acute distress noted in patient, respirations even and unlabored.

## 2018-11-04 NOTE — PLAN OF CARE
Problem: Fall Risk (Adult)  Intervention: Patient Rounds   11/04/18 0000   Safety Interventions   Patient Rounds bed in low position;bed wheels locked;call light in reach

## 2018-11-04 NOTE — NURSING
Discharged instructions reviewed with patient and family. Verbalized understanding. Removed IV from right wrist and right AC. Both catheters intact.  Pressure bandage applied. Pt tolerated well.  Toileted patient.  Escorted to personal vehicle via wheelchair.  Respirations even and unlabored.  No acute distress noted. Instructed to call for any questions or concerns.  MIKE GAVIRIA

## 2018-11-04 NOTE — HPI
Chronic blood loss anemia the patient is anemic at this time patient has a small-bowel GI malignancy see reports patient has had a PillCam per Dr. Ross in [place].  The patient's see the oncologist on Tuesday.  The patient has required multiple blood transfusions in the past.

## 2018-11-04 NOTE — ASSESSMENT & PLAN NOTE
The patient is known to have a small bowel malignancy.  She has been worked up in [place] with pillow can.  Patient will see Oncology on Tuesday.  I discussed case with Dr. PERAZA General surgery.  I am for me with the patient terms of blood transfusion.  I have reviewed record in the computer.  Long discussion with the family.  The patient receive 4 units of blood today.  The patient may need a hospice referral if Oncology does not work out.

## 2018-11-04 NOTE — PLAN OF CARE
Problem: Patient Care Overview  Goal: Plan of Care Review   11/04/18 0034   Coping/Psychosocial   Plan Of Care Reviewed With patient;daughter

## 2018-11-04 NOTE — H&P
[facility] Medicine  History & Physical    Patient Name: Oriana Tobar  MRN: [id number]  Admission Date: 11/3/2018  Attending Physician: Buck Hudson MD   Primary Care Provider: Carine Baltazar NP         Patient information was obtained from patient, relative(s) and ER records.     Subjective:     Principal Problem:Iron deficiency anemia due to chronic blood loss    Chief Complaint:   Chief Complaint   Patient presents with    Melena        HPI: Chronic blood loss anemia the patient is anemic at this time patient has a small-bowel GI malignancy see reports patient has had a PillCam per Dr. Ross in [place].  The patient's see the oncologist on Tuesday.  The patient has required multiple blood transfusions in the past.    Past Medical History:   Diagnosis Date    Cancer     kidney    Dementia     Encounter for blood transfusion     High cholesterol 2018    Hypertension     No Medication     Renal disorder     Wears dentures     Uppers       Past Surgical History:   Procedure Laterality Date     SECTION      COLONOSCOPY N/A 2018    Procedure: COLONOSCOPY;  Surgeon: Torres Son MD;  Location: [facility];  Service: Endoscopy;  Laterality: N/A;    COLONOSCOPY N/A 2018    [facility]O    CYSTOSCOPY WITH RETROGRADE PYELOGRAM Left 1/15/2016    [facility]ibell [facility]acon [facility] at [facility] OR    EGD (ESOPHAGOGASTRODUODENOSCOPY) N/A 2018    Per[facility] Torres Son [facility] at [facility] ENDO    ESOPHAGOGASTRODUODENOSCOPY N/A 2018    Procedure: EGD (ESOPHAGOGASTRODUODENOSCOPY);  Surgeon: Torres Son MD;  Location: [facility];  Service: Endoscopy;  Laterality: N/A;    EYE SURGERY      Rt eye Catarac    HYSTERECTOMY      IMAGING, GI TRACT, INTRALUMINAL, VIA CAPSULE N/A 2018    Performed by Rocco Ross [facility] at [facility]    INTRALUMINAL  "GASTROINTESTINAL TRACT IMAGING VIA CAPSULE N/A 8/6/2018    Procedure: IMAGING, GI TRACT, INTRALUMINAL, VIA CAPSULE;  Surgeon: Rocco Ross MD;  Location: [facility];  Service: Endoscopy;  Laterality: N/A;    KIDNEY SURGERY Left 06/23/2018    Left kidney removed due to cancer    lasix surgery      NEPHRECTOMY-RADICAL   Left 1/27/2016    Per[facility]acon [facility] at [facility] OR    REPAIR-HERNIA-VENTRAL N/A 1/27/2016    Performed by Marco A Randhawa MD at [facility] OR    RESECTION-BOWEL  1/27/2016    Performed by Marco A Randhawa MD at [facility] OR    URETEROSCOPY  1/15/2016    Performed by [facility]ibell [facility]acon MD at [facility] OR       Review of patient's allergies indicates:   Allergen Reactions    Codeine Other (See Comments)     Pt shakes and hallucinates    Pcn [penicillins] Anxiety and Other (See Comments)       No current facility-administered medications on file prior to encounter.      Current Outpatient Medications on File Prior to Encounter   Medication Sig    atorvastatin (LIPITOR) 20 MG tablet Take 20 mg by mouth once daily.    ferrous sulfate 325 (65 FE) MG EC tablet Take 325 mg by mouth once daily.    pantoprazole (PROTONIX) 40 MG tablet Take 1 tablet (40 mg total) by mouth once daily.    sucralfate (CARAFATE) 100 mg/mL suspension Take 10 mLs (1 g total) by mouth 4 (four) times daily before meals and nightly.     Family History     None        Tobacco Use    Smoking status: Current Every Day Smoker     Packs/day: 1.00     Years: 50.00     Pack years: 50.00    Smokeless tobacco: Never Used    Tobacco comment: Trying to QUIT   Substance and Sexual Activity    Alcohol use: Yes     Alcohol/week: 1.8 oz     Types: 3 Cans of beer per week     Comment: "about three beer a day"    Drug use: No    Sexual activity: No     Review of Systems   Constitutional: Positive for fatigue. Negative for activity change, appetite change, chills, diaphoresis, fever and unexpected weight change.   HENT: Negative " for congestion, drooling, hearing loss and trouble swallowing.    Eyes: Negative for pain and visual disturbance.   Respiratory: Negative.  Negative for apnea, cough, choking, chest tightness, shortness of breath and wheezing.    Cardiovascular: Negative for chest pain, palpitations and leg swelling.   Gastrointestinal: Positive for blood in stool. Negative for abdominal distention, abdominal pain, constipation, diarrhea, nausea and vomiting.   Endocrine: Negative for polydipsia, polyphagia and polyuria.   Genitourinary: Negative for difficulty urinating, dysuria and flank pain.   Musculoskeletal: Negative for arthralgias, back pain, gait problem, joint swelling, neck pain and neck stiffness.   Skin: Negative for color change, rash and wound.   Allergic/Immunologic: Negative for environmental allergies, food allergies and immunocompromised state.   Neurological: Positive for dizziness, weakness and light-headedness. Negative for syncope, numbness and headaches.   Hematological: Negative for adenopathy.   Psychiatric/Behavioral: Negative for agitation, confusion and sleep disturbance. The patient is not nervous/anxious.      Objective:     Vital Signs (Most Recent):  Temp: 97.1 °F (36.2 °C) (11/04/18 1056)  Pulse: 69 (11/04/18 1056)  Resp: 18 (11/04/18 1056)  BP: (!) 147/67 (11/04/18 1056)  SpO2: 100 % (11/04/18 1056) Vital Signs (24h Range):  Temp:  [96.7 °F (35.9 °C)-98.7 °F (37.1 °C)] 97.1 °F (36.2 °C)  Pulse:  [] 69  Resp:  [17-20] 18  SpO2:  [100 %] 100 %  BP: ()/(49-96) 147/67     Weight: 80.7 kg (178 lb)  Body mass index is 24.83 kg/m².    Physical Exam   Constitutional: She is oriented to person, place, and time. She appears well-developed and well-nourished.   HENT:   Head: Normocephalic and atraumatic.   Right Ear: External ear normal.   Left Ear: External ear normal.   Nose: Nose normal.   Eyes: Conjunctivae, EOM and lids are normal. Pupils are equal, round, and reactive to light.   Neck:  Trachea normal, normal range of motion and full passive range of motion without pain. Neck supple.   Cardiovascular: Normal rate, regular rhythm, S1 normal, S2 normal, normal heart sounds, intact distal pulses and normal pulses.   Pulmonary/Chest: Effort normal and breath sounds normal.   Abdominal: Soft. Normal aorta and bowel sounds are normal.   Genitourinary: Rectal exam shows guaiac positive stool.   Musculoskeletal: Normal range of motion.   Neurological: She is alert and oriented to person, place, and time. She has normal reflexes.   Skin: Skin is warm, dry and intact. There is pallor.   Psychiatric: She has a normal mood and affect. Her speech is normal and behavior is normal. Judgment and thought content normal. Cognition and memory are normal.   Nursing note and vitals reviewed.        CRANIAL NERVES     CN III, IV, VI   Pupils are equal, round, and reactive to light.  Extraocular motions are normal.        Significant Labs:   BMP:   Recent Labs   Lab 11/04/18  0859   *      K 3.5   *   CO2 22*   BUN 27*   CREATININE 1.0   CALCIUM 8.2*     CBC:   Recent Labs   Lab 11/03/18  1845 11/04/18  0859   WBC 16.72* 13.87*   HGB 4.3* 9.4*   HCT 14.1* 28.7*   * 308     CMP:   Recent Labs   Lab 11/03/18  1845 11/04/18  0859    140   K 3.0* 3.5    114*   CO2 20* 22*   * 133*   BUN 36* 27*   CREATININE 1.1 1.0   CALCIUM 8.5* 8.2*   PROT 5.4*  --    ALBUMIN 2.5*  --    BILITOT 0.2  --    ALKPHOS 33*  --    AST 14  --    ALT 9*  --    ANIONGAP 7* 4*   EGFRNONAA 49.9* 56.0*     All pertinent labs within the past 24 hours have been reviewed.    Significant Imaging: None    Assessment/Plan:     * Iron deficiency anemia due to chronic blood loss    The patient has a long history of blood-loss anemia has received multiple transfusion.  The patient is iron deficiency.  The patient has a GI malignancy.  The patient has been referred to Oncology.  Discussed case with Dr. kirstin diaz on  the complexity of the case.  Patient will receive 4 units of blood.       GI malignancy    The patient is known to have a small bowel malignancy.  She has been worked up in [place] with pillow can.  Patient will see Oncology on Tuesday.  I discussed case with Dr. PERAZA General surgery.  I am for me with the patient terms of blood transfusion.  I have reviewed record in the computer.  Long discussion with the family.  The patient receive 4 units of blood today.  The patient may need a hospice referral if Oncology does not work out.         VTE Risk Mitigation (From admission, onward)        Ordered     IP VTE HIGH RISK PATIENT  Once      11/03/18 2145     Place RORY hose  Until discontinued      11/03/18 2145     Reason for No Pharmacological VTE Prophylaxis  Once      11/03/18 2145         Patient was seen by me on 11/04/2020 6:00 AM    Buck Hudson MD  Department of Hospital Medicine   [facility] Surg

## 2018-11-04 NOTE — PLAN OF CARE
Problem: Fall Risk (Adult)  Intervention: Safety Promotion/Fall Prevention   11/04/18 0000   Safety Interventions   Safety Promotion/Fall Prevention side rails raised x 2;bed alarm set

## 2018-11-04 NOTE — PLAN OF CARE
Problem: Fall Risk (Adult)  Goal: Identify Related Risk Factors and Signs and Symptoms  Related risk factors and signs and symptoms are identified upon initiation of Human Response Clinical Practice Guideline (CPG)   11/04/18 0032   Fall Risk   Related Risk Factors (Fall Risk) age-related changes;bladder function altered;fatigue/slow reaction;fear of falling;gait/mobility problems;history of falls   Signs and Symptoms (Fall Risk) presence of risk factors

## 2018-11-04 NOTE — SUBJECTIVE & OBJECTIVE
Past Medical History:   Diagnosis Date    Cancer     kidney    Dementia     Encounter for blood transfusion     High cholesterol 2018    Hypertension     No Medication     Renal disorder     Wears dentures     Uppers       Past Surgical History:   Procedure Laterality Date     SECTION      COLONOSCOPY N/A 2018    Procedure: COLONOSCOPY;  Surgeon: Torres Son MD;  Location: [facility];  Service: Endoscopy;  Laterality: N/A;    COLONOSCOPY N/A 2018    Performed by [facility] Son [facility] at [facility] ENDO    CYSTOSCOPY WITH RETROGRADE PYELOGRAM Left 1/15/2016    Performed by Maribell [facility]acon [facility] at [facility] OR    EGD (ESOPHAGOGASTRODUODENOSCOPY) N/A 2018    Performed by Torres Son [facility] at [facility] ENDO    ESOPHAGOGASTRODUODENOSCOPY N/A 2018    Procedure: EGD (ESOPHAGOGASTRODUODENOSCOPY);  Surgeon: Torres Son MD;  Location: [facility];  Service: Endoscopy;  Laterality: N/A;    EYE SURGERY      Rt eye Catarac    HYSTERECTOMY      IMAGING, GI TRACT, INTRALUMINAL, VIA CAPSULE N/A 2018    [facility]    INTRALUMINAL GASTROINTESTINAL TRACT IMAGING VIA CAPSULE N/A 2018    Procedure: IMAGING, GI TRACT, INTRALUMINAL, VIA CAPSULE;  Surgeon: Rocco Ross MD;  Location: [facility];  Service: Endoscopy;  Laterality: N/A;    KIDNEY SURGERY Left 2018    Left kidney removed due to cancer    lasix surgery      NEPHRECTOMY-RADICAL   Left 2016    [facility] at [facility] OR    REPAIR-HERNIA-VENTRAL N/A 2016    Performed by Marco A Randhawa MD at [facility] OR    RESECTION-BOWEL  2016    [facility]formed by Marco A [facility]awa [facility] at [facility] OR    URETEROSCOPY  1/15/2016    Performed by [facility]ell [facility]acon MD at [facility] OR       Review of patient's allergies indicates:   Allergen Reactions    Codeine Other (See Comments)     Pt shakes and hallucinates    Pcn [penicillins] Anxiety and  "Other (See Comments)       No current facility-administered medications on file prior to encounter.      Current Outpatient Medications on File Prior to Encounter   Medication Sig    atorvastatin (LIPITOR) 20 MG tablet Take 20 mg by mouth once daily.    ferrous sulfate 325 (65 FE) MG EC tablet Take 325 mg by mouth once daily.    pantoprazole (PROTONIX) 40 MG tablet Take 1 tablet (40 mg total) by mouth once daily.    sucralfate (CARAFATE) 100 mg/mL suspension Take 10 mLs (1 g total) by mouth 4 (four) times daily before meals and nightly.     Family History     None        Tobacco Use    Smoking status: Current Every Day Smoker     Packs/day: 1.00     Years: 50.00     Pack years: 50.00    Smokeless tobacco: Never Used    Tobacco comment: Trying to QUIT   Substance and Sexual Activity    Alcohol use: Yes     Alcohol/week: 1.8 oz     Types: 3 Cans of beer per week     Comment: "about three beer a day"    Drug use: No    Sexual activity: No     Review of Systems   Constitutional: Positive for fatigue. Negative for activity change, appetite change, chills, diaphoresis, fever and unexpected weight change.   HENT: Negative for congestion, drooling, hearing loss and trouble swallowing.    Eyes: Negative for pain and visual disturbance.   Respiratory: Negative.  Negative for apnea, cough, choking, chest tightness, shortness of breath and wheezing.    Cardiovascular: Negative for chest pain, palpitations and leg swelling.   Gastrointestinal: Positive for blood in stool. Negative for abdominal distention, abdominal pain, constipation, diarrhea, nausea and vomiting.   Endocrine: Negative for polydipsia, polyphagia and polyuria.   Genitourinary: Negative for difficulty urinating, dysuria and flank pain.   Musculoskeletal: Negative for arthralgias, back pain, gait problem, joint swelling, neck pain and neck stiffness.   Skin: Negative for color change, rash and wound.   Allergic/Immunologic: Negative for environmental " allergies, food allergies and immunocompromised state.   Neurological: Positive for dizziness, weakness and light-headedness. Negative for syncope, numbness and headaches.   Hematological: Negative for adenopathy.   Psychiatric/Behavioral: Negative for agitation, confusion and sleep disturbance. The patient is not nervous/anxious.      Objective:     Vital Signs (Most Recent):  Temp: 97.1 °F (36.2 °C) (11/04/18 1056)  Pulse: 69 (11/04/18 1056)  Resp: 18 (11/04/18 1056)  BP: (!) 147/67 (11/04/18 1056)  SpO2: 100 % (11/04/18 1056) Vital Signs (24h Range):  Temp:  [96.7 °F (35.9 °C)-98.7 °F (37.1 °C)] 97.1 °F (36.2 °C)  Pulse:  [] 69  Resp:  [17-20] 18  SpO2:  [100 %] 100 %  BP: ()/(49-96) 147/67     Weight: 80.7 kg (178 lb)  Body mass index is 24.83 kg/m².    Physical Exam   Constitutional: She is oriented to person, place, and time. She appears well-developed and well-nourished.   HENT:   Head: Normocephalic and atraumatic.   Right Ear: External ear normal.   Left Ear: External ear normal.   Nose: Nose normal.   Eyes: Conjunctivae, EOM and lids are normal. Pupils are equal, round, and reactive to light.   Neck: Trachea normal, normal range of motion and full passive range of motion without pain. Neck supple.   Cardiovascular: Normal rate, regular rhythm, S1 normal, S2 normal, normal heart sounds, intact distal pulses and normal pulses.   Pulmonary/Chest: Effort normal and breath sounds normal.   Abdominal: Soft. Normal aorta and bowel sounds are normal.   Genitourinary: Rectal exam shows guaiac positive stool.   Musculoskeletal: Normal range of motion.   Neurological: She is alert and oriented to person, place, and time. She has normal reflexes.   Skin: Skin is warm, dry and intact. There is pallor.   Psychiatric: She has a normal mood and affect. Her speech is normal and behavior is normal. Judgment and thought content normal. Cognition and memory are normal.   Nursing note and vitals  reviewed.        CRANIAL NERVES     CN III, IV, VI   Pupils are equal, round, and reactive to light.  Extraocular motions are normal.        Significant Labs:   BMP:   Recent Labs   Lab 11/04/18  0859   *      K 3.5   *   CO2 22*   BUN 27*   CREATININE 1.0   CALCIUM 8.2*     CBC:   Recent Labs   Lab 11/03/18 1845 11/04/18  0859   WBC 16.72* 13.87*   HGB 4.3* 9.4*   HCT 14.1* 28.7*   * 308     CMP:   Recent Labs   Lab 11/03/18 1845 11/04/18  0859    140   K 3.0* 3.5    114*   CO2 20* 22*   * 133*   BUN 36* 27*   CREATININE 1.1 1.0   CALCIUM 8.5* 8.2*   PROT 5.4*  --    ALBUMIN 2.5*  --    BILITOT 0.2  --    ALKPHOS 33*  --    AST 14  --    ALT 9*  --    ANIONGAP 7* 4*   EGFRNONAA 49.9* 56.0*     All pertinent labs within the past 24 hours have been reviewed.    Significant Imaging: None

## 2018-11-04 NOTE — ASSESSMENT & PLAN NOTE
Patient has a GI malignancy see biopsy reports and everything contained in the computer.  Patient has small-bowel tumor.  The patient has been referred to Oncology.  I did discussed with the patient if Oncology did not workup for she might consider hospice referral.  Discussed the case with Dr. PERAZA General surgery who is a for me with the patient's case and I am from a with the patient terms of blood transfusions.  Will transfuse the patient today and discharge home.  Discussed the above with the family.

## 2018-11-04 NOTE — PLAN OF CARE
Problem: Fall Risk (Adult)  Intervention: Reduce Risk/Promote Restraint Free Environment   18 0033   Safety Interventions   Environmental Safety Modification assistive device/personal items within reach;clutter free environment maintained;lighting adjusted;room near unit station;room organization consistent   Prevent [place] Drop/Fall   Safety/Security Measures bed alarm set

## 2018-11-04 NOTE — ED NOTES
Patient lying supine in bed. Respirations even and unlabored, no acute distress noted. Patient denies shortness of breath, back pain, or other symptoms of blood reaction at this time.     Family remains at bedside. No immediate needs voiced by patient or family at this time.

## 2018-11-04 NOTE — PLAN OF CARE
Problem: Anemia (Adult)  Intervention: Promote Hydration and Nutrition   11/04/18 0036   Nutrition Interventions   Oral Nutrition Promotion rest periods promoted;social interaction promoted

## 2018-11-04 NOTE — PLAN OF CARE
Problem: Anemia (Adult)  Goal: Symptom Improvement  Patient will demonstrate the desired outcomes by discharge/transition of care.  Outcome: Ongoing (interventions implemented as appropriate)   11/04/18 0035   Anemia (Adult)   Symptom Improvement making progress toward outcome

## 2018-11-04 NOTE — PLAN OF CARE
Problem: Pressure Ulcer Risk (Baljit Scale) (Adult,Obstetrics,Pediatric)  Intervention: Promote/Optimize Nutrition   11/04/18 0037   Nutrition Interventions   Oral Nutrition Promotion nutritional therapy counseling provided

## 2018-11-04 NOTE — PLAN OF CARE
Problem: Anemia (Adult)  Intervention: Assist with Determining Underlying Cause   11/04/18 0036   Safety Interventions   Bleeding Precautions blood pressure closely monitored;foot protection facilitated;gentle oral care promoted;monitored for signs of bleeding

## 2018-11-04 NOTE — ASSESSMENT & PLAN NOTE
The patient has a long history of blood-loss anemia has received multiple transfusion.  The patient is iron deficiency.  The patient has a GI malignancy.  The patient has been referred to Oncology.  Discussed case with Dr. kirstin diaz on the complexity of the case.  Patient will receive 4 units of blood.

## 2018-11-04 NOTE — PLAN OF CARE
Problem: Patient Care Overview  Goal: Individualization & Mutuality  Outcome: Ongoing (interventions implemented as appropriate)   11/03/18 7823   Mutuality   What Questions Do You Have About Your Health or Care? DAUGHTER STATES NO   Mutuality/Individual Preferences   What Anxieties, Fears, Concerns, or Questions Do You Have About Your Care? NONE STATES DAUGHTER   What Information Would Help Us Give You More Personalized Care? DOESN'T C/O PAIN STATES DAUGHTER   How Would You and/or Your Support Person Like to Participate In Your Care? COMMUNICATE   Initial Information   How to be Addressed CHRIS

## 2018-11-04 NOTE — PLAN OF CARE
Problem: Anemia (Adult)  Intervention: Facilitate Safe Activity   11/04/18 0035   Musculoskeletal Interventions   Fatigue Management activity schedule adjusted;activity assistance provided;fatigue-related activity identified;frequent rest breaks encouraged;paced activity encouraged

## 2018-11-04 NOTE — ED NOTES
Patient's daughter comes to nurses station and states that the patient is complaining of dizziness. Patient placed in trendelenburg position and currently denies dizziness. No other immediate needs

## 2018-11-04 NOTE — ED PROVIDER NOTES
Encounter Date: 11/3/2018       History     Chief Complaint   Patient presents with    Melena     Patient arrived by EMS complaining of black tarry stools today.  Patient has dementia according to EMS and is unable to give any history.  There are no family members present initially and history is obtained from patient's old records.  Patient has history of renal cell carcinoma and was recently admitted last week for transfusion of multiple units of packed red blood cells.  According to all notes patient is not currently on hospice, is being cared for by family members.          Review of patient's allergies indicates:   Allergen Reactions    Codeine Other (See Comments)     Pt shakes and hallucinates    Pcn [penicillins] Anxiety and Other (See Comments)     Past Medical History:   Diagnosis Date    Cancer     kidney    Dementia     Encounter for blood transfusion     High cholesterol 2018    Hypertension     No Medication     Renal disorder     Wears dentures     Uppers     Past Surgical History:   Procedure Laterality Date     SECTION      COLONOSCOPY N/A 2018    Procedure: COLONOSCOPY;  Surgeon: Torres Son MD;  Location: [facility];  Service: Endoscopy;  Laterality: N/A;    COLONOSCOPY N/A 2018    Performed by [facility] at [facility] ENDO    CYSTOSCOPY WITH RETROGRADE PYELOGRAM Left 1/15/2016    [facility]    EGD (ESOPHAGOGASTRODUODENOSCOPY) N/A 2018    [facility]formed by [facility] at [facility] ENDO    ESOPHAGOGASTRODUODENOSCOPY N/A 2018    Procedure: EGD (ESOPHAGOGASTRODUODENOSCOPY);  Surgeon: Torres Son MD;  Location: [facility];  Service: Endoscopy;  Laterality: N/A;    EYE SURGERY      Rt eye Catarac    HYSTERECTOMY      IMAGING, GI TRACT, INTRALUMINAL, VIA CAPSULE N/A 2018    Performed by [facility] at [facility] ENDO    INTRALUMINAL GASTROINTESTINAL TRACT IMAGING VIA  "CAPSULE N/A 8/6/2018    Procedure: IMAGING, GI TRACT, INTRALUMINAL, VIA CAPSULE;  Surgeon: Rocco Ross MD;  [facility];  Service: Endoscopy;  Laterality: N/A;    KIDNEY SURGERY Left 06/23/2018    Left kidney removed due to cancer    lasix surgery      NEPHRECTOMY-RADICAL   Left 1/27/2016    [facility] OR    REPAIR-HERNIA-VENTRAL N/A 1/27/2016    Performed by Marco A Randhawa MD at [facility] OR    RESECTION-BOWEL  1/27/2016    Performed by Marco A [facility]awa [facility] at [facility] OR    URETEROSCOPY  1/15/2016    Performed by [facility]ell [facility]acon MD at [facility] OR     History reviewed. No pertinent family history.  Social History     Tobacco Use    Smoking status: Current Every Day Smoker     Packs/day: 1.00     Years: 50.00     Pack years: 50.00    Smokeless tobacco: Never Used    Tobacco comment: Trying to QUIT   Substance Use Topics    Alcohol use: Yes     Alcohol/week: 1.8 oz     Types: 3 Cans of beer per week     Comment: "about three beer a day"    Drug use: No     Review of Systems   Constitutional: Negative.    HENT: Negative.    Eyes: Negative.    Respiratory: Negative.    Cardiovascular: Negative.    Gastrointestinal: Positive for blood in stool.   Endocrine: Negative.    Genitourinary: Negative.    Musculoskeletal: Negative.    Allergic/Immunologic: Negative.    Neurological: Negative.    Hematological: Negative.    Psychiatric/Behavioral: Negative.        Physical Exam     Initial Vitals [11/03/18 1832]   BP Pulse Resp Temp SpO2   (!) 116/52 100 18 98.6 °F (37 °C) 100 %      MAP       --         Physical Exam    Nursing note and vitals reviewed.  Constitutional: She appears well-developed and well-nourished. She is not diaphoretic. No distress.   HENT:   Head: Normocephalic and atraumatic.   Mucous membranes very pale.  Conjunctiva pale.   Eyes: Conjunctivae and EOM are normal. Pupils are equal, round, and reactive to light.   Neck: Normal range of motion. Neck " supple.   Cardiovascular: Normal rate, regular rhythm, normal heart sounds and intact distal pulses. Exam reveals no gallop and no friction rub.    No murmur heard.  Pulmonary/Chest: Breath sounds normal. No respiratory distress. She has no wheezes. She has no rales.   Abdominal: Soft. Bowel sounds are normal. She exhibits no distension. There is no tenderness.   Musculoskeletal: Normal range of motion. She exhibits no edema.   Neurological: She is alert. She has normal strength.   Confused at baseline.   Skin: Skin is warm and dry. Capillary refill takes less than 2 seconds. No rash noted. No erythema. There is pallor.   Psychiatric: She has a normal mood and affect. Her behavior is normal. Judgment and thought content normal.         ED Course   Procedures  Labs Reviewed   CBC W/ AUTO DIFFERENTIAL - Abnormal; Notable for the following components:       Result Value    WBC 16.72 (*)     RBC 1.57 (*)     Hemoglobin 4.3 (*)     Hematocrit 14.1 (*)     MCHC 30.5 (*)     RDW 15.3 (*)     Platelets 353 (*)     Immature Granulocytes 0.6 (*)     Gran # (ANC) 12.6 (*)     Immature Grans (Abs) 0.10 (*)     Gran% 75.3 (*)     Lymph% 16.3 (*)     All other components within normal limits    Narrative:        H&Hcritical result(s) called and verbal readback obtained from ANA LAURA Chavarria at 1900. pj, 11/03/2018 19:01   CULTURE, BLOOD   CULTURE, BLOOD   COMPREHENSIVE METABOLIC PANEL   URINALYSIS, REFLEX TO URINE CULTURE   OCCULT BLOOD X 1, STOOL   TYPE & SCREEN          Imaging Results    None                            ED Course as of Nov 03 2054   Sat Nov 03, 2018 2023 Spoke with Dr. Hudson, will admit for transfusion.   [MD]      ED Course User Index  [MD] Kareen North MD     Clinical Impression:   The primary encounter diagnosis was Iron deficiency anemia due to chronic blood loss. A diagnosis of Renal cell carcinoma, unspecified laterality was also pertinent to this visit.                             Kareen BURNS  MD Can  11/03/18 9688

## 2018-11-04 NOTE — ED NOTES
Called med/surg floor, spoke with Kristin. Kristin states she is ready to receive the patient. Bedside report to be given upon patient arrival to room 115.

## 2018-11-05 NOTE — PLAN OF CARE
11/05/18 1422   Final Note   Assessment Type Final Discharge Note   Anticipated Discharge Disposition Home-Health   What phone number can be called within the next 1-3 days to see how you are doing after discharge? [phone]   Hospital Follow Up  Appt(s) scheduled? Yes   Discharge plans and expectations educations in teach back method with documentation complete? Yes   Left message for patient's daughter to make sure home health with [facility] had contacted her.

## 2018-11-05 NOTE — PLAN OF CARE
11/05/18 1429   Final Note   Assessment Type Final Discharge Note   Anticipated Discharge Disposition Home-Health   What phone number can be called within the next 1-3 days to see how you are doing after discharge? [phone]   Hospital Follow Up  Appt(s) scheduled? Yes   Discharge plans and expectations educations in teach back method with documentation complete? Yes   Spoke to daughter, Parisa Tobar. States home health has come but couldn't get labs drawn. Seeing Dr Irene tomorrow & will see what else patient will need & what their options are.

## 2018-11-06 ENCOUNTER — OFFICE VISIT (OUTPATIENT)
Dept: HEMATOLOGY/ONCOLOGY | Facility: CLINIC | Age: 73
End: 2018-11-06
Payer: MEDICARE

## 2018-11-06 ENCOUNTER — HOSPITAL ENCOUNTER (INPATIENT)
Facility: HOSPITAL | Age: 73
LOS: 2 days | Discharge: HOME HEALTH | DRG: 375 | End: 2018-11-08
Attending: INTERNAL MEDICINE | Admitting: INTERNAL MEDICINE
Payer: MEDICARE

## 2018-11-06 VITALS
DIASTOLIC BLOOD PRESSURE: 54 MMHG | TEMPERATURE: 98 F | HEART RATE: 81 BPM | RESPIRATION RATE: 18 BRPM | WEIGHT: 160.06 LBS | HEIGHT: 71 IN | SYSTOLIC BLOOD PRESSURE: 91 MMHG | BODY MASS INDEX: 22.41 KG/M2

## 2018-11-06 DIAGNOSIS — F03.90 DEMENTIA WITHOUT BEHAVIORAL DISTURBANCE, UNSPECIFIED DEMENTIA TYPE: ICD-10-CM

## 2018-11-06 DIAGNOSIS — R53.83 FATIGUE, UNSPECIFIED TYPE: ICD-10-CM

## 2018-11-06 DIAGNOSIS — E44.0 MALNUTRITION OF MODERATE DEGREE: ICD-10-CM

## 2018-11-06 DIAGNOSIS — R53.1 WEAKNESS: ICD-10-CM

## 2018-11-06 DIAGNOSIS — C64.9 RECURRENT RENAL CELL CARCINOMA OF KIDNEY, UNSPECIFIED LATERALITY: Primary | ICD-10-CM

## 2018-11-06 DIAGNOSIS — D64.9 SYMPTOMATIC ANEMIA: ICD-10-CM

## 2018-11-06 DIAGNOSIS — C16.9 GASTRIC CARCINOMA: ICD-10-CM

## 2018-11-06 DIAGNOSIS — K92.1 MELENA: ICD-10-CM

## 2018-11-06 DIAGNOSIS — N94.89 ADNEXAL MASS: ICD-10-CM

## 2018-11-06 DIAGNOSIS — E87.6 HYPOKALEMIA: Primary | ICD-10-CM

## 2018-11-06 DIAGNOSIS — I95.9 HYPOTENSION, UNSPECIFIED HYPOTENSION TYPE: ICD-10-CM

## 2018-11-06 DIAGNOSIS — E83.42 HYPOMAGNESEMIA: ICD-10-CM

## 2018-11-06 DIAGNOSIS — D62 ACUTE BLOOD LOSS ANEMIA: ICD-10-CM

## 2018-11-06 DIAGNOSIS — K92.2 GASTROINTESTINAL HEMORRHAGE, UNSPECIFIED GASTROINTESTINAL HEMORRHAGE TYPE: ICD-10-CM

## 2018-11-06 DIAGNOSIS — K92.2 GI BLEED: ICD-10-CM

## 2018-11-06 LAB
ABO + RH BLD: NORMAL
ALBUMIN SERPL BCP-MCNC: 2.6 G/DL
ALP SERPL-CCNC: 41 U/L
ALT SERPL W/O P-5'-P-CCNC: 11 U/L
ANION GAP SERPL CALC-SCNC: 4 MMOL/L
ANISOCYTOSIS BLD QL SMEAR: ABNORMAL
APTT BLDCRRT: 24 SEC
AST SERPL-CCNC: 10 U/L
BACTERIA #/AREA URNS HPF: ABNORMAL /HPF
BASOPHILS # BLD AUTO: 0 K/UL
BASOPHILS NFR BLD: 0.1 %
BILIRUB SERPL-MCNC: 0.3 MG/DL
BILIRUB UR QL STRIP: NEGATIVE
BLD GP AB SCN CELLS X3 SERPL QL: NORMAL
BLD PROD TYP BPU: NORMAL
BLOOD UNIT EXPIRATION DATE: NORMAL
BLOOD UNIT TYPE CODE: 6200
BLOOD UNIT TYPE: NORMAL
BUN SERPL-MCNC: 31 MG/DL
CALCIUM SERPL-MCNC: 8.5 MG/DL
CHLORIDE SERPL-SCNC: 108 MMOL/L
CLARITY UR: ABNORMAL
CO2 SERPL-SCNC: 24 MMOL/L
CODING SYSTEM: NORMAL
COLOR UR: YELLOW
CREAT SERPL-MCNC: 0.9 MG/DL
DIFFERENTIAL METHOD: ABNORMAL
DISPENSE STATUS: NORMAL
EOSINOPHIL # BLD AUTO: 0.2 K/UL
EOSINOPHIL NFR BLD: 1.1 %
ERYTHROCYTE [DISTWIDTH] IN BLOOD BY AUTOMATED COUNT: 15.6 %
EST. GFR  (AFRICAN AMERICAN): >60 ML/MIN/1.73 M^2
EST. GFR  (NON AFRICAN AMERICAN): >60 ML/MIN/1.73 M^2
GLUCOSE SERPL-MCNC: 107 MG/DL
GLUCOSE UR QL STRIP: NEGATIVE
HCT VFR BLD AUTO: 21.3 %
HGB BLD-MCNC: 7 G/DL
HGB UR QL STRIP: NEGATIVE
HYPOCHROMIA BLD QL SMEAR: ABNORMAL
INR PPP: 1
KETONES UR QL STRIP: NEGATIVE
LEUKOCYTE ESTERASE UR QL STRIP: ABNORMAL
LYMPHOCYTES # BLD AUTO: 2.2 K/UL
LYMPHOCYTES NFR BLD: 15 %
MAGNESIUM SERPL-MCNC: 1.7 MG/DL
MCH RBC QN AUTO: 28.5 PG
MCHC RBC AUTO-ENTMCNC: 33 G/DL
MCV RBC AUTO: 86 FL
MICROSCOPIC COMMENT: ABNORMAL
MONOCYTES # BLD AUTO: 0.7 K/UL
MONOCYTES NFR BLD: 4.6 %
NEUTROPHILS # BLD AUTO: 11.7 K/UL
NEUTROPHILS NFR BLD: 79.2 %
NITRITE UR QL STRIP: NEGATIVE
NUM UNITS TRANS PACKED RBC: NORMAL
PH UR STRIP: 6 [PH] (ref 5–8)
PLATELET # BLD AUTO: 457 K/UL
PLATELET BLD QL SMEAR: ABNORMAL
PMV BLD AUTO: 7.3 FL
POIKILOCYTOSIS BLD QL SMEAR: SLIGHT
POTASSIUM SERPL-SCNC: 3.6 MMOL/L
PROT SERPL-MCNC: 5.6 G/DL
PROT UR QL STRIP: NEGATIVE
PROTHROMBIN TIME: 10.5 SEC
RBC # BLD AUTO: 2.46 M/UL
RBC #/AREA URNS HPF: 0 /HPF (ref 0–4)
SODIUM SERPL-SCNC: 136 MMOL/L
SP GR UR STRIP: 1.01 (ref 1–1.03)
SQUAMOUS #/AREA URNS HPF: 13 /HPF
TSH SERPL DL<=0.005 MIU/L-ACNC: 0.64 UIU/ML
URN SPEC COLLECT METH UR: ABNORMAL
UROBILINOGEN UR STRIP-ACNC: NEGATIVE EU/DL
WBC # BLD AUTO: 14.8 K/UL
WBC #/AREA URNS HPF: 20 /HPF (ref 0–5)

## 2018-11-06 PROCEDURE — 86920 COMPATIBILITY TEST SPIN: CPT

## 2018-11-06 PROCEDURE — 20000000 HC ICU ROOM

## 2018-11-06 PROCEDURE — 3074F SYST BP LT 130 MM HG: CPT | Mod: CPTII,S$GLB,, | Performed by: INTERNAL MEDICINE

## 2018-11-06 PROCEDURE — 1101F PT FALLS ASSESS-DOCD LE1/YR: CPT | Mod: CPTII,S$GLB,, | Performed by: INTERNAL MEDICINE

## 2018-11-06 PROCEDURE — 85025 COMPLETE CBC W/AUTO DIFF WBC: CPT

## 2018-11-06 PROCEDURE — 36415 COLL VENOUS BLD VENIPUNCTURE: CPT

## 2018-11-06 PROCEDURE — 87086 URINE CULTURE/COLONY COUNT: CPT

## 2018-11-06 PROCEDURE — 84443 ASSAY THYROID STIM HORMONE: CPT

## 2018-11-06 PROCEDURE — 99215 OFFICE O/P EST HI 40 MIN: CPT | Mod: S$GLB,,, | Performed by: INTERNAL MEDICINE

## 2018-11-06 PROCEDURE — 86850 RBC ANTIBODY SCREEN: CPT

## 2018-11-06 PROCEDURE — 3078F DIAST BP <80 MM HG: CPT | Mod: CPTII,S$GLB,, | Performed by: INTERNAL MEDICINE

## 2018-11-06 PROCEDURE — 99223 1ST HOSP IP/OBS HIGH 75: CPT | Mod: AI,,, | Performed by: INTERNAL MEDICINE

## 2018-11-06 PROCEDURE — 83735 ASSAY OF MAGNESIUM: CPT

## 2018-11-06 PROCEDURE — P9016 RBC LEUKOCYTES REDUCED: HCPCS

## 2018-11-06 PROCEDURE — 85730 THROMBOPLASTIN TIME PARTIAL: CPT

## 2018-11-06 PROCEDURE — 94761 N-INVAS EAR/PLS OXIMETRY MLT: CPT

## 2018-11-06 PROCEDURE — 63600175 PHARM REV CODE 636 W HCPCS: Performed by: INTERNAL MEDICINE

## 2018-11-06 PROCEDURE — 99999 PR PBB SHADOW E&M-EST. PATIENT-LVL III: CPT | Mod: PBBFAC,,, | Performed by: INTERNAL MEDICINE

## 2018-11-06 PROCEDURE — 81000 URINALYSIS NONAUTO W/SCOPE: CPT

## 2018-11-06 PROCEDURE — 80053 COMPREHEN METABOLIC PANEL: CPT

## 2018-11-06 PROCEDURE — 25000003 PHARM REV CODE 250: Performed by: INTERNAL MEDICINE

## 2018-11-06 PROCEDURE — 85610 PROTHROMBIN TIME: CPT

## 2018-11-06 PROCEDURE — 36430 TRANSFUSION BLD/BLD COMPNT: CPT

## 2018-11-06 RX ORDER — SODIUM CHLORIDE 0.9 % (FLUSH) 0.9 %
5 SYRINGE (ML) INJECTION
Status: DISCONTINUED | OUTPATIENT
Start: 2018-11-06 | End: 2018-11-08 | Stop reason: HOSPADM

## 2018-11-06 RX ORDER — GLUCAGON 1 MG
1 KIT INJECTION
Status: DISCONTINUED | OUTPATIENT
Start: 2018-11-06 | End: 2018-11-08 | Stop reason: HOSPADM

## 2018-11-06 RX ORDER — ALPRAZOLAM 0.25 MG/1
0.5 TABLET ORAL 2 TIMES DAILY PRN
Status: DISCONTINUED | OUTPATIENT
Start: 2018-11-06 | End: 2018-11-08 | Stop reason: HOSPADM

## 2018-11-06 RX ORDER — FUROSEMIDE 10 MG/ML
20 INJECTION INTRAMUSCULAR; INTRAVENOUS 2 TIMES DAILY PRN
Status: DISCONTINUED | OUTPATIENT
Start: 2018-11-06 | End: 2018-11-08 | Stop reason: HOSPADM

## 2018-11-06 RX ORDER — IBUPROFEN 200 MG
16 TABLET ORAL
Status: DISCONTINUED | OUTPATIENT
Start: 2018-11-06 | End: 2018-11-08 | Stop reason: HOSPADM

## 2018-11-06 RX ORDER — SODIUM CHLORIDE 450 MG/100ML
INJECTION, SOLUTION INTRAVENOUS CONTINUOUS
Status: DISCONTINUED | OUTPATIENT
Start: 2018-11-06 | End: 2018-11-08 | Stop reason: HOSPADM

## 2018-11-06 RX ORDER — IBUPROFEN 200 MG
24 TABLET ORAL
Status: DISCONTINUED | OUTPATIENT
Start: 2018-11-06 | End: 2018-11-08 | Stop reason: HOSPADM

## 2018-11-06 RX ADMIN — SODIUM CHLORIDE: 0.45 INJECTION, SOLUTION INTRAVENOUS at 03:11

## 2018-11-06 RX ADMIN — FUROSEMIDE 20 MG: 10 INJECTION, SOLUTION INTRAMUSCULAR; INTRAVENOUS at 11:11

## 2018-11-06 NOTE — PROGRESS NOTES
CC I am tired    Oriana Tobar is a 73 y.o.   This is a 73-year-old female who presented to the emergency room on October 22nd with symptomatic anemia and a history of GI bleed.  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  A 1.9 cm mass noted within the central mesentery with another 1.4 cm nodule within the left khadar abdomen, numerous nodules at the lung bases the largest of which is along the pleura of the right lower lobe, a right adnexal mass was noted suspicious for possible ovarian neoplasm and a possible left adnexal mass separate from the dermoid also concerning for neoplasm.  Pt had a biopsy of the left upper quadrant intraperitoneal mass August 30, 2018 which revealed findings consistent with past renal cell carcinoma,  Since then she has been experiencing increasing shortness of breath associated with melena dizziness and abdominal pain.   Patient had been seen by Dr Randhawa for blood loss possibly associated with a small bowel tumor. Given the extent of stage 4 Renal cell carcinoma and a possible new GYN second primary surgical intervention was rejected with agreement from the patient and family and the patient had wanted Hospice. Upon discharge the pt and family reconsidered their options and she was discharged with home health alone. SInce discharge she has had consistent melena. Today in clinic she is profoundly weak, hypotensive and has received 4 units of blood at [facility] on Sunday for severe sympttomatic anemia. Her hemoglobin was greaer than 9 after transfusion     Past medical history significant for dementia, hysterectomy, recurrent renal cell carcinoma(  originally diagnosed in 2016 status post left nephrectomy) with metastatic disease , positive tobacco use    Past Medical History:   Diagnosis Date    Cancer     kidney    Dementia     Encounter for blood transfusion     High cholesterol 2018    Hypertension     No Medication     Renal disorder     Wears dentures     Uppers     Past Surgical History:   Procedure Laterality Date     SECTION      COLONOSCOPY N/A 2018    Procedure: COLONOSCOPY;  Surgeon: Torres Son MD;  Location: [facility];  Service: Endoscopy;  Laterality: N/A;    COLONOSCOPY N/A 2018    [facility] by [facility] END[facility]    CYSTOSCOPY WITH RETROGRADE PYELOGRAM Left 1/15/2016    Performed by [facility]ell [facility]acon [facility] at [facility] OR    EGD (ESOPHAGOGASTRODUODENOSCOPY) N/A 2018    [facility] by [facility] at [facility] END[facility]    ESOPHAGOGASTRODUODENOSCOPY N/A 2018    Procedure: EGD (ESOPHAGOGASTRODUODENOSCOPY);  Surgeon: Torres Son MD;  Location: [facility];  Service: Endoscopy;  Laterality: N/A;    EYE SURGERY      Rt eye Catarac    HYSTERECTOMY      IMAGING, GI TRACT, INTRALUMINAL, VIA CAPSULE N/A 2018    [facility]    INTRALUMINAL GASTROINTESTINAL TRACT IMAGING VIA CAPSULE N/A 2018    Procedure: IMAGING, GI TRACT, INTRALUMINAL, VIA CAPSULE;  Surgeon: Rocco Ross MD;  Location: [facility];  Service: Endoscopy;  Laterality: N/A;    KIDNEY SURGERY Left 2018    Left kidney removed due to cancer    lasix surgery      NEPHRECTOMY-RADICAL   Left 2016    [facility] at [facility] OR    REPAIR-HERNIA-VENTRAL N/A 2016    Performed by Marco A [facility]hawa MD at [facility] OR    RESECTION-BOWEL  2016    [facility]formed by [facility]awa [facility] at [facility] OR    URETEROSCOPY  1/15/2016    Performed by [facility]ell [facility]acon [facility] at [facility] OR       Current Outpatient Medications:      "atorvastatin (LIPITOR) 20 MG tablet, Take 20 mg by mouth once daily., Disp: , Rfl:     ferrous sulfate 325 (65 FE) MG EC tablet, Take 325 mg by mouth once daily., Disp: , Rfl:     pantoprazole (PROTONIX) 40 MG tablet, Take 1 tablet (40 mg total) by mouth once daily., Disp: 30 tablet, Rfl: 11    sucralfate (CARAFATE) 100 mg/mL suspension, Take 10 mLs (1 g total) by mouth 4 (four) times daily before meals and nightly., Disp: 1 Bottle, Rfl: 11  Review of patient's allergies indicates:   Allergen Reactions    Codeine Other (See Comments)     Pt shakes and hallucinates    Pcn [penicillins] Anxiety and Other (See Comments)     Social History     Tobacco Use    Smoking status: Current Every Day Smoker     Packs/day: 1.00     Years: 50.00     Pack years: 50.00    Smokeless tobacco: Never Used    Tobacco comment: Trying to QUIT   Substance Use Topics    Alcohol use: Yes     Alcohol/week: 1.8 oz     Types: 3 Cans of beer per week     Comment: "about three beer a day"    Drug use: No     No family history on file.    Review of Systems:  General: Weight gain: No, Weight Loss: No, Fatigue: Y  Fever: No, Chills: No, Night Sweats: No, Insomnia: No, Excessive sleeping: No   Respiratory:  Cough: No, Shortness of Breath:Y  Wheezing: No, Excessive Snoring: No, Coughing up blood: No  Endocrine: Heat Intolerance: No, Cold Intolerance: No,   Excessive Thirst: No, Excessive Hunger: No,   Eyes:  Blurred Vision: No, Double Vision: No,   Light Sensitivity: No, Eye pain: No  Musculoskeletal: Muscle Aches/Pain: No, Joint Pain/Swelling: No, Muscle Cramps: No, Muscle Weakness: Y Neck Pain: No, Back Pain: No   Neurological: Difficulty Walking/Falls: No  Headache Migraine: No, Dizziness/Vertigo: Y Fainting: No, Difficulty with Speech: No, Weakness: No, Tingling/Numbness: Y Tremors: No,  Memory Problems: Y Seizures: No, Difficulty Swallowing: No, Altered Taste: Y  Cardiovascular: Chest Pain: No, Shortness of Breath: Y   Palpitations: " "No,  Gastrointestinal: Nausea/Vomiting: No, Constipation: No, Diarrhea: No, Bloody Stools: Y, + melena  Psych/Cog:  Depression: No, Anxiety: No, Hallucinations: No, Problems Concentrating: No  : Frequent Urination: No, Incontinence: No, Blood of Urine: No, Urinary Infections  ENT:Hearing Loss: No, Earache: No, Ringing in Ears: No,   Facial Pain: No, Chronic Congestion: No   Immune: Seasonal Allergies: No, Hives and/or Rashes: No  The remainder of the review of twelve body systems was reviewed and normal       BP (!) 91/54   Pulse 81   Temp 98.3 °F (36.8 °C)   Resp 18   Ht 5' 11" (1.803 m)   Wt 72.6 kg (160 lb 0.9 oz)   BMI 22.32 kg/m²     Constitutional: oriented to person, placeThin frail  HENT:   Head: Normocephalic and atraumatic.   Right Ear: External ear normal.   Left Ear: External ear normal.   Nose: Nose normal.   Mouth/Throat: Oropharynx is clear and moist.   Eyes: Conjunctivae and EOM are normal. Pupils are equal, round, and reactive to light.   Neck: Normal range of motion. Neck supple. No thyromegaly present.   Cardiovascular: Normal rate, regular rhythm, normal heart sounds   No murmur heard.  Pulmonary/Chest: Effort normal and breath sounds normal.  no wheezes.  no rales.   Abdominal: Soft. Bowel sounds are normal.  no mass. There is no tenderness. There is no rebound.   Musculoskeletal: Normal range of motion.  no edema or tenderness. Strength decreased  Lymphadenopathy:    no cervical adenopathy.   Neurological: alert and oriented to person, place  Skin: Skin is warm and dry. No rash noted.   Psychiatric: normal mood and affect.   behavior is normal.   Vitals reviewed.      Lab Results   Component Value Date    WBC 13.87 (H) 11/04/2018    HGB 9.4 (L) 11/04/2018    HCT 28.7 (L) 11/04/2018    MCV 87 11/04/2018     11/04/2018     CMP  Sodium   Date Value Ref Range Status   11/04/2018 140 136 - 145 mmol/L Final     Potassium   Date Value Ref Range Status   11/04/2018 3.5 3.5 - 5.1 mmol/L " Final     Chloride   Date Value Ref Range Status   11/04/2018 114 (H) 95 - 110 mmol/L Final     CO2   Date Value Ref Range Status   11/04/2018 22 (L) 23 - 29 mmol/L Final     Glucose   Date Value Ref Range Status   11/04/2018 133 (H) 70 - 110 mg/dL Final     BUN, Bld   Date Value Ref Range Status   11/04/2018 27 (H) 8 - 23 mg/dL Final     Creatinine   Date Value Ref Range Status   11/04/2018 1.0 0.5 - 1.4 mg/dL Final     Calcium   Date Value Ref Range Status   11/04/2018 8.2 (L) 8.7 - 10.5 mg/dL Final     Total Protein   Date Value Ref Range Status   11/03/2018 5.4 (L) 6.0 - 8.4 g/dL Final     Albumin   Date Value Ref Range Status   11/03/2018 2.5 (L) 3.5 - 5.2 g/dL Final     Total Bilirubin   Date Value Ref Range Status   11/03/2018 0.2 0.1 - 1.0 mg/dL Final     Comment:     For infants and newborns, interpretation of results should be based  on gestational age, weight and in agreement with clinical  observations.  Premature Infant recommended reference ranges:  Up to 24 hours.............<8.0 mg/dL  Up to 48 hours............<12.0 mg/dL  3-5 days..................<15.0 mg/dL  6-29 days.................<15.0 mg/dL       Alkaline Phosphatase   Date Value Ref Range Status   11/03/2018 33 (L) 55 - 135 U/L Final     AST   Date Value Ref Range Status   11/03/2018 14 10 - 40 U/L Final     ALT   Date Value Ref Range Status   11/03/2018 9 (L) 10 - 44 U/L Final     Anion Gap   Date Value Ref Range Status   11/04/2018 4 (L) 8 - 16 mmol/L Final     eGFR if    Date Value Ref Range Status   11/04/2018 >60.0 >60 mL/min/1.73 m^2 Final     eGFR if non    Date Value Ref Range Status   11/04/2018 56.0 (A) >60 mL/min/1.73 m^2 Final     Comment:     Calculation used to obtain the estimated glomerular filtration  rate (eGFR) is the CKD-EPI equation.          Recurrent renal cell carcinoma of kidney, unspecified laterality    Adnexal mass    Weakness    Melena    Gastrointestinal hemorrhage, unspecified  gastrointestinal hemorrhage type  From carcinoma   Hypotension, unspecified hypotension type    Symptomatic anemia    Gastric carcinoma        Given her clinical state she will be admitted to the hospital for weakness and active GI bleeding. Dr Randhawa and I discussed her case. He will consider palliative surgical intervention for the GI tumor which is oozing blood. The pt and her daughter have again verbally agreed to hospice after her active bleed has been addressed. The patient would like to prolong her life with quality. DR Lewis was kind enough to accept the pt as a direct admit. The physicians on her case agree that she is indeed appropriate for hospice yet controlling her GI bleeding would prolong her survival and would add quality as well. Her usual meds shall be continued in the hospital       Thank you for allowing me to evaluate and participate in the care of this pleasant patient. Please fell free to call me with any questions or concerns.    Warmly,  Lauren Irene MD    This note was dictated with Dragon and briefly proofread. Please excuse any sentences that may be unclear or words misspelled

## 2018-11-06 NOTE — PROGRESS NOTES
Patient was direct admit from Dr. Irene's office due to patient started to feel dizziness, weakness and had black stools again. Patient continues to deny abdominal pain. Dr. Randhawa has seen the patient, spoke with daughter. Patient may be going to surgery on Thursday or Friday this week.   Patient is NPO.

## 2018-11-06 NOTE — H&P
PCP: Carine Baltazar NP    History & Physical    Chief Complaint: Symptomatic anemia    History of Present Illness:  Patient is a 73 y.o. female admitted to Hospitalist Service from Dr. Irene's office with complaints of dizziness and black stools. Patient reportedly has past medical history significant for Renal cell carcinoma s/p left nephrectomy, dementia, hypertension, hyperlipidemia, Chronic Kidney disease stage 3 and small bowel tumor. Patient recently underwent retroperitoneal hemorrhage s/p CT guided retroperitoneal mass biopsy (on 18).  Patient reportedly had metastatic lesions in the retro-peritoneaum and lungs. Patient was recently hospitalized at [facility] twice and continues to require multiple PRBC for symptomatic anemia. Earlier, patient was hospitalized at our facility from 10-22-18 to 10-25-18 for same. Patient decided not to pursue surgical intervention and stated not ready for hospice care despite recommendations by Dr. Irene. Now per daughter after palliative surgery would consider home hospice care. Patient is weak and dizzy for few days and regularly having black bowel movement. Patient denied any abdominal pain. Patient denied chest pain, shortness of breath, headache, vision changes, focal neuro-deficits, cough or fever.    Past Medical History:   Diagnosis Date    Cancer     kidney    Dementia     Encounter for blood transfusion     2018    High cholesterol 2018    Hypertension     No Medication     Renal disorder     Wears dentures     Uppers     Past Surgical History:   Procedure Laterality Date     SECTION      COLONOSCOPY N/A 2018    Procedure: COLONOSCOPY;  Surgeon: Torres Son MD;  Location: [facility] ENDO;  Service: Endoscopy;  Laterality: N/A;    COLONOSCOPY N/A 2018    Performed by Torres Son MD at [facility] ENDO    CYSTOSCOPY WITH RETROGRADE PYELOGRAM Left 1/15/2016    Performed by [facility]ell [facility]acon MD  at [facility] OR    EGD (ESOPHAGOGASTRODUODENOSCOPY) N/A 2018    [facility]formed by [facility] at [facility] ENDO    ESOPHAGOGASTRODUODENOSCOPY N/A 2018    Procedure: EGD (ESOPHAGOGASTRODUODENOSCOPY);  Surgeon: Torres Son MD;  Location: [facility] ENDO;  Service: Endoscopy;  Laterality: N/A;    EYE SURGERY      Rt eye Catarac    HYSTERECTOMY      IMAGING, GI TRACT, INTRALUMINAL, VIA CAPSULE N/A 2018    Performed by Rocco Ross [facility] at [facility] ENDO    INTRALUMINAL GASTROINTESTINAL TRACT IMAGING VIA CAPSULE N/A 2018    Procedure: IMAGING, GI TRACT, INTRALUMINAL, VIA CAPSULE;  Surgeon: Rocco Ross MD;  Location: [facility] ENDO;  Service: Endoscopy;  Laterality: N/A;    KIDNEY SURGERY Left 2018    Left kidney removed due to cancer    lasix surgery      NEPHRECTOMY-RADICAL   Left 2016    [facility]formed by [facility] at [facility] OR    REPAIR-HERNIA-VENTRAL N/A 2016    Performed by Marco A [facility]hawa MD at [facility] OR    RESECTION-BOWEL  2016    Performed by Marco A [facility]awa MD at [facility] OR    URETEROSCOPY  1/15/2016    Performed by [facility]ell [facility]acon MD at [facility] OR     History reviewed. No pertinent family history.  Social History     Tobacco Use    Smoking status: Former Smoker     Packs/day: 1.00     Years: 50.00     Pack years: 50.00     Last attempt to quit: 10/6/2018     Years since quittin.0    Smokeless tobacco: Never Used    Tobacco comment: pt has stopped; encouraged to remain stopped   Substance Use Topics    Alcohol use: No     Frequency: Never     Comment: last alcohol intake 2 weeks ago    Drug use: No      Review of patient's allergies indicates:   Allergen Reactions    Codeine Other (See Comments)     Pt shakes and hallucinates    Pcn [penicillins] Anxiety and Other (See Comments)     PTA Medications   Medication Sig    atorvastatin (LIPITOR) 20 MG tablet Take 20 mg by mouth once daily.    ferrous sulfate 325 (65 FE) MG EC tablet Take 325  mg by mouth once daily.    pantoprazole (PROTONIX) 40 MG tablet Take 1 tablet (40 mg total) by mouth once daily.    sucralfate (CARAFATE) 100 mg/mL suspension Take 10 mLs (1 g total) by mouth 4 (four) times daily before meals and nightly.     Review of Systems:  Constitutional: no fever or chills  Eyes: no visual changes  Ears, nose, mouth, throat, and face: no nasal congestion or sore throat  Respiratory: no cough or shorness of breath  Cardiovascular: no chest pain or palpitations  Gastrointestinal: see HPI  Genitourinary: no hematuria or dysuria  Integument/breast: no rash or pruritis  Hematologic/lymphatic: no easy bruising or lymphadenopathy  Musculoskeletal: no arthralgias or myalgias  Neurological: no seizures or tremors.  Behavioral/Psych: no auditory or visual hallucinations  Endocrine: no heat or cold intolerance     OBJECTIVE:     Vital Signs (Most Recent)    There were no vitals filed for this visit.    Physical Exam:  General appearance: well developed, appears stated age  Head: normocephalic, atraumatic  Eyes: Pallor conjunctivae/corneas clear. PERRL.  Nose: Nares normal. Septum midline.  Throat: lips, mucosa, and tongue normal; teeth and gums normal, no throat erythema.  Neck: supple, symmetrical, trachea midline, no JVD and thyroid not enlarged, symmetric, no tenderness/mass/nodules  Lungs:  clear to auscultation bilaterally and normal respiratory effort  Chest wall: no tenderness  Heart: regular rate and rhythm, S1, S2 normal, no murmur, click, rub or gallop  Abdomen: soft, non-tender non-distented; bowel sounds normal; no masses,  no organomegaly  Extremities: no cyanosis, clubbing or edema.   Pulses: 2+ and symmetric  Skin: Skin color, texture, turgor normal. No rashes or lesions.  Lymph nodes: Cervical, supraclavicular, and axillary nodes normal.  Neurologic: Normal strength and tone. No focal numbness or weakness. CNII-XII intact.      Laboratory:   CBC:   Recent Labs   Lab 11/04/18  0859    WBC 13.87*   RBC 3.31*   HGB 9.4*   HCT 28.7*      MCV 87   MCH 28.4   MCHC 32.8     CMP:   Recent Labs   Lab 11/03/18  1845 11/04/18  0859   * 133*   CALCIUM 8.5* 8.2*   ALBUMIN 2.5*  --    PROT 5.4*  --     140   K 3.0* 3.5   CO2 20* 22*    114*   BUN 36* 27*   CREATININE 1.1 1.0   ALKPHOS 33*  --    ALT 9*  --    AST 14  --    BILITOT 0.2  --    Diagnostic Results:  Capsule endoscopy (08-06-18):  1. Small bowel passage time as above  2. Apparent small bowel tumor, likely in the mid jejunum, as noted above with no active bleeding.     Colonoscopy (06-25-18):  Diverticulosis in the ascending colon. There was no evidence of diverticular bleeding.  One 5 mm polyp in the ascending colon, removed with a hot biopsy forceps. Resected and retrieved.     EGD (06-25-18):  - Normal esophagus.                        - Gastritis. Biopsied.                        - Normal duodenal bulb and second portion of the                         duodenum.    Assessment/Plan:      Severe symptomatic anemia  Iron deficiency anemia due to chronic blood loss [D50.0]  Acute GI blood loss anemia  Small bowel tumor  Admit to ICU. Discussed with Dr. Irene and patient's daughter.   Type and screen and will transfuse accordingly. Follow CBC.  Consult general surgeon for palliative resection of small intestine tumor.      Yes          Mesenteric mass [K63.9] - metastatic disease  Renal cell cancer [C64.9] s/p nephrectomy  Under care of oncologist.   Yes              Hypertension [I10]  Hold anti-HTN agents.     Moderate Malnutrition  Nutrition consulted. Encourage maximal PO intake. Diet supplementation ordered per nutrition approval. Will encourage PO and monitor closely for weight changes.  .   Yes    Dementia [F03.90]  Dementia is controlled currently. Continue home dementia meds and non-pharmacologic interventions to prevent delirium (No VS between 11PM-5AM, activity during day, opening blinds, providing  glasses/hearing aids, and up in chair during daytime). Use PRN anti-psychotics to prevent behavior of self harm during sundowning, and avoid narcotics and benzos unless absolutely necessary. PRN anti-psychotics prescribed to avoid self harm behaviors.   Yes      DVT prophylaxis: Use SCD and TEDs. Avoiding anticoagulation due to GI bleeding.    Sol Lewis MD  Department of Hospital Medicine   [facility]

## 2018-11-07 LAB
ALBUMIN SERPL BCP-MCNC: 2.5 G/DL
ALP SERPL-CCNC: 42 U/L
ALT SERPL W/O P-5'-P-CCNC: 8 U/L
ANION GAP SERPL CALC-SCNC: 9 MMOL/L
AST SERPL-CCNC: 12 U/L
BASOPHILS # BLD AUTO: 0 K/UL
BASOPHILS NFR BLD: 0.4 %
BILIRUB SERPL-MCNC: 0.9 MG/DL
BUN SERPL-MCNC: 29 MG/DL
CALCIUM SERPL-MCNC: 8.3 MG/DL
CHLORIDE SERPL-SCNC: 108 MMOL/L
CO2 SERPL-SCNC: 21 MMOL/L
CREAT SERPL-MCNC: 0.9 MG/DL
DIFFERENTIAL METHOD: ABNORMAL
EOSINOPHIL # BLD AUTO: 0.3 K/UL
EOSINOPHIL NFR BLD: 2.1 %
ERYTHROCYTE [DISTWIDTH] IN BLOOD BY AUTOMATED COUNT: 15 %
EST. GFR  (AFRICAN AMERICAN): >60 ML/MIN/1.73 M^2
EST. GFR  (NON AFRICAN AMERICAN): >60 ML/MIN/1.73 M^2
GLUCOSE SERPL-MCNC: 108 MG/DL
HCT VFR BLD AUTO: 29.8 %
HGB BLD-MCNC: 9.9 G/DL
LYMPHOCYTES # BLD AUTO: 1.9 K/UL
LYMPHOCYTES NFR BLD: 14.7 %
MAGNESIUM SERPL-MCNC: 1.5 MG/DL
MCH RBC QN AUTO: 28.9 PG
MCHC RBC AUTO-ENTMCNC: 33.2 G/DL
MCV RBC AUTO: 87 FL
MONOCYTES # BLD AUTO: 0.8 K/UL
MONOCYTES NFR BLD: 6 %
NEUTROPHILS # BLD AUTO: 10 K/UL
NEUTROPHILS NFR BLD: 76.8 %
PLATELET # BLD AUTO: 383 K/UL
PMV BLD AUTO: 7.5 FL
POTASSIUM SERPL-SCNC: 3.4 MMOL/L
PROT SERPL-MCNC: 5.3 G/DL
RBC # BLD AUTO: 3.43 M/UL
SODIUM SERPL-SCNC: 138 MMOL/L
WBC # BLD AUTO: 13.1 K/UL

## 2018-11-07 PROCEDURE — 99223 1ST HOSP IP/OBS HIGH 75: CPT | Mod: ,,, | Performed by: INTERNAL MEDICINE

## 2018-11-07 PROCEDURE — 99233 SBSQ HOSP IP/OBS HIGH 50: CPT | Mod: ,,, | Performed by: INTERNAL MEDICINE

## 2018-11-07 PROCEDURE — 94761 N-INVAS EAR/PLS OXIMETRY MLT: CPT

## 2018-11-07 PROCEDURE — 80053 COMPREHEN METABOLIC PANEL: CPT

## 2018-11-07 PROCEDURE — 36430 TRANSFUSION BLD/BLD COMPNT: CPT

## 2018-11-07 PROCEDURE — 36415 COLL VENOUS BLD VENIPUNCTURE: CPT

## 2018-11-07 PROCEDURE — 63600175 PHARM REV CODE 636 W HCPCS: Performed by: PHYSICIAN ASSISTANT

## 2018-11-07 PROCEDURE — 25000003 PHARM REV CODE 250: Performed by: INTERNAL MEDICINE

## 2018-11-07 PROCEDURE — 85025 COMPLETE CBC W/AUTO DIFF WBC: CPT

## 2018-11-07 PROCEDURE — 97802 MEDICAL NUTRITION INDIV IN: CPT

## 2018-11-07 PROCEDURE — 83735 ASSAY OF MAGNESIUM: CPT

## 2018-11-07 PROCEDURE — 99223 1ST HOSP IP/OBS HIGH 75: CPT | Mod: ,,, | Performed by: SURGERY

## 2018-11-07 PROCEDURE — 12000002 HC ACUTE/MED SURGE SEMI-PRIVATE ROOM

## 2018-11-07 PROCEDURE — 63600175 PHARM REV CODE 636 W HCPCS: Performed by: INTERNAL MEDICINE

## 2018-11-07 RX ORDER — MAGNESIUM GLUCONATE 27 MG(500)
54 TABLET ORAL ONCE
Status: COMPLETED | OUTPATIENT
Start: 2018-11-07 | End: 2018-11-07

## 2018-11-07 RX ORDER — POTASSIUM CHLORIDE 20 MEQ/1
20 TABLET, EXTENDED RELEASE ORAL ONCE
Status: COMPLETED | OUTPATIENT
Start: 2018-11-07 | End: 2018-11-07

## 2018-11-07 RX ADMIN — FUROSEMIDE 20 MG: 10 INJECTION, SOLUTION INTRAMUSCULAR; INTRAVENOUS at 03:11

## 2018-11-07 RX ADMIN — POTASSIUM CHLORIDE 20 MEQ: 20 TABLET, EXTENDED RELEASE ORAL at 09:11

## 2018-11-07 RX ADMIN — Medication 54 MG: at 09:11

## 2018-11-07 RX ADMIN — SODIUM CHLORIDE: 0.45 INJECTION, SOLUTION INTRAVENOUS at 06:11

## 2018-11-07 RX ADMIN — CEFTRIAXONE 1 G: 1 INJECTION, SOLUTION INTRAVENOUS at 02:11

## 2018-11-07 NOTE — PLAN OF CARE
Problem: Patient Care Overview  Goal: Plan of Care Review  Outcome: Ongoing (interventions implemented as appropriate)  Patient with eventful night, received 3 units of RBC with Lasix given after 2nd and 3rd unit, so up to bedside commode multiple times. VS stable throughout. No complaints voiced except feeling weak. NSR on monitor. Tolerating clear liquids well. Received Rocephin for UTI after NP notified of lab results of UTI. Will continue to monitor labs, call light at bedside.

## 2018-11-07 NOTE — EICU
New admit    74 y/o F s/p left radical nephrectomy with colonic resection 2016 for renal cell CA.  She underwent CT guided core biopsy of left upper quadrant intraperitoneal mass last August and histopath is consistent with metastatic disease.  Capsule endoscopy (8/6/18) showed small julisa tumor around mid jejunum with no active bleeding at that time.  Most recent CT scan last month concerning for disease progression with enlargement of mass along the left nephrectomy bed and enlargement of target and non target peritoneal implants. Pulmonary nodules are unchanged but remain concerning for metastatic disease.    Patient now presents with dizziness and black stools, found to be anemic H/H 7/21.    Camera assessment:  Appears comfortable, not in distress, family at bedside    Telemetry:  /71  HR 90  O2 96%    · Symptomatic anemia, hemodynamically stable, likely from small bowel tumor which is likely a metastatic lesion.  Transfuse as necessary.  · Metastatic renal cell CA, discussion ongoing regarding proceeding with palliative care

## 2018-11-07 NOTE — PLAN OF CARE
2nd unit of RBC started, VS stable. Patient up to bedside commode, voiding without difficulty. No complaints voiced. Family at bedside.

## 2018-11-07 NOTE — PROGRESS NOTES
Progress Note  Hospital Medicine  Patient Name:Oriana Tobar  MRN:  [id number]  Patient Class: IP- Inpatient  Admit Date: 11/6/2018  Length of Stay: 1 days  Expected Discharge Date:   Attending Physician: Sol Lewis MD  Primary Care Provider:  Carine Baltazar NP    SUBJECTIVE:     Principal Problem: GI bleed  Initial history of present illness: Patient is a 73 y.o. female admitted to Hospitalist Service from Dr. Irene's office with complaints of dizziness and black stools. Patient reportedly has past medical history significant for Renal cell carcinoma s/p left nephrectomy, dementia, hypertension, hyperlipidemia, Chronic Kidney disease stage 3 and small bowel tumor. Patient recently underwent retroperitoneal hemorrhage s/p CT guided retroperitoneal mass biopsy (on 08-30-18).  Patient reportedly had metastatic lesions in the retro-peritoneaum and lungs. Patient was recently hospitalized at [facility] twice and continues to require multiple PRBC for symptomatic anemia. Earlier, patient was hospitalized at our facility from 10-22-18 to 10-25-18 for same. Patient decided not to pursue surgical intervention and stated not ready for hospice care despite recommendations by Dr. Irene. Now per daughter after palliative surgery would consider home hospice care. Patient is weak and dizzy for few days and regularly having black bowel movement. Patient denied any abdominal pain. Patient denied chest pain, shortness of breath, headache, vision changes, focal neuro-deficits, cough or fever.    PMH/PSH/SH/FH/Meds: reviewed.    Symptoms/Review of Systems: Feeling better after 3 units of PRBC. Surgery scheduled for 11-09-18 but patient is refusing to undergo surgery, two daughters at bedside. No shortness of breath, cough, chest pain or headache, fever or abdominal pain.     Diet:  Adequate intake.    Activity level: Normal.    Pain:  Patient reports no pain.       OBJECTIVE:   Vital Signs (Most  Recent):      Temp: 98.4 °F (36.9 °C) (11/07/18 0325)  Pulse: 69 (11/07/18 0325)  Resp: (!) 53 (11/07/18 0325)  BP: (!) 147/63 (11/07/18 0325)  SpO2: 100 % (11/07/18 0325)       Vital Signs Range (Last 24H):  Temp:  [98.2 °F (36.8 °C)-98.8 °F (37.1 °C)]   Pulse:  []   Resp:  [12-53]   BP: ()/(54-89)   SpO2:  [100 %]     Weight: 74.4 kg (164 lb 0.4 oz)  Body mass index is 22.88 kg/m².    Intake/Output Summary (Last 24 hours) at 11/7/2018 0856  Last data filed at 11/7/2018 0500  Gross per 24 hour   Intake 736.25 ml   Output 3825 ml   Net -3088.75 ml     Physical Examination:  General appearance: well developed, appears stated age  Head: normocephalic, atraumatic  Eyes: Pallor conjunctivae/corneas clear. PERRL.  Nose: Nares normal. Septum midline.  Throat: lips, mucosa, and tongue normal; teeth and gums normal, no throat erythema.  Neck: supple, symmetrical, trachea midline, no JVD and thyroid not enlarged, symmetric, no tenderness/mass/nodules  Lungs:  clear to auscultation bilaterally and normal respiratory effort  Chest wall: no tenderness  Heart: regular rate and rhythm, S1, S2 normal, no murmur, click, rub or gallop  Abdomen: soft, non-tender non-distented; bowel sounds normal; no masses,  no organomegaly  Extremities: no cyanosis, clubbing or edema.   Pulses: 2+ and symmetric  Skin: Skin color, texture, turgor normal. No rashes or lesions.  Lymph nodes: Cervical, supraclavicular, and axillary nodes normal.  Neurologic: Normal strength and tone. No focal numbness or weakness. CNII-XII intact.      CBC:  Recent Labs   Lab 11/04/18  0859 11/06/18  1540 11/07/18  0358   WBC 13.87* 14.80* 13.10*   RBC 3.31* 2.46* 3.43*   HGB 9.4* 7.0* 9.9*   HCT 28.7* 21.3* 29.8*    457* 383*   MCV 87 86 87   MCH 28.4 28.5 28.9   MCHC 32.8 33.0 33.2   BMP  Recent Labs   Lab 11/04/18  0859 11/06/18  1540 11/07/18  0358   * 107 108    136 138   K 3.5 3.6 3.4*   * 108 108   CO2 22* 24 21*   BUN 27*  31* 29*   CREATININE 1.0 0.9 0.9   CALCIUM 8.2* 8.5* 8.3*   MG  --  1.7 1.5*      Diagnostic Results:  Microbiology Results (last 7 days)     Procedure Component Value Units Date/Time    Urine culture [[id number]] Collected:  11/06/18 1752    Order Status:  Sent Specimen:  Urine, Clean Catch Updated:  11/07/18 0517         Capsule endoscopy (08-06-18):  1. Small bowel passage time as above  2. Apparent small bowel tumor, likely in the mid jejunum, as noted above with no active bleeding.     Colonoscopy (06-25-18):  Diverticulosis in the ascending colon. There was no evidence of diverticular bleeding.  One 5 mm polyp in the ascending colon, removed with a hot biopsy forceps. Resected and retrieved.     EGD (06-25-18):  - Normal esophagus.                        - Gastritis. Biopsied.                        - Normal duodenal bulb and second portion of the                         duodenum.    Assessment/Plan:      Severe symptomatic anemia  Iron deficiency anemia due to chronic blood loss [D50.0]  Acute GI blood loss anemia  Small bowel tumor  Type and screen and will transfuse accordingly. Follow CBC.  Follow Dr. Randhawa's recommedations for palliative resection of small intestine tumor.      Yes              Mesenteric mass [K63.9] - metastatic disease  Renal cell cancer [C64.9] s/p nephrectomy  Under care of oncologist.   Yes              Hypertension [I10]  Hold anti-HTN agents.     Moderate Malnutrition  Encourage maximal PO intake. Diet supplementation ordered per nutrition approval. Will encourage PO and monitor closely for weight changes.    Hypokalemia  Replete KCl. Follow BMP.    Hypomagnesemia  Replete Mag and follow level.    UTI  Urine C/S. Continue IV Rocephin.  .   Yes    Dementia [F03.90]  Dementia is controlled currently. Continue home dementia meds and non-pharmacologic interventions to prevent delirium (No VS between 11PM-5AM, activity during day, opening blinds, providing glasses/hearing aids, and  up in chair during daytime). Use PRN anti-psychotics to prevent behavior of self harm during sundowning, and avoid narcotics and benzos unless absolutely necessary. PRN anti-psychotics prescribed to avoid self harm behaviors.   Yes      DVT prophylaxis: Use SCD and TEDs. Avoiding anticoagulation due to GI bleeding.    I had a long discussion with patient and daughter. Patient is without good mind and clearly tells me, she does not wish to undergo any surgery. Daughter is interested in home hospice. Discussed with SW and CM who will assist. Family to discuss with Dr. Randhawa and Dr. Irene. Will abide by their wishes. Time spent in care of the patient, counseling and coordination of care (Greater than 50% spent in direct face to face contact): 35 min.    Transfer to medical floor.    Sol Lewis MD  Department of Hospital Medicine   [facility]

## 2018-11-07 NOTE — PROGRESS NOTES
Pt remains free of falls, VS stable, RR even and unlabored, Abd non-distended, BS in all four quadrants, clear speech, makes needs known, bed in low position with wheels locked call light in reach, family at bedside, no signs of distress at this time

## 2018-11-07 NOTE — NURSING
Report given to MIKE Galvin on 3rd floor.  Telemetry monitor placed on pt.  Pt then brought to room 322 via wheelchair in stable condition.  She was accompanied by RN and her daughter.

## 2018-11-07 NOTE — PLAN OF CARE
11/06/18 1934   Patient Assessment/Suction   Level of Consciousness (AVPU) alert   Respiratory Effort Unlabored   PRE-TX-O2-ETCO2   O2 Device (Oxygen Therapy) room air   SpO2 100 %   Pulse Oximetry Type Continuous   $ Pulse Oximetry - Multiple Charge Pulse Oximetry - Multiple   Pulse 78   Resp (!) 24

## 2018-11-07 NOTE — SUBJECTIVE & OBJECTIVE
No current facility-administered medications on file prior to encounter.      Current Outpatient Medications on File Prior to Encounter   Medication Sig    atorvastatin (LIPITOR) 20 MG tablet Take 20 mg by mouth once daily.    ferrous sulfate 325 (65 FE) MG EC tablet Take 325 mg by mouth once daily.    pantoprazole (PROTONIX) 40 MG tablet Take 1 tablet (40 mg total) by mouth once daily.    sucralfate (CARAFATE) 100 mg/mL suspension Take 10 mLs (1 g total) by mouth 4 (four) times daily before meals and nightly.       Review of patient's allergies indicates:   Allergen Reactions    Codeine Other (See Comments)     Pt shakes and hallucinates    Pcn [penicillins] Anxiety and Other (See Comments)       Past Medical History:   Diagnosis Date    Cancer     kidney    Dementia     Encounter for blood transfusion     2018    High cholesterol 2018    Hypertension     No Medication     Renal disorder     Wears dentures     Uppers     Past Surgical History:   Procedure Laterality Date     SECTION      COLONOSCOPY N/A 2018    Procedure: COLONOSCOPY;  Surgeon: Torres Son MD;  Location: [facility];  Service: Endoscopy;  Laterality: N/A;    COLONOSCOPY N/A 2018    Performed by Torres Son [facility] at [facility] ENDO    CYSTOSCOPY WITH RETROGRADE PYELOGRAM Left 1/15/2016    [facility]    EGD (ESOPHAGOGASTRODUODENOSCOPY) N/A 2018    Performed by Torres Son [facility] at [facility] ENDO    ESOPHAGOGASTRODUODENOSCOPY N/A 2018    Procedure: EGD (ESOPHAGOGASTRODUODENOSCOPY);  Surgeon: Torres Son MD;  Location: [facility];  Service: Endoscopy;  Laterality: N/A;    EYE SURGERY      Rt eye Catarac    HYSTERECTOMY      IMAGING, GI TRACT, INTRALUMINAL, VIA CAPSULE N/A 2018    [facility]    INTRALUMINAL GASTROINTESTINAL TRACT IMAGING VIA CAPSULE N/A 2018    Procedure: IMAGING, GI  TRACT, INTRALUMINAL, VIA CAPSULE;  Surgeon: Rocco Ross MD;  Location: [facility];  Service: Endoscopy;  Laterality: N/A;    KIDNEY SURGERY Left 2018    Left kidney removed due to cancer    lasix surgery      NEPHRECTOMY-RADICAL   Left 2016    [facility] OR    REPAIR-HERNIA-VENTRAL N/A 2016    Performed by Marco A [facility]hawa [facility] at [facility] OR    RESECTION-BOWEL  2016    Performed by Marco A [facility]hawa MD at [facility] OR    URETEROSCOPY  1/15/2016    Performed by [facility]ell [facility]acon [facility] at [facility] OR     Family History     None        Tobacco Use    Smoking status: Former Smoker     Packs/day: 1.00     Years: 50.00     Pack years: 50.00     Last attempt to quit: 10/6/2018     Years since quittin.0    Smokeless tobacco: Never Used    Tobacco comment: pt has stopped; encouraged to remain stopped   Substance and Sexual Activity    Alcohol use: No     Frequency: Never     Comment: last alcohol intake 2 weeks ago    Drug use: No    Sexual activity: No     Review of Systems   Constitutional: Negative for appetite change, chills, diaphoresis, fatigue, fever and unexpected weight change.   HENT: Negative for hearing loss, sore throat, trouble swallowing and voice change.    Eyes: Negative for visual disturbance.   Respiratory: Negative for cough, shortness of breath and wheezing.    Cardiovascular: Negative for chest pain, palpitations and leg swelling.   Gastrointestinal: Positive for blood in stool. Negative for abdominal distention, abdominal pain, anal bleeding, constipation, diarrhea, nausea, rectal pain and vomiting.   Genitourinary: Negative for difficulty urinating, dysuria, flank pain, frequency, hematuria, menstrual problem and urgency.   Musculoskeletal: Negative for arthralgias, back pain, joint swelling, myalgias and neck pain.   Skin: Negative for pallor and rash.   Neurological: Negative for dizziness, syncope, weakness and headaches.    Hematological: Negative for adenopathy. Does not bruise/bleed easily.   Psychiatric/Behavioral: Positive for confusion and decreased concentration. Negative for suicidal ideas. The patient is not nervous/anxious.      Objective:     Vital Signs (Most Recent):  Temp: 98.4 °F (36.9 °C) (11/07/18 0800)  Pulse: 98 (11/07/18 0900)  Resp: (!) 39 (11/07/18 0900)  BP: (!) 127/59 (11/07/18 0900)  SpO2: 100 % (11/07/18 0900) Vital Signs (24h Range):  Temp:  [98.2 °F (36.8 °C)-98.8 °F (37.1 °C)] 98.4 °F (36.9 °C)  Pulse:  [] 98  Resp:  [12-53] 39  SpO2:  [100 %] 100 %  BP: ()/(54-89) 127/59     Weight: 74.4 kg (164 lb 0.4 oz)  Body mass index is 22.88 kg/m².    Physical Exam   Constitutional: She is oriented to person, place, and time. She appears well-developed and well-nourished. No distress.   HENT:   Head: Normocephalic and atraumatic.   Right Ear: External ear normal.   Left Ear: External ear normal.   Eyes: Conjunctivae are normal. Pupils are equal, round, and reactive to light. Right eye exhibits no discharge. Left eye exhibits no discharge.   Neck: No tracheal deviation present. No thyromegaly present.   Cardiovascular: Normal rate and regular rhythm.   Pulmonary/Chest: Effort normal. No respiratory distress.   Abdominal: Soft. She exhibits no distension and no mass. There is no tenderness. There is no guarding.   Musculoskeletal: She exhibits no edema or tenderness.   Lymphadenopathy:     She has no cervical adenopathy.   Neurological: She is alert and oriented to person, place, and time. No cranial nerve deficit.   Skin: Skin is warm and dry. No rash noted. She is not diaphoretic. No pallor.   Psychiatric: She has a normal mood and affect. Her behavior is normal. Judgment and thought content normal.   Nursing note and vitals reviewed.      Significant Labs:  CBC:   Recent Labs   Lab 11/07/18  0358   WBC 13.10*   RBC 3.43*   HGB 9.9*   HCT 29.8*   *   MCV 87   MCH 28.9   MCHC 33.2     CMP:    Recent Labs   Lab 11/07/18  0358      CALCIUM 8.3*   ALBUMIN 2.5*   PROT 5.3*      K 3.4*   CO2 21*      BUN 29*   CREATININE 0.9   ALKPHOS 42*   ALT 8*   AST 12   BILITOT 0.9     Coagulation:   Recent Labs   Lab 11/06/18  1540   LABPROT 10.5   INR 1.0   APTT 24.0       Significant Diagnostics:  I have reviewed all pertinent imaging results/findings within the past 24 hours.

## 2018-11-07 NOTE — PLAN OF CARE
Problem: Patient Care Overview  Goal: Plan of Care Review  Outcome: Ongoing (interventions implemented as appropriate)  Pt on room air with 100% sats.

## 2018-11-07 NOTE — PROGRESS NOTES
" [facility]  Adult Nutrition  Progress Note    SUMMARY    Intervention: Nutrition Supplement therapy-commercial beverage     Recommendation:  1) Add Boost Breeze with meals while on clear liquids   2) advance to regular diet, teture per SLP as soon as medicaly able + Boost Plus BID (strawberry or vanilla to promote PO intake)   3) If unable to advance diet in < 4-5 days rec. 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 Pt with active mets. CA, 14% wt loss in the last 5 months. Plan for palliative sx. 11/9 and then possibly home with hospice. Pt on clear liquids x 1 day (was NPO yesterday) noted to have no appetite. NFPE done today, pt with mild-moderate muscle/fat wasting in upper body.     Nutrition Discharge Planning: To be determined-possible hospice?    Nutrition Risk Screen    Nutrition Risk Screen: other (see comments)(gi bleeding)    Nutrition/Diet History    Patient Reported Diet/Restrictions/Preferences: general  Do you have any cultural, spiritual, Taoist conflicts, given your current situation?: none  Food Allergies: NKFA(or intolerances)    Anthropometrics    Temp: 98.4 °F (36.9 °C)  Height Method: Measured  Height: 5' 11"(Office 11/6/18)  Height (inches): 71 in  Weight Method: Bed Scale  Weight: 74.4 kg (164 lb 0.4 oz)  Weight (lb): 164.02 lb  Ideal Body Weight (IBW), Female: 155 lb  % Ideal Body Weight, Female (lb): " 105.82 lb  BMI (Calculated): 22.9  BMI Grade: (Underweight for age)  Weight Loss: unintentional  Usual Body Weight (UBW), k.4 kg 18 (was lowest 72.6 kg 18)  % Usual Body Weight: 88    Lab/Procedures/Meds    Pertinent Labs Reviewed: reviewed  BMP  Lab Results   Component Value Date     2018    K 3.4 (L) 2018     2018    CO2 21 (L) 2018    BUN 29 (H) 2018    CREATININE 0.9 2018    CALCIUM 8.3 (L) 2018    ANIONGAP 9 2018    ESTGFRAFRICA >60 2018    EGFRNONAA >60 2018     Lab Results   Component Value Date    ALBUMIN 2.5 (L) 2018       Pertinent Medications Reviewed: reviewed  Pertinent Medications Comments: KCl, 1/2 NS @ 75 ml/hr    Physical Findings/Assessment    Overall Physical Appearance: loss of subcutaneous fat, loss of muscle mass(Thin not wasted moderate muscle/fat loss @ clavicle and scapula/ mild @ temples, bicep, and shoulder)  Oral/Mouth Cavity: dental applicance present (specify)  Skin: intact(Baljit = 18)    Estimated/Assessed Needs    Weight Used For Calorie Calculations: 74.4 kg (164 lb 0.4 oz)  Energy Calorie Requirements (kcal): [place] Jeor = x 1.5 wt gain/ CA = 2015 kcal  Energy Need Method: [place] Jeor  Protein Requirements: 1.2 g protein/kg (CA/age and muscle loss) = 89 g protein  Weight Used For Protein Calculations: 74.4 kg (164 lb 0.4 oz)  Fluid Requirements (mL): 2000 ml  Fluid Need Method: RDA Method           Nutrition Prescription Ordered    Current Diet Order: Clear Liquids    Evaluation of Received Nutrient/Fluid Intake    Energy Calories Required: not meeting needs  Protein Required: not meeting needs  Fluid Required: meeting needs  Comments: 1/2 NS @ 75 ml/hr (90% needs)  Tolerance: tolerating  % Intake of Estimated Energy Needs: <20%  % Meal Intake: 0-25%    Nutrition Risk    Level of Risk/Frequency of Follow-up: moderate - high 2 x weekly     Assessment and Plan    Malnutrition of  moderate degree    Contributing Nutrition Diagnosis  Moderate chronic malnutrition    Related to (etiology):   Decreased appetite and increase in energy expenditure d/t mets. CA    Signs and Symptoms (as evidenced by):   1) moderate muscle/fat loss @ clavicle and scapula/ mild @ temples, bicep, and shoulder 2) 14% wt loss x 5 months     Interventions/Recommendations (treatment strategy):  1) Boost Breeze on clear liquids, Boost plus once diet advanced BID 2) If unable to advance diet to goal in < 5 days rec. Supplemental nutrition support if consistent with goals of care    Nutrition Diagnosis Status:   New            Monitor and Evaluation    Food and Nutrient Intake: energy intake  Food and Nutrient Adminstration: diet order  Anthropometric Measurements: weight  Biochemical Data, Medical Tests and Procedures: electrolyte and renal panel  Nutrition-Focused Physical Findings: overall appearance     Nutrition Follow-Up    RD Follow-up?: Yes

## 2018-11-07 NOTE — ASSESSMENT & PLAN NOTE
Contributing Nutrition Diagnosis  Moderate chronic malnutrition    Related to (etiology):   Decreased appetite and increase in energy expenditure d/t mets. CA    Signs and Symptoms (as evidenced by):   1) moderate muscle/fat loss @ clavicle and scapula/ mild @ temples, bicep, and shoulder 2) 14% wt loss x 5 months     Interventions/Recommendations (treatment strategy):  1) Boost Breeze on clear liquids, Boost plus once diet advanced BID 2) If unable to advance diet to goal in < 5 days rec. Supplemental nutrition support if consistent with goals of care    Nutrition Diagnosis Status:   New

## 2018-11-07 NOTE — HPI
Patient is a 73 y.o. female admitted to Hospitalist Service from Dr. Irene's office with complaints of dizziness and black stools. Patient reportedly has past medical history significant for Renal cell carcinoma s/p left nephrectomy, dementia, hypertension, hyperlipidemia, Chronic Kidney disease stage 3 and small bowel tumor. Patient recently underwent retroperitoneal hemorrhage s/p CT guided retroperitoneal mass biopsy (on 08-30-18).  Patient reportedly had metastatic lesions in the retro-peritoneaum and lungs. Patient was recently hospitalized at [facility] twice and continues to require multiple PRBC for symptomatic anemia. Earlier, patient was hospitalized at our facility from 10-22-18 to 10-25-18 for same. Patient decided not to pursue surgical intervention and stated not ready for hospice care despite recommendations by Dr. Irene. Now per daughter after palliative surgery would consider home hospice care. Patient is weak and dizzy for few days and regularly having black bowel movement. Patient denied any abdominal pain. Patient denied chest pain, shortness of breath, headache, vision changes, focal neuro-deficits, cough or fever.

## 2018-11-07 NOTE — ASSESSMENT & PLAN NOTE
Patient with multiple cancers with evidence of bowel invasion and GI bleeding.  Hospice advised but family does not want to agree.  The would like surgical exploration to see if the bleeding mass is resectable.  All aspects of procedure including risks and possible complications were discussed in detail with the family who want to proceed with procedure.    Unable to get patient on the OR schedule till 11/9.  Will schedule for then.  Will discuss with Dr. Lewis and Dr. Irene

## 2018-11-07 NOTE — PLAN OF CARE
Problem: Nutrition, Imbalanced: Inadequate Oral Intake (Adult)  Intervention: Promote/Optimize Nutrition   Intervention: Nutrition Supplement therapy-commercial beverage      Recommendation:  1) Add Boost Breeze with meals while on clear liquids   2) advance to regular diet, teture per SLP as soon as medicaly able + Boost Plus BID (strawberry or vanilla to promote PO intake)   3) If unable to advance diet in < 4-5 days rec. supplemental nutrition support if consistent with goals of care     Goals: 1) Diet advanced or pt with supplemental nutrition support in < 5 days  Nutrition Goal Status: new  Communication of RD Recs: (POC, sticky note, second sign)

## 2018-11-07 NOTE — PLAN OF CARE
11/07/18 1235   Discharge Assessment   Assessment Type Discharge Planning Assessment   Confirmed/corrected address and phone number on facesheet? Yes   Assessment information obtained from? Patient;Other  (daughter and grand-daughter at the bedside )   Expected Length of Stay (days) 3   Communicated expected length of stay with patient/caregiver yes   Prior to hospitilization cognitive status: Alert/Oriented   Prior to hospitalization functional status: Independent   Current cognitive status: Alert/Oriented   Current Functional Status: Independent   Lives With grandchild(samy)   Able to Return to Prior Arrangements yes   Is patient able to care for self after discharge? Yes   Patient's perception of discharge disposition admitted as an inpatient   Readmission Within The Last 30 Days other (see comments)  (multiple admission between this facility and [facility] for same diagnosis)   Patient currently being followed by outpatient case management? No   Patient currently receives any other outside agency services? No   Equipment Currently Used at Home wheelchair;hospital bed;walker, rolling   Do you have any problems affording any of your prescribed medications? No   Is the patient taking medications as prescribed? yes   Does the patient have transportation home? Yes   Transportation Available family or friend will provide   Does the patient receive services at the [facility]? No   Discharge Plan A Home Health   Discharge Plan B Hospice/home   Patient/Family In Agreement With Plan other (see comments)  (undecided at this time)     Spoke with the pt's daughter and grand-daughter at the bedside (pt in the bathroom); the pt does NOT want surgery. The pt's daughterMaryjane states they are waiting on her brother to come to decide about going home with hospice but she does not see the need for hospice since the pt has no needs at this time. I explained that if the pt signed up with hospice now they would provide  services as needed and increase them as needed. She believes [facility] also has a hospice company. I left my name and number on the white board in the pt's room, they will call me once they have made decision regarding home hospice....MIKE [place] CM

## 2018-11-07 NOTE — PLAN OF CARE
3rd unit RBC completed with Lasix 20mg IVP given as ordered. Up to bedside commode multiple times to void. No complaints voiced, except stating her legs are getting tired of getting out of bed. Call light at bedside.

## 2018-11-07 NOTE — CONSULTS
[facility]  Hematology/Oncology  Consult Note    Patient Name: Oriana Tobar  MRN: [id number]  Admission Date: 11/6/2018  Hospital Length of Stay: 1 days  Code Status: Full Code   Attending Provider: Sol Lewis MD  Consulting Provider: Lauren Irene MD  Primary Care Physician: Carine Baltazar NP  Principal Problem:GI bleed  November 7   Consults  Subjective:     HPI:   This is a 73-year-old female who presented to the emergency room on October 22nd with symptomatic anemia and a history of GI bleed.  She had been seen by GI for EGD and colonoscopy with were negative yet video capsule testing had shown  oozing from an ulcerated small bowel tumor.CT abdomen revealed extensive masses throughout .  Pt had a biopsy of the left upper quadrant intraperitoneal mass August 30, 2018 which revealed findings consistent with past renal cell carcinoma,  Since then she has been experiencing increasing shortness of breath associated with melena dizziness and weakness associated with anemia Pt with evidence of bleeding from GI tumor.  Yesterday in clinic she was profoundly weak, hypotensive and had received 4 units of blood at [facility] on Sunday for severe sympttomatic anemia. Her hemoglobin was greaer than 9 after transfusion. SHe was admitted to the hospital with active bleeding     Medications:  Continuous Infusions:   sodium chloride 0.45% 75 mL/hr at 11/07/18 0318     Scheduled Meds:   cefTRIAXone 1 g in dextrose 5 % 50 mL  1 g Intravenous Q24H     PRN Meds:ALPRAZolam, dextrose 50%, dextrose 50%, furosemide, glucagon (human recombinant), glucose, glucose, sodium chloride 0.9%     Review of patient's allergies indicates:   Allergen Reactions    Codeine Other (See Comments)     Pt shakes and hallucinates    Pcn [penicillins] Anxiety and Other (See Comments)        Past Medical History:   Diagnosis Date    Cancer     kidney    Dementia     Encounter for blood transfusion      2018    High cholesterol 2018    Hypertension     No Medication     Renal disorder     Wears dentures     Uppers     Past Surgical History:   Procedure Laterality Date     SECTION      COLONOSCOPY N/A 2018    Procedure: COLONOSCOPY;  Surgeon: Torres Son MD;  Location: [facility];  Service: Endoscopy;  Laterality: N/A;    COLONOSCOPY N/A 2018    [facility]    CYSTOSCOPY WITH RETROGRADE PYELOGRAM Left 1/15/2016    [facility] by [facility] at [facility] OR    EGD (ESOPHAGOGASTRODUODENOSCOPY) N/A 2018    [facility] by [facility] at [facility]    ESOPHAGOGASTRODUODENOSCOPY N/A 2018    Procedure: EGD (ESOPHAGOGASTRODUODENOSCOPY);  Surgeon: Torres Son MD;  Location: [facility];  Service: Endoscopy;  Laterality: N/A;    EYE SURGERY      Rt eye Catarac    HYSTERECTOMY      IMAGING, GI TRACT, INTRALUMINAL, VIA CAPSULE N/A 2018    [facility]    INTRALUMINAL GASTROINTESTINAL TRACT IMAGING VIA CAPSULE N/A 2018    Procedure: IMAGING, GI TRACT, INTRALUMINAL, VIA CAPSULE;  Surgeon: Rocco Ross MD;  Location: [facility];  Service: Endoscopy;  Laterality: N/A;    KIDNEY SURGERY Left 2018    Left kidney removed due to cancer    lasix surgery      NEPHRECTOMY-RADICAL   Left 2016    [facility]    REPAIR-HERNIA-VENTRAL N/A 2016    [facility]awa [facility] at [facility] OR    RESECTION-BOWEL  2016    [facility] at [facility] OR    URETEROSCOPY  1/15/2016    Per[facility] by [facility] at [facility] OR     Family History     None        Tobacco Use    Smoking status: Former Smoker     Packs/day: 1.00     Years: 50.00     Pack years: 50.00     Last attempt to quit: 10/6/2018     Years since quittin.0    Smokeless tobacco: Never Used    Tobacco comment: pt has  stopped; encouraged to remain stopped   Substance and Sexual Activity    Alcohol use: No     Frequency: Never     Comment: last alcohol intake 2 weeks ago    Drug use: No    Sexual activity: No       Review of Systems     Review of Systems:  General: Weight gain: No, Weight Loss: No, Fatigue: Y  Fever: No, Chills: No, Night Sweats: No, Insomnia: No, Excessive sleeping: No   Respiratory:  Cough: No, Shortness of Breath:Y  Wheezing: No, Excessive Snoring: No, Coughing up blood: No  Endocrine: Heat Intolerance: No, Cold Intolerance: No,   Excessive Thirst: No, Excessive Hunger: No,   Eyes:  Blurred Vision: No, Double Vision: No,   Light Sensitivity: No, Eye pain: No  Musculoskeletal: Muscle Aches/Pain: No, Joint Pain/Swelling: No, Muscle Cramps: No, Muscle Weakness: Y Neck Pain: No, Back Pain: No   Neurological: Difficulty Walking/Falls: No  Headache Migraine: No, Dizziness/Vertigo: Y Fainting: No, Difficulty with Speech: No, Weakness: No, Tingling/Numbness: Y Tremors: No,  Memory Problems: Y Seizures: No, Difficulty Swallowing: No, Altered Taste: Y  Cardiovascular: Chest Pain: No, Shortness of Breath: Y   Palpitations: No,  Gastrointestinal: Nausea/Vomiting: No, Constipation: No, Diarrhea: No, Bloody Stools: Y, + melena  Psych/Cog:  Depression: No, Anxiety: No, Hallucinations: No, Problems Concentrating: No  : Frequent Urination: No, Incontinence: No, Blood of Urine: No, Urinary Infections  ENT:Hearing Loss: No, Earache: No, Ringing in Ears: No,   Facial Pain: No, Chronic Congestion: No   Immune: Seasonal Allergies: No, Hives and/or Rashes: No  The remainder of the review of twelve body systems was reviewed and normal       Objective:     Vital Signs (Most Recent):  Temp: 98.4 °F (36.9 °C) (11/07/18 0325)  Pulse: 69 (11/07/18 0325)  Resp: (!) 53 (11/07/18 0325)  BP: (!) 147/63 (11/07/18 0325)  SpO2: 100 % (11/07/18 0325) Vital Signs (24h Range):  Temp:  [98.2 °F (36.8 °C)-98.8 °F (37.1 °C)] 98.4 °F (36.9  °C)  Pulse:  [] 69  Resp:  [12-53] 53  SpO2:  [100 %] 100 %  BP: ()/(54-89) 147/63     Weight: 74.4 kg (164 lb 0.4 oz)  Body mass index is 22.88 kg/m².  Body surface area is 1.93 meters squared.      Intake/Output Summary (Last 24 hours) at 11/7/2018 0555  Last data filed at 11/7/2018 0500  Gross per 24 hour   Intake 736.25 ml   Output 3825 ml   Net -3088.75 ml       Physical Exam  Constitutional: oriented to person, placeThin frail  HENT:   Head: Normocephalic and atraumatic.   Right Ear: External ear normal.   Left Ear: External ear normal.   Nose: Nose normal.   Mouth/Throat: Oropharynx is clear and moist.   Eyes: Conjunctivae and EOM are normal. Pupils are equal, round, and reactive to light.   Neck: Normal range of motion. Neck supple. No thyromegaly present.   Cardiovascular: Normal rate, regular rhythm, normal heart sounds   No murmur heard.  Pulmonary/Chest: Effort normal and breath sounds normal.  no wheezes.  no rales.   Abdominal: Soft. Bowel sounds are normal.  no mass. There is no tenderness. There is no rebound.   Musculoskeletal: Normal range of motion.  no edema or tenderness. Strength decreased  Lymphadenopathy:    no cervical adenopathy.   Neurological: alert and oriented to person, place  Skin: Skin is warm and dry. No rash noted.   Psychiatric: normal mood and affect.   behavior is normal.   Vitals reviewed.  Significant Labs:     Lab Results   Component Value Date    WBC 13.10 (H) 11/07/2018    RBC 3.43 (L) 11/07/2018    HGB 9.9 (L) 11/07/2018    HCT 29.8 (L) 11/07/2018    MCV 87 11/07/2018    MCH 28.9 11/07/2018    MCHC 33.2 11/07/2018    RDW 15.0 (H) 11/07/2018     (H) 11/07/2018    MPV 7.5 (L) 11/07/2018    GRAN 10.0 (H) 11/07/2018    GRAN 76.8 (H) 11/07/2018    LYMPH 1.9 11/07/2018    LYMPH 14.7 (L) 11/07/2018    MONO 0.8 11/07/2018    MONO 6.0 11/07/2018    EOS 0.3 11/07/2018    BASO 0.00 11/07/2018    EOSINOPHIL 2.1 11/07/2018    BASOPHIL 0.4 11/07/2018     CMP  Sodium    Date Value Ref Range Status   11/07/2018 138 136 - 145 mmol/L Final     Potassium   Date Value Ref Range Status   11/07/2018 3.4 (L) 3.5 - 5.1 mmol/L Final     Chloride   Date Value Ref Range Status   11/07/2018 108 95 - 110 mmol/L Final     CO2   Date Value Ref Range Status   11/07/2018 21 (L) 23 - 29 mmol/L Final     Glucose   Date Value Ref Range Status   11/07/2018 108 70 - 110 mg/dL Final     BUN, Bld   Date Value Ref Range Status   11/07/2018 29 (H) 8 - 23 mg/dL Final     Creatinine   Date Value Ref Range Status   11/07/2018 0.9 0.5 - 1.4 mg/dL Final     Calcium   Date Value Ref Range Status   11/07/2018 8.3 (L) 8.7 - 10.5 mg/dL Final     Total Protein   Date Value Ref Range Status   11/07/2018 5.3 (L) 6.0 - 8.4 g/dL Final     Albumin   Date Value Ref Range Status   11/07/2018 2.5 (L) 3.5 - 5.2 g/dL Final     Total Bilirubin   Date Value Ref Range Status   11/07/2018 0.9 0.1 - 1.0 mg/dL Final     Comment:     For infants and newborns, interpretation of results should be based  on gestational age, weight and in agreement with clinical  observations.  Premature Infant recommended reference ranges:  Up to 24 hours.............<8.0 mg/dL  Up to 48 hours............<12.0 mg/dL  3-5 days..................<15.0 mg/dL  6-29 days.................<15.0 mg/dL       Alkaline Phosphatase   Date Value Ref Range Status   11/07/2018 42 (L) 55 - 135 U/L Final     AST   Date Value Ref Range Status   11/07/2018 12 10 - 40 U/L Final     ALT   Date Value Ref Range Status   11/07/2018 8 (L) 10 - 44 U/L Final     Anion Gap   Date Value Ref Range Status   11/07/2018 9 8 - 16 mmol/L Final     eGFR if    Date Value Ref Range Status   11/07/2018 >60 >60 mL/min/1.73 m^2 Final     eGFR if non    Date Value Ref Range Status   11/07/2018 >60 >60 mL/min/1.73 m^2 Final     Comment:     Calculation used to obtain the estimated glomerular filtration  rate (eGFR) is the CKD-EPI equation.        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 STage 4 RCC  2. Small bowel tumor with melena  3. Transfusion dependent due to bleed with recurring episodes of severe symptomatic anemia: including fatigue SOB at times and hypotension with weakness  4. Adnexal masses suspicious for an additional secondary primary malignancy  5. Hypokalemia and hypomagnesemia: replacing elec      No need for further blood transfusions currently Hb stable  Appreciate Andres Lewis and Edis and their help in caring for this pt  Consider surgical intervention for GI bleed then discharge to Hospice     Thank you for your consult.    Lauren Irene MD  Hematology/Oncology  [facility]

## 2018-11-07 NOTE — PLAN OF CARE
Problem: Patient Care Overview  Goal: Plan of Care Review  Outcome: Ongoing (interventions implemented as appropriate)  Care plan reviewed.  Safety maintained.  Dr. Randhawa consulted. Dr. Irene consulted.  Possible surgery Thursday or Friday.  Patient to receive 3 units of PRBC's total, first unit infusing at this time.  Patient on CLD.   SCD's and Teds for VTE.

## 2018-11-07 NOTE — CONSULTS
[facility]  General Surgery  Consult Note    Patient Name: Oriana Tobar  MRN: [id number]  Code Status: Full Code  Admission Date: 11/6/2018  Hospital Length of Stay: 1 days  Attending Physician: Sol Lewis MD  Primary Care Provider: Carine Baltazar NP    Patient information was obtained from relative(s), past medical records and ER records.     Inpatient consult to General Surgery  Consult performed by: Marco A Randhawa MD  Consult ordered by: Sol Lewis MD        Subjective:     Principal Problem: GI bleed    History of Present Illness: Patient is a 73 y.o. female admitted to Hospitalist Service from Dr. Irene's office with complaints of dizziness and black stools. Patient reportedly has past medical history significant for Renal cell carcinoma s/p left nephrectomy, dementia, hypertension, hyperlipidemia, Chronic Kidney disease stage 3 and small bowel tumor. Patient recently underwent retroperitoneal hemorrhage s/p CT guided retroperitoneal mass biopsy (on 08-30-18).  Patient reportedly had metastatic lesions in the retro-peritoneaum and lungs. Patient was recently hospitalized at [facility] twice and continues to require multiple PRBC for symptomatic anemia. Earlier, patient was hospitalized at our facility from 10-22-18 to 10-25-18 for same. Patient decided not to pursue surgical intervention and stated not ready for hospice care despite recommendations by Dr. Irene. Now per daughter after palliative surgery would consider home hospice care. Patient is weak and dizzy for few days and regularly having black bowel movement. Patient denied any abdominal pain. Patient denied chest pain, shortness of breath, headache, vision changes, focal neuro-deficits, cough or fever.           No current facility-administered medications on file prior to encounter.      Current Outpatient Medications on File Prior to Encounter   Medication Sig    atorvastatin (LIPITOR) 20 MG tablet Take  20 mg by mouth once daily.    ferrous sulfate 325 (65 FE) MG EC tablet Take 325 mg by mouth once daily.    pantoprazole (PROTONIX) 40 MG tablet Take 1 tablet (40 mg total) by mouth once daily.    sucralfate (CARAFATE) 100 mg/mL suspension Take 10 mLs (1 g total) by mouth 4 (four) times daily before meals and nightly.       Review of patient's allergies indicates:   Allergen Reactions    Codeine Other (See Comments)     Pt shakes and hallucinates    Pcn [penicillins] Anxiety and Other (See Comments)       Past Medical History:   Diagnosis Date    Cancer     kidney    Dementia     Encounter for blood transfusion     2018    High cholesterol 2018    Hypertension     No Medication     Renal disorder     Wears dentures     Uppers     Past Surgical History:   Procedure Laterality Date     SECTION      COLONOSCOPY N/A 2018    Procedure: COLONOSCOPY;  Surgeon: Torres Son MD;  Location: [facility];  Service: Endoscopy;  Laterality: N/A;    COLONOSCOPY N/A 2018    [facility]formed by [facility] ENDO    CYSTOSCOPY WITH RETROGRADE PYELOGRAM Left 1/15/2016    [facility]    EGD (ESOPHAGOGASTRODUODENOSCOPY) N/A 2018    Performed by [facility] at [facility] ENDO    ESOPHAGOGASTRODUODENOSCOPY N/A 2018    Procedure: EGD (ESOPHAGOGASTRODUODENOSCOPY);  Surgeon: Torres Son MD;  Location: [facility];  Service: Endoscopy;  Laterality: N/A;    EYE SURGERY      Rt eye Catarac    HYSTERECTOMY      IMAGING, GI TRACT, INTRALUMINAL, VIA CAPSULE N/A 2018    [facility]    INTRALUMINAL GASTROINTESTINAL TRACT IMAGING VIA CAPSULE N/A 2018    Procedure: IMAGING, GI TRACT, INTRALUMINAL, VIA CAPSULE;  Surgeon: Rocco Ross MD;  Location: [facility];  Service: Endoscopy;  Laterality: N/A;    KIDNEY SURGERY Left 2018    Left kidney removed due to cancer     lasix surgery      NEPHRECTOMY-RADICAL   Left 2016    [facility]    REPAIR-HERNIA-VENTRAL N/A 2016    [facility]A [facility] at [facility] OR    RESECTION-BOWEL  2016    [facility] by [facility] at [facility] OR    URETEROSCOPY  1/15/2016    Per[facility] by [facility] at [facility] OR     Family History     None        Tobacco Use    Smoking status: Former Smoker     Packs/day: 1.00     Years: 50.00     Pack years: 50.00     Last attempt to quit: 10/6/2018     Years since quittin.0    Smokeless tobacco: Never Used    Tobacco comment: pt has stopped; encouraged to remain stopped   Substance and Sexual Activity    Alcohol use: No     Frequency: Never     Comment: last alcohol intake 2 weeks ago    Drug use: No    Sexual activity: No     Review of Systems   Constitutional: Negative for appetite change, chills, diaphoresis, fatigue, fever and unexpected weight change.   HENT: Negative for hearing loss, sore throat, trouble swallowing and voice change.    Eyes: Negative for visual disturbance.   Respiratory: Negative for cough, shortness of breath and wheezing.    Cardiovascular: Negative for chest pain, palpitations and leg swelling.   Gastrointestinal: Positive for blood in stool. Negative for abdominal distention, abdominal pain, anal bleeding, constipation, diarrhea, nausea, rectal pain and vomiting.   Genitourinary: Negative for difficulty urinating, dysuria, flank pain, frequency, hematuria, menstrual problem and urgency.   Musculoskeletal: Negative for arthralgias, back pain, joint swelling, myalgias and neck pain.   Skin: Negative for pallor and rash.   Neurological: Negative for dizziness, syncope, weakness and headaches.   Hematological: Negative for adenopathy. Does not bruise/bleed easily.   Psychiatric/Behavioral: Positive for confusion and decreased concentration. Negative for suicidal ideas. The patient is not  nervous/anxious.      Objective:     Vital Signs (Most Recent):  Temp: 98.4 °F (36.9 °C) (11/07/18 0800)  Pulse: 98 (11/07/18 0900)  Resp: (!) 39 (11/07/18 0900)  BP: (!) 127/59 (11/07/18 0900)  SpO2: 100 % (11/07/18 0900) Vital Signs (24h Range):  Temp:  [98.2 °F (36.8 °C)-98.8 °F (37.1 °C)] 98.4 °F (36.9 °C)  Pulse:  [] 98  Resp:  [12-53] 39  SpO2:  [100 %] 100 %  BP: ()/(54-89) 127/59     Weight: 74.4 kg (164 lb 0.4 oz)  Body mass index is 22.88 kg/m².    Physical Exam   Constitutional: She is oriented to person, place, and time. She appears well-developed and well-nourished. No distress.   HENT:   Head: Normocephalic and atraumatic.   Right Ear: External ear normal.   Left Ear: External ear normal.   Eyes: Conjunctivae are normal. Pupils are equal, round, and reactive to light. Right eye exhibits no discharge. Left eye exhibits no discharge.   Neck: No tracheal deviation present. No thyromegaly present.   Cardiovascular: Normal rate and regular rhythm.   Pulmonary/Chest: Effort normal. No respiratory distress.   Abdominal: Soft. She exhibits no distension and no mass. There is no tenderness. There is no guarding.   Musculoskeletal: She exhibits no edema or tenderness.   Lymphadenopathy:     She has no cervical adenopathy.   Neurological: She is alert and oriented to person, place, and time. No cranial nerve deficit.   Skin: Skin is warm and dry. No rash noted. She is not diaphoretic. No pallor.   Psychiatric: She has a normal mood and affect. Her behavior is normal. Judgment and thought content normal.   Nursing note and vitals reviewed.      Significant Labs:  CBC:   Recent Labs   Lab 11/07/18 0358   WBC 13.10*   RBC 3.43*   HGB 9.9*   HCT 29.8*   *   MCV 87   MCH 28.9   MCHC 33.2     CMP:   Recent Labs   Lab 11/07/18 0358      CALCIUM 8.3*   ALBUMIN 2.5*   PROT 5.3*      K 3.4*   CO2 21*      BUN 29*   CREATININE 0.9   ALKPHOS 42*   ALT 8*   AST 12   BILITOT 0.9      Coagulation:   Recent Labs   Lab 11/06/18  1540   LABPROT 10.5   INR 1.0   APTT 24.0       Significant Diagnostics:  I have reviewed all pertinent imaging results/findings within the past 24 hours.    Assessment/Plan:     * GI bleed    Patient with multiple cancers with evidence of bowel invasion and GI bleeding.  Hospice advised but family does not want to agree.  The would like surgical exploration to see if the bleeding mass is resectable.  All aspects of procedure including risks and possible complications were discussed in detail with the family who want to proceed with procedure.    Unable to get patient on the OR schedule till 11/9.  Will schedule for then.  Will discuss with Dr. Lewis and Dr. Irene       VTE Risk Mitigation (From admission, onward)        Ordered     Reason for no Mechanical VTE Prophylaxis  Once      11/06/18 1450     IP VTE HIGH RISK PATIENT  Once      11/06/18 1450     Place sequential compression device  Until discontinued      11/06/18 1450     Place RORY hose  Until discontinued      11/06/18 1450          Thank you for your consult. I will follow-up with patient. Please contact us if you have any additional questions.    Marco A Randhawa MD  General Surgery  [facility]

## 2018-11-07 NOTE — PLAN OF CARE
3rd unit of RBC started after Lasix 20mg IVP given as ordered. Up to bedside commode with minimal assistance to void. No complaints voiced. Call light at bedside.

## 2018-11-08 ENCOUNTER — ANESTHESIA EVENT (OUTPATIENT)
Dept: SURGERY | Facility: HOSPITAL | Age: 73
End: 2018-11-08

## 2018-11-08 VITALS
HEART RATE: 76 BPM | DIASTOLIC BLOOD PRESSURE: 56 MMHG | WEIGHT: 164 LBS | TEMPERATURE: 98 F | BODY MASS INDEX: 22.96 KG/M2 | SYSTOLIC BLOOD PRESSURE: 121 MMHG | HEIGHT: 71 IN | RESPIRATION RATE: 18 BRPM | OXYGEN SATURATION: 100 %

## 2018-11-08 LAB
ALBUMIN SERPL BCP-MCNC: 2.3 G/DL
ALP SERPL-CCNC: 41 U/L
ALT SERPL W/O P-5'-P-CCNC: 8 U/L
ANION GAP SERPL CALC-SCNC: 7 MMOL/L
AST SERPL-CCNC: 10 U/L
BACTERIA UR CULT: NO GROWTH
BASOPHILS # BLD AUTO: 0 K/UL
BASOPHILS NFR BLD: 0.4 %
BILIRUB SERPL-MCNC: 0.4 MG/DL
BUN SERPL-MCNC: 20 MG/DL
CALCIUM SERPL-MCNC: 8.1 MG/DL
CHLORIDE SERPL-SCNC: 109 MMOL/L
CO2 SERPL-SCNC: 23 MMOL/L
CREAT SERPL-MCNC: 0.8 MG/DL
DIFFERENTIAL METHOD: ABNORMAL
EOSINOPHIL # BLD AUTO: 0.4 K/UL
EOSINOPHIL NFR BLD: 3.5 %
ERYTHROCYTE [DISTWIDTH] IN BLOOD BY AUTOMATED COUNT: 15.3 %
EST. GFR  (AFRICAN AMERICAN): >60 ML/MIN/1.73 M^2
EST. GFR  (NON AFRICAN AMERICAN): >60 ML/MIN/1.73 M^2
GLUCOSE SERPL-MCNC: 103 MG/DL
HCT VFR BLD AUTO: 25.8 %
HGB BLD-MCNC: 8.6 G/DL
LYMPHOCYTES # BLD AUTO: 1.6 K/UL
LYMPHOCYTES NFR BLD: 15.3 %
MAGNESIUM SERPL-MCNC: 1.7 MG/DL
MCH RBC QN AUTO: 29.2 PG
MCHC RBC AUTO-ENTMCNC: 33.2 G/DL
MCV RBC AUTO: 88 FL
MONOCYTES # BLD AUTO: 0.6 K/UL
MONOCYTES NFR BLD: 5.6 %
NEUTROPHILS # BLD AUTO: 8 K/UL
NEUTROPHILS NFR BLD: 75.2 %
PLATELET # BLD AUTO: 355 K/UL
PMV BLD AUTO: 7.6 FL
POTASSIUM SERPL-SCNC: 3.7 MMOL/L
PROT SERPL-MCNC: 5 G/DL
RBC # BLD AUTO: 2.93 M/UL
SODIUM SERPL-SCNC: 139 MMOL/L
WBC # BLD AUTO: 10.6 K/UL

## 2018-11-08 PROCEDURE — 80053 COMPREHEN METABOLIC PANEL: CPT

## 2018-11-08 PROCEDURE — 94760 N-INVAS EAR/PLS OXIMETRY 1: CPT

## 2018-11-08 PROCEDURE — 85025 COMPLETE CBC W/AUTO DIFF WBC: CPT

## 2018-11-08 PROCEDURE — 94761 N-INVAS EAR/PLS OXIMETRY MLT: CPT

## 2018-11-08 PROCEDURE — 99239 HOSP IP/OBS DSCHRG MGMT >30: CPT | Mod: ,,, | Performed by: INTERNAL MEDICINE

## 2018-11-08 PROCEDURE — 36415 COLL VENOUS BLD VENIPUNCTURE: CPT

## 2018-11-08 PROCEDURE — 83735 ASSAY OF MAGNESIUM: CPT

## 2018-11-08 PROCEDURE — 63600175 PHARM REV CODE 636 W HCPCS: Performed by: PHYSICIAN ASSISTANT

## 2018-11-08 PROCEDURE — 25000003 PHARM REV CODE 250: Performed by: INTERNAL MEDICINE

## 2018-11-08 RX ORDER — CIPROFLOXACIN 250 MG/1
250 TABLET, FILM COATED ORAL 2 TIMES DAILY
Qty: 10 TABLET | Refills: 0 | Status: SHIPPED | OUTPATIENT
Start: 2018-11-08 | End: 2018-11-13

## 2018-11-08 RX ADMIN — SODIUM CHLORIDE: 0.45 INJECTION, SOLUTION INTRAVENOUS at 10:11

## 2018-11-08 RX ADMIN — CEFTRIAXONE 1 G: 1 INJECTION, SOLUTION INTRAVENOUS at 01:11

## 2018-11-08 NOTE — PLAN OF CARE
Obtained signature for the disclosure form to resume with [facility] health.      11/08/18 2940   Discharge Reassessment   Assessment Type Discharge Planning Reassessment   Discharge Plan A [facility]

## 2018-11-08 NOTE — DISCHARGE SUMMARY
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 female admitted to Hospitalist Service from Dr. Irene's office with complaints of dizziness and black stools. Patient reportedly has past medical history significant for Renal cell carcinoma s/p left nephrectomy, dementia, hypertension, hyperlipidemia, Chronic Kidney disease stage 3 and small bowel tumor. Patient recently underwent retroperitoneal hemorrhage s/p CT guided retroperitoneal mass biopsy (on 08-30-18).  Patient reportedly had metastatic lesions in the retro-peritoneaum and lungs. Patient was recently hospitalized at [facility] twice and continues to require multiple PRBC for symptomatic anemia. Earlier, patient was hospitalized at our facility from 10-22-18 to 10-25-18 for same. Patient decided not to pursue surgical intervention and stated not ready for hospice care despite recommendations by Dr. Irene. Now per daughter after palliative surgery would consider home hospice care. Patient is weak and dizzy for few days and regularly having black bowel movement. Patient denied any abdominal pain. Patient denied chest pain, shortness of breath, headache, vision changes, focal neuro-deficits, cough or fever. Patient was admitted to Hospitalist medicine service. Patient was evaluated by Dr. Irene and Dr. Randhawa. Patient received 3 units of PRBC with improved symptoms. Palliative surgical intestinal resection  discussed. Patient decided not to under go any surgical intervention. We discussed hospice care. Family wante dto go home with home health and later will switch to hospice later. Home health to check CBC q 4 days. Patient was discharged home in stable condition with following discharge plan of care. Total time with the patient was 30 minutes and greater than 50% was spent in counseling and coordination of care. The assessment and plan have been discussed at length. Physicians' notes reviewed. Labs and procedure reviewed.      Consults: Dr. Randhawa, Dr. Irene     Significant Diagnostic Studies:   Capsule endoscopy (08-06-18):  1. Small bowel passage time as above  2. Apparent small bowel tumor, likely in the mid jejunum, as noted above with no active bleeding.     Colonoscopy (06-25-18):  Diverticulosis in the ascending colon. There was no evidence of diverticular bleeding.  One 5 mm polyp in the ascending colon, removed with a hot biopsy forceps. Resected and retrieved.     EGD (06-25-18):  - Normal esophagus.                        - Gastritis. Biopsied.                        - Normal duodenal bulb and second portion of the                         duodenum.              Microbiology Results (last 7 days)      Procedure Component Value Units Date/Time     Urine culture [[id number]] Collected:  11/06/18 1752     Order Status:  Completed Specimen:  Urine, Clean Catch Updated:  11/08/18 1223       Urine Culture, Routine No growth          Special Treatments/Procedures: None  Disposition: Home or Self Care    Medications:  Reconciled Home Medications:   Current Discharge Medication List      START taking these medications    Details   ciprofloxacin HCl (CIPRO) 250 MG tablet Take 1 tablet (250 mg total) by mouth 2 (two) times daily. for 5 days  Qty: 10 tablet, Refills: 0         CONTINUE these medications which have NOT CHANGED    Details   atorvastatin (LIPITOR) 20 MG tablet Take 20 mg by mouth once daily.      ferrous  sulfate 325 (65 FE) MG EC tablet Take 325 mg by mouth once daily.      pantoprazole (PROTONIX) 40 MG tablet Take 1 tablet (40 mg total) by mouth once daily.  Qty: 30 tablet, Refills: 11      sucralfate (CARAFATE) 100 mg/mL suspension Take 10 mLs (1 g total) by mouth 4 (four) times daily before meals and nightly.  Qty: 1 Bottle, Refills: 11           Discharge Procedure Orders   Ambulatory referral to Home Health   Referral Priority: Routine Referral Type: Home Health   Referral Reason: Specialty Services Required   Requested Specialty: Home Health Services   Number of Visits Requested: 1     Diet Cardiac   Order Comments: Boost can with meals     Other restrictions (specify):   Order Comments: PLEASE OBSERVE FALL PRECAUTIONS     Call MD for:   Order Comments: For worsening symptoms, chest pain, shortness of breath, increased abdominal pain, high grade fever, stroke or stroke like symptoms, immediately go to the nearest Emergency Room or call 911 as soon as possible.     Follow-up Information     Carine Baltazar NP In 1 week.    Specialty:  Family Medicine  Contact information:  [address] [place] [address] Dr  [place] [address] [phone]             Lauren Irene MD.    Specialty:  Hematology and Oncology  Why:  As needed  Contact information:  [address]  [place] [address]  [phone]             Marco A Randhawa MD.    Specialties:  General Surgery, Surgery  Why:  As needed  Contact information:  [address]  [address]  [place] [address]  [phone]

## 2018-11-08 NOTE — NURSING
Discharge instructions given to patient and family. Patient verbalized understanding. Printed prescription with patient in blue discharge folder. PIV removed; pressure and bandage applied. Telemetry monitor removed. Awaiting patient to get dressed.

## 2018-11-08 NOTE — PLAN OF CARE
Problem: Patient Care Overview  Goal: Plan of Care Review  Outcome: Ongoing (interventions implemented as appropriate)  Plan of care reviewed with pt, pt verbalized understanding. VSS.  PIV clean dry and intact. IVF infusing per order. IV abx infusing per order. Hourly/Q2 hourly rounds completed throughout shift. Telemetry continues to be monitored. Comfort level established.  Up with assist to bedside commode. Repositions self independently.  Pt has remained free from fall/injury. No skin breakdown noted. Bed in lowest position, brakes locked, call light within reach, SR^x2 for pt safety. Needs attended to, will continue to monitor.

## 2018-11-08 NOTE — PLAN OF CARE
I was present when Dr Lewis spoke with the pt and her granddaughter, the pt is still refusing surgery and wants to discharge home with resumption of home health. They are not ready to commit to hospice at this time.   Dr Lewis is discharging the pt home today.....MIKE Houston       11/08/18 9595   Discharge Reassessment   Assessment Type Discharge Planning Reassessment

## 2018-11-09 ENCOUNTER — LAB VISIT (OUTPATIENT)
Dept: LAB | Facility: HOSPITAL | Age: 73
End: 2018-11-09
Attending: FAMILY MEDICINE
Payer: MEDICARE

## 2018-11-09 ENCOUNTER — ANESTHESIA (OUTPATIENT)
Dept: SURGERY | Facility: HOSPITAL | Age: 73
End: 2018-11-09

## 2018-11-09 DIAGNOSIS — D64.9 ANEMIA: Primary | ICD-10-CM

## 2018-11-09 LAB
BACTERIA BLD CULT: NORMAL
BACTERIA BLD CULT: NORMAL
BASOPHILS # BLD AUTO: 0.07 K/UL
BASOPHILS NFR BLD: 0.7 %
DIFFERENTIAL METHOD: ABNORMAL
EOSINOPHIL # BLD AUTO: 0.3 K/UL
EOSINOPHIL NFR BLD: 3.4 %
ERYTHROCYTE [DISTWIDTH] IN BLOOD BY AUTOMATED COUNT: 15.1 %
HCT VFR BLD AUTO: 24.2 %
HGB BLD-MCNC: 7.7 G/DL
IMM GRANULOCYTES # BLD AUTO: 0.04 K/UL
IMM GRANULOCYTES NFR BLD AUTO: 0.4 %
LYMPHOCYTES # BLD AUTO: 1.8 K/UL
LYMPHOCYTES NFR BLD: 18.2 %
MCH RBC QN AUTO: 28.5 PG
MCHC RBC AUTO-ENTMCNC: 31.8 G/DL
MCV RBC AUTO: 90 FL
MONOCYTES # BLD AUTO: 0.7 K/UL
MONOCYTES NFR BLD: 7.3 %
NEUTROPHILS # BLD AUTO: 6.8 K/UL
NEUTROPHILS NFR BLD: 70 %
NRBC BLD-RTO: 0 /100 WBC
PLATELET # BLD AUTO: 428 K/UL
PMV BLD AUTO: 9.6 FL
RBC # BLD AUTO: 2.7 M/UL
WBC # BLD AUTO: 9.64 K/UL

## 2018-11-09 PROCEDURE — 85025 COMPLETE CBC W/AUTO DIFF WBC: CPT

## 2018-11-09 PROCEDURE — 36415 COLL VENOUS BLD VENIPUNCTURE: CPT

## 2018-11-09 NOTE — PHYSICIAN QUERY
PT Name: Oriana Tobar  MR #: [id number]     Physician Query Form - Etiology of Condition Clarification      CDS: Too ASKEW,RN        Contact information:[phone]  This form is a permanent document in the medical record.     Query Date: November 9, 2018    By submitting this query, we are merely seeking further clarification of documentation.  Please utilize your independent clinical judgment when addressing the question(s) below.     The medical record contains the following:    Findings Supporting Clinical Information Location in Medical Record                            Hemtaemesis Patient reportedly had metastatic lesions in  the retro-peritoneaum and lungs. Patient was  recently hospitalized at [facility] twice and continues to require  multiple PRBC for symptomatic anemia.  Earlier, patient was hospitalized at our facility  from 10-22-18 to 10-25-18 for same.  Acute GI blood loss anemia  Small bowel tumor  Consult general surgeon for palliative  resection of small intestine tumor.    CT abdomen revealed extensive masses  throughout .  Pt had a biopsy of the left upper  quadrant intraperitoneal mass August 30, 2018 which revealed findings consistent with  past renal cell carcinoma,  Since then she   has been experiencing increasing shortness   of breath associated with melena dizziness   and weakness associated with anemia Pt   with evidence of bleeding from GI tumor.   Yesterday in clinic she was profoundly weak,   hypotensive and had received 4 units of   blood at [facility] on Sunday for   severe sympttomatic anemia     Consults: Dr. Randhawa, Dr. Irene   Significant Diagnostic Studies:   Capsule endoscopy (08-06-18):  1. Small bowel passage time as above  2. Apparent small bowel tumor, likely in the      mid jejunum, as noted above with no active      bleeding.             11/06/18 Hospital Medicine H&P                          11/07/18 Hematology and  Oncology                        11/08/18 Hospital Medicine Discharge Summary     Please document your best medical opinion regarding the etiology of _Hematemesis_______________ for which the primary focus of treatment is/was directed.           ___x_ Small Bowel Tumor        ____ Other______Patient did not have hematemesis this hospitalization__________________________________              Clinically Undetermined     Please document in your progress notes daily for the duration of treatment, until resolved, and include in your discharge summary.

## 2018-11-09 NOTE — PLAN OF CARE
11/09/18 0748   Final Note   Assessment Type Final Discharge Note   Anticipated Discharge Disposition Home-Health

## 2018-11-12 ENCOUNTER — HOSPITAL ENCOUNTER (EMERGENCY)
Facility: HOSPITAL | Age: 73
Discharge: HOME | End: 2018-11-12
Attending: EMERGENCY MEDICINE
Payer: MEDICARE

## 2018-11-12 VITALS
DIASTOLIC BLOOD PRESSURE: 67 MMHG | HEIGHT: 71 IN | SYSTOLIC BLOOD PRESSURE: 126 MMHG | BODY MASS INDEX: 25.2 KG/M2 | RESPIRATION RATE: 17 BRPM | HEART RATE: 76 BPM | TEMPERATURE: 99 F | WEIGHT: 180 LBS | OXYGEN SATURATION: 100 %

## 2018-11-12 DIAGNOSIS — R53.83 FATIGUE: ICD-10-CM

## 2018-11-12 DIAGNOSIS — R19.7 DIARRHEA, UNSPECIFIED TYPE: Primary | ICD-10-CM

## 2018-11-12 DIAGNOSIS — D63.8 ANEMIA, CHRONIC DISEASE: ICD-10-CM

## 2018-11-12 LAB
ALBUMIN SERPL BCP-MCNC: 2.8 G/DL
ALP SERPL-CCNC: 60 U/L
ALT SERPL W/O P-5'-P-CCNC: 12 U/L
ANION GAP SERPL CALC-SCNC: 5 MMOL/L
AST SERPL-CCNC: 19 U/L
BASOPHILS # BLD AUTO: 0.02 K/UL
BASOPHILS NFR BLD: 0.2 %
BILIRUB SERPL-MCNC: 0.6 MG/DL
BUN SERPL-MCNC: 12 MG/DL
CALCIUM SERPL-MCNC: 8.4 MG/DL
CHLORIDE SERPL-SCNC: 108 MMOL/L
CO2 SERPL-SCNC: 23 MMOL/L
CREAT SERPL-MCNC: 1 MG/DL
DIFFERENTIAL METHOD: ABNORMAL
EOSINOPHIL # BLD AUTO: 0.1 K/UL
EOSINOPHIL NFR BLD: 1.4 %
ERYTHROCYTE [DISTWIDTH] IN BLOOD BY AUTOMATED COUNT: 13.9 %
EST. GFR  (AFRICAN AMERICAN): >60 ML/MIN/1.73 M^2
EST. GFR  (NON AFRICAN AMERICAN): 56 ML/MIN/1.73 M^2
GLUCOSE SERPL-MCNC: 103 MG/DL
HCT VFR BLD AUTO: 26.8 %
HGB BLD-MCNC: 8.4 G/DL
IMM GRANULOCYTES # BLD AUTO: 0.15 K/UL
IMM GRANULOCYTES NFR BLD AUTO: 1.5 %
LIPASE SERPL-CCNC: 37 U/L
LYMPHOCYTES # BLD AUTO: 1.2 K/UL
LYMPHOCYTES NFR BLD: 12 %
MCH RBC QN AUTO: 28.4 PG
MCHC RBC AUTO-ENTMCNC: 31.3 G/DL
MCV RBC AUTO: 91 FL
MONOCYTES # BLD AUTO: 0.6 K/UL
MONOCYTES NFR BLD: 6.3 %
NEUTROPHILS # BLD AUTO: 7.7 K/UL
NEUTROPHILS NFR BLD: 78.6 %
NRBC BLD-RTO: 0 /100 WBC
PLATELET # BLD AUTO: 525 K/UL
PMV BLD AUTO: 9.2 FL
POTASSIUM SERPL-SCNC: 3.3 MMOL/L
PROT SERPL-MCNC: 6.3 G/DL
RBC # BLD AUTO: 2.96 M/UL
SODIUM SERPL-SCNC: 136 MMOL/L
WBC # BLD AUTO: 9.78 K/UL

## 2018-11-12 PROCEDURE — 85025 COMPLETE CBC W/AUTO DIFF WBC: CPT

## 2018-11-12 PROCEDURE — 93005 ELECTROCARDIOGRAM TRACING: CPT

## 2018-11-12 PROCEDURE — 99284 EMERGENCY DEPT VISIT MOD MDM: CPT | Mod: 25

## 2018-11-12 PROCEDURE — 83690 ASSAY OF LIPASE: CPT

## 2018-11-12 PROCEDURE — 96360 HYDRATION IV INFUSION INIT: CPT

## 2018-11-12 PROCEDURE — 25000003 PHARM REV CODE 250: Performed by: EMERGENCY MEDICINE

## 2018-11-12 PROCEDURE — 80053 COMPREHEN METABOLIC PANEL: CPT

## 2018-11-12 RX ORDER — LOPERAMIDE HYDROCHLORIDE 2 MG/1
2 CAPSULE ORAL 4 TIMES DAILY PRN
Qty: 12 CAPSULE | Refills: 0 | Status: ON HOLD | OUTPATIENT
Start: 2018-11-12 | End: 2018-11-23 | Stop reason: HOSPADM

## 2018-11-12 RX ORDER — LOPERAMIDE HYDROCHLORIDE 2 MG/1
4 CAPSULE ORAL
Status: COMPLETED | OUTPATIENT
Start: 2018-11-12 | End: 2018-11-12

## 2018-11-12 RX ADMIN — SODIUM CHLORIDE 500 ML: 9 INJECTION, SOLUTION INTRAVENOUS at 11:11

## 2018-11-12 RX ADMIN — LOPERAMIDE HYDROCHLORIDE 4 MG: 2 CAPSULE ORAL at 12:11

## 2018-11-12 NOTE — ED PROVIDER NOTES
"Encounter Date: 2018       History     Chief Complaint   Patient presents with    Diarrhea    Fatigue     72yo female with anemia of chronic disease and dementia presents to ED for evaluation of acute exacerbation of chronic fatigue and intermittent loose stool x 1 day. Daughter states she has been "more tired than usual." Denies fever, chills, nausea, vomiting. Seen/admitted on  with transfusion and repeat CBC  with hemoglobin 7.7.           Review of patient's allergies indicates:   Allergen Reactions    Codeine Other (See Comments)     Pt shakes and hallucinates    Pcn [penicillins] Anxiety and Other (See Comments)     Past Medical History:   Diagnosis Date    Cancer     kidney    Dementia     Encounter for blood transfusion     2018    High cholesterol 2018    Hypertension     No Medication     Renal disorder     Wears dentures     Uppers     Past Surgical History:   Procedure Laterality Date     SECTION      COLONOSCOPY N/A 2018    Procedure: COLONOSCOPY;  Surgeon: Torres Son MD;  Location: [facility];  Service: Endoscopy;  Laterality: N/A;    COLONOSCOPY N/A 2018    Per[facility] Torres [facility]    CYSTOSCOPY WITH RETROGRADE PYELOGRAM Left 1/15/2016    [facility] OR    EGD (ESOPHAGOGASTRODUODENOSCOPY) N/A 2018    Performed by Torres [facility] at [facility]    ESOPHAGOGASTRODUODENOSCOPY N/A 2018    Procedure: EGD (ESOPHAGOGASTRODUODENOSCOPY);  Surgeon: Torres Son MD;  Location: [facility];  Service: Endoscopy;  Laterality: N/A;    EYE SURGERY      Rt eye Catarac    HYSTERECTOMY      IMAGING, GI TRACT, INTRALUMINAL, VIA CAPSULE N/A 2018    [facility]    INTRALUMINAL GASTROINTESTINAL TRACT IMAGING VIA CAPSULE N/A 2018    Procedure: IMAGING, GI TRACT, INTRALUMINAL, VIA CAPSULE;  Surgeon: Rocco Ross MD;  " Location: [facility];  Service: Endoscopy;  Laterality: N/A;    KIDNEY SURGERY Left 2018    Left kidney removed due to cancer    lasix surgery      NEPHRECTOMY-RADICAL   Left 2016    Performed [facility] at [facility] OR    REPAIR-HERNIA-VENTRAL N/A 2016    Performed by [facility]awa [facility] at [facility] OR    RESECTION-BOWEL  2016    Performed by [facility] at [facility] OR    URETEROSCOPY  1/15/2016    Performed by [facility]acon [facility] at [facility] OR     History reviewed. No pertinent family history.  Social History     Tobacco Use    Smoking status: Former Smoker     Packs/day: 1.00     Years: 50.00     Pack years: 50.00     Last attempt to quit: 10/6/2018     Years since quittin.1    Smokeless tobacco: Never Used    Tobacco comment: pt has stopped; encouraged to remain stopped   Substance Use Topics    Alcohol use: No     Frequency: Never     Comment: last alcohol intake 2 weeks ago    Drug use: No     Review of Systems   Unable to perform ROS: Dementia       Physical Exam     Initial Vitals [18 1032]   BP Pulse Resp Temp SpO2   124/68 102 18 99.3 °F (37.4 °C) 99 %      MAP       --         Physical Exam    Nursing note and vitals reviewed.  Constitutional: She appears well-developed and well-nourished. No distress.   HENT:   Head: Normocephalic and atraumatic.   Right Ear: External ear normal.   Left Ear: External ear normal.   Nose: Nose normal.   Mouth/Throat: Oropharynx is clear and moist. No oropharyngeal exudate.   Eyes: Conjunctivae and EOM are normal. Pupils are equal, round, and reactive to light. Right eye exhibits no discharge. Left eye exhibits no discharge. No scleral icterus.   Neck: Normal range of motion. Neck supple.   Cardiovascular: Normal rate, regular rhythm, normal heart sounds and intact distal pulses. Exam reveals no gallop and no friction rub.    No murmur heard.  Pulmonary/Chest: Breath sounds normal. No respiratory distress. She has no  wheezes. She has no rhonchi. She has no rales. She exhibits no tenderness.   Abdominal: Soft. Bowel sounds are normal. She exhibits no distension. There is no tenderness. There is no rebound and no guarding.   Musculoskeletal: Normal range of motion. She exhibits no edema or tenderness.   Lymphadenopathy:     She has no cervical adenopathy.   Neurological: She is alert. She has normal reflexes. GCS eye subscore is 4. GCS verbal subscore is 5. GCS motor subscore is 6.   Skin: Skin is warm. Capillary refill takes less than 2 seconds. No erythema. No pallor.   Psychiatric: She has a normal mood and affect.         ED Course   Procedures  Labs Reviewed   CBC W/ AUTO DIFFERENTIAL - Abnormal; Notable for the following components:       Result Value    RBC 2.96 (*)     Hemoglobin 8.4 (*)     Hematocrit 26.8 (*)     MCHC 31.3 (*)     Platelets 525 (*)     Immature Granulocytes 1.5 (*)     Immature Grans (Abs) 0.15 (*)     Gran% 78.6 (*)     Lymph% 12.0 (*)     All other components within normal limits   COMPREHENSIVE METABOLIC PANEL - Abnormal; Notable for the following components:    Potassium 3.3 (*)     Calcium 8.4 (*)     Albumin 2.8 (*)     Anion Gap 5 (*)     eGFR if non  56.0 (*)     All other components within normal limits   LIPASE     EKG Readings: (Independently Interpreted)   Initial Reading: No STEMI. Rhythm: Normal Sinus Rhythm. Heart Rate: 80. Ectopy: No Ectopy. Conduction: Normal. Axis: Normal. Clinical Impression: Normal Sinus Rhythm       Imaging Results    None          Medical Decision Making:   Hemoglobin improved since last lab draw - 9/9. Pt has follow up with her pcp on 9/15. Discussed lab results with the pt and her family and they wish to follow up as scheduled.                       Clinical Impression:   The primary encounter diagnosis was Diarrhea, unspecified type. Diagnoses of Fatigue and Anemia, chronic disease were also pertinent to this visit.      Disposition:    Disposition: Discharged  Condition: Stable                        Melva Acosta MD  11/12/18 2248

## 2018-11-12 NOTE — ED NOTES
Pt attempted to provide urine sample but was unable to at this time. Pt is aware urine sample is needed. Plan of care discussed. Pt's daughter at the bedside. No needs voiced. Will continue to monitor.

## 2018-11-19 ENCOUNTER — HOSPITAL ENCOUNTER (EMERGENCY)
Facility: HOSPITAL | Age: 73
Discharge: STILL ADMITTED OR ADMINISTRATIVE | End: 2018-11-19
Payer: MEDICARE

## 2018-11-19 VITALS
BODY MASS INDEX: 25.2 KG/M2 | HEART RATE: 81 BPM | DIASTOLIC BLOOD PRESSURE: 66 MMHG | OXYGEN SATURATION: 100 % | WEIGHT: 180 LBS | RESPIRATION RATE: 20 BRPM | SYSTOLIC BLOOD PRESSURE: 143 MMHG | HEIGHT: 71 IN | TEMPERATURE: 98 F

## 2018-11-19 DIAGNOSIS — Z00.129 ENCOUNTER FOR WELL ADOLESCENT VISIT: Primary | ICD-10-CM

## 2018-11-19 PROCEDURE — 99281 EMR DPT VST MAYX REQ PHY/QHP: CPT

## 2018-11-19 NOTE — ED NOTES
Pt states they were sent here for abnormal lab results. Hemaglobin of 7.0.  Spoke with lab they state that they were supposed to go to outpatient lab for a redraw that the lab results were inaccurate due to clotting of the blood.  Awaiting MSE from NP to send Pt to lab

## 2018-11-21 ENCOUNTER — HOSPITAL ENCOUNTER (OUTPATIENT)
Facility: HOSPITAL | Age: 73
Discharge: HOME | End: 2018-11-23
Attending: FAMILY MEDICINE | Admitting: INTERNAL MEDICINE
Payer: MEDICARE

## 2018-11-21 DIAGNOSIS — C16.9 MALIGNANT NEOPLASM OF STOMACH, UNSPECIFIED LOCATION: ICD-10-CM

## 2018-11-21 DIAGNOSIS — D64.9 ANEMIA, UNSPECIFIED TYPE: Primary | ICD-10-CM

## 2018-11-21 LAB
ALBUMIN SERPL BCP-MCNC: 2.6 G/DL
ALP SERPL-CCNC: 63 U/L
ALT SERPL W/O P-5'-P-CCNC: 11 U/L
ANION GAP SERPL CALC-SCNC: 8 MMOL/L
AST SERPL-CCNC: 24 U/L
BASOPHILS # BLD AUTO: 0.05 K/UL
BASOPHILS NFR BLD: 0.5 %
BILIRUB SERPL-MCNC: 0.6 MG/DL
BUN SERPL-MCNC: 6 MG/DL
CALCIUM SERPL-MCNC: 8.1 MG/DL
CHLORIDE SERPL-SCNC: 104 MMOL/L
CO2 SERPL-SCNC: 24 MMOL/L
CREAT SERPL-MCNC: 1 MG/DL
DIFFERENTIAL METHOD: ABNORMAL
EOSINOPHIL # BLD AUTO: 0.2 K/UL
EOSINOPHIL NFR BLD: 2.1 %
ERYTHROCYTE [DISTWIDTH] IN BLOOD BY AUTOMATED COUNT: 14.7 %
EST. GFR  (AFRICAN AMERICAN): >60 ML/MIN/1.73 M^2
EST. GFR  (NON AFRICAN AMERICAN): 56 ML/MIN/1.73 M^2
GLUCOSE SERPL-MCNC: 116 MG/DL
HCT VFR BLD AUTO: 24.9 %
HGB BLD-MCNC: 7.5 G/DL
IMM GRANULOCYTES # BLD AUTO: 0.04 K/UL
IMM GRANULOCYTES NFR BLD AUTO: 0.4 %
LYMPHOCYTES # BLD AUTO: 1.8 K/UL
LYMPHOCYTES NFR BLD: 19.9 %
MCH RBC QN AUTO: 26.3 PG
MCHC RBC AUTO-ENTMCNC: 30.1 G/DL
MCV RBC AUTO: 87 FL
MONOCYTES # BLD AUTO: 0.9 K/UL
MONOCYTES NFR BLD: 9.2 %
NEUTROPHILS # BLD AUTO: 6.2 K/UL
NEUTROPHILS NFR BLD: 67.9 %
NRBC BLD-RTO: 0 /100 WBC
PLATELET # BLD AUTO: 506 K/UL
PMV BLD AUTO: 9.4 FL
POTASSIUM SERPL-SCNC: 3.6 MMOL/L
PROT SERPL-MCNC: 6.3 G/DL
RBC # BLD AUTO: 2.85 M/UL
SODIUM SERPL-SCNC: 136 MMOL/L
WBC # BLD AUTO: 9.19 K/UL

## 2018-11-21 PROCEDURE — 86922 COMPATIBILITY TEST ANTIGLOB: CPT

## 2018-11-21 PROCEDURE — 85025 COMPLETE CBC W/AUTO DIFF WBC: CPT

## 2018-11-21 PROCEDURE — 80053 COMPREHEN METABOLIC PANEL: CPT

## 2018-11-21 PROCEDURE — 86920 COMPATIBILITY TEST SPIN: CPT

## 2018-11-21 PROCEDURE — 99285 EMERGENCY DEPT VISIT HI MDM: CPT

## 2018-11-21 PROCEDURE — G0378 HOSPITAL OBSERVATION PER HR: HCPCS

## 2018-11-21 PROCEDURE — 25000003 PHARM REV CODE 250: Performed by: FAMILY MEDICINE

## 2018-11-21 RX ORDER — ATORVASTATIN CALCIUM 10 MG/1
20 TABLET, FILM COATED ORAL DAILY
Status: DISCONTINUED | OUTPATIENT
Start: 2018-11-22 | End: 2018-11-23 | Stop reason: HOSPADM

## 2018-11-21 RX ORDER — PANTOPRAZOLE SODIUM 40 MG/1
40 TABLET, DELAYED RELEASE ORAL DAILY
Status: DISCONTINUED | OUTPATIENT
Start: 2018-11-22 | End: 2018-11-23 | Stop reason: HOSPADM

## 2018-11-21 RX ORDER — HYDROCODONE BITARTRATE AND ACETAMINOPHEN 500; 5 MG/1; MG/1
TABLET ORAL
Status: DISCONTINUED | OUTPATIENT
Start: 2018-11-21 | End: 2018-11-23 | Stop reason: HOSPADM

## 2018-11-21 RX ORDER — ONDANSETRON 4 MG/1
8 TABLET, ORALLY DISINTEGRATING ORAL EVERY 8 HOURS PRN
Status: DISCONTINUED | OUTPATIENT
Start: 2018-11-21 | End: 2018-11-23 | Stop reason: HOSPADM

## 2018-11-21 RX ORDER — LOPERAMIDE HYDROCHLORIDE 2 MG/1
2 CAPSULE ORAL 4 TIMES DAILY PRN
Status: DISCONTINUED | OUTPATIENT
Start: 2018-11-21 | End: 2018-11-23 | Stop reason: HOSPADM

## 2018-11-21 RX ORDER — SUCRALFATE 1 G/10ML
1 SUSPENSION ORAL
Status: DISCONTINUED | OUTPATIENT
Start: 2018-11-22 | End: 2018-11-23 | Stop reason: HOSPADM

## 2018-11-21 RX ORDER — SODIUM CHLORIDE 9 MG/ML
1000 INJECTION, SOLUTION INTRAVENOUS
Status: COMPLETED | OUTPATIENT
Start: 2018-11-21 | End: 2018-11-21

## 2018-11-21 RX ADMIN — SODIUM CHLORIDE 1000 ML: 9 INJECTION, SOLUTION INTRAVENOUS at 08:11

## 2018-11-21 NOTE — ED PROVIDER NOTES
"=Encounter Date: 2018       History     Chief Complaint   Patient presents with    Abnormal Lab     sent by Jason     Oriana Tobar is a 73 y.o female with sign pmhx including HTN, Anemia, Hyperlipidemia and Renal disorder. She presents to ED with complaint of "abnormal labs"            Review of patient's allergies indicates:   Allergen Reactions    Codeine Other (See Comments)     Pt shakes and hallucinates    Pcn [penicillins] Anxiety and Other (See Comments)     Past Medical History:   Diagnosis Date    Cancer     kidney    Dementia     Encounter for blood transfusion     2018    High cholesterol 2018    Hypertension     No Medication     Renal disorder     Wears dentures     Uppers     Past Surgical History:   Procedure Laterality Date     SECTION      COLONOSCOPY N/A 2018    Procedure: COLONOSCOPY;  Surgeon: Torres Son MD;  Location: [facility];  Service: Endoscopy;  Laterality: N/A;    COLONOSCOPY N/A 2018    Performed by [facility] at [facility] ENDO    CYSTOSCOPY WITH RETROGRADE PYELOGRAM Left 1/15/2016    Performed by [facility]acon [facility] at [facility] OR    EGD (ESOPHAGOGASTRODUODENOSCOPY) N/A 2018    Performed by Torres [facility] at [facility] ENDO    ESOPHAGOGASTRODUODENOSCOPY N/A 2018    Procedure: EGD (ESOPHAGOGASTRODUODENOSCOPY);  Surgeon: Torres Son MD;  Location: [facility];  Service: Endoscopy;  Laterality: N/A;    EYE SURGERY      Rt eye Catarac    HYSTERECTOMY      IMAGING, GI TRACT, INTRALUMINAL, VIA CAPSULE N/A 2018    [facility]    INTRALUMINAL GASTROINTESTINAL TRACT IMAGING VIA CAPSULE N/A 2018    Procedure: IMAGING, GI TRACT, INTRALUMINAL, VIA CAPSULE;  Surgeon: Rocco Ross MD;  Location: [facility];  Service: Endoscopy;  Laterality: N/A;    KIDNEY SURGERY Left 2018    Left kidney removed due to cancer    lasix surgery      " NEPHRECTOMY-RADICAL   Left 2016    [facility] OR    REPAIR-HERNIA-VENTRAL N/A 2016    [facility] by [facility] at [facility] OR    RESECTION-BOWEL  2016    [facility] at [facility] OR    URETEROSCOPY  1/15/2016    Per[facility] by [facility] at [facility] OR     History reviewed. No pertinent family history.  Social History     Tobacco Use    Smoking status: Former Smoker     Packs/day: 1.00     Years: 50.00     Pack years: 50.00     Last attempt to quit: 10/6/2018     Years since quittin.1    Smokeless tobacco: Never Used    Tobacco comment: pt has stopped; encouraged to remain stopped   Substance Use Topics    Alcohol use: No     Frequency: Never     Comment: last alcohol intake 2 weeks ago    Drug use: No     Review of Systems   Constitutional: Negative for fever.   HENT: Negative for sore throat.    Respiratory: Negative for shortness of breath.    Cardiovascular: Negative for chest pain.   Gastrointestinal: Negative for nausea.   Genitourinary: Negative for dysuria.   Musculoskeletal: Negative for back pain.   Skin: Negative for rash.   Neurological: Negative for weakness.   Hematological: Does not bruise/bleed easily.   All other systems reviewed and are negative.      Physical Exam     Initial Vitals [18 1555]   BP Pulse Resp Temp SpO2   (!) 143/66 81 20 98.3 °F (36.8 °C) 100 %      MAP       --         Physical Exam    Nursing note and vitals reviewed.  Constitutional: She appears well-developed and well-nourished.   HENT:   Head: Normocephalic.   Eyes: Pupils are equal, round, and reactive to light.   Neck: Normal range of motion.   Cardiovascular: Normal rate, regular rhythm and normal heart sounds.   Pulmonary/Chest: Breath sounds normal.   Musculoskeletal: Normal range of motion.   Neurological: She is alert and oriented to person, place, and time.   Skin: Skin is warm and dry.   Psychiatric: She has a normal mood  "and affect. Her behavior is normal. Judgment and thought content normal.         ED Course   Procedures  Labs Reviewed - No data to display       Imaging Results    None          Medical Decision Making:   Initial Assessment:   Patient presents to ED for complaint of "abnormal labs".  Patient was suppose to go to OP lab for blood draw because previous sample had coagulated  ED Management:  Patient discharged from ED and escorted to OP lab.                      Clinical Impression:   The encounter diagnosis was Encounter for well adolescent visit.                             Yudy Castano NP  11/20/18 1358    "

## 2018-11-22 LAB
ABO + RH BLD: NORMAL
BLD GP AB SCN CELLS X3 SERPL QL: NORMAL
BLOOD GROUP ANTIBODIES SERPL: NORMAL

## 2018-11-22 PROCEDURE — 86870 RBC ANTIBODY IDENTIFICATION: CPT

## 2018-11-22 PROCEDURE — 86901 BLOOD TYPING SEROLOGIC RH(D): CPT

## 2018-11-22 PROCEDURE — 86880 COOMBS TEST DIRECT: CPT | Mod: 59

## 2018-11-22 PROCEDURE — 86900 BLOOD TYPING SEROLOGIC ABO: CPT | Mod: 59

## 2018-11-22 PROCEDURE — 86870 RBC ANTIBODY IDENTIFICATION: CPT | Mod: 59

## 2018-11-22 PROCEDURE — 36415 COLL VENOUS BLD VENIPUNCTURE: CPT

## 2018-11-22 PROCEDURE — 36430 TRANSFUSION BLD/BLD COMPNT: CPT

## 2018-11-22 PROCEDURE — 86886 COOMBS TEST INDIRECT TITER: CPT

## 2018-11-22 PROCEDURE — P9016 RBC LEUKOCYTES REDUCED: HCPCS

## 2018-11-22 PROCEDURE — G0378 HOSPITAL OBSERVATION PER HR: HCPCS

## 2018-11-22 PROCEDURE — 25000003 PHARM REV CODE 250: Performed by: FAMILY MEDICINE

## 2018-11-22 PROCEDURE — 25000003 PHARM REV CODE 250

## 2018-11-22 PROCEDURE — 86902 BLOOD TYPE ANTIGEN DONOR EA: CPT

## 2018-11-22 PROCEDURE — 86850 RBC ANTIBODY SCREEN: CPT

## 2018-11-22 PROCEDURE — 25000003 PHARM REV CODE 250: Performed by: INTERNAL MEDICINE

## 2018-11-22 RX ORDER — ONDANSETRON 2 MG/ML
4 INJECTION INTRAMUSCULAR; INTRAVENOUS EVERY 6 HOURS PRN
Status: DISCONTINUED | OUTPATIENT
Start: 2018-11-22 | End: 2018-11-23 | Stop reason: HOSPADM

## 2018-11-22 RX ORDER — DIPHENHYDRAMINE HCL 25 MG
CAPSULE ORAL
Status: COMPLETED
Start: 2018-11-22 | End: 2018-11-22

## 2018-11-22 RX ORDER — ACETAMINOPHEN 325 MG/1
650 TABLET ORAL EVERY 4 HOURS PRN
Status: DISCONTINUED | OUTPATIENT
Start: 2018-11-22 | End: 2018-11-23 | Stop reason: HOSPADM

## 2018-11-22 RX ORDER — DIPHENHYDRAMINE HCL 25 MG
50 CAPSULE ORAL ONCE
Status: DISCONTINUED | OUTPATIENT
Start: 2018-11-22 | End: 2018-11-23 | Stop reason: HOSPADM

## 2018-11-22 RX ADMIN — SUCRALFATE 1 G: 1 SUSPENSION ORAL at 09:11

## 2018-11-22 RX ADMIN — DIPHENHYDRAMINE HYDROCHLORIDE 50 MG: 25 CAPSULE ORAL at 10:11

## 2018-11-22 RX ADMIN — SUCRALFATE 1 G: 1 SUSPENSION ORAL at 06:11

## 2018-11-22 RX ADMIN — ATORVASTATIN CALCIUM 20 MG: 10 TABLET, FILM COATED ORAL at 09:11

## 2018-11-22 RX ADMIN — ACETAMINOPHEN 650 MG: 325 TABLET ORAL at 10:11

## 2018-11-22 RX ADMIN — PANTOPRAZOLE SODIUM 40 MG: 40 TABLET, DELAYED RELEASE ORAL at 09:11

## 2018-11-22 NOTE — SUBJECTIVE & OBJECTIVE
Past Medical History:   Diagnosis Date    Cancer     kidney    Dementia     Encounter for blood transfusion     2018    High cholesterol 2018    Hypertension     No Medication     Renal disorder     Wears dentures     Uppers       Past Surgical History:   Procedure Laterality Date     SECTION      COLONOSCOPY N/A 2018    Procedure: COLONOSCOPY;  Surgeon: Torres Son MD;  Location: [facility];  Service: Endoscopy;  Laterality: N/A;    COLONOSCOPY N/A 2018    [facility]    CYSTOSCOPY WITH RETROGRADE PYELOGRAM Left 1/15/2016    Performed by Maribell [facility]acon [facility] at [facility] OR    EGD (ESOPHAGOGASTRODUODENOSCOPY) N/A 2018    Per[facility] by Torres [facility]    ESOPHAGOGASTRODUODENOSCOPY N/A 2018    Procedure: EGD (ESOPHAGOGASTRODUODENOSCOPY);  Surgeon: Torres Son MD;  Location: [facility];  Service: Endoscopy;  Laterality: N/A;    EYE SURGERY      Rt eye Catarac    HYSTERECTOMY      IMAGING, GI TRACT, INTRALUMINAL, VIA CAPSULE N/A 2018    Performed by [facility] at [facility]    INTRALUMINAL GASTROINTESTINAL TRACT IMAGING VIA CAPSULE N/A 2018    Procedure: IMAGING, GI TRACT, INTRALUMINAL, VIA CAPSULE;  Surgeon: Rocco Ross MD;  Location: [facility];  Service: Endoscopy;  Laterality: N/A;    KIDNEY SURGERY Left 2018    Left kidney removed due to cancer    lasix surgery      NEPHRECTOMY-RADICAL   Left 2016    [facility] at [facility] OR    REPAIR-HERNIA-VENTRAL N/A 2016    Performed by Marco A [facility]hawa MD at [facility] OR    RESECTION-BOWEL  2016    [facility] by [facility]awa [facility] at [facility] OR    URETEROSCOPY  1/15/2016    Performed by [facility] at [facility] OR       Review of patient's allergies indicates:   Allergen Reactions    Codeine Other (See Comments)     Pt shakes and hallucinates    Pcn [penicillins]  Anxiety and Other (See Comments)       No current facility-administered medications on file prior to encounter.      Current Outpatient Medications on File Prior to Encounter   Medication Sig    atorvastatin (LIPITOR) 20 MG tablet Take 20 mg by mouth once daily.    ferrous sulfate 325 (65 FE) MG EC tablet Take 325 mg by mouth once daily.    loperamide (IMODIUM) 2 mg capsule Take 1 capsule (2 mg total) by mouth 4 (four) times daily as needed for Diarrhea.    pantoprazole (PROTONIX) 40 MG tablet Take 1 tablet (40 mg total) by mouth once daily.    sucralfate (CARAFATE) 100 mg/mL suspension Take 10 mLs (1 g total) by mouth 4 (four) times daily before meals and nightly.     Family History     None        Tobacco Use    Smoking status: Former Smoker     Packs/day: 1.00     Years: 50.00     Pack years: 50.00     Last attempt to quit: 10/6/2018     Years since quittin.1    Smokeless tobacco: Never Used    Tobacco comment: pt has stopped; encouraged to remain stopped   Substance and Sexual Activity    Alcohol use: No     Frequency: Never     Comment: last alcohol intake 2 weeks ago    Drug use: No    Sexual activity: No     Review of Systems   Constitutional: Positive for fatigue. Negative for activity change, appetite change and fever.   HENT: Negative for ear discharge, mouth sores, nosebleeds, rhinorrhea, sinus pressure, sinus pain and tinnitus.    Eyes: Negative.  Negative for pain, redness and itching.   Respiratory: Positive for apnea. Negative for cough, choking, chest tightness, shortness of breath, wheezing and stridor.    Cardiovascular: Positive for palpitations. Negative for chest pain and leg swelling.   Gastrointestinal: Negative for abdominal distention, abdominal pain, anal bleeding, blood in stool, constipation and diarrhea.   Endocrine: Negative.    Genitourinary: Negative for difficulty urinating, flank pain, frequency and urgency.   Musculoskeletal: Positive for arthralgias, back pain and  joint swelling. Negative for gait problem and myalgias.   Skin: Negative for color change and pallor.   Allergic/Immunologic: Negative.    Neurological: Negative for dizziness, facial asymmetry, weakness, light-headedness and headaches.   Hematological: Negative for adenopathy. Does not bruise/bleed easily.     Objective:     Vital Signs (Most Recent):  Temp: 98.5 °F (36.9 °C) (11/22/18 1032)  Pulse: 70 (11/22/18 1032)  Resp: 18 (11/22/18 1032)  BP: 122/60 (11/22/18 1032)  SpO2: 100 % (11/22/18 1032) Vital Signs (24h Range):  Temp:  [96.2 °F (35.7 °C)-98.5 °F (36.9 °C)] 98.5 °F (36.9 °C)  Pulse:  [69-79] 70  Resp:  [16-18] 18  SpO2:  [99 %-100 %] 100 %  BP: (107-147)/(56-78) 122/60     Weight: 74.4 kg (164 lb)  Body mass index is 22.87 kg/m².    Physical Exam   Constitutional: She is oriented to person, place, and time. She appears well-developed and well-nourished.   HENT:   Head: Normocephalic and atraumatic.   Eyes: EOM are normal. Pupils are equal, round, and reactive to light.   Neck: Normal range of motion. Neck supple. No tracheal deviation present. No thyromegaly present.   Cardiovascular: Normal rate, regular rhythm and normal heart sounds.   Pulmonary/Chest: She is in respiratory distress. She has wheezes.   Abdominal: Soft. Bowel sounds are normal. She exhibits no distension and no mass. There is no tenderness. There is no rebound and no guarding.   Musculoskeletal: Normal range of motion.   Lymphadenopathy:     She has no cervical adenopathy.   Neurological: She is alert and oriented to person, place, and time.   Skin: Skin is warm and dry. Capillary refill takes less than 2 seconds.   Psychiatric: She has a normal mood and affect. Her behavior is normal. Judgment and thought content normal.   Nursing note and vitals reviewed.        CRANIAL NERVES     CN III, IV, VI   Pupils are equal, round, and reactive to light.  Extraocular motions are normal.        Significant Labs:   Recent Lab Results        11/22/18  0023   11/21/18 2059        Immature Granulocytes   0.4     Immature Grans (Abs)   0.04  Comment:  Mild elevation in immature granulocytes is non specific and   can be seen in a variety of conditions including stress response,   acute inflammation, trauma and pregnancy. Correlation with other   laboratory and clinical findings is essential.       Albumin   2.6     Alkaline Phosphatase   63     ALT   11     Anion Gap   8     AST   24     Baso #   0.05     Basophil%   0.5     Total Bilirubin   0.6  Comment:  For infants and newborns, interpretation of results should be based  on gestational age, weight and in agreement with clinical  observations.  Premature Infant recommended reference ranges:  Up to 24 hours.............<8.0 mg/dL  Up to 48 hours............<12.0 mg/dL  3-5 days..................<15.0 mg/dL  6-29 days.................<15.0 mg/dL       BUN, Bld   6     Calcium   8.1     Chloride   104     CO2   24     Creatinine   1.0     Differential Method   Automated     eGFR if    >60.0     eGFR if non    56.0  Comment:  Calculation used to obtain the estimated glomerular filtration  rate (eGFR) is the CKD-EPI equation.        Eos #   0.2     Eosinophil%   2.1     Glucose   116     Gran # (ANC)   6.2     Gran%   67.9     Group & Rh A POS       Hematocrit   24.9     Hemoglobin   7.5     Lymph #   1.8     Lymph%   19.9     MCH   26.3     MCHC   30.1     MCV   87     Mono #   0.9     Mono%   9.2     MPV   9.4     nRBC   0     Platelets   506     Potassium   3.6     Total Protein   6.3     RBC   2.85     RDW   14.7     Sodium   136     WBC   9.19         All pertinent labs within the past 24 hours have been reviewed.    Significant Imaging: I have reviewed and interpreted all pertinent imaging results/findings within the past 24 hours.

## 2018-11-22 NOTE — PLAN OF CARE
Problem: Cardiac Output Decreased (Adult)  Goal: Identify Related Risk Factors and Signs and Symptoms  Related risk factors and signs and symptoms are identified upon initiation of Human Response Clinical Practice Guideline (CPG)  Outcome: Ongoing (interventions implemented as appropriate)   11/22/18 0680   Cardiac Output Decreased   Related Risk Factors (Cardiac Output Decreased) cardiac muscle disease;oxygenation decreased   Signs and Symptoms (Cardiac Output Decreased) dyspnea;shortness of breath;weakness/activity intolerance

## 2018-11-22 NOTE — HPI
Patient is a 73-year-old female with complaints of weakness and dizziness.  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  She frequently has to get transfusions approximately 1 every week to 1 every 2 weeks.  Patient has a past medical history significant for renal cell carcinoma, hypertension, hyperlipidemia, and symptomatic anemia.  She presented to the emergency department with symptoms of dizziness and shortness of breath as well as generalized weakness.  Her hemoglobin in the emergency department was 7.5 although patient is symptomatic and will need to have 2 units packed red blood cells day as per normal for her.  Patient's labs in the emergency department were white blood cell count 9.1 hemoglobin 7.5 hematocrit to 24.9 comprehensive metabolic panel revealed sodium 136 potassium 3.6 chloride 104 bicarb 24 glucose 116 BUN creatinine were 6 and 1.0 GFR was 56

## 2018-11-22 NOTE — H&P
[facility] Medicine  History & Physical    Patient Name: Oriana Tobar  MRN: [id number]  Admission Date: 2018  Attending Physician: Tito Mathews MD  Primary Care Provider: Carine Baltazar NP         Patient information was obtained from patient and ER records.     Subjective:     Principal Problem:Anemia    Chief Complaint:   Chief Complaint   Patient presents with    Weakness    Dizziness        HPI: Patient is a 73-year-old female with complaints of weakness and dizziness.  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  She frequently has to get transfusions approximately 1 every week to 1 every 2 weeks.  Patient has a past medical history significant for renal cell carcinoma, hypertension, hyperlipidemia, and symptomatic anemia.  She presented to the emergency department with symptoms of dizziness and shortness of breath as well as generalized weakness.  Her hemoglobin in the emergency department was 7.5 although patient is symptomatic and will need to have 2 units packed red blood cells day as per normal for her.  Patient's labs in the emergency department were white blood cell count 9.1 hemoglobin 7.5 hematocrit to 24.9 comprehensive metabolic panel revealed sodium 136 potassium 3.6 chloride 104 bicarb 24 glucose 116 BUN creatinine were 6 and 1.0 GFR was 56    Past Medical History:   Diagnosis Date    Cancer     kidney    Dementia     Encounter for blood transfusion     2018    High cholesterol 2018    Hypertension     No Medication     Renal disorder     Wears dentures     Uppers       Past Surgical History:   Procedure Laterality Date     SECTION      COLONOSCOPY N/A 2018    Procedure: COLONOSCOPY;  Surgeon: Torres Son MD;  Location: [facility];  Service: Endoscopy;  Laterality: N/A;    COLONOSCOPY N/A 2018    Performed by Torres Son MD at [facility] ENDO    CYSTOSCOPY WITH  RETROGRADE PYELOGRAM Left 1/15/2016    [facility] OR    EGD (ESOPHAGOGASTRODUODENOSCOPY) N/A 6/25/2018    Performed by Torres Son MD at [facility] ENDO    ESOPHAGOGASTRODUODENOSCOPY N/A 6/25/2018    Procedure: EGD (ESOPHAGOGASTRODUODENOSCOPY);  Surgeon: Torres Son MD;  Location: [facility] ENDO;  Service: Endoscopy;  Laterality: N/A;    EYE SURGERY      Rt eye Catarac    HYSTERECTOMY      IMAGING, GI TRACT, INTRALUMINAL, VIA CAPSULE N/A 8/6/2018    Performed by [facility] at [facility] ENDO    INTRALUMINAL GASTROINTESTINAL TRACT IMAGING VIA CAPSULE N/A 8/6/2018    Procedure: IMAGING, GI TRACT, INTRALUMINAL, VIA CAPSULE;  Surgeon: Rocco Ross MD;  Location: [facility] ENDO;  Service: Endoscopy;  Laterality: N/A;    KIDNEY SURGERY Left 06/23/2018    Left kidney removed due to cancer    lasix surgery      NEPHRECTOMY-RADICAL   Left 1/27/2016    [facility] at [facility] OR    REPAIR-HERNIA-VENTRAL N/A 1/27/2016    Performed by Marco A [facility]hawa MD at [facility] OR    RESECTION-BOWEL  1/27/2016    Performed by [facility]awa [facility] at [facility] OR    URETEROSCOPY  1/15/2016    Performed by [facility] at [facility] OR       Review of patient's allergies indicates:   Allergen Reactions    Codeine Other (See Comments)     Pt shakes and hallucinates    Pcn [penicillins] Anxiety and Other (See Comments)       No current facility-administered medications on file prior to encounter.      Current Outpatient Medications on File Prior to Encounter   Medication Sig    atorvastatin (LIPITOR) 20 MG tablet Take 20 mg by mouth once daily.    ferrous sulfate 325 (65 FE) MG EC tablet Take 325 mg by mouth once daily.    loperamide (IMODIUM) 2 mg capsule Take 1 capsule (2 mg total) by mouth 4 (four) times daily as needed for Diarrhea.    pantoprazole (PROTONIX) 40 MG tablet Take 1 tablet (40 mg total) by mouth once daily.    sucralfate (CARAFATE) 100  mg/mL suspension Take 10 mLs (1 g total) by mouth 4 (four) times daily before meals and nightly.     Family History     None        Tobacco Use    Smoking status: Former Smoker     Packs/day: 1.00     Years: 50.00     Pack years: 50.00     Last attempt to quit: 10/6/2018     Years since quittin.1    Smokeless tobacco: Never Used    Tobacco comment: pt has stopped; encouraged to remain stopped   Substance and Sexual Activity    Alcohol use: No     Frequency: Never     Comment: last alcohol intake 2 weeks ago    Drug use: No    Sexual activity: No     Review of Systems   Constitutional: Positive for fatigue. Negative for activity change, appetite change and fever.   HENT: Negative for ear discharge, mouth sores, nosebleeds, rhinorrhea, sinus pressure, sinus pain and tinnitus.    Eyes: Negative.  Negative for pain, redness and itching.   Respiratory: Positive for apnea. Negative for cough, choking, chest tightness, shortness of breath, wheezing and stridor.    Cardiovascular: Positive for palpitations. Negative for chest pain and leg swelling.   Gastrointestinal: Negative for abdominal distention, abdominal pain, anal bleeding, blood in stool, constipation and diarrhea.   Endocrine: Negative.    Genitourinary: Negative for difficulty urinating, flank pain, frequency and urgency.   Musculoskeletal: Positive for arthralgias, back pain and joint swelling. Negative for gait problem and myalgias.   Skin: Negative for color change and pallor.   Allergic/Immunologic: Negative.    Neurological: Negative for dizziness, facial asymmetry, weakness, light-headedness and headaches.   Hematological: Negative for adenopathy. Does not bruise/bleed easily.     Objective:     Vital Signs (Most Recent):  Temp: 98.5 °F (36.9 °C) (18 1032)  Pulse: 70 (18 1032)  Resp: 18 (18 1032)  BP: 122/60 (18 1032)  SpO2: 100 % (18 1032) Vital Signs (24h Range):  Temp:  [96.2 °F (35.7 °C)-98.5 °F (36.9 °C)] 98.5  °F (36.9 °C)  Pulse:  [69-79] 70  Resp:  [16-18] 18  SpO2:  [99 %-100 %] 100 %  BP: (107-147)/(56-78) 122/60     Weight: 74.4 kg (164 lb)  Body mass index is 22.87 kg/m².    Physical Exam   Constitutional: She is oriented to person, place, and time. She appears well-developed and well-nourished.   HENT:   Head: Normocephalic and atraumatic.   Eyes: EOM are normal. Pupils are equal, round, and reactive to light.   Neck: Normal range of motion. Neck supple. No tracheal deviation present. No thyromegaly present.   Cardiovascular: Normal rate, regular rhythm and normal heart sounds.   Pulmonary/Chest: She is in respiratory distress. She has wheezes.   Abdominal: Soft. Bowel sounds are normal. She exhibits no distension and no mass. There is no tenderness. There is no rebound and no guarding.   Musculoskeletal: Normal range of motion.   Lymphadenopathy:     She has no cervical adenopathy.   Neurological: She is alert and oriented to person, place, and time.   Skin: Skin is warm and dry. Capillary refill takes less than 2 seconds.   Psychiatric: She has a normal mood and affect. Her behavior is normal. Judgment and thought content normal.   Nursing note and vitals reviewed.        CRANIAL NERVES     CN III, IV, VI   Pupils are equal, round, and reactive to light.  Extraocular motions are normal.        Significant Labs:   Recent Lab Results       11/22/18  0023   11/21/18  2059        Immature Granulocytes   0.4     Immature Grans (Abs)   0.04  Comment:  Mild elevation in immature granulocytes is non specific and   can be seen in a variety of conditions including stress response,   acute inflammation, trauma and pregnancy. Correlation with other   laboratory and clinical findings is essential.       Albumin   2.6     Alkaline Phosphatase   63     ALT   11     Anion Gap   8     AST   24     Baso #   0.05     Basophil%   0.5     Total Bilirubin   0.6  Comment:  For infants and newborns, interpretation of results should be  based  on gestational age, weight and in agreement with clinical  observations.  Premature Infant recommended reference ranges:  Up to 24 hours.............<8.0 mg/dL  Up to 48 hours............<12.0 mg/dL  3-5 days..................<15.0 mg/dL  6-29 days.................<15.0 mg/dL       BUN, Bld   6     Calcium   8.1     Chloride   104     CO2   24     Creatinine   1.0     Differential Method   Automated     eGFR if    >60.0     eGFR if non    56.0  Comment:  Calculation used to obtain the estimated glomerular filtration  rate (eGFR) is the CKD-EPI equation.        Eos #   0.2     Eosinophil%   2.1     Glucose   116     Gran # (ANC)   6.2     Gran%   67.9     Group & Rh A POS       Hematocrit   24.9     Hemoglobin   7.5     Lymph #   1.8     Lymph%   19.9     MCH   26.3     MCHC   30.1     MCV   87     Mono #   0.9     Mono%   9.2     MPV   9.4     nRBC   0     Platelets   506     Potassium   3.6     Total Protein   6.3     RBC   2.85     RDW   14.7     Sodium   136     WBC   9.19         All pertinent labs within the past 24 hours have been reviewed.    Significant Imaging: I have reviewed and interpreted all pertinent imaging results/findings within the past 24 hours.    Assessment/Plan:     * Anemia    Transfuse 2 units packed red blood cells.  Premedicate with Benadryl and Tylenol.  If stable after transfusion patient may be discharged to home         VTE Risk Mitigation (From admission, onward)        Ordered     Place RORY hose  Until discontinued      11/21/18 4709             Tito Mathews MD  Department of Hospital Medicine   [facility] - Med Surg

## 2018-11-22 NOTE — ED PROVIDER NOTES
Encounter Date: 2018       History     Chief Complaint   Patient presents with    Weakness    Dizziness     73-year-old female presents brought in by her daughter who is concerned as patient became weak after having a bloody bowel movement at home she is known to have GI bleeding as a complication of her gastric cancer she is not on chemotherapy or radiation, she has been transfused every week or so over the past few months her last hemoglobin was 8.4  2 days ago, record review shows hemoglobin as low as 4.3 in the past patient denies any chest pain abdominal pain cough fever or chills          Review of patient's allergies indicates:   Allergen Reactions    Codeine Other (See Comments)     Pt shakes and hallucinates    Pcn [penicillins] Anxiety and Other (See Comments)     Past Medical History:   Diagnosis Date    Cancer     kidney    Dementia     Encounter for blood transfusion     2018    High cholesterol 2018    Hypertension     No Medication     Renal disorder     Wears dentures     Uppers     Past Surgical History:   Procedure Laterality Date     SECTION      COLONOSCOPY N/A 2018    Procedure: COLONOSCOPY;  Surgeon: Torres Son MD;  Location: [facility];  Service: Endoscopy;  Laterality: N/A;    COLONOSCOPY N/A 2018    Performed by Torres Son MD at [facility]    CYSTOSCOPY WITH RETROGRADE PYELOGRAM Left 1/15/2016    [facility] by [facility] at [facility]    EGD (ESOPHAGOGASTRODUODENOSCOPY) N/A 2018    [facility] by [facility]O    ESOPHAGOGASTRODUODENOSCOPY N/A 2018    Procedure: EGD (ESOPHAGOGASTRODUODENOSCOPY);  Surgeon: Torres Son MD;  Location: [facility];  Service: Endoscopy;  Laterality: N/A;    EYE SURGERY      Rt eye Catarac    HYSTERECTOMY      IMAGING, GI TRACT, INTRALUMINAL, VIA CAPSULE N/A 2018    Per[facility] by [facility] at [facility]    INTRALUMINAL  GASTROINTESTINAL TRACT IMAGING VIA CAPSULE N/A 2018    Procedure: IMAGING, GI TRACT, INTRALUMINAL, VIA CAPSULE;  Surgeon: Rocco Ross MD;  Location: [facility];  Service: Endoscopy;  Laterality: N/A;    KIDNEY SURGERY Left 2018    Left kidney removed due to cancer    lasix surgery      NEPHRECTOMY-RADICAL   Left 2016    [facility] at [facility] OR    REPAIR-HERNIA-VENTRAL N/A 2016    Performed by Marco A [facility]hawa MD at [facility] OR    RESECTION-BOWEL  2016    Performed by [facility] A [facility]awa MD at [facility] OR    URETEROSCOPY  1/15/2016    Performed by [facility]acon [facility] at [facility] OR     History reviewed. No pertinent family history.  Social History     Tobacco Use    Smoking status: Former Smoker     Packs/day: 1.00     Years: 50.00     Pack years: 50.00     Last attempt to quit: 10/6/2018     Years since quittin.1    Smokeless tobacco: Never Used    Tobacco comment: pt has stopped; encouraged to remain stopped   Substance Use Topics    Alcohol use: No     Frequency: Never     Comment: last alcohol intake 2 weeks ago    Drug use: No     Review of Systems   Constitutional: Negative for fever.   HENT: Negative for sore throat.    Respiratory: Negative for shortness of breath.    Cardiovascular: Negative for chest pain.   Gastrointestinal: Positive for anal bleeding and blood in stool. Negative for nausea and vomiting.   Genitourinary: Negative for dysuria.   Musculoskeletal: Negative for back pain.   Skin: Negative for rash.   Neurological: Negative for weakness.   Hematological: Does not bruise/bleed easily.       Physical Exam     Initial Vitals [18]   BP Pulse Resp Temp SpO2   120/63 76 16 98.2 °F (36.8 °C) 100 %      MAP       --         Physical Exam    Nursing note and vitals reviewed.  Constitutional: She appears well-developed and well-nourished. She is not diaphoretic. No distress.   HENT:   Head: Normocephalic and atraumatic.   Right Ear:  External ear normal.   Left Ear: External ear normal.   Oral mucosa are dry   Eyes: Pupils are equal, round, and reactive to light. Right eye exhibits no discharge. Left eye exhibits no discharge.   Neck: No tracheal deviation present. No JVD present.   Cardiovascular: Exam reveals no friction rub.    No murmur heard.  Pulmonary/Chest: No stridor. No respiratory distress. She has no wheezes. She has no rales.   Abdominal: Bowel sounds are normal. She exhibits no distension.   Musculoskeletal: Normal range of motion.   Neurological: She is alert.   Skin: Skin is warm and dry. There is pallor.   Psychiatric: She has a normal mood and affect.         ED Course   Procedures  Labs Reviewed   CBC W/ AUTO DIFFERENTIAL - Abnormal; Notable for the following components:       Result Value    RBC 2.85 (*)     Hemoglobin 7.5 (*)     Hematocrit 24.9 (*)     MCH 26.3 (*)     MCHC 30.1 (*)     RDW 14.7 (*)     Platelets 506 (*)     All other components within normal limits   COMPREHENSIVE METABOLIC PANEL - Abnormal; Notable for the following components:    Glucose 116 (*)     BUN, Bld 6 (*)     Calcium 8.1 (*)     Albumin 2.6 (*)     eGFR if non  56.0 (*)     All other components within normal limits          Imaging Results    None          Medical Decision Making:   Case discussed with hospitalist Dr. Mathwes, considering this is Thanksgiving eve and holiday weekend it seems in the patient's best interest that she be transfused now to replenish her physiologic reserve in this apparent palliative care situation                      Clinical Impression:   The primary encounter diagnosis was Anemia, unspecified type. A diagnosis of Malignant neoplasm of stomach, unspecified location was also pertinent to this visit.                             Stewart Jhaveri MD  11/21/18 0891

## 2018-11-22 NOTE — PLAN OF CARE
Problem: Patient Care Overview  Goal: Plan of Care Review   11/22/18 0244   Coping/Psychosocial   Plan Of Care Reviewed With patient

## 2018-11-22 NOTE — HOSPITAL COURSE
Patient will be admitted to medical-surgical unit for transfusion of 2 units packed red blood cells.  Patient will be sent home after transfusion if stable.    November 23, 2018:  This patient has been here for the last 48 hr due to need for transfusion.  Patient could not get her transfusion yesterday because there were antibodies that had to be considered and the blood had to be procured and that took several hours to do.  Blood was available last evening and patient received her 2 units packed red blood cells without difficulty overnight.  As soon as that unit is complete she patient may be discharged home in stable condition for follow-up with her primary care doctor.  Otherwise no other issues were present at the time of discharge.

## 2018-11-22 NOTE — ASSESSMENT & PLAN NOTE
Transfuse 2 units packed red blood cells.  Premedicate with Benadryl and Tylenol.  If stable after transfusion patient may be discharged to home

## 2018-11-22 NOTE — PLAN OF CARE
Problem: Cardiac Output Decreased (Adult)  Goal: Identify Related Risk Factors and Signs and Symptoms  Related risk factors and signs and symptoms are identified upon initiation of Human Response Clinical Practice Guideline (CPG)   11/22/18 0242   Cardiac Output Decreased   Related Risk Factors (Cardiac Output Decreased) afterload altered;disease process   Signs and Symptoms (Cardiac Output Decreased) dyspnea     Goal: Adequate Cardiac Output/Effective Tissue Perfusion  Patient will demonstrate the desired outcomes by discharge/transition of care.   11/22/18 0242   Cardiac Output Decreased (Adult)   Adequate Cardiac Output/Effective Tissue Perfusion making progress toward outcome

## 2018-11-22 NOTE — PLAN OF CARE
Problem: Fall Risk (Adult)  Goal: Identify Related Risk Factors and Signs and Symptoms  Related risk factors and signs and symptoms are identified upon initiation of Human Response Clinical Practice Guideline (CPG)   11/22/18 0244   Fall Risk   Signs and Symptoms (Fall Risk) presence of risk factors

## 2018-11-23 VITALS
RESPIRATION RATE: 18 BRPM | SYSTOLIC BLOOD PRESSURE: 131 MMHG | TEMPERATURE: 97 F | WEIGHT: 164 LBS | HEART RATE: 67 BPM | OXYGEN SATURATION: 99 % | HEIGHT: 71 IN | BODY MASS INDEX: 22.96 KG/M2 | DIASTOLIC BLOOD PRESSURE: 60 MMHG

## 2018-11-23 LAB
BASOPHILS # BLD AUTO: 0.05 K/UL
BASOPHILS NFR BLD: 0.6 %
BLD PROD TYP BPU: NORMAL
BLD PROD TYP BPU: NORMAL
BLOOD UNIT EXPIRATION DATE: NORMAL
BLOOD UNIT EXPIRATION DATE: NORMAL
BLOOD UNIT TYPE CODE: 6200
BLOOD UNIT TYPE CODE: 6200
BLOOD UNIT TYPE: NORMAL
BLOOD UNIT TYPE: NORMAL
CODING SYSTEM: NORMAL
CODING SYSTEM: NORMAL
DIFFERENTIAL METHOD: ABNORMAL
DISPENSE STATUS: NORMAL
DISPENSE STATUS: NORMAL
EOSINOPHIL # BLD AUTO: 0.3 K/UL
EOSINOPHIL NFR BLD: 3 %
ERYTHROCYTE [DISTWIDTH] IN BLOOD BY AUTOMATED COUNT: 14.6 %
HCT VFR BLD AUTO: 29.4 %
HGB BLD-MCNC: 9.4 G/DL
IMM GRANULOCYTES # BLD AUTO: 0.02 K/UL
IMM GRANULOCYTES NFR BLD AUTO: 0.2 %
LYMPHOCYTES # BLD AUTO: 1.6 K/UL
LYMPHOCYTES NFR BLD: 18.7 %
MCH RBC QN AUTO: 27.7 PG
MCHC RBC AUTO-ENTMCNC: 32 G/DL
MCV RBC AUTO: 87 FL
MONOCYTES # BLD AUTO: 0.7 K/UL
MONOCYTES NFR BLD: 7.7 %
NEUTROPHILS # BLD AUTO: 6.1 K/UL
NEUTROPHILS NFR BLD: 69.8 %
NRBC BLD-RTO: 0 /100 WBC
NUM UNITS TRANS PACKED RBC: NORMAL
NUM UNITS TRANS PACKED RBC: NORMAL
PLATELET # BLD AUTO: 433 K/UL
PMV BLD AUTO: 9.3 FL
RBC # BLD AUTO: 3.39 M/UL
WBC # BLD AUTO: 8.73 K/UL

## 2018-11-23 PROCEDURE — G0378 HOSPITAL OBSERVATION PER HR: HCPCS

## 2018-11-23 PROCEDURE — 36430 TRANSFUSION BLD/BLD COMPNT: CPT

## 2018-11-23 PROCEDURE — 25000003 PHARM REV CODE 250: Performed by: FAMILY MEDICINE

## 2018-11-23 PROCEDURE — P9021 RED BLOOD CELLS UNIT: HCPCS

## 2018-11-23 PROCEDURE — 36415 COLL VENOUS BLD VENIPUNCTURE: CPT

## 2018-11-23 PROCEDURE — 85025 COMPLETE CBC W/AUTO DIFF WBC: CPT

## 2018-11-23 RX ADMIN — ATORVASTATIN CALCIUM 20 MG: 10 TABLET, FILM COATED ORAL at 09:11

## 2018-11-23 RX ADMIN — SUCRALFATE 1 G: 1 SUSPENSION ORAL at 06:11

## 2018-11-23 RX ADMIN — PANTOPRAZOLE SODIUM 40 MG: 40 TABLET, DELAYED RELEASE ORAL at 09:11

## 2018-11-23 NOTE — PLAN OF CARE
11/23/18 1453   Final Note   Assessment Type Final Discharge Note   Anticipated Discharge Disposition Home   What phone number can be called within the next 1-3 days to see how you are doing after discharge? [phone]   Patient left before doing assessment. Left message for daughter to see if they have any discharge needs I can help her with.    no

## 2018-11-23 NOTE — PLAN OF CARE
11/23/18 1030   HOWELL Message   Medicare Outpatient and Observation Notification regarding financial responsibility Other (comments)   Patient left before signing HOWELL.

## 2018-11-23 NOTE — PLAN OF CARE
Problem: Fall Risk (Adult)  Intervention: Monitor/Assist with Self Care   18   Activity   Activity Assistance Provided assistance, 1 person   Daily Care Interventions   Self-Care Promotion BADL personal objects within reach   Functional Level Current   Ambulation 2 - assistive person   Transferring 2 - assistive person   Toileting 2 - assistive person   Bathing 2 - assistive person   Dressing 2 - assistive person   Eating 0 - independent   Communication 0 - understands/communicates without difficulty   Swallowing 0 - swallows foods/liquids without difficulty     Intervention: Reduce Risk/Promote Restraint Free Environment   18   Safety Interventions   Environmental Safety Modification clutter free environment maintained;lighting adjusted   Prevent  Drop/Fall   Safety/Security Measures bed alarm set       Goal: Absence of Falls  Patient will demonstrate the desired outcomes by discharge/transition of care.   18   Fall Risk (Adult)   Absence of Falls making progress toward outcome       Problem: Patient Care Overview  Goal: Plan of Care Review   18   Coping/Psychosocial   Plan Of Care Reviewed With patient       Problem: Cardiac Output Decreased (Adult)  Intervention: Promote Oxygenation/Perfusion   18   Activity   Activity Management ambulated to bathroom* - L4   Positioning   Head of Bed (HOB) HOB at 20-30 degrees

## 2018-11-23 NOTE — ASSESSMENT & PLAN NOTE
Transfuse 2 units packed red blood cells.  Premedicate with Benadryl and Tylenol.  If stable after transfusion patient may be discharged to home    September 23, 2018:  1.  Discharged to home in stable condition  2.  Patient may be discharged when 2nd unit complete.  3.  Continue home medications  4.  Follow-up primary care physician as needed

## 2018-11-23 NOTE — DISCHARGE SUMMARY
[facility]  Hospital Medicine  Discharge Summary      Patient Name: Oriana Tobar  MRN: [id number]  Admission Date: 11/21/2018  Hospital Length of Stay: 0 days  Discharge Date and Time:  11/23/2018 8:13 AM  Attending Physician: iTto Mathews MD   Discharging Provider: Tito Mathews MD  Primary Care Provider: Carine Baltazar NP      HPI:   Patient is a 73-year-old female with complaints of weakness and dizziness.  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  She frequently has to get transfusions approximately 1 every week to 1 every 2 weeks.  Patient has a past medical history significant for renal cell carcinoma, hypertension, hyperlipidemia, and symptomatic anemia.  She presented to the emergency department with symptoms of dizziness and shortness of breath as well as generalized weakness.  Her hemoglobin in the emergency department was 7.5 although patient is symptomatic and will need to have 2 units packed red blood cells day as per normal for her.  Patient's labs in the emergency department were white blood cell count 9.1 hemoglobin 7.5 hematocrit to 24.9 comprehensive metabolic panel revealed sodium 136 potassium 3.6 chloride 104 bicarb 24 glucose 116 BUN creatinine were 6 and 1.0 GFR was 56    * No surgery found *      Hospital Course:   Patient will be admitted to medical-surgical unit for transfusion of 2 units packed red blood cells.  Patient will be sent home after transfusion if stable.    November 23, 2018:  This patient has been here for the last 48 hr due to need for transfusion.  Patient could not get her transfusion yesterday because there were antibodies that had to be considered and the blood had to be procured and that took several hours to do.  Blood was available last evening and patient received her 2 units packed red blood cells without difficulty overnight.  As soon as that unit is complete she patient may be discharged home in  stable condition for follow-up with her primary care doctor.  Otherwise no other issues were present at the time of discharge.     Consults:     * Anemia    Transfuse 2 units packed red blood cells.  Premedicate with Benadryl and Tylenol.  If stable after transfusion patient may be discharged to home    September 23, 2018:  1.  Discharged to home in stable condition  2.  Patient may be discharged when 2nd unit complete.  3.  Continue home medications  4.  Follow-up primary care physician as needed         Final Active Diagnoses:    Diagnosis Date Noted POA    PRINCIPAL PROBLEM:  Anemia [D64.9] 10/22/2018 Yes      Problems Resolved During this Admission:       Discharged Condition: good    Disposition: Home or Self Care    Follow Up:    Patient Instructions:   No discharge procedures on file.    Significant Diagnostic Studies: Labs: All labs within the past 24 hours have been reviewed    Pending Diagnostic Studies:     None         Medications:  Reconciled Home Medications:      Medication List      CONTINUE taking these medications    atorvastatin 20 MG tablet  Commonly known as:  LIPITOR  Take 20 mg by mouth once daily.     ferrous sulfate 325 (65 FE) MG EC tablet  Take 325 mg by mouth once daily.     pantoprazole 40 MG tablet  Commonly known as:  PROTONIX  Take 1 tablet (40 mg total) by mouth once daily.     sucralfate 100 mg/mL suspension  Commonly known as:  CARAFATE  Take 10 mLs (1 g total) by mouth 4 (four) times daily before meals and nightly.        STOP taking these medications    loperamide 2 mg capsule  Commonly known as:  IMODIUM            Indwelling Lines/Drains at time of discharge:   Lines/Drains/Airways          None          Time spent on the discharge of patient: 30 minutes  Patient was seen and examined on the date of discharge and determined to be suitable for discharge.         Tito Mathews MD  Department of Hospital Medicine  [facility] - Med Surg

## 2018-11-23 NOTE — NURSING
0945 patient left via wheelchair to private auto with daughter. Discharge instructions given to patient.

## 2018-11-23 NOTE — PLAN OF CARE
11/23/18 1028   Discharge Assessment   Assessment Type Discharge Planning Assessment   Patient left before able to see.

## 2018-11-26 NOTE — PLAN OF CARE
CM called patient to advise of follow up appt with Carine Baltazar spoke with granddaughter Joel to advise.

## 2018-11-27 NOTE — PLAN OF CARE
Patient's daughter called regarding home health orders. Called [facility] who stated to just send DC summary since not inpatient but just observation. Patient's daughter notified. Denies any other discharge needs at this time.

## 2018-11-29 ENCOUNTER — LAB VISIT (OUTPATIENT)
Dept: LAB | Facility: HOSPITAL | Age: 73
End: 2018-11-29
Attending: FAMILY MEDICINE
Payer: MEDICARE

## 2018-11-29 DIAGNOSIS — D64.9 ANEMIA: Primary | ICD-10-CM

## 2018-11-29 LAB
BASOPHILS # BLD AUTO: 0.07 K/UL
BASOPHILS NFR BLD: 0.8 %
DIFFERENTIAL METHOD: ABNORMAL
EOSINOPHIL # BLD AUTO: 0.2 K/UL
EOSINOPHIL NFR BLD: 2.2 %
ERYTHROCYTE [DISTWIDTH] IN BLOOD BY AUTOMATED COUNT: 15 %
HCT VFR BLD AUTO: 30.3 %
HGB BLD-MCNC: 9.3 G/DL
IMM GRANULOCYTES # BLD AUTO: 0.03 K/UL
IMM GRANULOCYTES NFR BLD AUTO: 0.3 %
LYMPHOCYTES # BLD AUTO: 1.9 K/UL
LYMPHOCYTES NFR BLD: 22 %
MCH RBC QN AUTO: 27.3 PG
MCHC RBC AUTO-ENTMCNC: 30.7 G/DL
MCV RBC AUTO: 89 FL
MONOCYTES # BLD AUTO: 0.6 K/UL
MONOCYTES NFR BLD: 7.1 %
NEUTROPHILS # BLD AUTO: 5.8 K/UL
NEUTROPHILS NFR BLD: 67.6 %
NRBC BLD-RTO: 0 /100 WBC
PLATELET # BLD AUTO: 424 K/UL
PMV BLD AUTO: 9.2 FL
RBC # BLD AUTO: 3.41 M/UL
WBC # BLD AUTO: 8.64 K/UL

## 2018-11-29 PROCEDURE — 85025 COMPLETE CBC W/AUTO DIFF WBC: CPT

## 2018-11-29 PROCEDURE — 36415 COLL VENOUS BLD VENIPUNCTURE: CPT

## 2018-12-04 ENCOUNTER — LAB VISIT (OUTPATIENT)
Dept: LAB | Facility: HOSPITAL | Age: 73
End: 2018-12-04
Attending: INTERNAL MEDICINE
Payer: MEDICARE

## 2018-12-04 DIAGNOSIS — D64.9 ANEMIA: ICD-10-CM

## 2018-12-04 LAB
BASOPHILS # BLD AUTO: 0.07 K/UL
BASOPHILS NFR BLD: 0.7 %
DIFFERENTIAL METHOD: ABNORMAL
EOSINOPHIL # BLD AUTO: 0.2 K/UL
EOSINOPHIL NFR BLD: 1.8 %
ERYTHROCYTE [DISTWIDTH] IN BLOOD BY AUTOMATED COUNT: 14.8 %
HCT VFR BLD AUTO: 32.3 %
HGB BLD-MCNC: 9.7 G/DL
IMM GRANULOCYTES # BLD AUTO: 0.04 K/UL
IMM GRANULOCYTES NFR BLD AUTO: 0.4 %
LYMPHOCYTES # BLD AUTO: 2.7 K/UL
LYMPHOCYTES NFR BLD: 26.4 %
MCH RBC QN AUTO: 26.4 PG
MCHC RBC AUTO-ENTMCNC: 30 G/DL
MCV RBC AUTO: 88 FL
MONOCYTES # BLD AUTO: 0.7 K/UL
MONOCYTES NFR BLD: 6.7 %
NEUTROPHILS # BLD AUTO: 6.6 K/UL
NEUTROPHILS NFR BLD: 64 %
NRBC BLD-RTO: 0 /100 WBC
PLATELET # BLD AUTO: 477 K/UL
PMV BLD AUTO: 9.2 FL
RBC # BLD AUTO: 3.68 M/UL
WBC # BLD AUTO: 10.37 K/UL

## 2018-12-04 PROCEDURE — 36415 COLL VENOUS BLD VENIPUNCTURE: CPT

## 2018-12-04 PROCEDURE — 85025 COMPLETE CBC W/AUTO DIFF WBC: CPT

## 2018-12-07 ENCOUNTER — LAB VISIT (OUTPATIENT)
Dept: LAB | Facility: HOSPITAL | Age: 73
End: 2018-12-07
Attending: FAMILY MEDICINE
Payer: MEDICARE

## 2018-12-07 DIAGNOSIS — D64.9 ANEMIA: Primary | ICD-10-CM

## 2018-12-07 LAB
ERYTHROCYTE [DISTWIDTH] IN BLOOD BY AUTOMATED COUNT: 14.6 %
HCT VFR BLD AUTO: 32.6 %
HGB BLD-MCNC: 9.7 G/DL
MCH RBC QN AUTO: 26.8 PG
MCHC RBC AUTO-ENTMCNC: 29.8 G/DL
MCV RBC AUTO: 90 FL
PLATELET # BLD AUTO: 514 K/UL
PMV BLD AUTO: 9.8 FL
RBC # BLD AUTO: 3.62 M/UL
WBC # BLD AUTO: 9.54 K/UL

## 2018-12-07 PROCEDURE — 85027 COMPLETE CBC AUTOMATED: CPT

## 2018-12-07 PROCEDURE — 36415 COLL VENOUS BLD VENIPUNCTURE: CPT

## 2018-12-11 ENCOUNTER — LAB VISIT (OUTPATIENT)
Dept: LAB | Facility: HOSPITAL | Age: 73
End: 2018-12-11
Attending: FAMILY MEDICINE
Payer: MEDICARE

## 2018-12-11 DIAGNOSIS — D64.9 ANEMIA, UNSPECIFIED: Primary | ICD-10-CM

## 2018-12-11 LAB
BASOPHILS # BLD AUTO: 0.05 K/UL
BASOPHILS NFR BLD: 0.6 %
DIFFERENTIAL METHOD: ABNORMAL
EOSINOPHIL # BLD AUTO: 0.2 K/UL
EOSINOPHIL NFR BLD: 1.9 %
ERYTHROCYTE [DISTWIDTH] IN BLOOD BY AUTOMATED COUNT: 14.8 %
HCT VFR BLD AUTO: 29.6 %
HGB BLD-MCNC: 8.9 G/DL
IMM GRANULOCYTES # BLD AUTO: 0.03 K/UL
IMM GRANULOCYTES NFR BLD AUTO: 0.3 %
LYMPHOCYTES # BLD AUTO: 1.9 K/UL
LYMPHOCYTES NFR BLD: 22.1 %
MCH RBC QN AUTO: 26.7 PG
MCHC RBC AUTO-ENTMCNC: 30.1 G/DL
MCV RBC AUTO: 89 FL
MONOCYTES # BLD AUTO: 0.7 K/UL
MONOCYTES NFR BLD: 7.9 %
NEUTROPHILS # BLD AUTO: 5.9 K/UL
NEUTROPHILS NFR BLD: 67.2 %
NRBC BLD-RTO: 0 /100 WBC
PLATELET # BLD AUTO: 499 K/UL
PMV BLD AUTO: 9.7 FL
RBC # BLD AUTO: 3.33 M/UL
WBC # BLD AUTO: 8.74 K/UL

## 2018-12-11 PROCEDURE — 85025 COMPLETE CBC W/AUTO DIFF WBC: CPT

## 2018-12-11 PROCEDURE — 36415 COLL VENOUS BLD VENIPUNCTURE: CPT

## 2018-12-17 ENCOUNTER — LAB VISIT (OUTPATIENT)
Dept: LAB | Facility: HOSPITAL | Age: 73
End: 2018-12-17
Attending: FAMILY MEDICINE
Payer: MEDICARE

## 2018-12-17 DIAGNOSIS — D64.9 ANEMIA, UNSPECIFIED: Primary | ICD-10-CM

## 2018-12-17 LAB
BASOPHILS # BLD AUTO: 0.03 K/UL
BASOPHILS NFR BLD: 0.4 %
DIFFERENTIAL METHOD: ABNORMAL
EOSINOPHIL # BLD AUTO: 0.1 K/UL
EOSINOPHIL NFR BLD: 1.5 %
ERYTHROCYTE [DISTWIDTH] IN BLOOD BY AUTOMATED COUNT: 14.7 %
HCT VFR BLD AUTO: 25.7 %
HGB BLD-MCNC: 8 G/DL
IMM GRANULOCYTES # BLD AUTO: 0.02 K/UL
IMM GRANULOCYTES NFR BLD AUTO: 0.3 %
LYMPHOCYTES # BLD AUTO: 1.8 K/UL
LYMPHOCYTES NFR BLD: 24.6 %
MCH RBC QN AUTO: 26.9 PG
MCHC RBC AUTO-ENTMCNC: 31.1 G/DL
MCV RBC AUTO: 87 FL
MONOCYTES # BLD AUTO: 0.6 K/UL
MONOCYTES NFR BLD: 8 %
NEUTROPHILS # BLD AUTO: 4.7 K/UL
NEUTROPHILS NFR BLD: 65.2 %
NRBC BLD-RTO: 0 /100 WBC
PLATELET # BLD AUTO: 454 K/UL
PMV BLD AUTO: 9.7 FL
RBC # BLD AUTO: 2.97 M/UL
WBC # BLD AUTO: 7.25 K/UL

## 2018-12-17 PROCEDURE — 36415 COLL VENOUS BLD VENIPUNCTURE: CPT

## 2018-12-17 PROCEDURE — 85025 COMPLETE CBC W/AUTO DIFF WBC: CPT

## 2018-12-21 ENCOUNTER — APPOINTMENT (OUTPATIENT)
Dept: LAB | Facility: HOSPITAL | Age: 73
End: 2018-12-21
Attending: FAMILY MEDICINE
Payer: MEDICARE

## 2018-12-21 LAB
BASOPHILS # BLD AUTO: 0.04 K/UL
BASOPHILS NFR BLD: 0.5 %
DIFFERENTIAL METHOD: ABNORMAL
EOSINOPHIL # BLD AUTO: 0.2 K/UL
EOSINOPHIL NFR BLD: 2.1 %
ERYTHROCYTE [DISTWIDTH] IN BLOOD BY AUTOMATED COUNT: 14.7 %
HCT VFR BLD AUTO: 30.3 %
HGB BLD-MCNC: 9.2 G/DL
IMM GRANULOCYTES # BLD AUTO: 0.03 K/UL
IMM GRANULOCYTES NFR BLD AUTO: 0.4 %
LYMPHOCYTES # BLD AUTO: 2 K/UL
LYMPHOCYTES NFR BLD: 25.4 %
MCH RBC QN AUTO: 26.8 PG
MCHC RBC AUTO-ENTMCNC: 30.4 G/DL
MCV RBC AUTO: 88 FL
MONOCYTES # BLD AUTO: 0.6 K/UL
MONOCYTES NFR BLD: 7.9 %
NEUTROPHILS # BLD AUTO: 5.1 K/UL
NEUTROPHILS NFR BLD: 63.7 %
NRBC BLD-RTO: 0 /100 WBC
PLATELET # BLD AUTO: 510 K/UL
PMV BLD AUTO: 9.6 FL
RBC # BLD AUTO: 3.43 M/UL
WBC # BLD AUTO: 8 K/UL

## 2018-12-21 PROCEDURE — 85025 COMPLETE CBC W/AUTO DIFF WBC: CPT

## 2018-12-21 PROCEDURE — 36415 COLL VENOUS BLD VENIPUNCTURE: CPT

## 2019-02-08 ENCOUNTER — HOSPITAL ENCOUNTER (OUTPATIENT)
Facility: HOSPITAL | Age: 74
Discharge: HOME | End: 2019-02-10
Attending: FAMILY MEDICINE | Admitting: INTERNAL MEDICINE
Payer: MEDICARE

## 2019-02-08 DIAGNOSIS — K92.2 GASTROINTESTINAL HEMORRHAGE, UNSPECIFIED GASTROINTESTINAL HEMORRHAGE TYPE: Primary | ICD-10-CM

## 2019-02-08 DIAGNOSIS — D50.0 IRON DEFICIENCY ANEMIA DUE TO CHRONIC BLOOD LOSS: ICD-10-CM

## 2019-02-08 LAB
ABO + RH BLD: NORMAL
ALBUMIN SERPL BCP-MCNC: 3.1 G/DL
ALP SERPL-CCNC: 60 U/L
ALT SERPL W/O P-5'-P-CCNC: 6 U/L
ANION GAP SERPL CALC-SCNC: 11 MMOL/L
AST SERPL-CCNC: 12 U/L
BASOPHILS # BLD AUTO: 0.06 K/UL
BASOPHILS NFR BLD: 0.6 %
BILIRUB SERPL-MCNC: 0.4 MG/DL
BLD GP AB SCN CELLS X3 SERPL QL: NORMAL
BUN SERPL-MCNC: 17 MG/DL
CALCIUM SERPL-MCNC: 8.6 MG/DL
CHLORIDE SERPL-SCNC: 103 MMOL/L
CO2 SERPL-SCNC: 20 MMOL/L
CREAT SERPL-MCNC: 1.1 MG/DL
DIFFERENTIAL METHOD: ABNORMAL
EOSINOPHIL # BLD AUTO: 0.1 K/UL
EOSINOPHIL NFR BLD: 1 %
ERYTHROCYTE [DISTWIDTH] IN BLOOD BY AUTOMATED COUNT: 14.9 %
EST. GFR  (AFRICAN AMERICAN): 57.6 ML/MIN/1.73 M^2
EST. GFR  (NON AFRICAN AMERICAN): 49.9 ML/MIN/1.73 M^2
GLUCOSE SERPL-MCNC: 118 MG/DL
HCT VFR BLD AUTO: 19.9 %
HGB BLD-MCNC: 5.8 G/DL
IMM GRANULOCYTES # BLD AUTO: 0.08 K/UL
IMM GRANULOCYTES NFR BLD AUTO: 0.8 %
LYMPHOCYTES # BLD AUTO: 2.7 K/UL
LYMPHOCYTES NFR BLD: 25.9 %
MAGNESIUM SERPL-MCNC: 1.7 MG/DL
MCH RBC QN AUTO: 23.9 PG
MCHC RBC AUTO-ENTMCNC: 29.1 G/DL
MCV RBC AUTO: 82 FL
MONOCYTES # BLD AUTO: 0.7 K/UL
MONOCYTES NFR BLD: 7 %
NEUTROPHILS # BLD AUTO: 6.8 K/UL
NEUTROPHILS NFR BLD: 64.7 %
NRBC BLD-RTO: 0 /100 WBC
PLATELET # BLD AUTO: 471 K/UL
PMV BLD AUTO: 9.8 FL
POTASSIUM SERPL-SCNC: 2.9 MMOL/L
PROT SERPL-MCNC: 7 G/DL
RBC # BLD AUTO: 2.43 M/UL
SODIUM SERPL-SCNC: 134 MMOL/L
WBC # BLD AUTO: 10.5 K/UL

## 2019-02-08 PROCEDURE — 83735 ASSAY OF MAGNESIUM: CPT

## 2019-02-08 PROCEDURE — 85025 COMPLETE CBC W/AUTO DIFF WBC: CPT

## 2019-02-08 PROCEDURE — 36415 COLL VENOUS BLD VENIPUNCTURE: CPT

## 2019-02-08 PROCEDURE — 99285 EMERGENCY DEPT VISIT HI MDM: CPT

## 2019-02-08 PROCEDURE — 80053 COMPREHEN METABOLIC PANEL: CPT

## 2019-02-08 PROCEDURE — 25000003 PHARM REV CODE 250: Performed by: FAMILY MEDICINE

## 2019-02-08 PROCEDURE — 86922 COMPATIBILITY TEST ANTIGLOB: CPT

## 2019-02-08 PROCEDURE — 86870 RBC ANTIBODY IDENTIFICATION: CPT

## 2019-02-08 PROCEDURE — 86901 BLOOD TYPING SEROLOGIC RH(D): CPT

## 2019-02-08 RX ORDER — POTASSIUM CHLORIDE 20 MEQ/1
40 TABLET, EXTENDED RELEASE ORAL
Status: COMPLETED | OUTPATIENT
Start: 2019-02-08 | End: 2019-02-08

## 2019-02-08 RX ORDER — LOSARTAN POTASSIUM 50 MG/1
50 TABLET ORAL DAILY
COMMUNITY
End: 2019-06-05 | Stop reason: ALTCHOICE

## 2019-02-08 RX ADMIN — POTASSIUM CHLORIDE 40 MEQ: 1500 TABLET, EXTENDED RELEASE ORAL at 11:02

## 2019-02-09 LAB
ANION GAP SERPL CALC-SCNC: 8 MMOL/L
BASOPHILS # BLD AUTO: 0.03 K/UL
BASOPHILS NFR BLD: 0.4 %
BLD PROD TYP BPU: NORMAL
BLOOD GROUP ANTIBODIES SERPL: NORMAL
BLOOD UNIT EXPIRATION DATE: NORMAL
BLOOD UNIT TYPE CODE: 6200
BLOOD UNIT TYPE: NORMAL
BUN SERPL-MCNC: 16 MG/DL
CALCIUM SERPL-MCNC: 8.3 MG/DL
CHLORIDE SERPL-SCNC: 106 MMOL/L
CO2 SERPL-SCNC: 22 MMOL/L
CODING SYSTEM: NORMAL
CREAT SERPL-MCNC: 1 MG/DL
DIFFERENTIAL METHOD: ABNORMAL
DISPENSE STATUS: NORMAL
EOSINOPHIL # BLD AUTO: 0.1 K/UL
EOSINOPHIL NFR BLD: 0.9 %
ERYTHROCYTE [DISTWIDTH] IN BLOOD BY AUTOMATED COUNT: 14.8 %
EST. GFR  (AFRICAN AMERICAN): >60 ML/MIN/1.73 M^2
EST. GFR  (NON AFRICAN AMERICAN): 56 ML/MIN/1.73 M^2
GLUCOSE SERPL-MCNC: 104 MG/DL
HCT VFR BLD AUTO: 17.1 %
HGB BLD-MCNC: 5 G/DL
IMM GRANULOCYTES # BLD AUTO: 0.05 K/UL
IMM GRANULOCYTES NFR BLD AUTO: 0.6 %
LYMPHOCYTES # BLD AUTO: 1.5 K/UL
LYMPHOCYTES NFR BLD: 18.5 %
MCH RBC QN AUTO: 23.5 PG
MCHC RBC AUTO-ENTMCNC: 29.2 G/DL
MCV RBC AUTO: 80 FL
MONOCYTES # BLD AUTO: 0.7 K/UL
MONOCYTES NFR BLD: 9.1 %
NEUTROPHILS # BLD AUTO: 5.6 K/UL
NEUTROPHILS NFR BLD: 70.5 %
NRBC BLD-RTO: 0 /100 WBC
NUM UNITS TRANS PACKED RBC: NORMAL
OB PNL STL: POSITIVE
PLATELET # BLD AUTO: 388 K/UL
PMV BLD AUTO: 9.1 FL
POTASSIUM SERPL-SCNC: 4.2 MMOL/L
RBC # BLD AUTO: 2.13 M/UL
SODIUM SERPL-SCNC: 136 MMOL/L
WBC # BLD AUTO: 7.88 K/UL

## 2019-02-09 PROCEDURE — 36415 COLL VENOUS BLD VENIPUNCTURE: CPT

## 2019-02-09 PROCEDURE — 82272 OCCULT BLD FECES 1-3 TESTS: CPT

## 2019-02-09 PROCEDURE — 86886 COOMBS TEST INDIRECT TITER: CPT

## 2019-02-09 PROCEDURE — G0378 HOSPITAL OBSERVATION PER HR: HCPCS

## 2019-02-09 PROCEDURE — 86850 RBC ANTIBODY SCREEN: CPT | Mod: 59

## 2019-02-09 PROCEDURE — 25000003 PHARM REV CODE 250: Performed by: INTERNAL MEDICINE

## 2019-02-09 PROCEDURE — 80048 BASIC METABOLIC PNL TOTAL CA: CPT

## 2019-02-09 PROCEDURE — 86900 BLOOD TYPING SEROLOGIC ABO: CPT

## 2019-02-09 PROCEDURE — 86902 BLOOD TYPE ANTIGEN DONOR EA: CPT

## 2019-02-09 PROCEDURE — 25000003 PHARM REV CODE 250: Performed by: FAMILY MEDICINE

## 2019-02-09 PROCEDURE — 86880 COOMBS TEST DIRECT: CPT

## 2019-02-09 PROCEDURE — 86870 RBC ANTIBODY IDENTIFICATION: CPT

## 2019-02-09 PROCEDURE — P9016 RBC LEUKOCYTES REDUCED: HCPCS

## 2019-02-09 PROCEDURE — 85025 COMPLETE CBC W/AUTO DIFF WBC: CPT

## 2019-02-09 RX ORDER — FUROSEMIDE 20 MG/1
40 TABLET ORAL DAILY
Status: COMPLETED | OUTPATIENT
Start: 2019-02-09 | End: 2019-02-09

## 2019-02-09 RX ORDER — PANTOPRAZOLE SODIUM 40 MG/1
40 TABLET, DELAYED RELEASE ORAL DAILY
Status: DISCONTINUED | OUTPATIENT
Start: 2019-02-09 | End: 2019-02-10 | Stop reason: HOSPADM

## 2019-02-09 RX ORDER — HYDROCODONE BITARTRATE AND ACETAMINOPHEN 500; 5 MG/1; MG/1
TABLET ORAL ONCE
Status: COMPLETED | OUTPATIENT
Start: 2019-02-09 | End: 2019-02-09

## 2019-02-09 RX ORDER — ATORVASTATIN CALCIUM 10 MG/1
20 TABLET, FILM COATED ORAL DAILY
Status: DISCONTINUED | OUTPATIENT
Start: 2019-02-09 | End: 2019-02-10 | Stop reason: HOSPADM

## 2019-02-09 RX ORDER — FUROSEMIDE 20 MG/1
40 TABLET ORAL DAILY
Status: DISCONTINUED | OUTPATIENT
Start: 2019-02-09 | End: 2019-02-09

## 2019-02-09 RX ORDER — FERROUS SULFATE 300 MG/5ML
300 LIQUID (ML) ORAL DAILY
Status: DISCONTINUED | OUTPATIENT
Start: 2019-02-09 | End: 2019-02-10 | Stop reason: HOSPADM

## 2019-02-09 RX ORDER — POTASSIUM CHLORIDE 20 MEQ/1
20 TABLET, EXTENDED RELEASE ORAL ONCE
Status: COMPLETED | OUTPATIENT
Start: 2019-02-09 | End: 2019-02-09

## 2019-02-09 RX ORDER — SODIUM CHLORIDE 0.9 % (FLUSH) 0.9 %
5 SYRINGE (ML) INJECTION
Status: DISCONTINUED | OUTPATIENT
Start: 2019-02-09 | End: 2019-02-10 | Stop reason: HOSPADM

## 2019-02-09 RX ORDER — SUCRALFATE 1 G/10ML
1 SUSPENSION ORAL
Status: DISCONTINUED | OUTPATIENT
Start: 2019-02-09 | End: 2019-02-10 | Stop reason: HOSPADM

## 2019-02-09 RX ORDER — LOSARTAN POTASSIUM 25 MG/1
50 TABLET ORAL DAILY
Status: DISCONTINUED | OUTPATIENT
Start: 2019-02-09 | End: 2019-02-10 | Stop reason: HOSPADM

## 2019-02-09 RX ADMIN — MINERAL SUPPLEMENT IRON 300 MG / 5 ML STRENGTH LIQUID 100 PER BOX UNFLAVORED 300 MG: at 11:02

## 2019-02-09 RX ADMIN — SUCRALFATE 1 G: 1 SUSPENSION ORAL at 04:02

## 2019-02-09 RX ADMIN — LOSARTAN POTASSIUM 50 MG: 25 TABLET, FILM COATED ORAL at 11:02

## 2019-02-09 RX ADMIN — ATORVASTATIN CALCIUM 20 MG: 10 TABLET, FILM COATED ORAL at 11:02

## 2019-02-09 RX ADMIN — SUCRALFATE 1 G: 1 SUSPENSION ORAL at 11:02

## 2019-02-09 RX ADMIN — PANTOPRAZOLE SODIUM 40 MG: 40 TABLET, DELAYED RELEASE ORAL at 11:02

## 2019-02-09 RX ADMIN — SODIUM CHLORIDE: 9 INJECTION, SOLUTION INTRAVENOUS at 07:02

## 2019-02-09 RX ADMIN — FUROSEMIDE 40 MG: 20 TABLET ORAL at 04:02

## 2019-02-09 RX ADMIN — POTASSIUM CHLORIDE 20 MEQ: 1500 TABLET, EXTENDED RELEASE ORAL at 04:02

## 2019-02-09 NOTE — ED NOTES
Pt to ed with c/o black tarry stools that she reports started today.   Pt is AAO, no acute signs of distress noted at this time.

## 2019-02-09 NOTE — SUBJECTIVE & OBJECTIVE
Past Medical History:   Diagnosis Date    Cancer     kidney    Dementia     Encounter for blood transfusion     2018    High cholesterol 2018    Hypertension     No Medication     Renal disorder     Wears dentures     Uppers       Past Surgical History:   Procedure Laterality Date     SECTION      COLONOSCOPY N/A 2018    [facility]    CYSTOSCOPY WITH RETROGRADE PYELOGRAM Left 1/15/2016    [facility] OR    EGD (ESOPHAGOGASTRODUODENOSCOPY) N/A 2018    Performed by [facility] at [facility] ENDO    EYE SURGERY      Rt eye Catarac    HYSTERECTOMY      IMAGING, GI TRACT, INTRALUMINAL, VIA CAPSULE N/A 2018    [facility]formed by [facility] Ross [facility] at [facility] ENDO    KIDNEY SURGERY Left 2018    Left kidney removed due to cancer    lasix surgery      NEPHRECTOMY-RADICAL   Left 2016    [facility] at [facility]    REPAIR-HERNIA-VENTRAL N/A 2016    Performed by Marco [facility]awa MD at [facility] OR    RESECTION-BOWEL  2016    [facility] by [facility] at [facility] OR    URETEROSCOPY  1/15/2016    Per[facility] by [facility] at [facility] OR       Review of patient's allergies indicates:   Allergen Reactions    Codeine Other (See Comments)     Pt shakes and hallucinates    Pcn [penicillins] Anxiety and Other (See Comments)       No current facility-administered medications on file prior to encounter.      Current Outpatient Medications on File Prior to Encounter   Medication Sig    atorvastatin (LIPITOR) 20 MG tablet Take 20 mg by mouth once daily.    ferrous sulfate 325 (65 FE) MG EC tablet Take 325 mg by mouth once daily.    losartan (COZAAR) 50 MG tablet Take 50 mg by mouth once daily.    pantoprazole (PROTONIX) 40 MG tablet Take 1 tablet (40 mg total) by mouth once daily.    sucralfate (CARAFATE) 100 mg/mL suspension Take 10 mLs (1 g total) by  mouth 4 (four) times daily before meals and nightly.     Family History     None        Tobacco Use    Smoking status: Former Smoker     Packs/day: 1.00     Years: 50.00     Pack years: 50.00     Last attempt to quit: 10/6/2018     Years since quittin.3    Smokeless tobacco: Never Used    Tobacco comment: pt has stopped; encouraged to remain stopped   Substance and Sexual Activity    Alcohol use: No     Frequency: Never     Comment: last alcohol intake 2 weeks ago    Drug use: No    Sexual activity: No     Review of Systems   Unable to perform ROS: Acuity of condition   Constitutional: Positive for activity change. Negative for appetite change, chills, diaphoresis, fatigue, fever and unexpected weight change.   HENT: Negative for congestion, drooling, hearing loss and trouble swallowing.    Eyes: Negative for pain and visual disturbance.   Respiratory: Negative.  Negative for apnea, cough, choking, chest tightness, shortness of breath and wheezing.    Cardiovascular: Negative for chest pain, palpitations and leg swelling.   Gastrointestinal: Positive for blood in stool. Negative for abdominal distention, abdominal pain, constipation, diarrhea, nausea and vomiting.        Black tar stools   Endocrine: Negative for polydipsia, polyphagia and polyuria.   Genitourinary: Negative for difficulty urinating, dysuria and flank pain.   Musculoskeletal: Negative for arthralgias, back pain, gait problem, joint swelling, neck pain and neck stiffness.   Skin: Negative for color change, rash and wound.   Allergic/Immunologic: Negative for environmental allergies, food allergies and immunocompromised state.   Neurological: Positive for weakness and light-headedness. Negative for dizziness, syncope, numbness and headaches.   Hematological: Negative for adenopathy.   Psychiatric/Behavioral: Negative for agitation, confusion and sleep disturbance. The patient is not nervous/anxious.      Objective:     Vital Signs (Most  Recent):  Temp: 97.7 °F (36.5 °C) (02/09/19 1007)  Pulse: 71 (02/09/19 1007)  Resp: 16 (02/09/19 1007)  BP: (!) 100/55 (02/09/19 1007)  SpO2: 100 % (02/09/19 1007) Vital Signs (24h Range):  Temp:  [96.3 °F (35.7 °C)-98.1 °F (36.7 °C)] 97.7 °F (36.5 °C)  Pulse:  [66-88] 71  Resp:  [8-24] 16  SpO2:  [93 %-100 %] 100 %  BP: ()/(47-70) 100/55     Weight: 77.4 kg (170 lb 9.6 oz)  Body mass index is 24.48 kg/m².    Physical Exam   Constitutional: She is oriented to person, place, and time. She appears well-developed and well-nourished.   HENT:   Head: Normocephalic and atraumatic.   Right Ear: External ear normal.   Left Ear: External ear normal.   Nose: Nose normal.   Eyes: Conjunctivae, EOM and lids are normal. Pupils are equal, round, and reactive to light.   Neck: Trachea normal, normal range of motion and full passive range of motion without pain. Neck supple.   Cardiovascular: Normal rate, regular rhythm, S1 normal, S2 normal, normal heart sounds, intact distal pulses and normal pulses.   Pulmonary/Chest: Effort normal and breath sounds normal.   Abdominal: Soft. Normal aorta and bowel sounds are normal.   Genitourinary: Rectal exam shows guaiac positive stool.   Musculoskeletal: Normal range of motion.   Neurological: She is alert and oriented to person, place, and time. She has normal reflexes.   Skin: Skin is warm, dry and intact. There is pallor.   Pale lips eyes in Palm hands.  Patient is  she has dark skin her skin appears to be lighter not shiny is at or to be indicating that she is anemic.   Psychiatric: She has a normal mood and affect. Her speech is normal and behavior is normal. Cognition and memory are normal.   Confusion   Nursing note and vitals reviewed.        CRANIAL NERVES     CN III, IV, VI   Pupils are equal, round, and reactive to light.  Extraocular motions are normal.        Significant Labs:   BMP:   Recent Labs   Lab 02/08/19  2343 02/09/19  0618   GLU  --  104   NA  --   136   K  --  4.2   CL  --  106   CO2  --  22*   BUN  --  16   CREATININE  --  1.0   CALCIUM  --  8.3*   MG 1.7  --      CBC:   Recent Labs   Lab 02/08/19 2220 02/09/19 0618   WBC 10.50 7.88   HGB 5.8* 5.0*   HCT 19.9* 17.1*   * 388*     CMP:   Recent Labs   Lab 02/08/19 2220 02/09/19 0618   * 136   K 2.9* 4.2    106   CO2 20* 22*   * 104   BUN 17 16   CREATININE 1.1 1.0   CALCIUM 8.6* 8.3*   PROT 7.0  --    ALBUMIN 3.1*  --    BILITOT 0.4  --    ALKPHOS 60  --    AST 12  --    ALT 6*  --    ANIONGAP 11 8   EGFRNONAA 49.9* 56.0*     All pertinent labs within the past 24 hours have been reviewed.    Significant Imaging: I have reviewed and interpreted all pertinent imaging results/findings within the past 24 hours.

## 2019-02-09 NOTE — ED PROVIDER NOTES
Encounter Date: 2019       History     Chief Complaint   Patient presents with    Dizziness    GI Bleeding     black tarry stool, onset this morning, history of cancer with frequent blood transfusions     Patient presents to the ED complaining of dizziness, to episodes of black tarry stools just prior to arrival.  Patient has a history metastatic carcinoma, not currently wishing to pursue any treatment.  Patient has history of anemia and has required multiple transfusions in the past.          Review of patient's allergies indicates:   Allergen Reactions    Codeine Other (See Comments)     Pt shakes and hallucinates    Pcn [penicillins] Anxiety and Other (See Comments)     Past Medical History:   Diagnosis Date    Cancer     kidney    Dementia     Encounter for blood transfusion     2018    High cholesterol 2018    Hypertension     No Medication     Renal disorder     Wears dentures     Uppers     Past Surgical History:   Procedure Laterality Date     SECTION      COLONOSCOPY N/A 2018    Performed by [facility]    CYSTOSCOPY WITH RETROGRADE PYELOGRAM Left 1/15/2016    Performed by [facility]ell [facility]acon [facility] at [facility] OR    EGD (ESOPHAGOGASTRODUODENOSCOPY) N/A 2018    Performed by [facility] at [facility] ENDO    EYE SURGERY      Rt eye Catarac    HYSTERECTOMY      IMAGING, GI TRACT, INTRALUMINAL, VIA CAPSULE N/A 2018    Performed by Rocco Ross MD at [facility] ENDO    KIDNEY SURGERY Left 2018    Left kidney removed due to cancer    lasix surgery      NEPHRECTOMY-RADICAL   Left 2016    Performed [facility] at [facility] OR    REPAIR-HERNIA-VENTRAL N/A 2016    Performed by Marco A Randhawa MD at [facility] OR    RESECTION-BOWEL  2016    Performed [facility]awa [facility] at [facility] OR    URETEROSCOPY  1/15/2016    Performed by [facility]ibell [facility]acon [facility] at [facility] OR     History reviewed. No pertinent  family history.  Social History     Tobacco Use    Smoking status: Former Smoker     Packs/day: 1.00     Years: 50.00     Pack years: 50.00     Last attempt to quit: 10/6/2018     Years since quittin.3    Smokeless tobacco: Never Used    Tobacco comment: pt has stopped; encouraged to remain stopped   Substance Use Topics    Alcohol use: No     Frequency: Never     Comment: last alcohol intake 2 weeks ago    Drug use: No     Review of Systems   Constitutional: Positive for fatigue.   Gastrointestinal: Positive for blood in stool. Negative for vomiting.   Neurological: Positive for weakness and light-headedness. Negative for syncope.       Physical Exam     Initial Vitals [19]   BP Pulse Resp Temp SpO2   (!) 94/51 88 18 98.1 °F (36.7 °C) 99 %      MAP       --         Physical Exam    Nursing note and vitals reviewed.  Constitutional: She appears well-developed and well-nourished. She is not diaphoretic. No distress.   HENT:   Head: Normocephalic and atraumatic.   Eyes: Conjunctivae and EOM are normal. Pupils are equal, round, and reactive to light.   Neck: Normal range of motion. Neck supple.   Cardiovascular: Normal rate, regular rhythm, normal heart sounds and intact distal pulses. Exam reveals no gallop and no friction rub.    No murmur heard.  Pulmonary/Chest: Breath sounds normal. No respiratory distress. She has no wheezes. She has no rales.   Abdominal: Soft. Bowel sounds are normal. She exhibits no distension. There is no tenderness.   Musculoskeletal: Normal range of motion. She exhibits no edema.   Neurological: She is alert and oriented to person, place, and time. She has normal strength.   Skin: Skin is warm and dry. Capillary refill takes less than 2 seconds. No rash noted. No erythema.   Psychiatric: She has a normal mood and affect. Her behavior is normal. Judgment and thought content normal.         ED Course   Procedures  Labs Reviewed   CBC W/ AUTO DIFFERENTIAL - Abnormal;  Notable for the following components:       Result Value    RBC 2.43 (*)     Hemoglobin 5.8 (*)     Hematocrit 19.9 (*)     MCH 23.9 (*)     MCHC 29.1 (*)     RDW 14.9 (*)     Platelets 471 (*)     Immature Granulocytes 0.8 (*)     Immature Grans (Abs) 0.08 (*)     All other components within normal limits    Narrative:     Hemoglobin/Hematocrit critical result(s) called and verbal readback   obtained from Ambrocio Camargo RN ED. , 02/08/2019 22:31   COMPREHENSIVE METABOLIC PANEL - Abnormal; Notable for the following components:    Sodium 134 (*)     Potassium 2.9 (*)     CO2 20 (*)     Glucose 118 (*)     Calcium 8.6 (*)     Albumin 3.1 (*)     ALT 6 (*)     eGFR if  57.6 (*)     eGFR if non  49.9 (*)     All other components within normal limits   MAGNESIUM   TYPE & SCREEN          Imaging Results    None          Medical Decision Making:   ED Management:  Pt will require several units of PRBC's, will speak to Dr. Mathews about admission.                       Clinical Impression:   The primary encounter diagnosis was Gastrointestinal hemorrhage, unspecified gastrointestinal hemorrhage type. A diagnosis of Iron deficiency anemia due to chronic blood loss was also pertinent to this visit.                             Kareen North MD  02/08/19 3189

## 2019-02-09 NOTE — NURSING
Pt received to floor via stretcher.  Acclimated to room, side rails x2, bed in lowest position, call light in reach.  No concerns voiced at this time.

## 2019-02-09 NOTE — HPI
Patient has dementia unable to give a good history.  She states that her daughter knows everything.  Patient apparently had black tarry stools prior to arrival to the emergency room she has a cancer history with blood transfusions.

## 2019-02-09 NOTE — HOSPITAL COURSE
Blood transfusion.  Monitor H&H.  IV fluids indicated.  Adjust electrolytes as needed.  Replacement of potassium  Two hundred ten and 2019.  The patient is status post transfusion 4 units of blood.  Patient's hematocrit is 33.  She has not required blood transfusion for appear to time.  She has stage IV cancer.  Patient had black tarry stools and family is aware that she is had bleeding from her stomach did ulcer disease.  The had ran out of the Protonix and Carafate will prescribe those on discharge.

## 2019-02-09 NOTE — PLAN OF CARE
Problem: Adult Inpatient Plan of Care  Goal: Plan of Care Review  Outcome: Ongoing (interventions implemented as appropriate)   02/09/19 9044   Plan of Care Review   Plan of Care Reviewed With patient;daughter   Patient informed that she will be receiving 4 units of blood today due to a low H&H.

## 2019-02-09 NOTE — NURSING
IP Surgery consulted on case. Dr. Obrien was called and informed nursing staff to put consult in for Monday.

## 2019-02-09 NOTE — NURSING
First unit of blood infusing. Vital signs taken at start and 15 minutes after start time. VS WNL. Patient sitting up in bed eating breakfast and watching TV. NADN. Will continue to monitor patient. Patient informed to let nurse know of any changes or signs of reaction. Patient verbalized understanding will continue to remind patient.

## 2019-02-09 NOTE — NURSING
Patient is pleasantly confused and requires reorientation to settings and situation. Telesitter and bed alarm in use.  She has not used call light for bedside toileting purposes.

## 2019-02-10 VITALS
HEIGHT: 70 IN | WEIGHT: 170.63 LBS | HEART RATE: 65 BPM | BODY MASS INDEX: 24.43 KG/M2 | DIASTOLIC BLOOD PRESSURE: 63 MMHG | TEMPERATURE: 98 F | SYSTOLIC BLOOD PRESSURE: 126 MMHG | OXYGEN SATURATION: 100 % | RESPIRATION RATE: 17 BRPM

## 2019-02-10 LAB
ANION GAP SERPL CALC-SCNC: 8 MMOL/L
BASOPHILS # BLD AUTO: 0.08 K/UL
BASOPHILS NFR BLD: 0.9 %
BNP SERPL-MCNC: 116 PG/ML
BUN SERPL-MCNC: 11 MG/DL
CALCIUM SERPL-MCNC: 8.5 MG/DL
CHLORIDE SERPL-SCNC: 105 MMOL/L
CO2 SERPL-SCNC: 23 MMOL/L
CREAT SERPL-MCNC: 1.1 MG/DL
DIFFERENTIAL METHOD: ABNORMAL
EOSINOPHIL # BLD AUTO: 0.1 K/UL
EOSINOPHIL NFR BLD: 1.5 %
ERYTHROCYTE [DISTWIDTH] IN BLOOD BY AUTOMATED COUNT: 14.5 %
EST. GFR  (AFRICAN AMERICAN): 57.6 ML/MIN/1.73 M^2
EST. GFR  (NON AFRICAN AMERICAN): 49.9 ML/MIN/1.73 M^2
GLUCOSE SERPL-MCNC: 101 MG/DL
HCT VFR BLD AUTO: 33.3 %
HGB BLD-MCNC: 10.5 G/DL
IMM GRANULOCYTES # BLD AUTO: 0.05 K/UL
IMM GRANULOCYTES NFR BLD AUTO: 0.5 %
LYMPHOCYTES # BLD AUTO: 1.9 K/UL
LYMPHOCYTES NFR BLD: 20.5 %
MCH RBC QN AUTO: 26 PG
MCHC RBC AUTO-ENTMCNC: 31.5 G/DL
MCV RBC AUTO: 82 FL
MONOCYTES # BLD AUTO: 0.7 K/UL
MONOCYTES NFR BLD: 8 %
NEUTROPHILS # BLD AUTO: 6.3 K/UL
NEUTROPHILS NFR BLD: 68.6 %
NRBC BLD-RTO: 0 /100 WBC
PLATELET # BLD AUTO: 421 K/UL
PMV BLD AUTO: 9.4 FL
POTASSIUM SERPL-SCNC: 3.8 MMOL/L
RBC # BLD AUTO: 4.04 M/UL
SODIUM SERPL-SCNC: 136 MMOL/L
WBC # BLD AUTO: 9.14 K/UL

## 2019-02-10 PROCEDURE — 80048 BASIC METABOLIC PNL TOTAL CA: CPT

## 2019-02-10 PROCEDURE — 85025 COMPLETE CBC W/AUTO DIFF WBC: CPT

## 2019-02-10 PROCEDURE — 36415 COLL VENOUS BLD VENIPUNCTURE: CPT

## 2019-02-10 PROCEDURE — G0378 HOSPITAL OBSERVATION PER HR: HCPCS

## 2019-02-10 PROCEDURE — 83880 ASSAY OF NATRIURETIC PEPTIDE: CPT

## 2019-02-10 NOTE — ASSESSMENT & PLAN NOTE
Patient is GI blood loss black tar stools.  Will replace her hematocrit.  May require up to 4 units.  Will monitor any signs of heart failure will give the patient Lasix.  Have replaced her magnesium at this time.  Patient is very poor historian confusion dementia cannot give you a good history.  Continue current level of care adjustments depending on clinical changes.  02/10/2019.  Ella currently 30.  The patient and the family desires discharge she has had blood transfusions in the past at her stage IV cancer.  They aware that she has GI gastric bleeding.  The had ran out of Protonix and Carafate those will be prescribed discharge.

## 2019-02-10 NOTE — PLAN OF CARE
Problem: Fall Injury Risk  Goal: Absence of Fall and Fall-Related Injury    Intervention: Promote Injury-Free Environment   02/09/19 2037   Optimize Garden and Functional Mobility   Environmental Safety Modification assistive device/personal items within reach;clutter free environment maintained;room near unit station;room organization consistent   Optimize Balance and Safe Activity   Safety Promotion/Fall Prevention bed alarm set;Fall Risk signage in place;Fall Risk reviewed with patient/family;family to remain at bedside;side rails raised x 2;/camera at bedside

## 2019-02-10 NOTE — DISCHARGE SUMMARY
[facility]  Hospital Medicine  Discharge Summary      Patient Name: Oriana Tobar  MRN: [id number]  Admission Date: 2/8/2019  Hospital Length of Stay: 0 days  Discharge Date and Time:  02/10/2019 8:14 AM  Attending Physician: Adin Tejada MD   Discharging Provider: Adin Tejada MD  Primary Care Provider: Carine Baltazar NP      HPI:   Patient has dementia unable to give a good history.  She states that her daughter knows everything.  Patient apparently had black tarry stools prior to arrival to the emergency room she has a cancer history with blood transfusions.    * No surgery found *      Hospital Course:   Blood transfusion.  Monitor H&H.  IV fluids indicated.  Adjust electrolytes as needed.  Replacement of potassium  Two hundred ten and 2019.  The patient is status post transfusion 4 units of blood.  Patient's hematocrit is 33.  She has not required blood transfusion for appear to time.  She has stage IV cancer.  Patient had black tarry stools and family is aware that she is had bleeding from her stomach did ulcer disease.  The had ran out of the Protonix and Carafate will prescribe those on discharge.     Consults:   Consults (From admission, onward)        Status Ordering Provider     Inpatient consult to General Surgery  Once     Provider:  Marvin Obrien MD    Acknowledged ADIN TEJADA     IP consult to case management  Once     Provider:  (Not yet assigned)    ADIN Blackwell          * GI bleed    Patient is GI blood loss black tar stools.  Will replace her hematocrit.  May require up to 4 units.  Will monitor any signs of heart failure will give the patient Lasix.  Have replaced her magnesium at this time.  Patient is very poor historian confusion dementia cannot give you a good history.  Continue current level of care adjustments depending on clinical changes.  02/10/2019.  Ella currently 30.  The patient and the  family desires discharge she has had blood transfusions in the past at her stage IV cancer.  They aware that she has GI gastric bleeding.  The had ran out of Protonix and Carafate those will be prescribed discharge.       Final Active Diagnoses:    Diagnosis Date Noted POA    PRINCIPAL PROBLEM:  GI bleed [K92.2] 11/06/2018 Yes      Problems Resolved During this Admission:       Discharged Condition: fair    Disposition: Home or Self Care    Follow Up:  Follow-up Information     Carine Baltazar NP.    Specialty:  Family Medicine  Contact information:  [address] [place] [address] [address] [place] [address] [phone]                 Patient Instructions:      Prepare RBC 4 Units; severe anemia   Standing Status: Future Standing Exp. Date: 03/10/20     Order Specific Question Answer Comments   Number of Units 4 Units    Reason to prepare multiple units (e.g. Emergency): severe anemia    Purpose of Preparation: Pending Transfusion        Significant Diagnostic Studies: Labs:   BMP:   Recent Labs   Lab 02/08/19  2220 02/08/19  2343 02/09/19  0618 02/10/19  0624   *  --  104 101   *  --  136 136   K 2.9*  --  4.2 3.8     --  106 105   CO2 20*  --  22* 23   BUN 17  --  16 11   CREATININE 1.1  --  1.0 1.1   CALCIUM 8.6*  --  8.3* 8.5*   MG  --  1.7  --   --    , CMP   Recent Labs   Lab 02/08/19  2220 02/09/19  0618 02/10/19  0624   * 136 136   K 2.9* 4.2 3.8    106 105   CO2 20* 22* 23   * 104 101   BUN 17 16 11   CREATININE 1.1 1.0 1.1   CALCIUM 8.6* 8.3* 8.5*   PROT 7.0  --   --    ALBUMIN 3.1*  --   --    BILITOT 0.4  --   --    ALKPHOS 60  --   --    AST 12  --   --    ALT 6*  --   --    ANIONGAP 11 8 8   ESTGFRAFRICA 57.6* >60.0 57.6*   EGFRNONAA 49.9* 56.0* 49.9*   , CBC   Recent Labs   Lab 02/08/19  2220 02/09/19  0618 02/10/19  0624   WBC 10.50 7.88 9.14   HGB 5.8* 5.0* 10.5*   HCT 19.9* 17.1* 33.3*   * 388* 421*    and All labs within the past 24 hours have been  reviewed    Pending Diagnostic Studies:     None         Medications:  Reconciled Home Medications:      Medication List      CONTINUE taking these medications    ferrous sulfate 325 (65 FE) MG EC tablet  Take 325 mg by mouth once daily.     losartan 50 MG tablet  Commonly known as:  COZAAR  Take 50 mg by mouth once daily.     pantoprazole 40 MG tablet  Commonly known as:  PROTONIX  Take 1 tablet (40 mg total) by mouth once daily.     sucralfate 100 mg/mL suspension  Commonly known as:  CARAFATE  Take 10 mLs (1 g total) by mouth 4 (four) times daily before meals and nightly.        STOP taking these medications    atorvastatin 20 MG tablet  Commonly known as:  LIPITOR            Indwelling Lines/Drains at time of discharge:   Lines/Drains/Airways          None          Time spent on the discharge of patient: 35 minutes  Patient was seen and examined on the date of discharge and determined to be suitable for discharge.         Buck Hudson MD  Department of Hospital Medicine  [facility] - Med Surg

## 2019-02-10 NOTE — PLAN OF CARE
"Chief Complaint   Patient presents with     Derm Problem     lesion on outside right ear       Initial BP (!) 82/52 (BP Location: Right arm, Patient Position: Sitting, Cuff Size: Adult Regular)  Ht 4' 4\" (1.321 m)  Wt 58 lb 6.4 oz (26.5 kg)  BMI 15.18 kg/m2 Estimated body mass index is 15.18 kg/(m^2) as calculated from the following:    Height as of this encounter: 4' 4\" (1.321 m).    Weight as of this encounter: 58 lb 6.4 oz (26.5 kg).  Medication Reconciliation: complete    Alicia Chávez LPN  " AVS AND PRESCRIPTIONS DISCUSSED WITH PTS GRAN DAUGHTER WHO IS TAKING PT HOME. SHE VERBALIZED UNDERSTANDING AND ACCEPTED AVS/RX's x3. PRINTED INFORMATION ALSO INCLUDED WITH AVS ON ANEMIA DISCUSSED WITH FAMILY MEMBER. S/SX OF A BLOOD TRANSFUSION REACTION DISCUSSED WITH FAMILY MEMBER WITH INSTRUCTIONS TO GO TO THE NEAREST ER IF ANY DID OCCUR/FAMILY MEMBER VERBALIZED UNDERSTANDING.

## 2019-02-12 NOTE — PLAN OF CARE
02/12/19 0938   Final Note   Assessment Type Final Discharge Note   Anticipated Discharge Disposition Home   What phone number can be called within the next 1-3 days to see how you are doing after discharge? [phone]   Hospital Follow Up  Appt(s) scheduled? Yes   Discharge plans and expectations educations in teach back method with documentation complete? No   Per MD office patient was seen on Monday by Carine Baltazar. Left message for patient's daughter to verify she was seen & if she knows about her appointment on Monday for labs.

## 2019-02-22 ENCOUNTER — HOSPITAL ENCOUNTER (OUTPATIENT)
Facility: HOSPITAL | Age: 74
Discharge: HOME | End: 2019-02-23
Attending: EMERGENCY MEDICINE | Admitting: INTERNAL MEDICINE
Payer: MEDICARE

## 2019-02-22 DIAGNOSIS — R42 DIZZINESS: ICD-10-CM

## 2019-02-22 DIAGNOSIS — R53.1 GENERALIZED WEAKNESS: ICD-10-CM

## 2019-02-22 DIAGNOSIS — E87.6 HYPOKALEMIA: Primary | ICD-10-CM

## 2019-02-22 DIAGNOSIS — K92.2 CHRONIC LOWER GI BLEEDING: ICD-10-CM

## 2019-02-22 DIAGNOSIS — D64.9 SYMPTOMATIC ANEMIA: ICD-10-CM

## 2019-02-22 LAB
ABO + RH BLD: NORMAL
ALBUMIN SERPL BCP-MCNC: 2.7 G/DL
ALP SERPL-CCNC: 49 U/L
ALT SERPL W/O P-5'-P-CCNC: 6 U/L
ANION GAP SERPL CALC-SCNC: 13 MMOL/L
APTT BLDCRRT: 26.4 SEC
AST SERPL-CCNC: 15 U/L
BACTERIA #/AREA URNS HPF: ABNORMAL /HPF
BASOPHILS # BLD AUTO: 0.05 K/UL
BASOPHILS NFR BLD: 0.3 %
BILIRUB SERPL-MCNC: 0.3 MG/DL
BILIRUB UR QL STRIP: NEGATIVE
BLD GP AB SCN CELLS X3 SERPL QL: NORMAL
BUN SERPL-MCNC: 19 MG/DL
CALCIUM SERPL-MCNC: 8.5 MG/DL
CHLORIDE SERPL-SCNC: 107 MMOL/L
CLARITY UR: CLEAR
CO2 SERPL-SCNC: 18 MMOL/L
COLOR UR: YELLOW
CREAT SERPL-MCNC: 1.1 MG/DL
DIFFERENTIAL METHOD: ABNORMAL
EOSINOPHIL # BLD AUTO: 0.1 K/UL
EOSINOPHIL NFR BLD: 0.8 %
ERYTHROCYTE [DISTWIDTH] IN BLOOD BY AUTOMATED COUNT: 14.8 %
EST. GFR  (AFRICAN AMERICAN): 57.6 ML/MIN/1.73 M^2
EST. GFR  (NON AFRICAN AMERICAN): 49.9 ML/MIN/1.73 M^2
GLUCOSE SERPL-MCNC: 145 MG/DL
GLUCOSE UR QL STRIP: NEGATIVE
HCT VFR BLD AUTO: 15.5 %
HGB BLD-MCNC: 4.6 G/DL
HGB UR QL STRIP: NEGATIVE
IMM GRANULOCYTES # BLD AUTO: 0.13 K/UL
IMM GRANULOCYTES NFR BLD AUTO: 0.9 %
INFLUENZA A, MOLECULAR: NEGATIVE
INFLUENZA B, MOLECULAR: NEGATIVE
INR PPP: 1.3
KETONES UR QL STRIP: NEGATIVE
LEUKOCYTE ESTERASE UR QL STRIP: ABNORMAL
LYMPHOCYTES # BLD AUTO: 4.7 K/UL
LYMPHOCYTES NFR BLD: 32.4 %
MCH RBC QN AUTO: 26 PG
MCHC RBC AUTO-ENTMCNC: 29.7 G/DL
MCV RBC AUTO: 88 FL
MICROSCOPIC COMMENT: ABNORMAL
MONOCYTES # BLD AUTO: 1 K/UL
MONOCYTES NFR BLD: 6.6 %
NEUTROPHILS # BLD AUTO: 8.5 K/UL
NEUTROPHILS NFR BLD: 59 %
NITRITE UR QL STRIP: NEGATIVE
NRBC BLD-RTO: 0 /100 WBC
PH UR STRIP: 6 [PH] (ref 5–8)
PLATELET # BLD AUTO: 489 K/UL
PMV BLD AUTO: 9.5 FL
POTASSIUM SERPL-SCNC: 2.8 MMOL/L
PROT SERPL-MCNC: 6.2 G/DL
PROT UR QL STRIP: NEGATIVE
PROTHROMBIN TIME: 14.3 SEC
RBC # BLD AUTO: 1.77 M/UL
RBC #/AREA URNS HPF: 1 /HPF (ref 0–4)
SODIUM SERPL-SCNC: 138 MMOL/L
SP GR UR STRIP: 1.01 (ref 1–1.03)
SPECIMEN SOURCE: NORMAL
SQUAMOUS #/AREA URNS HPF: 40 /HPF
TROPONIN I SERPL DL<=0.01 NG/ML-MCNC: <0.01 NG/ML
URN SPEC COLLECT METH UR: ABNORMAL
UROBILINOGEN UR STRIP-ACNC: NEGATIVE EU/DL
WBC # BLD AUTO: 14.49 K/UL
WBC #/AREA URNS HPF: 10 /HPF (ref 0–5)

## 2019-02-22 PROCEDURE — 85610 PROTHROMBIN TIME: CPT

## 2019-02-22 PROCEDURE — 96360 HYDRATION IV INFUSION INIT: CPT

## 2019-02-22 PROCEDURE — G0378 HOSPITAL OBSERVATION PER HR: HCPCS

## 2019-02-22 PROCEDURE — 84484 ASSAY OF TROPONIN QUANT: CPT

## 2019-02-22 PROCEDURE — 80053 COMPREHEN METABOLIC PANEL: CPT

## 2019-02-22 PROCEDURE — 25000003 PHARM REV CODE 250: Performed by: INTERNAL MEDICINE

## 2019-02-22 PROCEDURE — 93005 ELECTROCARDIOGRAM TRACING: CPT

## 2019-02-22 PROCEDURE — 85025 COMPLETE CBC W/AUTO DIFF WBC: CPT

## 2019-02-22 PROCEDURE — 25000003 PHARM REV CODE 250: Performed by: EMERGENCY MEDICINE

## 2019-02-22 PROCEDURE — 93010 EKG 12-LEAD: ICD-10-PCS | Mod: ,,, | Performed by: INTERNAL MEDICINE

## 2019-02-22 PROCEDURE — 71045 X-RAY EXAM CHEST 1 VIEW: CPT | Mod: TC,FY

## 2019-02-22 PROCEDURE — 36430 TRANSFUSION BLD/BLD COMPNT: CPT

## 2019-02-22 PROCEDURE — 93010 ELECTROCARDIOGRAM REPORT: CPT | Mod: ,,, | Performed by: INTERNAL MEDICINE

## 2019-02-22 PROCEDURE — 87502 INFLUENZA DNA AMP PROBE: CPT

## 2019-02-22 PROCEDURE — 85730 THROMBOPLASTIN TIME PARTIAL: CPT

## 2019-02-22 PROCEDURE — 81000 URINALYSIS NONAUTO W/SCOPE: CPT

## 2019-02-22 PROCEDURE — 71045 X-RAY EXAM CHEST 1 VIEW: CPT | Mod: 26,,, | Performed by: RADIOLOGY

## 2019-02-22 PROCEDURE — 71045 XR CHEST AP PORTABLE: ICD-10-PCS | Mod: 26,,, | Performed by: RADIOLOGY

## 2019-02-22 PROCEDURE — 36415 COLL VENOUS BLD VENIPUNCTURE: CPT

## 2019-02-22 PROCEDURE — 86901 BLOOD TYPING SEROLOGIC RH(D): CPT

## 2019-02-22 PROCEDURE — 99285 EMERGENCY DEPT VISIT HI MDM: CPT | Mod: 25

## 2019-02-22 PROCEDURE — 86922 COMPATIBILITY TEST ANTIGLOB: CPT

## 2019-02-22 PROCEDURE — P9016 RBC LEUKOCYTES REDUCED: HCPCS

## 2019-02-22 RX ORDER — SUCRALFATE 1 G/10ML
1 SUSPENSION ORAL
Status: DISCONTINUED | OUTPATIENT
Start: 2019-02-22 | End: 2019-02-23 | Stop reason: HOSPADM

## 2019-02-22 RX ORDER — SODIUM CHLORIDE 0.9 % (FLUSH) 0.9 %
5 SYRINGE (ML) INJECTION
Status: DISCONTINUED | OUTPATIENT
Start: 2019-02-22 | End: 2019-02-23 | Stop reason: HOSPADM

## 2019-02-22 RX ORDER — PANTOPRAZOLE SODIUM 40 MG/1
40 TABLET, DELAYED RELEASE ORAL DAILY
Status: DISCONTINUED | OUTPATIENT
Start: 2019-02-23 | End: 2019-02-23 | Stop reason: HOSPADM

## 2019-02-22 RX ORDER — HYDROCODONE BITARTRATE AND ACETAMINOPHEN 500; 5 MG/1; MG/1
TABLET ORAL
Status: DISCONTINUED | OUTPATIENT
Start: 2019-02-22 | End: 2019-02-23 | Stop reason: HOSPADM

## 2019-02-22 RX ORDER — POTASSIUM CHLORIDE 20 MEQ/1
40 TABLET, EXTENDED RELEASE ORAL ONCE
Status: COMPLETED | OUTPATIENT
Start: 2019-02-22 | End: 2019-02-22

## 2019-02-22 RX ADMIN — POTASSIUM CHLORIDE 40 MEQ: 1500 TABLET, EXTENDED RELEASE ORAL at 05:02

## 2019-02-22 RX ADMIN — SUCRALFATE 1 G: 1 SUSPENSION ORAL at 05:02

## 2019-02-22 RX ADMIN — SODIUM CHLORIDE 500 ML: 9 INJECTION, SOLUTION INTRAVENOUS at 09:02

## 2019-02-22 NOTE — ED NOTES
Pt lying in bed resting. resp even and unlabored. Pt remains weak and drowsy. Pt placed on portable cardiac monitor. Pt transferred to floor via bed. Pt tolerated transfer well.

## 2019-02-22 NOTE — ED TRIAGE NOTES
Pt presents with generalized weakness and dizziness. Pt receives blood transfusions regularly. Pt had last transfusion this past Saturday. Pt AAO but drowsy. resp even and unlabored.

## 2019-02-22 NOTE — ASSESSMENT & PLAN NOTE
Patient with symptomatic anemia weakness syncope activity change H&H 4.6/10.5.  WBC is [address].  Will transfuse 4 units of blood.  Will add potassium to the regimen check magnesium.  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  The patient has bowel movements with blood in most for bowel movements.  The family is well aware of this.  Patient has had significant weight loss.  In pre she had 4 units of blood within the last month.

## 2019-02-22 NOTE — HOSPITAL COURSE
Patient receive IV fluids she will receive 4 units of blood electrolyte potassium and magnesium will be monitored.  Once blood has been given the patient will be considered for discharge if she is improved from her weakness and is able to be more active.  02/23/2019.  The patient is undergoing a blood transfusion she is on her last unit of blood at this time.  Laboratory values are pending.  These will be obtained prior to discharge. The patient finished her blood prior to discharge. The patient looking for to going home today.  She wonders when the blood will finish.  VT home follow-up with PCP

## 2019-02-22 NOTE — H&P
[facility] Medicine  History & Physical    Patient Name: Oriana Tobar  MRN: [id number]  Admission Date: 2019  Attending Physician: Buck Hudson MD   Primary Care Provider: Carine Baltazar NP         Patient information was obtained from patient, relative(s) and ER records.     Subjective:     Principal Problem:Symptomatic anemia    Chief Complaint:   Chief Complaint   Patient presents with    Weakness    Dizziness        HPI: Patient has recurrent anemia secondary to gastric malignancy to with chronic blood in her stool.  She is complaining basically of severe weakness inability to get out of bed and very symptomatic at this time.  She will require at least 4 units of blood at this time with a H&H of 4 point 6 and 10.5.  WBC is 38948.  Potassium is 2.8 will require potassium supplementation and also check magnesium.  The patient is taken care of at home by family.    Past Medical History:   Diagnosis Date    Cancer     kidney    Dementia     Encounter for blood transfusion     2018    High cholesterol 2018    Hypertension     No Medication     Renal disorder     Wears dentures     Uppers       Past Surgical History:   Procedure Laterality Date     SECTION      COLONOSCOPY N/A 2018    [facility]    CYSTOSCOPY WITH RETROGRADE PYELOGRAM Left 1/15/2016    Per[facility] by [facility]    EGD (ESOPHAGOGASTRODUODENOSCOPY) N/A 2018    [facility]    EYE SURGERY      Rt eye Catarac    HYSTERECTOMY      IMAGING, GI TRACT, INTRALUMINAL, VIA CAPSULE N/A 2018    Performed by Rocco Ross [facility] at [facility] END[facility]    KIDNEY SURGERY Left 2018    Left kidney removed due to cancer    lasix surgery      NEPHRECTOMY-RADICAL   Left 2016    [facility]     REPAIR-HERNIA-VENTRAL N/A 2016    Performed by [facility] at [facility] OR    RESECTION-BOWEL  2016    [facility] at [facility]    URETEROSCOPY  1/15/2016    [facility] at [facility] OR       Review of patient's allergies indicates:   Allergen Reactions    Codeine Other (See Comments)     Pt shakes and hallucinates    Pcn [penicillins] Anxiety and Other (See Comments)       No current facility-administered medications on file prior to encounter.      Current Outpatient Medications on File Prior to Encounter   Medication Sig    ferrous sulfate 325 (65 FE) MG EC tablet Take 325 mg by mouth once daily.    losartan (COZAAR) 50 MG tablet Take 50 mg by mouth once daily.    pantoprazole (PROTONIX) 40 MG tablet Take 1 tablet (40 mg total) by mouth once daily.    sucralfate (CARAFATE) 100 mg/mL suspension Take 10 mLs (1 g total) by mouth 4 (four) times daily before meals and nightly.     Family History     None        Tobacco Use    Smoking status: Former Smoker     Packs/day: 1.00     Years: 50.00     Pack years: 50.00     Last attempt to quit: 10/6/2018     Years since quittin.3    Smokeless tobacco: Never Used    Tobacco comment: pt has stopped; encouraged to remain stopped   Substance and Sexual Activity    Alcohol use: No     Frequency: Never     Comment: last alcohol intake 2 weeks ago    Drug use: No    Sexual activity: No     Review of Systems   Constitutional: Positive for activity change, appetite change and unexpected weight change. Negative for chills, diaphoresis, fatigue and fever.   HENT: Negative for congestion, drooling, hearing loss and trouble swallowing.    Eyes: Negative for pain and visual disturbance.   Respiratory: Negative.  Negative for apnea, cough, choking, chest tightness, shortness of breath and wheezing.    Cardiovascular: Negative for chest pain, palpitations and leg swelling.   Gastrointestinal: Negative for abdominal  distention, abdominal pain, blood in stool, constipation, diarrhea, nausea and vomiting.   Endocrine: Negative for polydipsia, polyphagia and polyuria.   Genitourinary: Negative for difficulty urinating, dysuria and flank pain.   Musculoskeletal: Negative for arthralgias, back pain, gait problem, joint swelling, neck pain and neck stiffness.   Skin: Negative for color change, rash and wound.   Allergic/Immunologic: Negative for environmental allergies, food allergies and immunocompromised state.   Neurological: Positive for dizziness, syncope, weakness and light-headedness. Negative for numbness and headaches.   Hematological: Negative for adenopathy.   Psychiatric/Behavioral: Negative for agitation, confusion and sleep disturbance. The patient is not nervous/anxious.      Objective:     Vital Signs (Most Recent):  Temp: 98.1 °F (36.7 °C) (02/22/19 1458)  Pulse: 79 (02/22/19 1458)  Resp: 18 (02/22/19 1458)  BP: (!) 104/52 (02/22/19 1458)  SpO2: 100 % (02/22/19 1458) Vital Signs (24h Range):  Temp:  [97.5 °F (36.4 °C)-98.4 °F (36.9 °C)] 98.1 °F (36.7 °C)  Pulse:  [72-79] 79  Resp:  [16-20] 18  SpO2:  [90 %-100 %] 100 %  BP: ()/(39-70) 104/52     Weight: 72.6 kg (160 lb)  Body mass index is 22.32 kg/m².    Physical Exam   Constitutional: She is oriented to person, place, and time. She appears well-developed and well-nourished.   HENT:   Head: Normocephalic and atraumatic.   Right Ear: External ear normal.   Left Ear: External ear normal.   Nose: Nose normal.   Eyes: Conjunctivae, EOM and lids are normal. Pupils are equal, round, and reactive to light.   Neck: Trachea normal, normal range of motion and full passive range of motion without pain. Neck supple.   Cardiovascular: Normal rate, regular rhythm, S1 normal, S2 normal, normal heart sounds, intact distal pulses and normal pulses.   Mild tachycardia   Pulmonary/Chest: Effort normal and breath sounds normal.   Decreased breath sounds at the bases   Abdominal:  Soft. Normal aorta and bowel sounds are normal. There is tenderness.   Musculoskeletal: Normal range of motion.   Weakness   Neurological: She is alert and oriented to person, place, and time. She has normal reflexes.   Skin: Skin is warm, dry and intact. There is pallor.   Psychiatric: She has a normal mood and affect. Her speech is normal and behavior is normal. Judgment and thought content normal. Cognition and memory are normal.   Nursing note and vitals reviewed.        CRANIAL NERVES     CN III, IV, VI   Pupils are equal, round, and reactive to light.  Extraocular motions are normal.        Significant Labs:   BMP:   Recent Labs   Lab 02/22/19  0934   *      K 2.8*      CO2 18*   BUN 19   CREATININE 1.1   CALCIUM 8.5*     CBC:   Recent Labs   Lab 02/22/19 0934   WBC 14.49*   HGB 4.6*   HCT 15.5*   *     CMP:   Recent Labs   Lab 02/22/19  0934      K 2.8*      CO2 18*   *   BUN 19   CREATININE 1.1   CALCIUM 8.5*   PROT 6.2   ALBUMIN 2.7*   BILITOT 0.3   ALKPHOS 49*   AST 15   ALT 6*   ANIONGAP 13   EGFRNONAA 49.9*       Significant Imaging: I have reviewed and interpreted all pertinent imaging results/findings within the past 24 hours.    Assessment/Plan:     * Symptomatic anemia    Patient with symptomatic anemia weakness syncope activity change H&H 4.6/10.5.  WBC is 59843.  Will transfuse 4 units of blood.  Will add potassium to the regimen check magnesium.  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  The patient has bowel movements with blood in most for bowel movements.  The family is well aware of this.  Patient has had significant weight loss.  In pre she had 4 units of blood within the last month.         VTE Risk Mitigation (From admission, onward)        Ordered     IP VTE HIGH RISK PATIENT  Once      02/22/19 1205     Place sequential compression device  Until discontinued      02/22/19 1205             Buck Hudson MD  Department of  [facility] Medicine   [facility] - Med Surg

## 2019-02-22 NOTE — ED PROVIDER NOTES
"Encounter Date: 2019       History     Chief Complaint   Patient presents with    Weakness    Dizziness     74yo female with pmh Stage IV gastric cancer with chronic GI bleeding requiring multiple hospitalizations for transfusions presents to ED for evaluation of fatigue and "needing blood." Discharged from hospital recently after receiving 4 units of PRBCs for similar presentation. Hemoglobin on discharge 2/10/19 was 10.5.           Review of patient's allergies indicates:   Allergen Reactions    Codeine Other (See Comments)     Pt shakes and hallucinates    Pcn [penicillins] Anxiety and Other (See Comments)     Past Medical History:   Diagnosis Date    Cancer     kidney    Dementia     Encounter for blood transfusion     2018    High cholesterol 2018    Hypertension     No Medication     Renal disorder     Wears dentures     Uppers     Past Surgical History:   Procedure Laterality Date     SECTION      COLONOSCOPY N/A 2018    Per[facility] by [facility] ENDO    CYSTOSCOPY WITH RETROGRADE PYELOGRAM Left 1/15/2016    [facility] OR    EGD (ESOPHAGOGASTRODUODENOSCOPY) N/A 2018    [facility]    EYE SURGERY      Rt eye Catarac    HYSTERECTOMY      IMAGING, GI TRACT, INTRALUMINAL, VIA CAPSULE N/A 2018    [facility]formed by [facility]occo Ross [facility] at [facility] ENDO    KIDNEY SURGERY Left 2018    Left kidney removed due to cancer    lasix surgery      NEPHRECTOMY-RADICAL   Left 2016    [facility] at [facility] OR    REPAIR-HERNIA-VENTRAL N/A 2016    [facility]formed by [facility]hawa [facility] at [facility] OR    RESECTION-BOWEL  2016    [facility] by [facility]awa [facility] at [facility] OR    URETEROSCOPY  1/15/2016    Performed by [facility] at [facility] OR     History reviewed. No pertinent family history.  Social History     Tobacco Use    Smoking " status: Former Smoker     Packs/day: 1.00     Years: 50.00     Pack years: 50.00     Last attempt to quit: 10/6/2018     Years since quittin.3    Smokeless tobacco: Never Used    Tobacco comment: pt has stopped; encouraged to remain stopped   Substance Use Topics    Alcohol use: No     Frequency: Never     Comment: last alcohol intake 2 weeks ago    Drug use: No     Review of Systems   Constitutional: Positive for fatigue. Negative for appetite change, chills, diaphoresis and fever.   HENT: Negative for congestion, ear pain, rhinorrhea, sinus pressure, sinus pain, sore throat, tinnitus and trouble swallowing.    Eyes: Negative for photophobia, redness and visual disturbance.   Respiratory: Negative for cough, chest tightness, shortness of breath and wheezing.    Cardiovascular: Negative for chest pain, palpitations and leg swelling.   Gastrointestinal: Positive for blood in stool. Negative for abdominal pain, constipation, diarrhea, nausea and vomiting.   Endocrine: Negative for cold intolerance, heat intolerance, polydipsia, polyphagia and polyuria.   Genitourinary: Negative for decreased urine volume, difficulty urinating, dysuria, flank pain, frequency and urgency.   Musculoskeletal: Negative for arthralgias, back pain, gait problem, joint swelling, myalgias, neck pain and neck stiffness.   Skin: Negative for color change, pallor, rash and wound.   Allergic/Immunologic: Negative for environmental allergies, food allergies and immunocompromised state.   Neurological: Positive for weakness. Negative for dizziness, tremors, syncope, light-headedness, numbness and headaches.   Hematological: Negative for adenopathy. Does not bruise/bleed easily.   Psychiatric/Behavioral: Negative for agitation, confusion, decreased concentration and dysphoric mood.   All other systems reviewed and are negative.      Physical Exam     Initial Vitals [19 0926]   BP Pulse Resp Temp SpO2   (!) 95/41 76 16 97.6 °F (36.4 °C)  100 %      MAP       --         Physical Exam    Nursing note and vitals reviewed.  Constitutional: She appears well-developed. She is not diaphoretic. She is cooperative. She appears ill. No distress.   HENT:   Head: Normocephalic and atraumatic.   Right Ear: External ear normal.   Left Ear: External ear normal.   Nose: Nose normal.   Mouth/Throat: Oropharynx is clear and moist.   Eyes: Conjunctivae are normal. No scleral icterus.   Neck: Normal range of motion. Neck supple. No JVD present.   Cardiovascular: Normal rate, regular rhythm, normal heart sounds and intact distal pulses.   Pulmonary/Chest: Breath sounds normal. No respiratory distress. She has no wheezes. She has no rhonchi. She has no rales. She exhibits no tenderness.   Abdominal: Soft. Bowel sounds are normal. She exhibits no distension. There is no tenderness. There is no rebound and no guarding.   Genitourinary: Rectal exam shows guaiac positive stool. Guaiac positive stool. : Acceptable.  Musculoskeletal: Normal range of motion. She exhibits no edema or tenderness.   Lymphadenopathy:     She has no cervical adenopathy.   Neurological: She is alert and oriented to person, place, and time. GCS score is 15. GCS eye subscore is 4. GCS verbal subscore is 5. GCS motor subscore is 6.   Skin: Skin is warm. Capillary refill takes less than 2 seconds. No rash noted. No erythema.   Psychiatric: She has a normal mood and affect. Her behavior is normal. Judgment and thought content normal.         ED Course   Procedures  Labs Reviewed   CBC W/ AUTO DIFFERENTIAL - Abnormal; Notable for the following components:       Result Value    WBC 14.49 (*)     RBC 1.77 (*)     Hemoglobin 4.6 (*)     Hematocrit 15.5 (*)     MCH 26.0 (*)     MCHC 29.7 (*)     RDW 14.8 (*)     Platelets 489 (*)     Immature Granulocytes 0.9 (*)     Gran # (ANC) 8.5 (*)     Immature Grans (Abs) 0.13 (*)     All other components within normal limits    Narrative:      HGB/HCT   critical result(s) called and verbal readback obtained from   AMBER RYLES @ 1026 , 02/22/2019 10:27   COMPREHENSIVE METABOLIC PANEL - Abnormal; Notable for the following components:    Potassium 2.8 (*)     CO2 18 (*)     Glucose 145 (*)     Calcium 8.5 (*)     Albumin 2.7 (*)     Alkaline Phosphatase 49 (*)     ALT 6 (*)     eGFR if  57.6 (*)     eGFR if non  49.9 (*)     All other components within normal limits   PROTIME-INR - Abnormal; Notable for the following components:    Prothrombin Time 14.3 (*)     INR 1.3 (*)     All other components within normal limits   TROPONIN I - Abnormal; Notable for the following components:    Troponin I <0.01 (*)     All other components within normal limits   INFLUENZA A & B BY MOLECULAR   APTT   URINALYSIS, REFLEX TO URINE CULTURE   TYPE & SCREEN   PREPARE RBC SOFT     EKG Readings: (Independently Interpreted)   Initial Reading: No STEMI. Rhythm: Normal Sinus Rhythm. Heart Rate: 67. Ectopy: No Ectopy. Conduction: Normal. ST Segments: Normal ST Segments. T Waves: Normal. Axis: Normal. Clinical Impression: Normal Sinus Rhythm       Imaging Results          X-Ray Chest AP Portable (Final result)  Result time 02/22/19 10:34:32    Final result by Ramin Singh MD (02/22/19 10:34:32)                 Impression:      No acute chest disease.      Electronically signed by: Ramin Singh  Date:    02/22/2019  Time:    10:34             Narrative:    EXAMINATION:  XR CHEST AP PORTABLE    CLINICAL HISTORY:  weakness;.    TECHNIQUE:  Single frontal portable view of the chest was performed.    COMPARISON:  Chest x-ray 06/23/2018.    FINDINGS:  Support devices: None    The lungs are clear, with normal appearance of pulmonary vasculature and no pleural effusion or pneumothorax.    The cardiac silhouette is top-normal in size.  The hilar and mediastinal contours are unremarkable.    Bones are intact.                              X-Rays:   Independently  Interpreted Readings:   Chest X-Ray: No infiltrates.  No acute abnormalities.  Normal heart size.     Medical Decision Making:   Differential Diagnosis:   Chronic anemia, chronic GI bleeding, gastric cancer  ED Management:  Hemoglobin 4.6 today, pt has been typed and crossed with PRBCs ordered.   Case discussed with Dr. Hudson, who accepts admission to the medical floor for transfusion.                      Clinical Impression:       ICD-10-CM ICD-9-CM   1. Hypokalemia E87.6 276.8   2. Dizziness R42 780.4   3. Symptomatic anemia D64.9 285.9   4. Chronic lower GI bleeding K92.2 578.9   5. Generalized weakness R53.1 780.79         Disposition:   Disposition: Admitted  Condition: Serious                        Melva Acosta MD  02/28/19 1200

## 2019-02-22 NOTE — PLAN OF CARE
Problem: Adult Inpatient Plan of Care  Goal: Readiness for Transition of Care    Intervention: Mutually Develop Transition Plan   02/22/19 1622 02/22/19 1629   OTHER   Communicated expected length of stay with patient/caregiver yes --    Is patient able to care for self after discharge? No --    Who are your caregiver(s) and their phone number(s)? Maryjane Tobar daughter [phone] or Zoey giron daughter [phone] --    Patient currently receives home health services? --  No   (RETIRED) Discharge Needs Assessment   How many people do you have in your home that can help with your care after discharge? --  2   Discharge Needs Assessment   Readmission Within the Last 30 Days previous discharge plan unsuccessful --    Equipment Currently Used at Home shower chair;commode;rollator --    Transportation Anticipated family or friend will provide --    Social Work Plan   Patient/Family in Agreement with Plan yes --    Living Environment   Able to Return to Prior Arrangements yes --    (RETIRED) Current Health   Expected Length of Stay (days) 1 --    (RETIRED) Social Work Plan   Patient's perception of discharge disposition home or selfcare --

## 2019-02-22 NOTE — PLAN OF CARE
02/22/19 1630   HOWELL Message   Medicare Outpatient and Observation Notification regarding financial responsibility Given to patient/caregiver;Explained to patient/caregiver;Signed/date by patient/caregiver   Date HOWELL was signed 02/22/19   Time HOWELL was signed 1521

## 2019-02-22 NOTE — PLAN OF CARE
02/22/19 1622   Discharge Assessment   Assessment Type Discharge Planning Assessment   Confirmed/corrected address and phone number on facesheet? Yes   Assessment information obtained from? Patient   Expected Length of Stay (days) 1   Communicated expected length of stay with patient/caregiver yes   Prior to hospitilization cognitive status: Not Oriented to Time   Prior to hospitalization functional status: Needs Assistance   Current cognitive status: Not Oriented to Time   Current Functional Status: Assistive Equipment   Lives With grandchild(samy)   Able to Return to Prior Arrangements yes   Is patient able to care for self after discharge? No   Who are your caregiver(s) and their phone number(s)? Maryjane Tobar daughter [phone] or Zoey giron daughter [phone]   Patient's perception of discharge disposition home or selfcare   Readmission Within the Last 30 Days previous discharge plan unsuccessful   If yes, most recent facility name: [facility]    Patient currently being followed by outpatient case management? No   Patient currently receives any other outside agency services? Yes   How many hours a day does the patient receive services? 10   Name and contact number of agency or person providing outside services Nursing Management M-F 8:30am-2:30pm; 3:00-7:00pm   Is it the patient/care giver preference to resume care with the current outside agency? Yes   Equipment Currently Used at Home shower chair;commode;rollator   Do you have any problems affording any of your prescribed medications? No   Is the patient taking medications as prescribed? yes   Does the patient have transportation home? Yes   Transportation Anticipated family or friend will provide   Does the patient receive services at the [facility]? No   Discharge Plan A Home with family   DME Needed Upon Discharge  none   Patient/Family in Agreement with Plan yes   Readmission Questionnaire   At the time of  your discharge, did someone talk to you about what your health problems were? Yes   At the time of discharge, did someone talk to you about what to watch out for regarding worsening of your health problem? Yes   At the time of discharge, did someone talk to you about what to do if you experienced worsening of your health problem? Yes   At the time of discharge, did someone talk to you about which medication to take when you left the hospital and which ones to stop taking? Yes   At the time of discharge, did someone talk to you about when and where to follow up with a doctor after you left the hospital? Yes   What do you believe caused you to be sick enough to be re-admitted? low blood count   How often do you need to have someone help you when you read instructions, pamphlets, or other written material from your doctor or pharmacy? Always   Do you have problems taking your medications as prescribed? No   Do you have any problems affording any of  your prescribed medications? No   Do you have problems obtaining/receiving your medications? No   Does the patient have transportation to healthcare appointments? Yes   Living Arrangements house   Does the patient have family/friends to help with healtcare needs after discharge? yes   Does your caregiver provide all the help you need? Yes   Are you currently feeling confused? Yes   Are you currently having problems thinking? Yes   Are you currently having memory problems? Yes   Have you felt down, depressed, or hopeless? 0   Have you felt little interest or pleasure in doing things? 0   In the last 7 days, my sleep quality was: very good   Patient lives at home with her grand daughter. She has sitters with Nursing Management M-F from 8:30am-7:00pm. She has dementia. She is very pleasant and able to walk around at home without assistance. Plan is to return home with continued services she is receiving. Grand daughter states she already has a follow up appointment with  Carine Baltazar. Instructed it will be on her discharge paperwork. Verbalized understanding. Denies any discharge needs at this time.

## 2019-02-22 NOTE — SUBJECTIVE & OBJECTIVE
Past Medical History:   Diagnosis Date    Cancer     kidney    Dementia     Encounter for blood transfusion     2018    High cholesterol 2018    Hypertension     No Medication     Renal disorder     Wears dentures     Uppers       Past Surgical History:   Procedure Laterality Date     SECTION      COLONOSCOPY N/A 2018    [facility]formed by [facility]    CYSTOSCOPY WITH RETROGRADE PYELOGRAM Left 1/15/2016    Performed by [facility]ell [facility] at [facility] OR    EGD (ESOPHAGOGASTRODUODENOSCOPY) N/A 2018    Per[facility] by [facility]    EYE SURGERY      Rt eye Catarac    HYSTERECTOMY      IMAGING, GI TRACT, INTRALUMINAL, VIA CAPSULE N/A 2018    Performed by Rocco Ross [facility] at [facility] END[facility]    KIDNEY SURGERY Left 2018    Left kidney removed due to cancer    lasix surgery      NEPHRECTOMY-RADICAL   Left 2016    [facility] at [facility]    REPAIR-HERNIA-VENTRAL N/A 2016    Performed by Marco A [facility]hawa [facility] at [facility] OR    RESECTION-BOWEL  2016    Performed by [facility] at [facility] OR    URETEROSCOPY  1/15/2016    Performed by [facility] at [facility] OR       Review of patient's allergies indicates:   Allergen Reactions    Codeine Other (See Comments)     Pt shakes and hallucinates    Pcn [penicillins] Anxiety and Other (See Comments)       No current facility-administered medications on file prior to encounter.      Current Outpatient Medications on File Prior to Encounter   Medication Sig    ferrous sulfate 325 (65 FE) MG EC tablet Take 325 mg by mouth once daily.    losartan (COZAAR) 50 MG tablet Take 50 mg by mouth once daily.    pantoprazole (PROTONIX) 40 MG tablet Take 1 tablet (40 mg total) by mouth once daily.    sucralfate (CARAFATE) 100 mg/mL suspension Take 10 mLs (1 g total) by mouth 4 (four) times daily before meals and nightly.     Family History      None        Tobacco Use    Smoking status: Former Smoker     Packs/day: 1.00     Years: 50.00     Pack years: 50.00     Last attempt to quit: 10/6/2018     Years since quittin.3    Smokeless tobacco: Never Used    Tobacco comment: pt has stopped; encouraged to remain stopped   Substance and Sexual Activity    Alcohol use: No     Frequency: Never     Comment: last alcohol intake 2 weeks ago    Drug use: No    Sexual activity: No     Review of Systems   Constitutional: Positive for activity change, appetite change and unexpected weight change. Negative for chills, diaphoresis, fatigue and fever.   HENT: Negative for congestion, drooling, hearing loss and trouble swallowing.    Eyes: Negative for pain and visual disturbance.   Respiratory: Negative.  Negative for apnea, cough, choking, chest tightness, shortness of breath and wheezing.    Cardiovascular: Negative for chest pain, palpitations and leg swelling.   Gastrointestinal: Negative for abdominal distention, abdominal pain, blood in stool, constipation, diarrhea, nausea and vomiting.   Endocrine: Negative for polydipsia, polyphagia and polyuria.   Genitourinary: Negative for difficulty urinating, dysuria and flank pain.   Musculoskeletal: Negative for arthralgias, back pain, gait problem, joint swelling, neck pain and neck stiffness.   Skin: Negative for color change, rash and wound.   Allergic/Immunologic: Negative for environmental allergies, food allergies and immunocompromised state.   Neurological: Positive for dizziness, syncope, weakness and light-headedness. Negative for numbness and headaches.   Hematological: Negative for adenopathy.   Psychiatric/Behavioral: Negative for agitation, confusion and sleep disturbance. The patient is not nervous/anxious.      Objective:     Vital Signs (Most Recent):  Temp: 98.1 °F (36.7 °C) (19 1458)  Pulse: 79 (19 1458)  Resp: 18 (19 1458)  BP: (!) 104/52 (19 1458)  SpO2: 100 %  (02/22/19 1458) Vital Signs (24h Range):  Temp:  [97.5 °F (36.4 °C)-98.4 °F (36.9 °C)] 98.1 °F (36.7 °C)  Pulse:  [72-79] 79  Resp:  [16-20] 18  SpO2:  [90 %-100 %] 100 %  BP: ()/(39-70) 104/52     Weight: 72.6 kg (160 lb)  Body mass index is 22.32 kg/m².    Physical Exam   Constitutional: She is oriented to person, place, and time. She appears well-developed and well-nourished.   HENT:   Head: Normocephalic and atraumatic.   Right Ear: External ear normal.   Left Ear: External ear normal.   Nose: Nose normal.   Eyes: Conjunctivae, EOM and lids are normal. Pupils are equal, round, and reactive to light.   Neck: Trachea normal, normal range of motion and full passive range of motion without pain. Neck supple.   Cardiovascular: Normal rate, regular rhythm, S1 normal, S2 normal, normal heart sounds, intact distal pulses and normal pulses.   Mild tachycardia   Pulmonary/Chest: Effort normal and breath sounds normal.   Decreased breath sounds at the bases   Abdominal: Soft. Normal aorta and bowel sounds are normal. There is tenderness.   Musculoskeletal: Normal range of motion.   Weakness   Neurological: She is alert and oriented to person, place, and time. She has normal reflexes.   Skin: Skin is warm, dry and intact. There is pallor.   Psychiatric: She has a normal mood and affect. Her speech is normal and behavior is normal. Judgment and thought content normal. Cognition and memory are normal.   Nursing note and vitals reviewed.        CRANIAL NERVES     CN III, IV, VI   Pupils are equal, round, and reactive to light.  Extraocular motions are normal.        Significant Labs:   BMP:   Recent Labs   Lab 02/22/19  0934   *      K 2.8*      CO2 18*   BUN 19   CREATININE 1.1   CALCIUM 8.5*     CBC:   Recent Labs   Lab 02/22/19  0934   WBC 14.49*   HGB 4.6*   HCT 15.5*   *     CMP:   Recent Labs   Lab 02/22/19  0934      K 2.8*      CO2 18*   *   BUN 19   CREATININE 1.1    CALCIUM 8.5*   PROT 6.2   ALBUMIN 2.7*   BILITOT 0.3   ALKPHOS 49*   AST 15   ALT 6*   ANIONGAP 13   EGFRNONAA 49.9*       Significant Imaging: I have reviewed and interpreted all pertinent imaging results/findings within the past 24 hours.

## 2019-02-22 NOTE — HPI
Patient has recurrent anemia secondary to gastric malignancy to with chronic blood in her stool.  She is complaining basically of severe weakness inability to get out of bed and very symptomatic at this time.  She will require at least 4 units of blood at this time with a H&H of 4 point 6 and 10.5.  WBC is 83528.  Potassium is 2.8 will require potassium supplementation and also check magnesium.  The patient is taken care of at home by family.

## 2019-02-23 VITALS
DIASTOLIC BLOOD PRESSURE: 64 MMHG | BODY MASS INDEX: 23.89 KG/M2 | TEMPERATURE: 98 F | HEIGHT: 71 IN | HEART RATE: 70 BPM | OXYGEN SATURATION: 100 % | SYSTOLIC BLOOD PRESSURE: 141 MMHG | RESPIRATION RATE: 16 BRPM | WEIGHT: 170.63 LBS

## 2019-02-23 LAB
ANION GAP SERPL CALC-SCNC: 9 MMOL/L
BASOPHILS # BLD AUTO: 0.07 K/UL
BASOPHILS NFR BLD: 0.7 %
BLD PROD TYP BPU: NORMAL
BLOOD UNIT EXPIRATION DATE: NORMAL
BLOOD UNIT TYPE CODE: 6200
BLOOD UNIT TYPE: NORMAL
BUN SERPL-MCNC: 12 MG/DL
CALCIUM SERPL-MCNC: 8.2 MG/DL
CHLORIDE SERPL-SCNC: 109 MMOL/L
CO2 SERPL-SCNC: 20 MMOL/L
CODING SYSTEM: NORMAL
CREAT SERPL-MCNC: 1.1 MG/DL
DIFFERENTIAL METHOD: ABNORMAL
DISPENSE STATUS: NORMAL
EOSINOPHIL # BLD AUTO: 0.1 K/UL
EOSINOPHIL NFR BLD: 0.9 %
ERYTHROCYTE [DISTWIDTH] IN BLOOD BY AUTOMATED COUNT: 14.3 %
EST. GFR  (AFRICAN AMERICAN): 57.6 ML/MIN/1.73 M^2
EST. GFR  (NON AFRICAN AMERICAN): 49.9 ML/MIN/1.73 M^2
GLUCOSE SERPL-MCNC: 123 MG/DL
HCT VFR BLD AUTO: 29.9 %
HGB BLD-MCNC: 9.2 G/DL
IMM GRANULOCYTES # BLD AUTO: 0.15 K/UL
IMM GRANULOCYTES NFR BLD AUTO: 1.4 %
LYMPHOCYTES # BLD AUTO: 2.1 K/UL
LYMPHOCYTES NFR BLD: 20 %
MAGNESIUM SERPL-MCNC: 1.6 MG/DL
MCH RBC QN AUTO: 27.1 PG
MCHC RBC AUTO-ENTMCNC: 30.8 G/DL
MCV RBC AUTO: 88 FL
MONOCYTES # BLD AUTO: 0.7 K/UL
MONOCYTES NFR BLD: 6.3 %
NEUTROPHILS # BLD AUTO: 7.4 K/UL
NEUTROPHILS NFR BLD: 70.7 %
NRBC BLD-RTO: 0 /100 WBC
NUM UNITS TRANS PACKED RBC: NORMAL
PLATELET # BLD AUTO: 391 K/UL
PMV BLD AUTO: 9.5 FL
POTASSIUM SERPL-SCNC: 3.5 MMOL/L
RBC # BLD AUTO: 3.39 M/UL
SODIUM SERPL-SCNC: 138 MMOL/L
WBC # BLD AUTO: 10.42 K/UL

## 2019-02-23 PROCEDURE — 25000003 PHARM REV CODE 250: Performed by: INTERNAL MEDICINE

## 2019-02-23 PROCEDURE — G0378 HOSPITAL OBSERVATION PER HR: HCPCS

## 2019-02-23 PROCEDURE — 85025 COMPLETE CBC W/AUTO DIFF WBC: CPT

## 2019-02-23 PROCEDURE — 83735 ASSAY OF MAGNESIUM: CPT

## 2019-02-23 PROCEDURE — P9016 RBC LEUKOCYTES REDUCED: HCPCS

## 2019-02-23 PROCEDURE — 36430 TRANSFUSION BLD/BLD COMPNT: CPT

## 2019-02-23 PROCEDURE — 80048 BASIC METABOLIC PNL TOTAL CA: CPT

## 2019-02-23 RX ADMIN — SUCRALFATE 1 G: 1 SUSPENSION ORAL at 06:02

## 2019-02-23 RX ADMIN — PANTOPRAZOLE SODIUM 40 MG: 40 TABLET, DELAYED RELEASE ORAL at 08:02

## 2019-02-23 NOTE — PLAN OF CARE
Problem: Adult Inpatient Plan of Care  Goal: Plan of Care Review  Outcome: Ongoing (interventions implemented as appropriate)   02/22/19 9625   Plan of Care Review   Plan of Care Reviewed With patient;grandchild(samy)   Blood transfusion and K+ replacement

## 2019-02-23 NOTE — PLAN OF CARE
02/23/19 0730   Final Note   Assessment Type Final Discharge Note   Anticipated Discharge Disposition Home   What phone number can be called within the next 1-3 days to see how you are doing after discharge? [phone]   Hospital Follow Up  Appt(s) scheduled? Yes   Discharge plans and expectations educations in teach back method with documentation complete? Yes   Granddaughter notified of follow up appointment. Demonstrated understanding by verbal feedback. Denies needing home health.

## 2019-02-23 NOTE — NURSING
"2222- Ambulated to bathroom with standby assist. 2nd unit of blood began. Daughter in room.  0400- 4th unit of blood infusing.    C/O "sweating". Vitals obtained. Up to bathroom, voids freely. Assisted to bed.   Cardiac rythmn-  Had a couple of pauses. Replaced pads. Running [id number].  "

## 2019-02-23 NOTE — DISCHARGE SUMMARY
[facility]  Hospital Medicine  Discharge Summary      Patient Name: Oriana Tobar  MRN: [id number]  Admission Date: 2/22/2019  Hospital Length of Stay: 0 days  Discharge Date and Time:  02/23/2019 7:11 AM  Attending Physician: Adin Tejada MD   Discharging Provider: Adin Tejada MD  Primary Care Provider: Carine Baltazar NP      HPI:   Patient has recurrent anemia secondary to gastric malignancy to with chronic blood in her stool.  She is complaining basically of severe weakness inability to get out of bed and very symptomatic at this time.  She will require at least 4 units of blood at this time with a H&H of 4 point 6 and 10.5.  WBC is 70510.  Potassium is 2.8 will require potassium supplementation and also check magnesium.  The patient is taken care of at home by family.    * No surgery found *      Hospital Course:   Patient receive IV fluids she will receive 4 units of blood electrolyte potassium and magnesium will be monitored.  Once blood has been given the patient will be considered for discharge if she is improved from her weakness and is able to be more active.  02/23/2019.  The patient is undergoing a blood transfusion she is on her last unit of blood at this time.  Laboratory values are pending.  These will be obtained prior to discharge. The patient finished her blood prior to discharge. The patient looking for to going home today.  She wonders when the blood will finish.  DC home follow-up with PCP     Consults:   Consults (From admission, onward)        Status Ordering Provider     IP consult to case management  Once     Provider:  (Not yet assigned)    Acknowledged ADIN TEJADA          * Symptomatic anemia    Patient with symptomatic anemia weakness syncope activity change H&H 4.6/10.5.  WBC is 34758.  Will transfuse 4 units of blood.  Will add potassium to the regimen check magnesium.  Patient does have a gastric malignancy which is  known.  The patient has bowel movements with blood in most for bowel movements.  The family is well aware of this.  Patient has had significant weight loss.  In pre she had 4 units of blood within the last month.  02/23/2019.  Patient has received blood transfusion.  A.m. lab is pending.  The patient is looking for to going home.  Patient is much improved.  Discussed the diagnosis and treatment with the son yesterday.  He was aware yesterday that mother probably discharge home today.  Patient will follow up with PCP.       Final Active Diagnoses:    Diagnosis Date Noted POA    PRINCIPAL PROBLEM:  Symptomatic anemia [D64.9] 10/17/2018 Yes      Problems Resolved During this Admission:       Discharged Condition: good    Disposition: Home or Self Care    Follow Up:  Follow-up Information     Carine Baltazar NP. Go on 3/13/2019.    Specialty:  Family Medicine  Why:  appointment time: 10:00am  Contact information:  [address] [place] [address] [address]  [place] [address]  [phone]             Carine Baltazar NP.    Specialty:  Family Medicine  Contact information:  [address] [place] [address] [address]  [place] [address]  [phone]                 Patient Instructions:   No discharge procedures on file.    Significant Diagnostic Studies: Labs:   BMP:   Recent Labs   Lab 02/22/19  0934   *      K 2.8*      CO2 18*   BUN 19   CREATININE 1.1   CALCIUM 8.5*   , CMP   Recent Labs   Lab 02/22/19  0934      K 2.8*      CO2 18*   *   BUN 19   CREATININE 1.1   CALCIUM 8.5*   PROT 6.2   ALBUMIN 2.7*   BILITOT 0.3   ALKPHOS 49*   AST 15   ALT 6*   ANIONGAP 13   ESTGFRAFRICA 57.6*   EGFRNONAA 49.9*   , CBC   Recent Labs   Lab 02/22/19  0934   WBC 14.49*   HGB 4.6*   HCT 15.5*   *    and All labs within the past 24 hours have been reviewed    Pending Diagnostic Studies:     Procedure Component Value Units Date/Time    Basic metabolic panel [[id number]]     Order Status:  Sent Lab Status:  No result      Specimen:  Blood     CBC auto differential [[id number]]     Order Status:  Sent Lab Status:  No result     Specimen:  Blood     CBC auto differential [[id number]]     Order Status:  Sent Lab Status:  No result     Specimen:  Blood     Magnesium [[id number]]     Order Status:  Sent Lab Status:  No result     Specimen:  Blood          Medications:  Reconciled Home Medications:      Medication List      CONTINUE taking these medications    ferrous sulfate 325 (65 FE) MG EC tablet  Take 325 mg by mouth once daily.     losartan 50 MG tablet  Commonly known as:  COZAAR  Take 50 mg by mouth once daily.     pantoprazole 40 MG tablet  Commonly known as:  PROTONIX  Take 1 tablet (40 mg total) by mouth once daily.     sucralfate 100 mg/mL suspension  Commonly known as:  CARAFATE  Take 10 mLs (1 g total) by mouth 4 (four) times daily before meals and nightly.            Indwelling Lines/Drains at time of discharge:   Lines/Drains/Airways          None          Time spent on the discharge of patient: 35 minutes  Patient was seen and examined on the date of discharge and determined to be suitable for discharge.         Buck Hudson MD  Department of Hospital Medicine  [facility] - Med Surg

## 2019-02-23 NOTE — NURSING
Discharge instructions reviewed with patient and family.  Prescription given to granddaughter. Verbalized understanding.  IV removed from left forearm without difficulty.  Catheter intact.  Pressure dressing applied.  Patient tolerated well.  MIKE GAVIRIA

## 2019-02-23 NOTE — ASSESSMENT & PLAN NOTE
Patient with symptomatic anemia weakness syncope activity change H&H 4.6/10.5.  WBC is 36551.  Will transfuse 4 units of blood.  Will add potassium to the regimen check magnesium.  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  The patient has bowel movements with blood in most for bowel movements.  The family is well aware of this.  Patient has had significant weight loss.  In pre she had 4 units of blood within the last month.  02/23/2019.  Patient has received blood transfusion.  A.m. lab is pending.  The patient is looking for to going home.  Patient is much improved.  Discussed the diagnosis and treatment with the son yesterday.  He was aware yesterday that mother probably discharge home today.  Patient will follow up with PCP.

## 2019-02-23 NOTE — NURSING
Patient escorted to personal vehicle via wheelchair.  NAD noted.  Gait steady. Respirations even and unlabored. LW, RN

## 2019-03-07 ENCOUNTER — HOSPITAL ENCOUNTER (OUTPATIENT)
Facility: HOSPITAL | Age: 74
Discharge: HOME | End: 2019-03-08
Attending: FAMILY MEDICINE | Admitting: INTERNAL MEDICINE
Payer: MEDICARE

## 2019-03-07 DIAGNOSIS — C16.9 MALIGNANT NEOPLASM OF STOMACH, UNSPECIFIED LOCATION: ICD-10-CM

## 2019-03-07 DIAGNOSIS — K92.2 GASTROINTESTINAL HEMORRHAGE, UNSPECIFIED GASTROINTESTINAL HEMORRHAGE TYPE: Primary | ICD-10-CM

## 2019-03-07 DIAGNOSIS — D64.9 ANEMIA, UNSPECIFIED TYPE: ICD-10-CM

## 2019-03-07 LAB
ABO + RH BLD: NORMAL
ALBUMIN SERPL BCP-MCNC: 2.4 G/DL
ALP SERPL-CCNC: 46 U/L
ALT SERPL W/O P-5'-P-CCNC: 5 U/L
ANION GAP SERPL CALC-SCNC: 11 MMOL/L
AST SERPL-CCNC: 11 U/L
BASOPHILS # BLD AUTO: 0.03 K/UL
BASOPHILS NFR BLD: 0.2 %
BILIRUB SERPL-MCNC: 0.4 MG/DL
BLD GP AB SCN CELLS X3 SERPL QL: NORMAL
BUN SERPL-MCNC: 22 MG/DL
CALCIUM SERPL-MCNC: 7.9 MG/DL
CHLORIDE SERPL-SCNC: 103 MMOL/L
CO2 SERPL-SCNC: 24 MMOL/L
CREAT SERPL-MCNC: 1.2 MG/DL
DIFFERENTIAL METHOD: ABNORMAL
EOSINOPHIL # BLD AUTO: 0 K/UL
EOSINOPHIL NFR BLD: 0.3 %
ERYTHROCYTE [DISTWIDTH] IN BLOOD BY AUTOMATED COUNT: 14.8 %
EST. GFR  (AFRICAN AMERICAN): 51.8 ML/MIN/1.73 M^2
EST. GFR  (NON AFRICAN AMERICAN): 44.9 ML/MIN/1.73 M^2
GLUCOSE SERPL-MCNC: 154 MG/DL
HCT VFR BLD AUTO: 16.1 %
HGB BLD-MCNC: 4.9 G/DL
IMM GRANULOCYTES # BLD AUTO: 0.16 K/UL
IMM GRANULOCYTES NFR BLD AUTO: 1.2 %
LYMPHOCYTES # BLD AUTO: 1.9 K/UL
LYMPHOCYTES NFR BLD: 13.8 %
MCH RBC QN AUTO: 26.8 PG
MCHC RBC AUTO-ENTMCNC: 30.4 G/DL
MCV RBC AUTO: 88 FL
MONOCYTES # BLD AUTO: 0.7 K/UL
MONOCYTES NFR BLD: 5.5 %
NEUTROPHILS # BLD AUTO: 10.7 K/UL
NEUTROPHILS NFR BLD: 79 %
NRBC BLD-RTO: 0 /100 WBC
OB PNL STL: POSITIVE
PLATELET # BLD AUTO: 406 K/UL
PMV BLD AUTO: 9.1 FL
POTASSIUM SERPL-SCNC: 3.5 MMOL/L
PROT SERPL-MCNC: 5.5 G/DL
RBC # BLD AUTO: 1.83 M/UL
SODIUM SERPL-SCNC: 138 MMOL/L
WBC # BLD AUTO: 13.56 K/UL

## 2019-03-07 PROCEDURE — G0378 HOSPITAL OBSERVATION PER HR: HCPCS

## 2019-03-07 PROCEDURE — 80053 COMPREHEN METABOLIC PANEL: CPT

## 2019-03-07 PROCEDURE — 36430 TRANSFUSION BLD/BLD COMPNT: CPT

## 2019-03-07 PROCEDURE — 25000003 PHARM REV CODE 250: Performed by: FAMILY MEDICINE

## 2019-03-07 PROCEDURE — 86922 COMPATIBILITY TEST ANTIGLOB: CPT

## 2019-03-07 PROCEDURE — 96360 HYDRATION IV INFUSION INIT: CPT

## 2019-03-07 PROCEDURE — 86850 RBC ANTIBODY SCREEN: CPT

## 2019-03-07 PROCEDURE — 36415 COLL VENOUS BLD VENIPUNCTURE: CPT

## 2019-03-07 PROCEDURE — P9016 RBC LEUKOCYTES REDUCED: HCPCS

## 2019-03-07 PROCEDURE — 85025 COMPLETE CBC W/AUTO DIFF WBC: CPT

## 2019-03-07 PROCEDURE — 96361 HYDRATE IV INFUSION ADD-ON: CPT

## 2019-03-07 PROCEDURE — 99285 EMERGENCY DEPT VISIT HI MDM: CPT | Mod: 25

## 2019-03-07 PROCEDURE — 82272 OCCULT BLD FECES 1-3 TESTS: CPT

## 2019-03-07 RX ORDER — HYDROCODONE BITARTRATE AND ACETAMINOPHEN 500; 5 MG/1; MG/1
TABLET ORAL
Status: DISCONTINUED | OUTPATIENT
Start: 2019-03-07 | End: 2019-03-08 | Stop reason: HOSPADM

## 2019-03-07 RX ORDER — HYDROCODONE BITARTRATE AND ACETAMINOPHEN 500; 5 MG/1; MG/1
TABLET ORAL
Status: DISCONTINUED | OUTPATIENT
Start: 2019-03-07 | End: 2019-03-07

## 2019-03-07 RX ORDER — SODIUM CHLORIDE 0.9 % (FLUSH) 0.9 %
3 SYRINGE (ML) INJECTION
Status: DISCONTINUED | OUTPATIENT
Start: 2019-03-07 | End: 2019-03-08 | Stop reason: HOSPADM

## 2019-03-07 RX ORDER — PANTOPRAZOLE SODIUM 40 MG/10ML
40 INJECTION, POWDER, LYOPHILIZED, FOR SOLUTION INTRAVENOUS DAILY
Status: DISCONTINUED | OUTPATIENT
Start: 2019-03-08 | End: 2019-03-08 | Stop reason: HOSPADM

## 2019-03-07 RX ORDER — SODIUM CHLORIDE 9 MG/ML
INJECTION, SOLUTION INTRAVENOUS CONTINUOUS
Status: DISCONTINUED | OUTPATIENT
Start: 2019-03-07 | End: 2019-03-08 | Stop reason: HOSPADM

## 2019-03-07 RX ADMIN — SODIUM CHLORIDE 1000 ML: 0.9 INJECTION, SOLUTION INTRAVENOUS at 07:03

## 2019-03-08 VITALS
HEART RATE: 72 BPM | HEIGHT: 71 IN | WEIGHT: 180.31 LBS | OXYGEN SATURATION: 100 % | RESPIRATION RATE: 18 BRPM | BODY MASS INDEX: 25.24 KG/M2 | DIASTOLIC BLOOD PRESSURE: 60 MMHG | TEMPERATURE: 97 F | SYSTOLIC BLOOD PRESSURE: 130 MMHG

## 2019-03-08 PROBLEM — D50.0 CHRONIC BLOOD LOSS ANEMIA: Status: ACTIVE | Noted: 2019-03-08

## 2019-03-08 PROBLEM — C16.2 MALIGNANT NEOPLASM OF BODY OF STOMACH: Status: ACTIVE | Noted: 2019-03-08

## 2019-03-08 LAB
BASOPHILS # BLD AUTO: 0.04 K/UL
BASOPHILS NFR BLD: 0.4 %
BLD PROD TYP BPU: NORMAL
BLOOD UNIT EXPIRATION DATE: NORMAL
BLOOD UNIT TYPE CODE: 6200
BLOOD UNIT TYPE: NORMAL
CODING SYSTEM: NORMAL
DIFFERENTIAL METHOD: ABNORMAL
DISPENSE STATUS: NORMAL
EOSINOPHIL # BLD AUTO: 0.1 K/UL
EOSINOPHIL NFR BLD: 0.5 %
ERYTHROCYTE [DISTWIDTH] IN BLOOD BY AUTOMATED COUNT: 14.5 %
HCT VFR BLD AUTO: 27.1 %
HGB BLD-MCNC: 8.7 G/DL
IMM GRANULOCYTES # BLD AUTO: 0.04 K/UL
IMM GRANULOCYTES NFR BLD AUTO: 0.4 %
LYMPHOCYTES # BLD AUTO: 2 K/UL
LYMPHOCYTES NFR BLD: 19.1 %
MCH RBC QN AUTO: 28.1 PG
MCHC RBC AUTO-ENTMCNC: 32.1 G/DL
MCV RBC AUTO: 87 FL
MONOCYTES # BLD AUTO: 0.7 K/UL
MONOCYTES NFR BLD: 7 %
NEUTROPHILS # BLD AUTO: 7.5 K/UL
NEUTROPHILS NFR BLD: 72.6 %
NRBC BLD-RTO: 0 /100 WBC
NUM UNITS TRANS PACKED RBC: NORMAL
PLATELET # BLD AUTO: 384 K/UL
PMV BLD AUTO: 10.9 FL
RBC # BLD AUTO: 3.1 M/UL
WBC # BLD AUTO: 10.26 K/UL

## 2019-03-08 PROCEDURE — 36430 TRANSFUSION BLD/BLD COMPNT: CPT

## 2019-03-08 PROCEDURE — 36415 COLL VENOUS BLD VENIPUNCTURE: CPT

## 2019-03-08 PROCEDURE — 85025 COMPLETE CBC W/AUTO DIFF WBC: CPT

## 2019-03-08 PROCEDURE — G0378 HOSPITAL OBSERVATION PER HR: HCPCS

## 2019-03-08 PROCEDURE — P9016 RBC LEUKOCYTES REDUCED: HCPCS

## 2019-03-08 NOTE — PLAN OF CARE
03/08/19 1105   HOWELL Message   Medicare Outpatient and Observation Notification regarding financial responsibility Given to patient/caregiver;Explained to patient/caregiver;Signed/date by patient/caregiver   Date HOWELL was signed 03/08/19   Time HOWELL was signed 1035   SW EXPLAINED MOON TO PT AND HER DAUGHTER SIGNED FORM FOR HER.

## 2019-03-08 NOTE — ASSESSMENT & PLAN NOTE
Gastric cancer terminal care chronic blood loss transfusion   03/08/2019.  Patient has terminal gastric cancer.  Continue chronic blood loss continue transfusion.  Poor prognosis is expected.  The options of care been discussed with the family in the past will discuss with him again today.  Discharge planning was discussed.

## 2019-03-08 NOTE — ASSESSMENT & PLAN NOTE
Gastric cancer chronic blood loss transfuse  03/08/2019.  Patient receiving blood transfusion.  Expect patient to have chronic blood loss secondary to gastric cancer and continue transfusion as long as family and the patient willing.  Will discuss other options with the family today as possible.  Will discuss discharge planning.  03/08/2019.  Chronic blood loss anemia secondary to gastric cancer patient has continued bleeding passage blood in her stool.  Requires blood transfusions periodically.  The patient has a known diagnosis of gastric cancer and had known terminal illness.

## 2019-03-08 NOTE — ED PROVIDER NOTES
Encounter Date: 3/7/2019       History     Chief Complaint   Patient presents with    Weakness     family reports need for transfusion when weak. hx of CA-possible rectal bleeding and near syncope     73-year-old female presents per EMS after near syncopal episode she has been weak throughout the day but upon standing nearly fainted she has a known history of gastric cancer with GI bleeding she has had regular transfusions over the past few weeks having dropped her hemoglobin under 5 within the past few weeks, she is followed by primary care physician Dr. Rosario she currently is under no active treatment for GI cancer she denies any chest pain has had no alvino hematemesis but has had melanotic stool today and yesterday          Review of patient's allergies indicates:   Allergen Reactions    Codeine Other (See Comments)     Pt shakes and hallucinates    Pcn [penicillins] Anxiety and Other (See Comments)     Past Medical History:   Diagnosis Date    Cancer     kidney    Dementia     Encounter for blood transfusion     2018    High cholesterol 2018    Hypertension     No Medication     Renal disorder     Wears dentures     Uppers     Past Surgical History:   Procedure Laterality Date     SECTION      COLONOSCOPY N/A 2018    [facility]O    CYSTOSCOPY WITH RETROGRADE PYELOGRAM Left 1/15/2016    [facility] OR    EGD (ESOPHAGOGASTRODUODENOSCOPY) N/A 2018    Performed by [facility] at [facility] ENDO    EYE SURGERY      Rt eye Catarac    HYSTERECTOMY      IMAGING, GI TRACT, INTRALUMINAL, VIA CAPSULE N/A 2018    [facility] Rocco Ross [facility] at [facility] ENDO    KIDNEY SURGERY Left 2018    Left kidney removed due to cancer    lasix surgery      NEPHRECTOMY-RADICAL   Left 2016    [facility]    REPAIR-HERNIA-VENTRAL N/A 2016     [facility] OR    RESECTION-BOWEL  2016    [facility] at [facility] OR    URETEROSCOPY  1/15/2016    [facility] at [facility] OR     No family history on file.  Social History     Tobacco Use    Smoking status: Former Smoker     Packs/day: 1.00     Years: 50.00     Pack years: 50.00     Last attempt to quit: 10/6/2018     Years since quittin.4    Smokeless tobacco: Never Used    Tobacco comment: pt has stopped; encouraged to remain stopped   Substance Use Topics    Alcohol use: No     Frequency: Never     Comment: last alcohol intake 2 weeks ago    Drug use: No     Review of Systems   Constitutional: Positive for activity change, appetite change and fatigue. Negative for fever.   HENT: Negative for sore throat.    Respiratory: Positive for shortness of breath.    Cardiovascular: Negative for chest pain.   Gastrointestinal: Negative for nausea.   Genitourinary: Negative for dysuria.   Musculoskeletal: Negative for back pain.   Skin: Negative for rash.   Neurological: Positive for weakness and light-headedness.   Hematological: Does not bruise/bleed easily.       Physical Exam     Initial Vitals [19]   BP Pulse Resp Temp SpO2   (!) 87/50 67 16 97.5 °F (36.4 °C) 100 %      MAP       --         Physical Exam    Nursing note and vitals reviewed.  Constitutional: She is not diaphoretic. No distress.   Very thin nearly emaciated   HENT:   Head: Normocephalic and atraumatic.   Right Ear: External ear normal.   Left Ear: External ear normal.   Poor skin turgor   Eyes: Pupils are equal, round, and reactive to light. Right eye exhibits no discharge. Left eye exhibits no discharge.   Neck: No tracheal deviation present. No JVD present.   Cardiovascular: Exam reveals no friction rub.    No murmur heard.  Pulmonary/Chest: No stridor. No respiratory distress. She has no wheezes. She has no rales.   Abdominal: Bowel sounds are normal. She  exhibits no distension. There is no tenderness.   Rectal exam she shows gross melena stool heme-positive   Musculoskeletal: Normal range of motion.   2.5 sec capillary fill   Neurological: She is alert.   Skin: Skin is warm.   Psychiatric: She has a normal mood and affect.         ED Course   Procedures  Labs Reviewed   CBC W/ AUTO DIFFERENTIAL - Abnormal; Notable for the following components:       Result Value    WBC 13.56 (*)     RBC 1.83 (*)     Hemoglobin 4.9 (*)     Hematocrit 16.1 (*)     MCH 26.8 (*)     MCHC 30.4 (*)     RDW 14.8 (*)     Platelets 406 (*)     MPV 9.1 (*)     Immature Granulocytes 1.2 (*)     Gran # (ANC) 10.7 (*)     Immature Grans (Abs) 0.16 (*)     Gran% 79.0 (*)     Lymph% 13.8 (*)     All other components within normal limits    Narrative:      HGB/HCT  critical result(s) called and verbal readback obtained from   DAVONTE AYSHA @ 2042 , 03/07/2019 20:42   COMPREHENSIVE METABOLIC PANEL - Abnormal; Notable for the following components:    Glucose 154 (*)     Calcium 7.9 (*)     Total Protein 5.5 (*)     Albumin 2.4 (*)     Alkaline Phosphatase 46 (*)     ALT 5 (*)     eGFR if  51.8 (*)     eGFR if non  44.9 (*)     All other components within normal limits   OCCULT BLOOD X 1, STOOL - Abnormal; Notable for the following components:    Occult Blood Positive (*)     All other components within normal limits   TYPE & SCREEN   PREPARE RBC SOFT          Imaging Results    None          Medical Decision Making:   Other:   I have discussed this case with another health care provider.       <> Summary of the Discussion: Case discussed with Dr. Rosario, patient will be admitted to the ICU for transfusion of 4 units, the possibility of hospice placement was discussed with the patient her family and Dr. Rosario                      Clinical Impression:       ICD-10-CM ICD-9-CM   1. Gastrointestinal hemorrhage, unspecified gastrointestinal hemorrhage type K92.2  578.9   2. Malignant neoplasm of stomach, unspecified location C16.9 151.9   3. Anemia, unspecified type D64.9 285.9                                Stewart Jhaveri MD  03/08/19 5276

## 2019-03-08 NOTE — PLAN OF CARE
Problem: Adult Inpatient Plan of Care  Goal: Plan of Care Review  Pt ti an uneventful night. Pt admitted to receive 4 units of PRBS due to low H/H levels. Not falls or injuries during the shift. UOP adequate pt is able to get up to BSCC. Pt is still receiving blood at this time.

## 2019-03-08 NOTE — PLAN OF CARE
03/08/19 1112   Readmission Questionnaire   At the time of your discharge, did someone talk to you about what your health problems were? Yes   At the time of discharge, did someone talk to you about what to watch out for regarding worsening of your health problem? Yes   At the time of discharge, did someone talk to you about what to do if you experienced worsening of your health problem? Yes   At the time of discharge, did someone talk to you about which medication to take when you left the hospital and which ones to stop taking? Yes   At the time of discharge, did someone talk to you about when and where to follow up with a doctor after you left the hospital? Yes   What do you believe caused you to be sick enough to be re-admitted? LOSS OF BLOOD   How often do you need to have someone help you when you read instructions, pamphlets, or other written material from your doctor or pharmacy? Often   Do you have problems taking your medications as prescribed? No   Do you have any problems affording any of  your prescribed medications? No   Do you have problems obtaining/receiving your medications? No   Does the patient have transportation to healthcare appointments? Yes   Lives With grandchild(samy)   Does the patient have family/friends to help with healtcare needs after discharge? yes   Does your caregiver provide all the help you need? Yes   Are you currently feeling confused? No   Are you currently having problems thinking? No   Are you currently having memory problems? Yes   Have you felt down, depressed, or hopeless? 0   Have you felt little interest or pleasure in doing things? 0   In the last 7 days, my sleep quality was: very good   PT ADMITTED TO BE TRANSFUSED. SHE HAS A GRANDDAUGHTER AND  WHO LIVE WITH HER AND ASSIST WITH HER CARE. SHE ALSO HAS 2 DAUGHTERS WHO HELP ON A REGULAR BASIS. SHE HAS MEDICAID WAIVER 7 HOURS A DAY Tuesday THROUGH Friday. PT WILL BE DISCHARGED HOME TODAY. NO DISCHARGE NEEDS  IDENTIFIED AT THIS TIME.

## 2019-03-08 NOTE — HOSPITAL COURSE
Type match transfuse 4 units PRBC  03/08/2019.  Blood transfusions are in progress.  Will discuss with the family discharge after blood transfusions.  03/08/2019.  After the patient's received her 4th unit of blood should be discharged home as long someone's at home at this time.  Patient is very alert feels very well day and desires to go home.

## 2019-03-08 NOTE — ASSESSMENT & PLAN NOTE
Chronic blood loss due to gastric cancer requiring chronic blood transfusion  03/08/2019 chronic blood loss anemia due to gastric cancer is expected.  Continue blood transfusions as needed.  Patient is where poor prognosis family where poor prognosis.  Options have been discussed with family in the past.  Continue current level of care less clinical course changes.  Discharge planning will be discussed.

## 2019-03-08 NOTE — SUBJECTIVE & OBJECTIVE
Interval History:  No real change in and room except the patient is receiving blood.  Continue current level of care unless clinical changes.  Will discuss with family discharge options.    Review of Systems   Constitutional: Positive for activity change and fatigue. Negative for appetite change, chills, diaphoresis, fever and unexpected weight change.   HENT: Negative for congestion, drooling, hearing loss and trouble swallowing.    Eyes: Negative for pain and visual disturbance.   Respiratory: Negative.  Negative for apnea, cough, choking, chest tightness, shortness of breath and wheezing.    Cardiovascular: Negative for chest pain, palpitations and leg swelling.   Gastrointestinal: Negative for abdominal distention, abdominal pain, blood in stool, constipation, diarrhea, nausea and vomiting.        Stomach cancer   Endocrine: Negative for polydipsia, polyphagia and polyuria.   Genitourinary: Negative for difficulty urinating, dysuria and flank pain.   Musculoskeletal: Negative for arthralgias, back pain, gait problem, joint swelling, neck pain and neck stiffness.   Skin: Negative for color change, rash and wound.   Allergic/Immunologic: Negative for environmental allergies, food allergies and immunocompromised state.   Neurological: Positive for dizziness, syncope, weakness and light-headedness. Negative for numbness and headaches.   Hematological: Negative for adenopathy.   Psychiatric/Behavioral: Negative for agitation, confusion and sleep disturbance. The patient is not nervous/anxious.      Objective:     Vital Signs (Most Recent):  Temp: 98.2 °F (36.8 °C) (03/08/19 0515)  Pulse: 70 (03/08/19 0515)  Resp: 16 (03/08/19 0515)  BP: (!) 107/57 (03/08/19 0515)  SpO2: 100 % (03/08/19 0515) Vital Signs (24h Range):  Temp:  [97.5 °F (36.4 °C)-98.7 °F (37.1 °C)] 98.2 °F (36.8 °C)  Pulse:  [] 70  Resp:  [12-22] 16  SpO2:  [100 %] 100 %  BP: ()/(45-81) 107/57     Weight: 81.8 kg (180 lb 5.4 oz)  Body mass  index is 25.15 kg/m².    Intake/Output Summary (Last 24 hours) at 3/8/2019 0606  Last data filed at 3/8/2019 0427  Gross per 24 hour   Intake 1740 ml   Output 900 ml   Net 840 ml      Physical Exam   Constitutional: She is oriented to person, place, and time. She appears well-developed and well-nourished.   HENT:   Head: Normocephalic and atraumatic.   Right Ear: External ear normal.   Left Ear: External ear normal.   Nose: Nose normal.   Eyes: Conjunctivae, EOM and lids are normal. Pupils are equal, round, and reactive to light.   Neck: Trachea normal, normal range of motion and full passive range of motion without pain. Neck supple.   Cardiovascular: Regular rhythm, S1 normal, S2 normal, normal heart sounds, intact distal pulses and normal pulses.   tachycardia   Pulmonary/Chest: Effort normal and breath sounds normal.   Abdominal: Soft. Normal aorta and bowel sounds are normal. There is tenderness.   Musculoskeletal: Normal range of motion.   Neurological: She is alert and oriented to person, place, and time. She has normal reflexes.   Skin: Skin is warm, dry and intact.   Psychiatric: She has a normal mood and affect. Her speech is normal and behavior is normal. Judgment and thought content normal. Cognition and memory are normal.   Nursing note and vitals reviewed.      Significant Labs: All pertinent labs within the past 24 hours have been reviewed.    Significant Imaging: I have reviewed and interpreted all pertinent imaging results/findings within the past 24 hours.

## 2019-03-08 NOTE — ASSESSMENT & PLAN NOTE
Gastric cancer terminal care chronic blood loss transfusion   03/08/2019.  Patient has terminal gastric cancer.  Continue chronic blood loss continue transfusion.  Poor prognosis is expected.  The options of care been discussed with the family in the past will discuss with him again today.  Discharge planning was discussed.    03/08/2019.  Patient has a known diagnosis biopsy-proven gastric cancer.  She has chronic bleeding and requires chronic blood transfusions..  The patient is very alert today the patient desires to go home after blood today.  She will go home at the 1st unit she felt better but we have given 4 units of blood at this time consider H&H was 4.9 and 16.  Lab will be checked prior to patient being discharged home.  Follow patient up in office

## 2019-03-08 NOTE — H&P
[facility]  Hospital Medicine  History & Physical    Patient Name: Oriana Tobar  MRN: [id number]  Admission Date: 3/7/2019  Attending Physician: Buck Hudson MD   Primary Care Provider: Carine Baltazar NP         Patient information was obtained from patient, relative(s) and ER records.     Subjective:     Principal Problem:GI bleed    Chief Complaint:   Chief Complaint   Patient presents with    Weakness     family reports need for transfusion when weak. hx of CA-possible rectal bleeding and near syncope        HPI: Gastric cancer syncope anemia chronic blood loss requiring chronic transfusion    Past Medical History:   Diagnosis Date    Cancer     kidney    Dementia     Encounter for blood transfusion     2018    High cholesterol 2018    Hypertension     No Medication     Renal disorder     Wears dentures     Uppers       Past Surgical History:   Procedure Laterality Date     SECTION      COLONOSCOPY N/A 2018    Performed by [facility] at [facility]    CYSTOSCOPY WITH RETROGRADE PYELOGRAM Left 1/15/2016    Performed by [facility]acon [facility] at [facility] OR    EGD (ESOPHAGOGASTRODUODENOSCOPY) N/A 2018    Performed by [facility] at [facility] END[facility]    EYE SURGERY      Rt eye Catarac    HYSTERECTOMY      IMAGING, GI TRACT, INTRALUMINAL, VIA CAPSULE N/A 2018    Performed by Rocco Ross MD at [facility] ENDO    KIDNEY SURGERY Left 2018    Left kidney removed due to cancer    lasix surgery      NEPHRECTOMY-RADICAL   Left 2016    [facility]formed [facility] at [facility] OR    REPAIR-HERNIA-VENTRAL N/A 2016    Performed by Marco A Randhawa MD at [facility] OR    RESECTION-BOWEL  2016    Performed by Marco [facility]hawa [facility] at [facility] OR    URETEROSCOPY  1/15/2016    Performed by [facility]ell [facility]acon [facility] at [facility] OR       Review of patient's allergies indicates:   Allergen Reactions     Codeine Other (See Comments)     Pt shakes and hallucinates    Pcn [penicillins] Anxiety and Other (See Comments)       No current facility-administered medications on file prior to encounter.      Current Outpatient Medications on File Prior to Encounter   Medication Sig    ferrous sulfate 325 (65 FE) MG EC tablet Take 325 mg by mouth once daily.    losartan (COZAAR) 50 MG tablet Take 50 mg by mouth once daily.    pantoprazole (PROTONIX) 40 MG tablet Take 1 tablet (40 mg total) by mouth once daily.    sucralfate (CARAFATE) 100 mg/mL suspension Take 10 mLs (1 g total) by mouth 4 (four) times daily before meals and nightly.     Family History     None        Tobacco Use    Smoking status: Former Smoker     Packs/day: 1.00     Years: 50.00     Pack years: 50.00     Last attempt to quit: 10/6/2018     Years since quittin.4    Smokeless tobacco: Never Used    Tobacco comment: pt has stopped; encouraged to remain stopped   Substance and Sexual Activity    Alcohol use: No     Frequency: Never     Comment: last alcohol intake 2 weeks ago    Drug use: No    Sexual activity: No     Review of Systems   Constitutional: Positive for activity change and fatigue.   Gastrointestinal:        Stomach cancer   Neurological: Positive for dizziness, syncope, weakness and light-headedness.     Objective:     Vital Signs (Most Recent):  Temp: 98.7 °F (37.1 °C) (19 0300)  Pulse: 63 (19 0300)  Resp: 18 (19 0300)  BP: (!) 107/58 (19 0300)  SpO2: 100 % (19 0300) Vital Signs (24h Range):  Temp:  [97.5 °F (36.4 °C)-98.7 °F (37.1 °C)] 98.7 °F (37.1 °C)  Pulse:  [] 63  Resp:  [12-22] 18  SpO2:  [100 %] 100 %  BP: ()/(45-81) 107/58     Weight: 81.8 kg (180 lb 5.4 oz)  Body mass index is 25.15 kg/m².    Physical Exam   Constitutional: She is oriented to person, place, and time. She appears well-developed and well-nourished.   HENT:   Head: Normocephalic and atraumatic.   Right Ear: External  ear normal.   Left Ear: External ear normal.   Nose: Nose normal.   Eyes: Conjunctivae, EOM and lids are normal. Pupils are equal, round, and reactive to light.   Neck: Trachea normal, normal range of motion and full passive range of motion without pain. Neck supple.   Cardiovascular: Regular rhythm, S1 normal, S2 normal, normal heart sounds, intact distal pulses and normal pulses.   tachycardia   Pulmonary/Chest: Effort normal and breath sounds normal.   Abdominal: Soft. Normal aorta and bowel sounds are normal. There is tenderness.   Musculoskeletal: Normal range of motion.   Neurological: She is alert and oriented to person, place, and time. She has normal reflexes.   Skin: Skin is warm, dry and intact.   Psychiatric: She has a normal mood and affect. Her speech is normal and behavior is normal. Judgment and thought content normal. Cognition and memory are normal.   Nursing note and vitals reviewed.        CRANIAL NERVES     CN III, IV, VI   Pupils are equal, round, and reactive to light.  Extraocular motions are normal.        Significant Labs:   BMP:   Recent Labs   Lab 03/07/19 2031   *      K 3.5      CO2 24   BUN 22   CREATININE 1.2   CALCIUM 7.9*     CBC:   Recent Labs   Lab 03/07/19 2031   WBC 13.56*   HGB 4.9*   HCT 16.1*   *     CMP:   Recent Labs   Lab 03/07/19 2031      K 3.5      CO2 24   *   BUN 22   CREATININE 1.2   CALCIUM 7.9*   PROT 5.5*   ALBUMIN 2.4*   BILITOT 0.4   ALKPHOS 46*   AST 11   ALT 5*   ANIONGAP 11   EGFRNONAA 44.9*     All pertinent labs within the past 24 hours have been reviewed.    Significant Imaging: I have reviewed and interpreted all pertinent imaging results/findings within the past 24 hours.    Assessment/Plan:     * GI bleed    Gastric cancer chronic blood loss transfuse       Chronic blood loss anemia    Chronic blood loss due to gastric cancer requiring chronic blood transfusion       Malignant neoplasm of body of stomach     Gastric cancer terminal care chronic blood loss transfusion          VTE Risk Mitigation (From admission, onward)        Ordered     Place RORY hose  Until discontinued      03/07/19 2572             Buck Hudson MD  Department of Hospital Medicine   [facility] - ICU

## 2019-03-08 NOTE — DISCHARGE SUMMARY
[facility]  Hospital Medicine  Discharge Summary      Patient Name: Oriana Tobar  MRN: [id number]  Admission Date: 3/7/2019  Hospital Length of Stay: 0 days  Discharge Date and Time:  03/08/2019 6:37 AM  Attending Physician: Buck Hudson MD   Discharging Provider: Buck Hudson MD  Primary Care Provider: Carine Baltazar NP      HPI:   Gastric cancer syncope anemia chronic blood loss requiring chronic transfusion    * No surgery found *      Hospital Course:   Type match transfuse 4 units PRBC  03/08/2019.  Blood transfusions are in progress.  Will discuss with the family discharge after blood transfusions.  03/08/2019.  After the patient's received her 4th unit of blood should be discharged home as long someone's at home at this time.  Patient is very alert feels very well day and desires to go home.     Consults:     * GI bleed    Gastric cancer chronic blood loss transfuse  03/08/2019.  Patient receiving blood transfusion.  Expect patient to have chronic blood loss secondary to gastric cancer and continue transfusion as long as family and the patient willing.  Will discuss other options with the family today as possible.  Will discuss discharge planning.  03/08/2019.  Chronic blood loss anemia secondary to gastric cancer patient has continued bleeding passage blood in her stool.  Requires blood transfusions periodically.  The patient has a known diagnosis of gastric cancer and had known terminal illness.     Chronic blood loss anemia    Chronic blood loss due to gastric cancer requiring chronic blood transfusion  03/08/2019 chronic blood loss anemia due to gastric cancer is expected.  Continue blood transfusions as needed.  Patient is where poor prognosis family where poor prognosis.  Options have been discussed with family in the past.  Continue current level of care less clinical course changes.  Discharge planning will be discussed.  03/08/2019.  Will  discharge the patient home she desires to go home at this time.  She will go home after 4th unit of blood today.     Malignant neoplasm of body of stomach    Gastric cancer terminal care chronic blood loss transfusion   03/08/2019.  Patient has terminal gastric cancer.  Continue chronic blood loss continue transfusion.  Poor prognosis is expected.  The options of care been discussed with the family in the past will discuss with him again today.  Discharge planning was discussed.    03/08/2019.  Patient has a known diagnosis biopsy-proven gastric cancer.  She has chronic bleeding and requires chronic blood transfusions..  The patient is very alert today the patient desires to go home after blood today.  She will go home at the 1st unit she felt better but we have given 4 units of blood at this time consider H&H was 4.9 and 16.  Lab will be checked prior to patient being discharged home.  Follow patient up in office       Final Active Diagnoses:    Diagnosis Date Noted POA    PRINCIPAL PROBLEM:  GI bleed [K92.2] 11/06/2018 Yes    Malignant neoplasm of body of stomach [C16.2] 03/08/2019 Yes    Chronic blood loss anemia [D50.0] 03/08/2019 Unknown      Problems Resolved During this Admission:       Discharged Condition: fair    Disposition:     Follow Up:  Follow-up Information     Buck Hudson MD.    Specialties:  Hospitalist, Family Medicine, Internal Medicine, Cardiology  Contact information:  [address] [place] [id number] [phone]                 Patient Instructions:   No discharge procedures on file.    Significant Diagnostic Studies: Labs:   BMP:   Recent Labs   Lab 03/07/19 2031   *      K 3.5      CO2 24   BUN 22   CREATININE 1.2   CALCIUM 7.9*   , CMP   Recent Labs   Lab 03/07/19 2031      K 3.5      CO2 24   *   BUN 22   CREATININE 1.2   CALCIUM 7.9*   PROT 5.5*   ALBUMIN 2.4*   BILITOT 0.4   ALKPHOS 46*   AST 11   ALT 5*   ANIONGAP 11   ESTGFRAFRICA  51.8*   EGFRNONAA 44.9*   , CBC   Recent Labs   Lab 03/07/19 2031   WBC 13.56*   HGB 4.9*   HCT 16.1*   *    and All labs within the past 24 hours have been reviewed    Pending Diagnostic Studies:     Procedure Component Value Units Date/Time    CBC auto differential [[id number]]     Order Status:  Sent Lab Status:  No result     Specimen:  Blood          Medications:  Reconciled Home Medications:      Medication List      CONTINUE taking these medications    ferrous sulfate 325 (65 FE) MG EC tablet  Take 325 mg by mouth once daily.     losartan 50 MG tablet  Commonly known as:  COZAAR  Take 50 mg by mouth once daily.     pantoprazole 40 MG tablet  Commonly known as:  PROTONIX  Take 1 tablet (40 mg total) by mouth once daily.     sucralfate 100 mg/mL suspension  Commonly known as:  CARAFATE  Take 10 mLs (1 g total) by mouth 4 (four) times daily before meals and nightly.            Indwelling Lines/Drains at time of discharge:   Lines/Drains/Airways          None          Time spent on the discharge of patient: 35 minutes  Patient was seen and examined on the date of discharge and determined to be suitable for discharge.         Buck Hudson MD  Department of Hospital Medicine  [facility] - ICU

## 2019-03-08 NOTE — PROGRESS NOTES
[facility] - ICU  Hospital Medicine  Progress Note    Patient Name: Oriana Tobar  MRN: [id number]  Patient Class: OP- Observation   Admission Date: 3/7/2019  Length of Stay: 0 days  Attending Physician: Buck Hudson MD  Primary Care Provider: Carine Baltazar NP        Subjective:     Principal Problem:GI bleed    HPI:  Gastric cancer syncope anemia chronic blood loss requiring chronic transfusion    Hospital Course:  Type match transfuse 4 units PRBC  03/08/2019.  Blood transfusions are in progress.  Will discuss with the family discharge after blood transfusions.    Interval History:  No real change in and room except the patient is receiving blood.  Continue current level of care unless clinical changes.  Will discuss with family discharge options.    Review of Systems   Constitutional: Positive for activity change and fatigue. Negative for appetite change, chills, diaphoresis, fever and unexpected weight change.   HENT: Negative for congestion, drooling, hearing loss and trouble swallowing.    Eyes: Negative for pain and visual disturbance.   Respiratory: Negative.  Negative for apnea, cough, choking, chest tightness, shortness of breath and wheezing.    Cardiovascular: Negative for chest pain, palpitations and leg swelling.   Gastrointestinal: Negative for abdominal distention, abdominal pain, blood in stool, constipation, diarrhea, nausea and vomiting.        Stomach cancer   Endocrine: Negative for polydipsia, polyphagia and polyuria.   Genitourinary: Negative for difficulty urinating, dysuria and flank pain.   Musculoskeletal: Negative for arthralgias, back pain, gait problem, joint swelling, neck pain and neck stiffness.   Skin: Negative for color change, rash and wound.   Allergic/Immunologic: Negative for environmental allergies, food allergies and immunocompromised state.   Neurological: Positive for dizziness, syncope, weakness and light-headedness. Negative for  numbness and headaches.   Hematological: Negative for adenopathy.   Psychiatric/Behavioral: Negative for agitation, confusion and sleep disturbance. The patient is not nervous/anxious.      Objective:     Vital Signs (Most Recent):  Temp: 98.2 °F (36.8 °C) (03/08/19 0515)  Pulse: 70 (03/08/19 0515)  Resp: 16 (03/08/19 0515)  BP: (!) 107/57 (03/08/19 0515)  SpO2: 100 % (03/08/19 0515) Vital Signs (24h Range):  Temp:  [97.5 °F (36.4 °C)-98.7 °F (37.1 °C)] 98.2 °F (36.8 °C)  Pulse:  [] 70  Resp:  [12-22] 16  SpO2:  [100 %] 100 %  BP: ()/(45-81) 107/57     Weight: 81.8 kg (180 lb 5.4 oz)  Body mass index is 25.15 kg/m².    Intake/Output Summary (Last 24 hours) at 3/8/2019 0606  Last data filed at 3/8/2019 0427  Gross per 24 hour   Intake 1740 ml   Output 900 ml   Net 840 ml      Physical Exam   Constitutional: She is oriented to person, place, and time. She appears well-developed and well-nourished.   HENT:   Head: Normocephalic and atraumatic.   Right Ear: External ear normal.   Left Ear: External ear normal.   Nose: Nose normal.   Eyes: Conjunctivae, EOM and lids are normal. Pupils are equal, round, and reactive to light.   Neck: Trachea normal, normal range of motion and full passive range of motion without pain. Neck supple.   Cardiovascular: Regular rhythm, S1 normal, S2 normal, normal heart sounds, intact distal pulses and normal pulses.   tachycardia   Pulmonary/Chest: Effort normal and breath sounds normal.   Abdominal: Soft. Normal aorta and bowel sounds are normal. There is tenderness.   Musculoskeletal: Normal range of motion.   Neurological: She is alert and oriented to person, place, and time. She has normal reflexes.   Skin: Skin is warm, dry and intact.   Psychiatric: She has a normal mood and affect. Her speech is normal and behavior is normal. Judgment and thought content normal. Cognition and memory are normal.   Nursing note and vitals reviewed.      Significant Labs: All pertinent labs  within the past 24 hours have been reviewed.    Significant Imaging: I have reviewed and interpreted all pertinent imaging results/findings within the past 24 hours.    Assessment/Plan:      * GI bleed    Gastric cancer chronic blood loss transfuse  03/08/2019.  Patient receiving blood transfusion.  Expect patient to have chronic blood loss secondary to gastric cancer and continue transfusion as long as family and the patient willing.  Will discuss other options with the family today as possible.  Will discuss discharge planning.       Chronic blood loss anemia    Chronic blood loss due to gastric cancer requiring chronic blood transfusion  03/08/2019 chronic blood loss anemia due to gastric cancer is expected.  Continue blood transfusions as needed.  Patient is where poor prognosis family where poor prognosis.  Options have been discussed with family in the past.  Continue current level of care less clinical course changes.  Discharge planning will be discussed.     Malignant neoplasm of body of stomach    Gastric cancer terminal care chronic blood loss transfusion   03/08/2019.  Patient has terminal gastric cancer.  Continue chronic blood loss continue transfusion.  Poor prognosis is expected.  The options of care been discussed with the family in the past will discuss with him again today.  Discharge planning was discussed.         VTE Risk Mitigation (From admission, onward)        Ordered     Place RORY hose  Until discontinued      03/07/19 9428              Buck Hudson MD  Department of Hospital Medicine   [facility] - ICU

## 2019-03-08 NOTE — ED NOTES
Multiple IV sticks attempted using ultrasound guidance. EJ attempted and was successful on second stick by Dr. Jhaveri. Pt tolerated well. Daughter at bedside.

## 2019-03-08 NOTE — SUBJECTIVE & OBJECTIVE
Past Medical History:   Diagnosis Date    Cancer     kidney    Dementia     Encounter for blood transfusion     2018    High cholesterol 2018    Hypertension     No Medication     Renal disorder     Wears dentures     Uppers       Past Surgical History:   Procedure Laterality Date     SECTION      COLONOSCOPY N/A 2018    [facility]formed by [facility]    CYSTOSCOPY WITH RETROGRADE PYELOGRAM Left 1/15/2016    [facility] by [facility]    EGD (ESOPHAGOGASTRODUODENOSCOPY) N/A 2018    [facility]    EYE SURGERY      Rt eye Catarac    HYSTERECTOMY      IMAGING, GI TRACT, INTRALUMINAL, VIA CAPSULE N/A 2018    [facility]formed by Rocco Ross [facility] at [facility] ENDO    KIDNEY SURGERY Left 2018    Left kidney removed due to cancer    lasix surgery      NEPHRECTOMY-RADICAL   Left 2016    [facility]    REPAIR-HERNIA-VENTRAL N/A 2016    [facility] by [facility]awa [facility] at [facility]    RESECTION-BOWEL  2016    [facility] at [facility]    URETEROSCOPY  1/15/2016    [facility] at [facility]       Review of patient's allergies indicates:   Allergen Reactions    Codeine Other (See Comments)     Pt shakes and hallucinates    Pcn [penicillins] Anxiety and Other (See Comments)       No current facility-administered medications on file prior to encounter.      Current Outpatient Medications on File Prior to Encounter   Medication Sig    ferrous sulfate 325 (65 FE) MG EC tablet Take 325 mg by mouth once daily.    losartan (COZAAR) 50 MG tablet Take 50 mg by mouth once daily.    pantoprazole (PROTONIX) 40 MG tablet Take 1 tablet (40 mg total) by mouth once daily.    sucralfate (CARAFATE) 100 mg/mL suspension Take 10 mLs (1 g total) by mouth 4 (four) times daily before meals and nightly.     Family History      None        Tobacco Use    Smoking status: Former Smoker     Packs/day: 1.00     Years: 50.00     Pack years: 50.00     Last attempt to quit: 10/6/2018     Years since quittin.4    Smokeless tobacco: Never Used    Tobacco comment: pt has stopped; encouraged to remain stopped   Substance and Sexual Activity    Alcohol use: No     Frequency: Never     Comment: last alcohol intake 2 weeks ago    Drug use: No    Sexual activity: No     Review of Systems   Constitutional: Positive for activity change and fatigue.   Gastrointestinal:        Stomach cancer   Neurological: Positive for dizziness, syncope, weakness and light-headedness.     Objective:     Vital Signs (Most Recent):  Temp: 98.7 °F (37.1 °C) (19 0300)  Pulse: 63 (19 030)  Resp: 18 (19)  BP: (!) 107/58 (19)  SpO2: 100 % (19) Vital Signs (24h Range):  Temp:  [97.5 °F (36.4 °C)-98.7 °F (37.1 °C)] 98.7 °F (37.1 °C)  Pulse:  [] 63  Resp:  [12-22] 18  SpO2:  [100 %] 100 %  BP: ()/(45-81) 107/58     Weight: 81.8 kg (180 lb 5.4 oz)  Body mass index is 25.15 kg/m².    Physical Exam   Constitutional: She is oriented to person, place, and time. She appears well-developed and well-nourished.   HENT:   Head: Normocephalic and atraumatic.   Right Ear: External ear normal.   Left Ear: External ear normal.   Nose: Nose normal.   Eyes: Conjunctivae, EOM and lids are normal. Pupils are equal, round, and reactive to light.   Neck: Trachea normal, normal range of motion and full passive range of motion without pain. Neck supple.   Cardiovascular: Regular rhythm, S1 normal, S2 normal, normal heart sounds, intact distal pulses and normal pulses.   tachycardia   Pulmonary/Chest: Effort normal and breath sounds normal.   Abdominal: Soft. Normal aorta and bowel sounds are normal. There is tenderness.   Musculoskeletal: Normal range of motion.   Neurological: She is alert and oriented to person, place, and time. She  has normal reflexes.   Skin: Skin is warm, dry and intact.   Psychiatric: She has a normal mood and affect. Her speech is normal and behavior is normal. Judgment and thought content normal. Cognition and memory are normal.   Nursing note and vitals reviewed.        CRANIAL NERVES     CN III, IV, VI   Pupils are equal, round, and reactive to light.  Extraocular motions are normal.        Significant Labs:   BMP:   Recent Labs   Lab 03/07/19 2031   *      K 3.5      CO2 24   BUN 22   CREATININE 1.2   CALCIUM 7.9*     CBC:   Recent Labs   Lab 03/07/19 2031   WBC 13.56*   HGB 4.9*   HCT 16.1*   *     CMP:   Recent Labs   Lab 03/07/19 2031      K 3.5      CO2 24   *   BUN 22   CREATININE 1.2   CALCIUM 7.9*   PROT 5.5*   ALBUMIN 2.4*   BILITOT 0.4   ALKPHOS 46*   AST 11   ALT 5*   ANIONGAP 11   EGFRNONAA 44.9*     All pertinent labs within the past 24 hours have been reviewed.    Significant Imaging: I have reviewed and interpreted all pertinent imaging results/findings within the past 24 hours.

## 2019-03-08 NOTE — ASSESSMENT & PLAN NOTE
Gastric cancer chronic blood loss transfuse  03/08/2019.  Patient receiving blood transfusion.  Expect patient to have chronic blood loss secondary to gastric cancer and continue transfusion as long as family and the patient willing.  Will discuss other options with the family today as possible.  Will discuss discharge planning.

## 2019-03-08 NOTE — ASSESSMENT & PLAN NOTE
Chronic blood loss due to gastric cancer requiring chronic blood transfusion  03/08/2019 chronic blood loss anemia due to gastric cancer is expected.  Continue blood transfusions as needed.  Patient is where poor prognosis family where poor prognosis.  Options have been discussed with family in the past.  Continue current level of care less clinical course changes.  Discharge planning will be discussed.  03/08/2019.  Will discharge the patient home she desires to go home at this time.  She will go home after 4th unit of blood today.

## 2019-03-11 NOTE — PLAN OF CARE
03/10/19 2031   Final Note   Assessment Type Final Discharge Note   Anticipated Discharge Disposition Home   What phone number can be called within the next 1-3 days to see how you are doing after discharge? [phone]   Hospital Follow Up  Appt(s) scheduled? Yes   Discharge plans and expectations educations in teach back method with documentation complete? Yes    provided patient & family with follow up appointment. States they always go for 8:00am. Message left for [facility] to see if time could be changed. Family states they will check with them too. No other needs at this time.

## 2019-03-12 ENCOUNTER — HOSPITAL ENCOUNTER (EMERGENCY)
Facility: HOSPITAL | Age: 74
Discharge: HOME | End: 2019-03-12
Attending: FAMILY MEDICINE
Payer: MEDICARE

## 2019-03-12 VITALS
HEIGHT: 71 IN | WEIGHT: 140 LBS | BODY MASS INDEX: 19.6 KG/M2 | OXYGEN SATURATION: 98 % | SYSTOLIC BLOOD PRESSURE: 120 MMHG | HEART RATE: 84 BPM | TEMPERATURE: 98 F | DIASTOLIC BLOOD PRESSURE: 63 MMHG | RESPIRATION RATE: 20 BRPM

## 2019-03-12 DIAGNOSIS — D50.0 IRON DEFICIENCY ANEMIA DUE TO CHRONIC BLOOD LOSS: Primary | ICD-10-CM

## 2019-03-12 LAB
ANION GAP SERPL CALC-SCNC: 10 MMOL/L
BASOPHILS # BLD AUTO: 0.06 K/UL
BASOPHILS NFR BLD: 0.5 %
BUN SERPL-MCNC: 18 MG/DL
CALCIUM SERPL-MCNC: 8.4 MG/DL
CHLORIDE SERPL-SCNC: 102 MMOL/L
CO2 SERPL-SCNC: 23 MMOL/L
CREAT SERPL-MCNC: 1.1 MG/DL
DIFFERENTIAL METHOD: ABNORMAL
EOSINOPHIL # BLD AUTO: 0.1 K/UL
EOSINOPHIL NFR BLD: 1.3 %
ERYTHROCYTE [DISTWIDTH] IN BLOOD BY AUTOMATED COUNT: 14.6 %
EST. GFR  (AFRICAN AMERICAN): 57.6 ML/MIN/1.73 M^2
EST. GFR  (NON AFRICAN AMERICAN): 49.9 ML/MIN/1.73 M^2
GLUCOSE SERPL-MCNC: 107 MG/DL
HCT VFR BLD AUTO: 27.4 %
HGB BLD-MCNC: 8.5 G/DL
IMM GRANULOCYTES # BLD AUTO: 0.06 K/UL
IMM GRANULOCYTES NFR BLD AUTO: 0.5 %
LYMPHOCYTES # BLD AUTO: 2.5 K/UL
LYMPHOCYTES NFR BLD: 22.7 %
MCH RBC QN AUTO: 27.7 PG
MCHC RBC AUTO-ENTMCNC: 31 G/DL
MCV RBC AUTO: 89 FL
MONOCYTES # BLD AUTO: 0.8 K/UL
MONOCYTES NFR BLD: 7.1 %
NEUTROPHILS # BLD AUTO: 7.6 K/UL
NEUTROPHILS NFR BLD: 67.9 %
NRBC BLD-RTO: 0 /100 WBC
PLATELET # BLD AUTO: 447 K/UL
PMV BLD AUTO: 9 FL
POTASSIUM SERPL-SCNC: 3.7 MMOL/L
RBC # BLD AUTO: 3.07 M/UL
SODIUM SERPL-SCNC: 135 MMOL/L
WBC # BLD AUTO: 11.2 K/UL

## 2019-03-12 PROCEDURE — 85025 COMPLETE CBC W/AUTO DIFF WBC: CPT

## 2019-03-12 PROCEDURE — 80048 BASIC METABOLIC PNL TOTAL CA: CPT

## 2019-03-12 PROCEDURE — 99283 EMERGENCY DEPT VISIT LOW MDM: CPT

## 2019-03-13 NOTE — ED PROVIDER NOTES
Encounter Date: 3/12/2019       History     Chief Complaint   Patient presents with    Dizziness     Patient presents to the ED with dizziness, patient has a history of gastric cancer, has chronic GI blood loss requiring multiple transfusions recently.  Patient has follow-up appointment with primary care in the morning but family member became concerned when she was dizzy today and brought her in for evaluation.  Patient continues to have black stools but family member says that these were not as bad today as they normally are.          Review of patient's allergies indicates:   Allergen Reactions    Codeine Other (See Comments)     Pt shakes and hallucinates    Pcn [penicillins] Anxiety and Other (See Comments)     Past Medical History:   Diagnosis Date    Cancer     kidney    Dementia     Encounter for blood transfusion     2018    High cholesterol 2018    Hypertension     No Medication     Renal disorder     Wears dentures     Uppers     Past Surgical History:   Procedure Laterality Date     SECTION      COLONOSCOPY N/A 2018    [facility]    CYSTOSCOPY WITH RETROGRADE PYELOGRAM Left 1/15/2016    [facility]    EGD (ESOPHAGOGASTRODUODENOSCOPY) N/A 2018    [facility]    EYE SURGERY      Rt eye Catarac    HYSTERECTOMY      IMAGING, GI TRACT, INTRALUMINAL, VIA CAPSULE N/A 2018    [facility]formed [facility] at [facility] ENDO    KIDNEY SURGERY Left 2018    Left kidney removed due to cancer    lasix surgery      NEPHRECTOMY-RADICAL   Left 2016    [facility] at [facility]    REPAIR-HERNIA-VENTRAL N/A 2016    [facility]formed [facility] at [facility] OR    RESECTION-BOWEL  2016    [facility] at [facility]    URETEROSCOPY  1/15/2016    Per[facility] by [facility] at [facility] OR      History reviewed. No pertinent family history.  Social History     Tobacco Use    Smoking status: Former Smoker     Packs/day: 1.00     Years: 50.00     Pack years: 50.00     Last attempt to quit: 10/6/2018     Years since quittin.4    Smokeless tobacco: Never Used    Tobacco comment: pt has stopped; encouraged to remain stopped   Substance Use Topics    Alcohol use: No     Frequency: Never     Comment: last alcohol intake 2 weeks ago    Drug use: No     Review of Systems   Constitutional: Negative.    Respiratory: Negative.    Cardiovascular: Negative.    Gastrointestinal: Positive for blood in stool.   Genitourinary: Negative.    Neurological: Positive for dizziness.   Psychiatric/Behavioral: Negative.        Physical Exam     Initial Vitals [19]   BP Pulse Resp Temp SpO2   136/64 109 20 98.3 °F (36.8 °C) (!) 94 %      MAP       --         Physical Exam    Nursing note and vitals reviewed.  Constitutional: She appears well-developed and well-nourished. She is not diaphoretic. No distress.   HENT:   Head: Normocephalic and atraumatic.   Eyes: Conjunctivae and EOM are normal. Pupils are equal, round, and reactive to light.   Neck: Normal range of motion. Neck supple.   Cardiovascular: Normal rate, regular rhythm, normal heart sounds and intact distal pulses. Exam reveals no gallop and no friction rub.    No murmur heard.  Pulmonary/Chest: Breath sounds normal. No respiratory distress. She has no wheezes. She has no rales.   Abdominal: Soft. Bowel sounds are normal. She exhibits no distension. There is no tenderness.   Musculoskeletal: Normal range of motion. She exhibits no edema.   Neurological: She is alert and oriented to person, place, and time. She has normal strength.   Skin: Skin is warm and dry. Capillary refill takes less than 2 seconds. No rash noted. No erythema.   Psychiatric: She has a normal mood and affect. Her behavior is normal. Judgment and thought content normal.         ED  Course   Procedures  Labs Reviewed   CBC W/ AUTO DIFFERENTIAL   BASIC METABOLIC PANEL           Labs Reviewed   CBC W/ AUTO DIFFERENTIAL - Abnormal; Notable for the following components:       Result Value    RBC 3.07 (*)     Hemoglobin 8.5 (*)     Hematocrit 27.4 (*)     MCHC 31.0 (*)     RDW 14.6 (*)     Platelets 447 (*)     MPV 9.0 (*)     Immature Grans (Abs) 0.06 (*)     All other components within normal limits   BASIC METABOLIC PANEL - Abnormal; Notable for the following components:    Sodium 135 (*)     Calcium 8.4 (*)     eGFR if  57.6 (*)     eGFR if non  49.9 (*)     All other components within normal limits     Imaging Results    None                               Clinical Impression:       ICD-10-CM ICD-9-CM   1. Iron deficiency anemia due to chronic blood loss D50.0 280.0                                Kareen North MD  03/12/19 5052

## 2019-03-13 NOTE — DISCHARGE INSTRUCTIONS
Keep appoint with primary care provider in the morning.  Return to the ER at any time if condition changes or worsens or new symptoms develop.  Continue previously prescribed medications as directed.

## 2019-03-20 ENCOUNTER — HOSPITAL ENCOUNTER (OUTPATIENT)
Facility: HOSPITAL | Age: 74
Discharge: HOME | End: 2019-03-21
Attending: EMERGENCY MEDICINE | Admitting: INTERNAL MEDICINE
Payer: MEDICARE

## 2019-03-20 DIAGNOSIS — D64.9 SYMPTOMATIC ANEMIA: Primary | ICD-10-CM

## 2019-03-20 LAB
ABO + RH BLD: NORMAL
ANION GAP SERPL CALC-SCNC: 12 MMOL/L
BASOPHILS # BLD AUTO: 0.05 K/UL
BASOPHILS NFR BLD: 0.6 %
BLD GP AB SCN CELLS X3 SERPL QL: NORMAL
BLD PROD TYP BPU: NORMAL
BLOOD UNIT EXPIRATION DATE: NORMAL
BLOOD UNIT TYPE CODE: 6200
BLOOD UNIT TYPE: NORMAL
BUN SERPL-MCNC: 10 MG/DL
CALCIUM SERPL-MCNC: 8.1 MG/DL
CHLORIDE SERPL-SCNC: 100 MMOL/L
CO2 SERPL-SCNC: 17 MMOL/L
CODING SYSTEM: NORMAL
CREAT SERPL-MCNC: 1.5 MG/DL
DIFFERENTIAL METHOD: ABNORMAL
DISPENSE STATUS: NORMAL
EOSINOPHIL # BLD AUTO: 0.1 K/UL
EOSINOPHIL NFR BLD: 0.9 %
ERYTHROCYTE [DISTWIDTH] IN BLOOD BY AUTOMATED COUNT: 14.7 %
EST. GFR  (AFRICAN AMERICAN): 39.6 ML/MIN/1.73 M^2
EST. GFR  (NON AFRICAN AMERICAN): 34.3 ML/MIN/1.73 M^2
GLUCOSE SERPL-MCNC: 126 MG/DL
HCT VFR BLD AUTO: 21 %
HGB BLD-MCNC: 6.3 G/DL
IMM GRANULOCYTES # BLD AUTO: 0.04 K/UL
IMM GRANULOCYTES NFR BLD AUTO: 0.4 %
LYMPHOCYTES # BLD AUTO: 1.8 K/UL
LYMPHOCYTES NFR BLD: 20.2 %
MCH RBC QN AUTO: 26.3 PG
MCHC RBC AUTO-ENTMCNC: 30 G/DL
MCV RBC AUTO: 88 FL
MONOCYTES # BLD AUTO: 0.7 K/UL
MONOCYTES NFR BLD: 7.3 %
NEUTROPHILS # BLD AUTO: 6.3 K/UL
NEUTROPHILS NFR BLD: 70.6 %
NRBC BLD-RTO: 0 /100 WBC
NUM UNITS TRANS PACKED RBC: NORMAL
PLATELET # BLD AUTO: 671 K/UL
PMV BLD AUTO: 8.9 FL
POTASSIUM SERPL-SCNC: 3.6 MMOL/L
RBC # BLD AUTO: 2.4 M/UL
SODIUM SERPL-SCNC: 129 MMOL/L
WBC # BLD AUTO: 8.93 K/UL

## 2019-03-20 PROCEDURE — 25000003 PHARM REV CODE 250: Performed by: EMERGENCY MEDICINE

## 2019-03-20 PROCEDURE — P9016 RBC LEUKOCYTES REDUCED: HCPCS

## 2019-03-20 PROCEDURE — 86922 COMPATIBILITY TEST ANTIGLOB: CPT

## 2019-03-20 PROCEDURE — 99285 EMERGENCY DEPT VISIT HI MDM: CPT

## 2019-03-20 PROCEDURE — 36415 COLL VENOUS BLD VENIPUNCTURE: CPT

## 2019-03-20 PROCEDURE — G0378 HOSPITAL OBSERVATION PER HR: HCPCS

## 2019-03-20 PROCEDURE — 85025 COMPLETE CBC W/AUTO DIFF WBC: CPT

## 2019-03-20 PROCEDURE — 80048 BASIC METABOLIC PNL TOTAL CA: CPT

## 2019-03-20 PROCEDURE — 25000003 PHARM REV CODE 250: Performed by: INTERNAL MEDICINE

## 2019-03-20 PROCEDURE — 86850 RBC ANTIBODY SCREEN: CPT

## 2019-03-20 PROCEDURE — 36430 TRANSFUSION BLD/BLD COMPNT: CPT

## 2019-03-20 RX ORDER — ACETAMINOPHEN 325 MG/1
650 TABLET ORAL EVERY 4 HOURS PRN
Status: DISCONTINUED | OUTPATIENT
Start: 2019-03-20 | End: 2019-03-21 | Stop reason: HOSPADM

## 2019-03-20 RX ORDER — SODIUM CHLORIDE 0.9 % (FLUSH) 0.9 %
5 SYRINGE (ML) INJECTION
Status: DISCONTINUED | OUTPATIENT
Start: 2019-03-20 | End: 2019-03-21 | Stop reason: HOSPADM

## 2019-03-20 RX ORDER — HYDROCODONE BITARTRATE AND ACETAMINOPHEN 500; 5 MG/1; MG/1
TABLET ORAL
Status: DISCONTINUED | OUTPATIENT
Start: 2019-03-20 | End: 2019-03-21 | Stop reason: HOSPADM

## 2019-03-20 RX ORDER — DIPHENHYDRAMINE HCL 25 MG
25 CAPSULE ORAL EVERY 6 HOURS PRN
Status: DISCONTINUED | OUTPATIENT
Start: 2019-03-20 | End: 2019-03-21 | Stop reason: HOSPADM

## 2019-03-20 RX ORDER — LOSARTAN POTASSIUM 25 MG/1
50 TABLET ORAL DAILY
Status: DISCONTINUED | OUTPATIENT
Start: 2019-03-20 | End: 2019-03-21 | Stop reason: HOSPADM

## 2019-03-20 RX ORDER — SUCRALFATE 1 G/10ML
1 SUSPENSION ORAL
Status: DISCONTINUED | OUTPATIENT
Start: 2019-03-20 | End: 2019-03-21 | Stop reason: HOSPADM

## 2019-03-20 RX ORDER — PANTOPRAZOLE SODIUM 40 MG/1
40 TABLET, DELAYED RELEASE ORAL DAILY
Status: DISCONTINUED | OUTPATIENT
Start: 2019-03-20 | End: 2019-03-21 | Stop reason: HOSPADM

## 2019-03-20 RX ADMIN — SUCRALFATE 1 G: 1 SUSPENSION ORAL at 06:03

## 2019-03-20 RX ADMIN — DIPHENHYDRAMINE HYDROCHLORIDE 25 MG: 25 CAPSULE ORAL at 12:03

## 2019-03-20 RX ADMIN — ACETAMINOPHEN 650 MG: 325 TABLET ORAL at 12:03

## 2019-03-20 NOTE — NURSING
1317-20G TO RIGHT AC INFILTRATED AT THIS TIME. BLOOD STOP IMMEDIATELY. ARLETTERN  1340-20G TO RIGHT HAND STARTED AT THIS TIME X3 ATTEMPTS. PT TOLERATED WELL. BLOOD STARTED BACK. VS STABLE AND RECORDED. PT TOLERATED WELL. INFORMED PT TO NOTIFY NURSE OF ANY CONCERNS/NEEDS.  PT VOICED UNDERSTANDING. WILL CONT TO MONITOR PT CLOSELY. ARLETTERN

## 2019-03-20 NOTE — PLAN OF CARE
Call placed to PCP at [facility] regarding increasing frequency of ED visits with subsequent admissions for continued blood loss anemia secondary to CA. Await callback.

## 2019-03-20 NOTE — NURSING
Report received from MIKE Conroy at 1130.  Patient arrived to floor via W/C from ER at 1142.  Family member at bedside.  Patient alert and oriented x3.  Patient oriented to room, bed in lowest and locked position, SR upx2 and call light within reach.  Patient instructed to call when needing to get up, bed alarm placed.  No distress noted or verbalized by patient at this time.

## 2019-03-20 NOTE — PLAN OF CARE
03/20/19 1437   HOWELL Message   Medicare Outpatient and Observation Notification regarding financial responsibility Given to patient/caregiver;Explained to patient/caregiver;Signed/date by patient/caregiver   Date HOWELL was signed 03/20/19   Time HOWELL was signed 121

## 2019-03-20 NOTE — HPI
Patient is a 73-year-old who presented to her primary care physician this morning and was found to be anemic at 6.3 and 21 hematocrit.  Patient has a long history of iron deficiency and chronic anemia due to blood loss.  Does require transfusions intermittently.  Patient states symptoms of dizziness, lightheadedness, and mild shortness of breath.  This was thought to be related to her extremely low hemoglobin level of 6.3.  Otherwise patient denies any other problems fever chills nausea vomiting and is only admitted for observation for transfusion.

## 2019-03-20 NOTE — NURSING
1304-BLOOD TRANSFUSION STARTED AT THIS TIME. VS STABLE AND RECORDED. RESP EVEN AND UNLABORED. NAD NOTED. PT AAOX3. INFORMED PT TO NOTIFY NURSE OF ANY CONCERNS/NEEDS, ANY SIGNS OF POSSIBLE REACTIONS. PT VOICED UNDERSTANDING. WILL REMAIN AT PT SIDE FOR FIRST 15 MINUTES PER HOSPITAL PROTOCOL. CL,RN

## 2019-03-20 NOTE — PLAN OF CARE
03/20/19 1408   Readmission Questionnaire   At the time of your discharge, did someone talk to you about what your health problems were? Yes   At the time of discharge, did someone talk to you about what to watch out for regarding worsening of your health problem? Yes   At the time of discharge, did someone talk to you about what to do if you experienced worsening of your health problem? Yes   At the time of discharge, did someone talk to you about which medication to take when you left the hospital and which ones to stop taking? Yes   At the time of discharge, did someone talk to you about when and where to follow up with a doctor after you left the hospital? Yes   What do you believe caused you to be sick enough to be re-admitted? blood count low   How often do you need to have someone help you when you read instructions, pamphlets, or other written material from your doctor or pharmacy? Never   Do you have problems taking your medications as prescribed? No   Do you have any problems affording any of  your prescribed medications? No   Do you have problems obtaining/receiving your medications? No   Does the patient have transportation to healthcare appointments? Yes   Lives With grandchild(samy)   Living Arrangements house   Does the patient have family/friends to help with healtcare needs after discharge? yes   Who are your caregiver(s) and their phone number(s)? Zoey alba granddaughter [phone]; Parisa Tobar daughter [phone]   Does your caregiver provide all the help you need? Yes   Are you currently feeling confused? No   Are you currently having problems thinking? No   Are you currently having memory problems? Yes   Have you felt down, depressed, or hopeless? 0   Have you felt little interest or pleasure in doing things? 0   In the last 7 days, my sleep quality was: very good

## 2019-03-20 NOTE — NURSING
Second unit of blood started at this time, patient instructed to verbalized any onset of new symptoms, verbalized understanding.  No distress noted or verbalized by patient at this time.

## 2019-03-20 NOTE — SUBJECTIVE & OBJECTIVE
Past Medical History:   Diagnosis Date    Cancer     kidney    Dementia     Encounter for blood transfusion     2018    High cholesterol 2018    Hypertension     No Medication     Renal disorder     Wears dentures     Uppers       Past Surgical History:   Procedure Laterality Date     SECTION      COLONOSCOPY N/A 2018    [facility]formed [facility] ENDO    CYSTOSCOPY WITH RETROGRADE PYELOGRAM Left 1/15/2016    [facility]    EGD (ESOPHAGOGASTRODUODENOSCOPY) N/A 2018    Performed by [facility] ENDO    EYE SURGERY      Rt eye Catarac    HYSTERECTOMY      IMAGING, GI TRACT, INTRALUMINAL, VIA CAPSULE N/A 2018    [facility] at [facility] ENDO    KIDNEY SURGERY Left 2018    Left kidney removed due to cancer    lasix surgery      NEPHRECTOMY-RADICAL   Left 2016    [facility]    REPAIR-HERNIA-VENTRAL N/A 2016    [facility] at [facility] OR    RESECTION-BOWEL  2016    [facility] at [facility]    URETEROSCOPY  1/15/2016    [facility] by [facility] at [facility] OR       Review of patient's allergies indicates:   Allergen Reactions    Codeine Other (See Comments)     Pt shakes and hallucinates    Pcn [penicillins] Anxiety and Other (See Comments)       No current facility-administered medications on file prior to encounter.      Current Outpatient Medications on File Prior to Encounter   Medication Sig    ferrous sulfate 325 (65 FE) MG EC tablet Take 325 mg by mouth once daily.    losartan (COZAAR) 50 MG tablet Take 50 mg by mouth once daily.    pantoprazole (PROTONIX) 40 MG tablet Take 1 tablet (40 mg total) by mouth once daily.    sucralfate (CARAFATE) 100 mg/mL suspension Take 10 mLs (1 g total) by mouth 4 (four) times daily before meals and nightly.     Family History      None        Tobacco Use    Smoking status: Former Smoker     Packs/day: 1.00     Years: 50.00     Pack years: 50.00     Last attempt to quit: 10/6/2018     Years since quittin.4    Smokeless tobacco: Never Used    Tobacco comment: pt has stopped; encouraged to remain stopped   Substance and Sexual Activity    Alcohol use: No     Frequency: Never     Comment: last alcohol intake friday    Drug use: No    Sexual activity: No     Review of Systems   Constitutional: Negative for activity change, appetite change, fatigue and fever.   HENT: Negative for congestion, ear discharge, mouth sores, nosebleeds, rhinorrhea, sinus pressure, sinus pain and tinnitus.    Eyes: Negative.  Negative for pain, redness and itching.   Respiratory: Positive for chest tightness and shortness of breath. Negative for apnea, cough, choking, wheezing and stridor.    Cardiovascular: Positive for palpitations. Negative for chest pain and leg swelling.   Gastrointestinal: Negative for abdominal distention, abdominal pain, anal bleeding, blood in stool, constipation and diarrhea.   Endocrine: Negative.    Genitourinary: Negative for difficulty urinating, flank pain, frequency and urgency.   Musculoskeletal: Negative for arthralgias, back pain, gait problem and myalgias.   Skin: Negative for color change and pallor.   Allergic/Immunologic: Negative.    Neurological: Positive for dizziness and light-headedness. Negative for facial asymmetry, weakness and headaches.   Hematological: Negative for adenopathy. Does not bruise/bleed easily.   Psychiatric/Behavioral: The patient is nervous/anxious.      Objective:     Vital Signs (Most Recent):  Temp: 96.1 °F (35.6 °C) (19 1145)  Pulse: (!) 57 (19 1145)  Resp: 18 (19 1145)  BP: (!) 123/58 (19 1145)  SpO2: 99 % (19 1145) Vital Signs (24h Range):  Temp:  [96.1 °F (35.6 °C)-98.1 °F (36.7 °C)] 96.1 °F (35.6 °C)  Pulse:  [] 57  Resp:  [18-20] 18  SpO2:  [99 %-100  %] 99 %  BP: ()/(58-69) 123/58     Weight: 64.9 kg (143 lb)  Body mass index is 19.94 kg/m².    Physical Exam   Constitutional: She is oriented to person, place, and time. She appears well-developed and well-nourished.   HENT:   Head: Normocephalic and atraumatic.   Eyes: EOM are normal. Pupils are equal, round, and reactive to light.   Neck: Normal range of motion. Neck supple. No tracheal deviation present. No thyromegaly present.   Cardiovascular: Normal rate, regular rhythm and normal heart sounds.   Pulmonary/Chest: Breath sounds normal. She is in respiratory distress.   Abdominal: Soft. Bowel sounds are normal. She exhibits no distension. There is no tenderness. There is no rebound and no guarding.   Musculoskeletal: Normal range of motion.   Lymphadenopathy:     She has no cervical adenopathy.   Neurological: She is alert and oriented to person, place, and time.   Skin: Skin is warm and dry. Capillary refill takes less than 2 seconds.   Psychiatric: She has a normal mood and affect. Her behavior is normal. Judgment and thought content normal.   Nursing note and vitals reviewed.        CRANIAL NERVES     CN III, IV, VI   Pupils are equal, round, and reactive to light.  Extraocular motions are normal.        Significant Labs:   Recent Lab Results       03/20/19  0935   03/20/19  0932        Immature Grans (Abs) 0.04  Comment:  Mild elevation in immature granulocytes is non specific and   can be seen in a variety of conditions including stress response,   acute inflammation, trauma and pregnancy. Correlation with other   laboratory and clinical findings is essential.         Anion Gap 12       Baso # 0.05       Basophil% 0.6       BUN, Bld 10       Calcium 8.1       Chloride 100       CO2 17       Creatinine 1.5       Differential Method Automated       eGFR if African American 39.6       eGFR if non  34.3  Comment:  Calculation used to obtain the estimated glomerular filtration  rate  (eGFR) is the CKD-EPI equation.          Eos # 0.1       Eosinophil% 0.9       Glucose 126       Gran # (ANC) 6.3       Gran% 70.6       Group & Rh   A POS     Hematocrit 21.0       Hemoglobin 6.3       Immature Granulocytes 0.4       Indirect Michael GEL   NEG     Lymph # 1.8       Lymph% 20.2       MCH 26.3       MCHC 30.0       MCV 88       Mono # 0.7       Mono% 7.3       MPV 8.9       nRBC 0       Platelets 671       Potassium 3.6       RBC 2.40       RDW 14.7       Sodium 129       WBC 8.93           All pertinent labs within the past 24 hours have been reviewed.    Significant Imaging: I have reviewed and interpreted all pertinent imaging results/findings within the past 24 hours.

## 2019-03-20 NOTE — PLAN OF CARE
Problem: Adult Inpatient Plan of Care  Goal: Readiness for Transition of Care    Intervention: Mutually Develop Transition Plan   03/20/19 1200 03/20/19 1408 03/20/19 1418   OTHER   Communicated expected length of stay with patient/caregiver yes --  --    Is patient able to care for self after discharge? Yes --  --    Who are your caregiver(s) and their phone number(s)? --  Zoey alba granddaughter [phone]; Parisa Tobar daughter [phone] --    Patient currently receives home health services? --  --  No   (RETIRED) Discharge Needs Assessment   How many people do you have in your home that can help with your care after discharge? --  --  3   Discharge Needs Assessment   Readmission Within the Last 30 Days other (see comments)  (needing another blood transfusion) --  --    Equipment Currently Used at Home bedside commode;wheelchair;shower chair;rollator --  --    Transportation Anticipated family or friend will provide --  --    Social Work Plan   Patient/Family in Agreement with Plan yes --  --    Living Environment   Able to Return to Prior Arrangements yes --  --    (RETIRED) Current Health   Expected Length of Stay (days) 1 --  --    (RETIRED) Social Work Plan   Patient's perception of discharge disposition home or selfcare --  --

## 2019-03-20 NOTE — H&P
[facility] Medicine  History & Physical    Patient Name: Oriana Tobar  MRN: [id number]  Admission Date: 3/20/2019  Attending Physician: Tito Mathews MD  Primary Care Provider: Carine Baltazar NP         Patient information was obtained from patient and ER records.     Subjective:     Principal Problem:Symptomatic anemia    Chief Complaint:   Chief Complaint   Patient presents with    Anemia     Pt sent by Carine parham for hgb 5.9 via bedside testing.         HPI: Patient is a 73-year-old who presented to her primary care physician this morning and was found to be anemic at 6.3 and 21 hematocrit.  Patient has a long history of iron deficiency and chronic anemia due to blood loss.  Does require transfusions intermittently.  Patient states symptoms of dizziness, lightheadedness, and mild shortness of breath.  This was thought to be related to her extremely low hemoglobin level of 6.3.  Otherwise patient denies any other problems fever chills nausea vomiting and is only admitted for observation for transfusion.    Past Medical History:   Diagnosis Date    Cancer     kidney    Dementia     Encounter for blood transfusion     2018    High cholesterol 2018    Hypertension     No Medication     Renal disorder     Wears dentures     Uppers       Past Surgical History:   Procedure Laterality Date     SECTION      COLONOSCOPY N/A 2018    [facility]formed [facility]    CYSTOSCOPY WITH RETROGRADE PYELOGRAM Left 1/15/2016    [facility]    EGD (ESOPHAGOGASTRODUODENOSCOPY) N/A 2018    Performed by [facility] at [facility] ENDO    EYE SURGERY      Rt eye Catarac    HYSTERECTOMY      IMAGING, GI TRACT, INTRALUMINAL, VIA CAPSULE N/A 2018    Performed by Rocco Ross [facility] at [facility] ENDO    KIDNEY SURGERY Left 2018    Left kidney removed due to  cancer    lasix surgery      NEPHRECTOMY-RADICAL   Left 2016    [facility]    REPAIR-HERNIA-VENTRAL N/A 2016    Performed by Marco A Randhawa MD at [facility] OR    RESECTION-BOWEL  2016    Performed by Marco [facility]awa [facility] at [facility] OR    URETEROSCOPY  1/15/2016    Performed by [facility]ell [facility]acon [facility] at [facility] OR       Review of patient's allergies indicates:   Allergen Reactions    Codeine Other (See Comments)     Pt shakes and hallucinates    Pcn [penicillins] Anxiety and Other (See Comments)       No current facility-administered medications on file prior to encounter.      Current Outpatient Medications on File Prior to Encounter   Medication Sig    ferrous sulfate 325 (65 FE) MG EC tablet Take 325 mg by mouth once daily.    losartan (COZAAR) 50 MG tablet Take 50 mg by mouth once daily.    pantoprazole (PROTONIX) 40 MG tablet Take 1 tablet (40 mg total) by mouth once daily.    sucralfate (CARAFATE) 100 mg/mL suspension Take 10 mLs (1 g total) by mouth 4 (four) times daily before meals and nightly.     Family History     None        Tobacco Use    Smoking status: Former Smoker     Packs/day: 1.00     Years: 50.00     Pack years: 50.00     Last attempt to quit: 10/6/2018     Years since quittin.4    Smokeless tobacco: Never Used    Tobacco comment: pt has stopped; encouraged to remain stopped   Substance and Sexual Activity    Alcohol use: No     Frequency: Never     Comment: last alcohol intake friday    Drug use: No    Sexual activity: No     Review of Systems   Constitutional: Negative for activity change, appetite change, fatigue and fever.   HENT: Negative for congestion, ear discharge, mouth sores, nosebleeds, rhinorrhea, sinus pressure, sinus pain and tinnitus.    Eyes: Negative.  Negative for pain, redness and itching.   Respiratory: Positive for chest tightness and shortness of breath. Negative for apnea, cough, choking, wheezing and  stridor.    Cardiovascular: Positive for palpitations. Negative for chest pain and leg swelling.   Gastrointestinal: Negative for abdominal distention, abdominal pain, anal bleeding, blood in stool, constipation and diarrhea.   Endocrine: Negative.    Genitourinary: Negative for difficulty urinating, flank pain, frequency and urgency.   Musculoskeletal: Negative for arthralgias, back pain, gait problem and myalgias.   Skin: Negative for color change and pallor.   Allergic/Immunologic: Negative.    Neurological: Positive for dizziness and light-headedness. Negative for facial asymmetry, weakness and headaches.   Hematological: Negative for adenopathy. Does not bruise/bleed easily.   Psychiatric/Behavioral: The patient is nervous/anxious.      Objective:     Vital Signs (Most Recent):  Temp: 96.1 °F (35.6 °C) (03/20/19 1145)  Pulse: (!) 57 (03/20/19 1145)  Resp: 18 (03/20/19 1145)  BP: (!) 123/58 (03/20/19 1145)  SpO2: 99 % (03/20/19 1145) Vital Signs (24h Range):  Temp:  [96.1 °F (35.6 °C)-98.1 °F (36.7 °C)] 96.1 °F (35.6 °C)  Pulse:  [] 57  Resp:  [18-20] 18  SpO2:  [99 %-100 %] 99 %  BP: ()/(58-69) 123/58     Weight: 64.9 kg (143 lb)  Body mass index is 19.94 kg/m².    Physical Exam   Constitutional: She is oriented to person, place, and time. She appears well-developed and well-nourished.   HENT:   Head: Normocephalic and atraumatic.   Eyes: EOM are normal. Pupils are equal, round, and reactive to light.   Neck: Normal range of motion. Neck supple. No tracheal deviation present. No thyromegaly present.   Cardiovascular: Normal rate, regular rhythm and normal heart sounds.   Pulmonary/Chest: Breath sounds normal. She is in respiratory distress.   Abdominal: Soft. Bowel sounds are normal. She exhibits no distension. There is no tenderness. There is no rebound and no guarding.   Musculoskeletal: Normal range of motion.   Lymphadenopathy:     She has no cervical adenopathy.   Neurological: She is alert and  oriented to person, place, and time.   Skin: Skin is warm and dry. Capillary refill takes less than 2 seconds.   Psychiatric: She has a normal mood and affect. Her behavior is normal. Judgment and thought content normal.   Nursing note and vitals reviewed.        CRANIAL NERVES     CN III, IV, VI   Pupils are equal, round, and reactive to light.  Extraocular motions are normal.        Significant Labs:   Recent Lab Results       03/20/19  0935   03/20/19  0932        Immature Grans (Abs) 0.04  Comment:  Mild elevation in immature granulocytes is non specific and   can be seen in a variety of conditions including stress response,   acute inflammation, trauma and pregnancy. Correlation with other   laboratory and clinical findings is essential.         Anion Gap 12       Baso # 0.05       Basophil% 0.6       BUN, Bld 10       Calcium 8.1       Chloride 100       CO2 17       Creatinine 1.5       Differential Method Automated       eGFR if African American 39.6       eGFR if non  34.3  Comment:  Calculation used to obtain the estimated glomerular filtration  rate (eGFR) is the CKD-EPI equation.          Eos # 0.1       Eosinophil% 0.9       Glucose 126       Gran # (ANC) 6.3       Gran% 70.6       Group & Rh   A POS     Hematocrit 21.0       Hemoglobin 6.3       Immature Granulocytes 0.4       Indirect Michael GEL   NEG     Lymph # 1.8       Lymph% 20.2       MCH 26.3       MCHC 30.0       MCV 88       Mono # 0.7       Mono% 7.3       MPV 8.9       nRBC 0       Platelets 671       Potassium 3.6       RBC 2.40       RDW 14.7       Sodium 129       WBC 8.93           All pertinent labs within the past 24 hours have been reviewed.    Significant Imaging: I have reviewed and interpreted all pertinent imaging results/findings within the past 24 hours.    Assessment/Plan:     * Symptomatic anemia    1.  Admit to medical-surgical unit for 23 observation for transfusion  2.  Type and cross for 3 units packed red  blood cells  3.  Premedicate with Benadryl and Tylenol prior to the 1st packed red blood cell unit  4.  Continue home medications  5.  Patient may be discharged after transfusion if stable.         VTE Risk Mitigation (From admission, onward)        Ordered     IP VTE HIGH RISK PATIENT  Once      03/20/19 0511             Tito Mathews MD  Department of Hospital Medicine   [facility]

## 2019-03-20 NOTE — PLAN OF CARE
03/20/19 1200   Discharge Assessment   Assessment Type Discharge Planning Assessment   Confirmed/corrected address and phone number on facesheet? Yes   Assessment information obtained from? Patient;Caregiver   Expected Length of Stay (days) 1   Communicated expected length of stay with patient/caregiver yes   Prior to hospitilization cognitive status: Alert/Oriented   Prior to hospitalization functional status: Needs Assistance   Current cognitive status: Alert/Oriented   Current Functional Status: Needs Assistance   Lives With alone   Able to Return to Prior Arrangements yes   Is patient able to care for self after discharge? Yes   Who are your caregiver(s) and their phone number(s)? Zoey Pardo granddaughter [phone]; Parisa Tobar daughter [phone]   Patient's perception of discharge disposition home or selfcare   Readmission Within the Last 30 Days other (see comments)  (needing another blood transfusion)   Patient currently being followed by outpatient case management? No   Patient currently receives any other outside agency services? Yes   How many hours a day does the patient receive services? 7   Name and contact number of agency or person providing outside services Nursing Management attendant MIRIAM noon-2:30pm; 3-7:30pm   Is it the patient/care giver preference to resume care with the current outside agency? Yes   Equipment Currently Used at Home bedside commode;wheelchair;shower chair;rollator   Do you have any problems affording any of your prescribed medications? No   Is the patient taking medications as prescribed? yes   Does the patient have transportation home? Yes   Transportation Anticipated family or friend will provide   Does the patient receive services at the [facility]? No   Discharge Plan A Home with family   DME Needed Upon Discharge  none   Patient/Family in Agreement with Plan yes   Patient lives at home with her granddaughter. She uses Nursing Management [facility] for 7hr a  day. She has a walker but most of the time doesn't use it. She like to dance & drink her beer. States she uses Carine Baltazar as her PCP but she doesn't have to stay with her. She had lab drawn & when she went to her today she told her to come to the ER as her blood count was low. States she doesn't like coming to the ER as it's cold in there. Talked about finding a PCP here that could check her labs & when she gets low she could just come in as an outpatient to get the blood & then go home. States that would be good. Granddaughter at bedside & in agreement with finding a provider here in the hospital that she can go to. Informed her I will make her follow up with one of the providers here. Informed her she can still go see Carine if she wants but since she can't order outpatient blood transfusions at least she will have a provider here that can. Denies any discharge needs at this time.

## 2019-03-20 NOTE — ASSESSMENT & PLAN NOTE
1.  Admit to medical-surgical unit for 23 observation for transfusion  2.  Type and cross for 3 units packed red blood cells  3.  Premedicate with Benadryl and Tylenol prior to the 1st packed red blood cell unit  4.  Continue home medications  5.  Patient may be discharged after transfusion if stable.

## 2019-03-21 VITALS
WEIGHT: 165.69 LBS | DIASTOLIC BLOOD PRESSURE: 57 MMHG | BODY MASS INDEX: 23.2 KG/M2 | RESPIRATION RATE: 16 BRPM | TEMPERATURE: 98 F | HEART RATE: 66 BPM | OXYGEN SATURATION: 99 % | SYSTOLIC BLOOD PRESSURE: 119 MMHG | HEIGHT: 71 IN

## 2019-03-21 LAB
BASOPHILS # BLD AUTO: 0.05 K/UL
BASOPHILS NFR BLD: 0.5 %
DIFFERENTIAL METHOD: ABNORMAL
EOSINOPHIL # BLD AUTO: 0.1 K/UL
EOSINOPHIL NFR BLD: 1 %
ERYTHROCYTE [DISTWIDTH] IN BLOOD BY AUTOMATED COUNT: 14.7 %
HCT VFR BLD AUTO: 33.6 %
HGB BLD-MCNC: 10.6 G/DL
IMM GRANULOCYTES # BLD AUTO: 0.06 K/UL
IMM GRANULOCYTES NFR BLD AUTO: 0.6 %
LYMPHOCYTES # BLD AUTO: 1.7 K/UL
LYMPHOCYTES NFR BLD: 18.2 %
MCH RBC QN AUTO: 27.2 PG
MCHC RBC AUTO-ENTMCNC: 31.5 G/DL
MCV RBC AUTO: 86 FL
MONOCYTES # BLD AUTO: 0.8 K/UL
MONOCYTES NFR BLD: 8.5 %
NEUTROPHILS # BLD AUTO: 6.6 K/UL
NEUTROPHILS NFR BLD: 71.2 %
NRBC BLD-RTO: 0 /100 WBC
PLATELET # BLD AUTO: 530 K/UL
PMV BLD AUTO: 9.1 FL
RBC # BLD AUTO: 3.9 M/UL
WBC # BLD AUTO: 9.33 K/UL

## 2019-03-21 PROCEDURE — 85025 COMPLETE CBC W/AUTO DIFF WBC: CPT

## 2019-03-21 PROCEDURE — 25000003 PHARM REV CODE 250: Performed by: EMERGENCY MEDICINE

## 2019-03-21 PROCEDURE — 36415 COLL VENOUS BLD VENIPUNCTURE: CPT

## 2019-03-21 PROCEDURE — G0378 HOSPITAL OBSERVATION PER HR: HCPCS

## 2019-03-21 RX ADMIN — PANTOPRAZOLE SODIUM 40 MG: 40 TABLET, DELAYED RELEASE ORAL at 08:03

## 2019-03-21 RX ADMIN — LOSARTAN POTASSIUM 50 MG: 25 TABLET, FILM COATED ORAL at 08:03

## 2019-03-21 RX ADMIN — SUCRALFATE 1 G: 1 SUSPENSION ORAL at 07:03

## 2019-03-21 NOTE — PLAN OF CARE
Problem: Fall Injury Risk  Goal: Absence of Fall and Fall-Related Injury    Intervention: Promote Injury-Free Environment   03/21/19 0500   Optimize Balance and Safe Activity   Safety Promotion/Fall Prevention side rails raised x 2;bed alarm set

## 2019-03-21 NOTE — ASSESSMENT & PLAN NOTE
1.  Admit to medical-surgical unit for 23 observation for transfusion  2.  Type and cross for 3 units packed red blood cells  3.  Premedicate with Benadryl and Tylenol prior to the 1st packed red blood cell unit  4.  Continue home medications  5.  Patient may be discharged after transfusion if stable.    03/21/2019:  1.  Discharge to home  2.  Follow up with primary care physician within 1 week for recheck of hemoglobin and hematocrit  3.  Continue other home medications as ordered.

## 2019-03-21 NOTE — DISCHARGE SUMMARY
[facility] Surg  Hospital Medicine  Discharge Summary      Patient Name: Oriana Tobar  MRN: [id number]  Admission Date: 3/20/2019  Hospital Length of Stay: 0 days  Discharge Date and Time:  03/21/2019 9:08 AM  Attending Physician: Tito Mathews MD   Discharging Provider: Tito Mathews MD  Primary Care Provider: Carine Baltazar NP      HPI:   Patient is a 73-year-old who presented to her primary care physician this morning and was found to be anemic at 6.3 and 21 hematocrit.  Patient has a long history of iron deficiency and chronic anemia due to blood loss.  Does require transfusions intermittently.  Patient states symptoms of dizziness, lightheadedness, and mild shortness of breath.  This was thought to be related to her extremely low hemoglobin level of 6.3.  Otherwise patient denies any other problems fever chills nausea vomiting and is only admitted for observation for transfusion.    * No surgery found *      Hospital Course:   03/21/2019:  Patient is asymptomatic this morning having no shortness of breath dizziness or weakness.  Eating and tolerating her diet.  Patient tolerated her transfusion very well and will be discharged to home for follow-up with her primary care physician within 1 week to recheck her H&H     Consults:     * Symptomatic anemia    1.  Admit to medical-surgical unit for 23 observation for transfusion  2.  Type and cross for 3 units packed red blood cells  3.  Premedicate with Benadryl and Tylenol prior to the 1st packed red blood cell unit  4.  Continue home medications  5.  Patient may be discharged after transfusion if stable.    03/21/2019:  1.  Discharge to home  2.  Follow up with primary care physician within 1 week for recheck of hemoglobin and hematocrit  3.  Continue other home medications as ordered.         Final Active Diagnoses:    Diagnosis Date Noted POA    PRINCIPAL PROBLEM:  Symptomatic anemia [D64.9] 10/17/2018 Yes       Problems Resolved During this Admission:       Discharged Condition: good    Disposition:     Follow Up:  Follow-up Information     Lucia Springer NP. Go on 3/29/2019.    Specialty:  Family Medicine  Why:  appointment time: 9:20am  Contact information:  [address]  [address]ND FLOOR  [facility] [place] [address]  [phone]                 Patient Instructions:   No discharge procedures on file.    Significant Diagnostic Studies: Labs: All labs within the past 24 hours have been reviewed    Pending Diagnostic Studies:     None         Medications:  Reconciled Home Medications:      Medication List      CONTINUE taking these medications    ferrous sulfate 325 (65 FE) MG EC tablet  Take 325 mg by mouth once daily.     losartan 50 MG tablet  Commonly known as:  COZAAR  Take 50 mg by mouth once daily.     pantoprazole 40 MG tablet  Commonly known as:  PROTONIX  Take 1 tablet (40 mg total) by mouth once daily.     sucralfate 100 mg/mL suspension  Commonly known as:  CARAFATE  Take 10 mLs (1 g total) by mouth 4 (four) times daily before meals and nightly.            Indwelling Lines/Drains at time of discharge:   Lines/Drains/Airways          None          Time spent on the discharge of patient: 35 minutes  Patient was seen and examined on the date of discharge and determined to be suitable for discharge.         Tito Mathews MD  Department of Hospital Medicine  [facility] - Med Surg

## 2019-03-21 NOTE — NURSING
Discharge orders received and reviewed with patient and family. 20G RH dc'ed, catheter intact, and pressure dressing applied. Pt transported off unit via WC to POV.

## 2019-03-21 NOTE — DISCHARGE INSTRUCTIONS
Anemia, Type Not Specified (Adult)  Red blood cells carry oxygen to the tissues of your body. Anemia is a condition in which you have too few red blood cells. You need iron to make red blood cells. The most common cause of anemia is not having enough iron. This may be because of:  · Loss of blood. This can be caused by heavy menstrual periods. It can also be caused by bleeding from the stomach or intestines.  · Poor diet. You may not be eating enough foods that contain iron.  Other causes of anemia include certain vitamin deficiencies, chronic kidney disease, and certain other chronic illnesses.  Anemia makes you to feel tired and run down. When anemia becomes severe, your skin becomes pale. You may feel short of breath after physical activity. Other symptoms include:  · Headaches  · Dizziness  · Leg cramps with physical activity  · Drowsiness  Home care  Follow these guidelines when caring for yourself at home:  · Dont overexert yourself.  · Talk with your healthcare provider before traveling by air or traveling to high altitudes.  Follow-up care  Follow up with your healthcare provider, or as advised. You may need other blood tests to find out the exact cause of your anemia. If you had testing done today, it may take several days to get all of the results. You can follow up with your own healthcare provider to get the results.  When to seek medical advice  Call your healthcare provider right away if any of these occur:  · Shortness of breath or chest pain  · Dizziness or fainting gets worse  · Vomiting blood or passing red or black-colored stool  Date Last Reviewed: 2/25/2016  © 9739-5998 [employer]. [address] [place] [address], [place] [address]. All rights reserved. This information is not intended as a substitute for professional medical care. Always follow your healthcare professional's instructions.

## 2019-03-21 NOTE — PLAN OF CARE
Problem: Anemia  Goal: Anemia Symptom Improvement    Intervention: Monitor and Manage Anemia   03/20/19 1906   Monitor and Manage Anemia   Fatigue Management activity assistance provided;fatigue-related activity identified   Monitor and Manage Anemia   Oral Nutrition Promotion rest periods promoted;medicated   Blood transfusions initiated.

## 2019-03-21 NOTE — PLAN OF CARE
Problem: Fall Injury Risk  Goal: Absence of Fall and Fall-Related Injury  Outcome: Ongoing (interventions implemented as appropriate)  Intervention: Identify and Manage Contributors to Fall Injury Risk   03/21/19 0726   Manage Acute Allergic Reaction   Medication Review/Management medications reviewed;dosing adjusted;high risk medications identified   Identify and Manage Contributors to Fall Injury Risk   Self-Care Promotion independence encouraged;BADL personal objects within reach;BADL personal routines maintained;meal setup provided;safe use of adaptive equipment encouraged

## 2019-03-21 NOTE — HOSPITAL COURSE
03/21/2019:  Patient is asymptomatic this morning having no shortness of breath dizziness or weakness.  Eating and tolerating her diet.  Patient tolerated her transfusion very well and will be discharged to home for follow-up with her primary care physician within 1 week to recheck her H&H

## 2019-03-21 NOTE — PLAN OF CARE
03/21/19 0917   Final Note   Assessment Type Final Discharge Note   Anticipated Discharge Disposition Home   What phone number can be called within the next 1-3 days to see how you are doing after discharge? [phone]   Hospital Follow Up  Appt(s) scheduled? Yes   Discharge plans and expectations educations in teach back method with documentation complete? Yes   Verbal & written follow up appointment provided to patient, daughter & granddaughter. States she is very happy to see someone here who can keep track of her labs & order blood as an outpatient instead of having to come into the hospital to get them. Denies any other discharge needs at this time.

## 2019-03-23 NOTE — ED PROVIDER NOTES
Encounter Date: 3/20/2019       History     Chief Complaint   Patient presents with    Anemia     Pt sent by Carine parham for hgb 5.9 via bedside testing.      73-year-old female presents with family from local primary care office where she had labs performed given her recent fatigue and history of profound anemia requiring transfusion therapy    She does not have any noted increased work of breathing or any acute hypotension but was found with a hemoglobin of 5.9 per the outside lab    She has history of dementia and provides no significant history, history of present illness or review of systems      The history is provided by a relative.     Review of patient's allergies indicates:   Allergen Reactions    Codeine Other (See Comments)     Pt shakes and hallucinates    Pcn [penicillins] Anxiety and Other (See Comments)     Past Medical History:   Diagnosis Date    Cancer     kidney    Dementia     Encounter for blood transfusion     2018    High cholesterol 2018    Hypertension     No Medication     Renal disorder     Wears dentures     Uppers     Past Surgical History:   Procedure Laterality Date     SECTION      COLONOSCOPY N/A 2018    [facility]O    CYSTOSCOPY WITH RETROGRADE PYELOGRAM Left 1/15/2016    [facility]    EGD (ESOPHAGOGASTRODUODENOSCOPY) N/A 2018    [facility] by [facility] ENDO    EYE SURGERY      Rt eye Catarac    HYSTERECTOMY      IMAGING, GI TRACT, INTRALUMINAL, VIA CAPSULE N/A 2018    [facility] Rocco [facility] at [facility] ENDO    KIDNEY SURGERY Left 2018    Left kidney removed due to cancer    lasix surgery      NEPHRECTOMY-RADICAL   Left 2016    [facility]    REPAIR-HERNIA-VENTRAL N/A 2016    [facility] by [facility] at [facility] OR    RESECTION-BOWEL  2016    [facility]formed  by [facility] at [facility]    URETEROSCOPY  1/15/2016    [facility] at [facility] OR     No family history on file.  Social History     Tobacco Use    Smoking status: Former Smoker     Packs/day: 1.00     Years: 50.00     Pack years: 50.00     Last attempt to quit: 10/6/2018     Years since quittin.4    Smokeless tobacco: Never Used    Tobacco comment: pt has stopped; encouraged to remain stopped   Substance Use Topics    Alcohol use: No     Frequency: Never     Comment: last alcohol intake friday    Drug use: No     Review of Systems   Unable to perform ROS: Dementia       Physical Exam     Initial Vitals [19 0914]   BP Pulse Resp Temp SpO2   99/60 108 20 97.7 °F (36.5 °C) 100 %      MAP       --         Physical Exam    Nursing note and vitals reviewed.  Constitutional: She appears well-developed and well-nourished. She is not diaphoretic. No distress.   HENT:   Head: Normocephalic and atraumatic.   Mouth/Throat: Oropharynx is clear and moist.   Eyes: EOM are normal. Pupils are equal, round, and reactive to light.   Pale sclera    Neck: Normal range of motion. Neck supple. No JVD present.   Cardiovascular: Regular rhythm, normal heart sounds and intact distal pulses. Tachycardia present.    Pulmonary/Chest: Breath sounds normal.   Abdominal: Soft. Bowel sounds are normal.   Neurological: She is alert. She is disoriented. GCS score is 15. GCS eye subscore is 4. GCS verbal subscore is 5. GCS motor subscore is 6.   Skin: Skin is warm and dry. Capillary refill takes less than 2 seconds.   Psychiatric: She has a normal mood and affect.         ED Course   Procedures  Labs Reviewed   CBC W/ AUTO DIFFERENTIAL - Abnormal; Notable for the following components:       Result Value    RBC 2.40 (*)     Hemoglobin 6.3 (*)     Hematocrit 21.0 (*)     MCH 26.3 (*)     MCHC 30.0 (*)     RDW 14.7 (*)     Platelets 671 (*)     MPV 8.9 (*)     All other components within normal limits    BASIC METABOLIC PANEL - Abnormal; Notable for the following components:    Sodium 129 (*)     CO2 17 (*)     Glucose 126 (*)     Creatinine 1.5 (*)     Calcium 8.1 (*)     eGFR if  39.6 (*)     eGFR if non  34.3 (*)     All other components within normal limits   TYPE & SCREEN          Imaging Results    None          Medical Decision Making:   History:   I obtained history from: someone other than patient.  Old Medical Records: I decided to obtain old medical records.  Old Records Summarized: records from previous admission(s).  Clinical Tests:   Lab Tests: Ordered and Reviewed  Other:   I have discussed this case with another health care provider.       <> Summary of the Discussion: Dr Garcia     Admitted for PRBC transfusion therapy    Stable to floor.                           Clinical Impression:       ICD-10-CM ICD-9-CM   1. Symptomatic anemia D64.9 285.9         Disposition:   Disposition: Placed in Observation  Condition: Stable                        Davy Eddy MD  03/23/19 0253

## 2019-03-29 ENCOUNTER — HOSPITAL ENCOUNTER (OUTPATIENT)
Facility: HOSPITAL | Age: 74
Discharge: HOME | End: 2019-03-30
Attending: FAMILY MEDICINE | Admitting: INTERNAL MEDICINE
Payer: MEDICARE

## 2019-03-29 ENCOUNTER — OFFICE VISIT (OUTPATIENT)
Dept: FAMILY MEDICINE | Facility: CLINIC | Age: 74
End: 2019-03-29
Payer: MEDICARE

## 2019-03-29 ENCOUNTER — TELEPHONE (OUTPATIENT)
Dept: FAMILY MEDICINE | Facility: CLINIC | Age: 74
End: 2019-03-29

## 2019-03-29 VITALS
WEIGHT: 160.38 LBS | DIASTOLIC BLOOD PRESSURE: 74 MMHG | HEART RATE: 106 BPM | HEIGHT: 71 IN | BODY MASS INDEX: 22.45 KG/M2 | SYSTOLIC BLOOD PRESSURE: 118 MMHG | RESPIRATION RATE: 18 BRPM | OXYGEN SATURATION: 98 %

## 2019-03-29 DIAGNOSIS — K92.1 GASTROINTESTINAL HEMORRHAGE WITH MELENA: ICD-10-CM

## 2019-03-29 DIAGNOSIS — C26.9 GI CANCER: ICD-10-CM

## 2019-03-29 DIAGNOSIS — R55 NEAR SYNCOPE: ICD-10-CM

## 2019-03-29 DIAGNOSIS — K92.2 GASTROINTESTINAL HEMORRHAGE, UNSPECIFIED GASTROINTESTINAL HEMORRHAGE TYPE: Primary | ICD-10-CM

## 2019-03-29 DIAGNOSIS — D50.0 CHRONIC BLOOD LOSS ANEMIA: Primary | ICD-10-CM

## 2019-03-29 LAB
ABO + RH BLD: NORMAL
ALBUMIN SERPL BCP-MCNC: 2.9 G/DL (ref 3.5–5.2)
ALP SERPL-CCNC: 63 U/L (ref 55–135)
ALT SERPL W/O P-5'-P-CCNC: 7 U/L (ref 10–44)
ANION GAP SERPL CALC-SCNC: 11 MMOL/L (ref 8–16)
AST SERPL-CCNC: 14 U/L (ref 10–40)
BASOPHILS # BLD AUTO: 0.07 K/UL (ref 0–0.2)
BASOPHILS NFR BLD: 0.6 % (ref 0–1.9)
BILIRUB SERPL-MCNC: 0.2 MG/DL (ref 0.1–1)
BLD GP AB SCN CELLS X3 SERPL QL: NORMAL
BLD PROD TYP BPU: NORMAL
BLOOD UNIT EXPIRATION DATE: NORMAL
BLOOD UNIT TYPE CODE: 6200
BLOOD UNIT TYPE: NORMAL
BUN SERPL-MCNC: 19 MG/DL (ref 8–23)
CALCIUM SERPL-MCNC: 8.6 MG/DL (ref 8.7–10.5)
CHLORIDE SERPL-SCNC: 103 MMOL/L (ref 95–110)
CO2 SERPL-SCNC: 21 MMOL/L (ref 23–29)
CODING SYSTEM: NORMAL
CREAT SERPL-MCNC: 1.1 MG/DL (ref 0.5–1.4)
DIFFERENTIAL METHOD: ABNORMAL
DISPENSE STATUS: NORMAL
EOSINOPHIL # BLD AUTO: 0.1 K/UL (ref 0–0.5)
EOSINOPHIL NFR BLD: 0.8 % (ref 0–8)
ERYTHROCYTE [DISTWIDTH] IN BLOOD BY AUTOMATED COUNT: 14.6 % (ref 11.5–14.5)
EST. GFR  (AFRICAN AMERICAN): 57.6 ML/MIN/1.73 M^2
EST. GFR  (NON AFRICAN AMERICAN): 49.9 ML/MIN/1.73 M^2
GLUCOSE SERPL-MCNC: 116 MG/DL (ref 70–110)
HCT VFR BLD AUTO: 21.6 % (ref 37–48.5)
HGB BLD-MCNC: 6.5 G/DL (ref 12–16)
IMM GRANULOCYTES # BLD AUTO: 0.05 K/UL (ref 0–0.04)
IMM GRANULOCYTES NFR BLD AUTO: 0.4 % (ref 0–0.5)
INR PPP: 1.2 (ref 0.8–1.2)
LYMPHOCYTES # BLD AUTO: 3.2 K/UL (ref 1–4.8)
LYMPHOCYTES NFR BLD: 26.3 % (ref 18–48)
MCH RBC QN AUTO: 26.1 PG (ref 27–31)
MCHC RBC AUTO-ENTMCNC: 30.1 G/DL (ref 32–36)
MCV RBC AUTO: 87 FL (ref 82–98)
MONOCYTES # BLD AUTO: 0.9 K/UL (ref 0.3–1)
MONOCYTES NFR BLD: 7.3 % (ref 4–15)
NEUTROPHILS # BLD AUTO: 7.9 K/UL (ref 1.8–7.7)
NEUTROPHILS NFR BLD: 64.6 % (ref 38–73)
NRBC BLD-RTO: 0 /100 WBC
NUM UNITS TRANS PACKED RBC: NORMAL
OB PNL STL: POSITIVE
PLATELET # BLD AUTO: 482 K/UL (ref 150–350)
PMV BLD AUTO: 8.8 FL (ref 9.2–12.9)
POCT GLUCOSE: 126 MG/DL (ref 70–110)
POTASSIUM SERPL-SCNC: 3.6 MMOL/L (ref 3.5–5.1)
PROT SERPL-MCNC: 6.8 G/DL (ref 6–8.4)
PROTHROMBIN TIME: 14 SEC (ref 9–12.5)
RBC # BLD AUTO: 2.49 M/UL (ref 4–5.4)
SODIUM SERPL-SCNC: 135 MMOL/L (ref 136–145)
WBC # BLD AUTO: 12.19 K/UL (ref 3.9–12.7)

## 2019-03-29 PROCEDURE — 25000003 PHARM REV CODE 250: Performed by: FAMILY MEDICINE

## 2019-03-29 PROCEDURE — 86901 BLOOD TYPING SEROLOGIC RH(D): CPT

## 2019-03-29 PROCEDURE — 82962 GLUCOSE BLOOD TEST: CPT

## 2019-03-29 PROCEDURE — 25000003 PHARM REV CODE 250: Performed by: INTERNAL MEDICINE

## 2019-03-29 PROCEDURE — 1101F PT FALLS ASSESS-DOCD LE1/YR: CPT | Mod: CPTII,S$GLB,, | Performed by: NURSE PRACTITIONER

## 2019-03-29 PROCEDURE — G0378 HOSPITAL OBSERVATION PER HR: HCPCS

## 2019-03-29 PROCEDURE — 99215 OFFICE O/P EST HI 40 MIN: CPT | Mod: S$GLB,,, | Performed by: NURSE PRACTITIONER

## 2019-03-29 PROCEDURE — 85025 COMPLETE CBC W/AUTO DIFF WBC: CPT

## 2019-03-29 PROCEDURE — 3074F PR MOST RECENT SYSTOLIC BLOOD PRESSURE < 130 MM HG: ICD-10-PCS | Mod: CPTII,S$GLB,, | Performed by: NURSE PRACTITIONER

## 2019-03-29 PROCEDURE — 36430 TRANSFUSION BLD/BLD COMPNT: CPT

## 2019-03-29 PROCEDURE — 85610 PROTHROMBIN TIME: CPT

## 2019-03-29 PROCEDURE — 82272 OCCULT BLD FECES 1-3 TESTS: CPT

## 2019-03-29 PROCEDURE — 80053 COMPREHEN METABOLIC PANEL: CPT

## 2019-03-29 PROCEDURE — 94761 N-INVAS EAR/PLS OXIMETRY MLT: CPT

## 2019-03-29 PROCEDURE — 3074F SYST BP LT 130 MM HG: CPT | Mod: CPTII,S$GLB,, | Performed by: NURSE PRACTITIONER

## 2019-03-29 PROCEDURE — P9016 RBC LEUKOCYTES REDUCED: HCPCS

## 2019-03-29 PROCEDURE — 36415 COLL VENOUS BLD VENIPUNCTURE: CPT

## 2019-03-29 PROCEDURE — 86922 COMPATIBILITY TEST ANTIGLOB: CPT | Mod: 59

## 2019-03-29 PROCEDURE — 99215 PR OFFICE/OUTPT VISIT, EST, LEVL V, 40-54 MIN: ICD-10-PCS | Mod: S$GLB,,, | Performed by: NURSE PRACTITIONER

## 2019-03-29 PROCEDURE — 1101F PR PT FALLS ASSESS DOC 0-1 FALLS W/OUT INJ PAST YR: ICD-10-PCS | Mod: CPTII,S$GLB,, | Performed by: NURSE PRACTITIONER

## 2019-03-29 PROCEDURE — 99285 EMERGENCY DEPT VISIT HI MDM: CPT | Mod: 25

## 2019-03-29 PROCEDURE — 3078F PR MOST RECENT DIASTOLIC BLOOD PRESSURE < 80 MM HG: ICD-10-PCS | Mod: CPTII,S$GLB,, | Performed by: NURSE PRACTITIONER

## 2019-03-29 PROCEDURE — 3078F DIAST BP <80 MM HG: CPT | Mod: CPTII,S$GLB,, | Performed by: NURSE PRACTITIONER

## 2019-03-29 RX ORDER — HYDROCODONE BITARTRATE AND ACETAMINOPHEN 500; 5 MG/1; MG/1
TABLET ORAL
Status: DISCONTINUED | OUTPATIENT
Start: 2019-03-29 | End: 2019-03-30 | Stop reason: HOSPADM

## 2019-03-29 RX ORDER — ACETAMINOPHEN 325 MG/1
650 TABLET ORAL
Status: CANCELLED | OUTPATIENT
Start: 2019-03-29

## 2019-03-29 RX ORDER — SUCRALFATE 1 G/10ML
1 SUSPENSION ORAL
Status: DISCONTINUED | OUTPATIENT
Start: 2019-03-29 | End: 2019-03-30 | Stop reason: HOSPADM

## 2019-03-29 RX ORDER — HYDROCODONE BITARTRATE AND ACETAMINOPHEN 500; 5 MG/1; MG/1
TABLET ORAL ONCE
Status: CANCELLED | OUTPATIENT
Start: 2019-03-29 | End: 2019-03-29

## 2019-03-29 RX ORDER — SODIUM CHLORIDE 0.9 % (FLUSH) 0.9 %
10 SYRINGE (ML) INJECTION
Status: DISCONTINUED | OUTPATIENT
Start: 2019-03-29 | End: 2019-03-30 | Stop reason: HOSPADM

## 2019-03-29 RX ORDER — DIPHENHYDRAMINE HYDROCHLORIDE 50 MG/ML
25 INJECTION INTRAMUSCULAR; INTRAVENOUS
Status: CANCELLED | OUTPATIENT
Start: 2019-03-29

## 2019-03-29 RX ORDER — PANTOPRAZOLE SODIUM 40 MG/1
40 TABLET, DELAYED RELEASE ORAL DAILY
Status: DISCONTINUED | OUTPATIENT
Start: 2019-03-29 | End: 2019-03-30 | Stop reason: HOSPADM

## 2019-03-29 RX ORDER — SODIUM CHLORIDE 0.9 % (FLUSH) 0.9 %
10 SYRINGE (ML) INJECTION
Status: CANCELLED | OUTPATIENT
Start: 2019-03-29

## 2019-03-29 RX ADMIN — SODIUM CHLORIDE 500 ML: 9 INJECTION, SOLUTION INTRAVENOUS at 10:03

## 2019-03-29 RX ADMIN — SUCRALFATE 1 G: 1 SUSPENSION ORAL at 05:03

## 2019-03-29 NOTE — PROGRESS NOTES
"Chief Complaint  Chief Complaint   Patient presents with    Follow-up    Anemia       HPI:  Oriana Tobar is a 73 y.o. female with medical diagnoses as listed and reviewed within the medical history and problem list that presents for an evaluation of anemia. Pt has chronic bleeding due to metastatic cancer in her small bowel, kidney, and pelvis. They have elected not to do treatment due to the extent of the cancer. However, she has been getting blood transfusions about every 10 - 14 days due to symptomatic anemia. She becomes weak and dizzy and presents to ER. She is usually admitted for OBS, given the blood and discharged. The plan is to keep the patient out of the ER and maybe try to do her transfusions outpatient. However, the patient usually requires more than 2 units of blood and that is the maximum OPT is able to transfuse. Today, pt reports that she feels fatigued but no dizziness or severe weakness. She continues to have blood in her stool. She does not report any other active bleeding noted. She is slightly tachycardic today. I want to check her blood count today and make a plan based on her current levels. I am thinking that she needs weekly cbc's and blood in order to prevent her from dropping too low and becoming symptomatic. She denies SOB or chest pain today. Just feels "tired". She is AA & O x3.         PAST MEDICAL HISTORY:  Past Medical History:   Diagnosis Date    Anemia     Cancer     kidney    Dementia     Encounter for blood transfusion     2018    High cholesterol 2018    Hypertension     No Medication     Renal disorder     Wears dentures     Uppers       PAST SURGICAL HISTORY:  Past Surgical History:   Procedure Laterality Date     SECTION      COLONOSCOPY N/A 2018    [facility]    CYSTOSCOPY WITH RETROGRADE PYELOGRAM Left 1/15/2016    [facility]    EGD " (ESOPHAGOGASTRODUODENOSCOPY) N/A 2018    [facility]    EYE SURGERY      Rt eye Catarac    HYSTERECTOMY      IMAGING, GI TRACT, INTRALUMINAL, VIA CAPSULE N/A 2018    [facility] ENDO    KIDNEY SURGERY Left 2018    Left kidney removed due to cancer    lasix surgery      NEPHRECTOMY-RADICAL   Left 2016    [facility]    REPAIR-HERNIA-VENTRAL N/A 2016    [facility]    RESECTION-BOWEL  2016    [facility]    URETEROSCOPY  1/15/2016    [facility] OR       SOCIAL HISTORY:  Social History     Socioeconomic History    Marital status:      Spouse name: Not on file    Number of children: Not on file    Years of education: Not on file    Highest education level: Not on file   Occupational History    Not on file   Social Needs    Financial resource strain: Not on file    Food insecurity:     Worry: Not on file     Inability: Not on file    Transportation needs:     Medical: Not on file     Non-medical: Not on file   Tobacco Use    Smoking status: Former Smoker     Packs/day: 1.00     Years: 50.00     Pack years: 50.00     Last attempt to quit: 10/6/2018     Years since quittin.4    Smokeless tobacco: Never Used    Tobacco comment: pt has stopped; encouraged to remain stopped   Substance and Sexual Activity    Alcohol use: No     Frequency: Never     Comment: last alcohol intake friday    Drug use: No    Sexual activity: Never   Lifestyle    Physical activity:     Days per week: Not on file     Minutes per session: Not on file    Stress: Not on file   Relationships    Social connections:     Talks on phone: Not on file     Gets together: Not on file     Attends Hinduism service: Not on file     Active member of club or organization: Not on file     Attends  meetings of clubs or organizations: Not on file     Relationship status: Not on file    Intimate partner violence:     Fear of current or ex partner: Not on file     Emotionally abused: Not on file     Physically abused: Not on file     Forced sexual activity: Not on file   Other Topics Concern    Not on file   Social History Narrative    Not on file       FAMILY HISTORY:  Family History   Adopted: Yes       ALLERGIES AND MEDICATIONS: updated and reviewed.  Review of patient's allergies indicates:   Allergen Reactions    Codeine Other (See Comments)     Pt shakes and hallucinates    Pcn [penicillins] Anxiety and Other (See Comments)     No current facility-administered medications for this visit.      Current Outpatient Medications   Medication Sig Dispense Refill    cyanocobalamin-cobamamide (B12) 5,000-100 mcg Lozg       ferrous sulfate 325 (65 FE) MG EC tablet Take 325 mg by mouth once daily.      losartan (COZAAR) 50 MG tablet Take 50 mg by mouth once daily.      pantoprazole (PROTONIX) 40 MG tablet Take 1 tablet (40 mg total) by mouth once daily. 30 tablet 11    sucralfate (CARAFATE) 100 mg/mL suspension Take 10 mLs (1 g total) by mouth 4 (four) times daily before meals and nightly. 1 Bottle 11     Facility-Administered Medications Ordered in Other Visits   Medication Dose Route Frequency Provider Last Rate Last Dose    0.9%  NaCl infusion (for blood administration)   Intravenous Q24H PRN Stewart Jhaveri MD             ROS  Review of Systems   Constitutional: Positive for appetite change and fatigue. Negative for chills and fever.   HENT: Negative for congestion, postnasal drip, rhinorrhea and sore throat.    Respiratory: Negative for cough, chest tightness, shortness of breath and wheezing.    Cardiovascular: Negative for chest pain and palpitations.   Gastrointestinal: Negative for constipation, diarrhea, nausea and vomiting.   Genitourinary: Negative for dysuria.   Musculoskeletal: Negative  "for myalgias.   Skin: Negative for color change and rash.        Reports that she gets "gray" when she needs blood.    Neurological: Negative for dizziness, weakness and headaches.        No dizziness or weakness this morning.    Psychiatric/Behavioral: Negative for confusion and sleep disturbance. The patient is not nervous/anxious.            PHYSICAL EXAM  Vitals:    03/29/19 0916   BP: 118/74   BP Location: Left arm   Patient Position: Sitting   Pulse: 106   Resp: 18   SpO2: 98%   Weight: 72.8 kg (160 lb 6.4 oz)   Height: 5' 11" (1.803 m)    Body mass index is 22.37 kg/m².  Weight: 72.8 kg (160 lb 6.4 oz)   Height: 5' 11" (180.3 cm)       Physical Exam   Constitutional: She is oriented to person, place, and time. She appears well-developed and well-nourished. She has a sickly appearance.   HENT:   Head: Normocephalic.   Eyes: Pupils are equal, round, and reactive to light.   Neck: Normal range of motion.   Cardiovascular: Regular rhythm, S1 normal and S2 normal. Tachycardia present.   No murmur heard.  Pulmonary/Chest: Effort normal and breath sounds normal. She has no wheezes.   Abdominal: Soft. Normal appearance and bowel sounds are normal. She exhibits no distension. There is tenderness.   Generalized tenderness with very light palpation. Did not deep palpate.   Musculoskeletal: Normal range of motion.   Generalized weakness. Uses wheelchair but ambulates short distances.    Neurological: She is alert and oriented to person, place, and time.   Skin: Skin is warm and dry. Capillary refill takes less than 2 seconds.   Psychiatric: She has a normal mood and affect. Her speech is normal and behavior is normal. Thought content normal. Cognition and memory are normal.   Vitals reviewed.        Health Maintenance       Date Due Completion Date    Hepatitis C Screening 1945 ---    TETANUS VACCINE 06/28/1963 ---    Mammogram 06/28/1985 ---    DEXA SCAN 06/28/1985 ---    Zoster Vaccine 06/28/2005 ---    LDCT Lung " Screen 01/13/2017 1/13/2016    Pneumococcal Vaccine (65+ High/Highest Risk) (2 of 2 - PPSV23) 12/25/2018 10/30/2018    Lipid Panel 08/31/2023 8/31/2018    Colonoscopy 06/25/2028 6/25/2018               Assessment & Plan    Oriana was seen today for follow-up and anemia.    Diagnoses and all orders for this visit:    Gastrointestinal hemorrhage, unspecified gastrointestinal hemorrhage type  -     CBC auto differential; Future  -     Comprehensive metabolic panel; Future   Standing  -     CBC auto differential; Standing  -     Basic metabolic panel; Standing    - Will get labs and possible blood transfusion today.  - Will update labs weekly with transfusions PRN to help prevent the symptoms that she has with severe anemia.   - I am recommending her to see  as she has not seen her since November and for her to manage patient blood levels and transfusions from a hematology standpoint.     - I had a very long discussion with patient and 3 family members in the room. They are not willing to get patient back on home health or hospice. They are reporting that HH wanted to drop them because the pt needs hospice and hospice would not allow them to give her blood. Family states that they are not ready to give up yet. Pt is willing to continue current treatment.     Follow-up: Follow up in about 1 month (around 4/26/2019).      Risks, benefits, and side effects were discussed with the patient. All questions were answered to the fullest satisfaction of the patient, and pt verbalized understanding and agreement to treatment plan. Pt was to call with any new or worsening symptoms, or present to the ER.

## 2019-03-29 NOTE — H&P
[facility] - ED  Hospital Medicine  History & Physical    Patient Name: Oriana Tobar  MRN: [id number]  Admission Date: 3/29/2019  Attending Physician: Stewart Jhaveri MD   Primary Care Provider: Carine Baltazar NP         Patient information was obtained from patient, relative(s) and ER records.     Subjective:     Principal Problem:GI bleed    Chief Complaint:   Chief Complaint   Patient presents with    Loss of Consciousness     syncopal episode in lab department while having blood drawn        HPI: 2019.  Patient has long complex history symptomatic anemia chronic blood loss anemia secondary to a GI cancer peptic ulcer disease. Patient's H&H today is 6.5/21.  It was attempting to place the patient as an outpatient to transfuse her today shows syncopal episode in lab and was sent to the emergency room.  The patient will be placed in observation since there is some question whether not she could walk out on her own power at this time.  My interview in the emergency room I feel the patient probably could she said she could dense I do hear if she does get out.  Patient uses transfused every 10-14 days.  Patient has end-stage GI cancer chronic gastric ulceration.    Past Medical History:   Diagnosis Date    Anemia     Cancer     kidney    Dementia     Encounter for blood transfusion     2018    High cholesterol 2018    Hypertension     No Medication     Renal disorder     Wears dentures     Uppers       Past Surgical History:   Procedure Laterality Date     SECTION      COLONOSCOPY N/A 2018    [facility]O    CYSTOSCOPY WITH RETROGRADE PYELOGRAM Left 1/15/2016    [facility]    EGD (ESOPHAGOGASTRODUODENOSCOPY) N/A 2018    [facility] ENDO    EYE SURGERY      Rt eye Catarac    HYSTERECTOMY      IMAGING, GI TRACT,  INTRALUMINAL, VIA CAPSULE N/A 2018    [facility] ENDO    KIDNEY SURGERY Left 2018    Left kidney removed due to cancer    lasix surgery      NEPHRECTOMY-RADICAL   Left 2016    [facility] OR    REPAIR-HERNIA-VENTRAL N/A 2016    [facility] OR    RESECTION-BOWEL  2016    [facility] at [facility] OR    URETEROSCOPY  1/15/2016    [facility] OR       Review of patient's allergies indicates:   Allergen Reactions    Codeine Other (See Comments)     Pt shakes and hallucinates    Pcn [penicillins] Anxiety and Other (See Comments)       No current facility-administered medications on file prior to encounter.      Current Outpatient Medications on File Prior to Encounter   Medication Sig    cyanocobalamin-cobamamide (B12) 5,000-100 mcg Lozg     ferrous sulfate 325 (65 FE) MG EC tablet Take 325 mg by mouth once daily.    losartan (COZAAR) 50 MG tablet Take 50 mg by mouth once daily.    pantoprazole (PROTONIX) 40 MG tablet Take 1 tablet (40 mg total) by mouth once daily.    sucralfate (CARAFATE) 100 mg/mL suspension Take 10 mLs (1 g total) by mouth 4 (four) times daily before meals and nightly.     Family History     None        Tobacco Use    Smoking status: Former Smoker     Packs/day: 1.00     Years: 50.00     Pack years: 50.00     Last attempt to quit: 10/6/2018     Years since quittin.4    Smokeless tobacco: Never Used    Tobacco comment: pt has stopped; encouraged to remain stopped   Substance and Sexual Activity    Alcohol use: No     Frequency: Never     Comment: last alcohol intake friday    Drug use: No    Sexual activity: Never     Review of Systems   Constitutional: Positive for activity change. Negative for appetite change, chills, diaphoresis, fatigue, fever and unexpected weight change.   HENT: Negative for congestion,  drooling, hearing loss and trouble swallowing.    Eyes: Negative for pain and visual disturbance.   Respiratory: Negative.  Negative for apnea, cough, choking, chest tightness, shortness of breath and wheezing.    Cardiovascular: Negative for chest pain, palpitations and leg swelling.   Gastrointestinal: Negative for abdominal distention, abdominal pain, blood in stool, constipation, diarrhea, nausea and vomiting.   Endocrine: Negative for polydipsia, polyphagia and polyuria.   Genitourinary: Negative for difficulty urinating, dysuria and flank pain.   Musculoskeletal: Negative for arthralgias, back pain, gait problem, joint swelling, neck pain and neck stiffness.   Skin: Negative for color change, rash and wound.   Allergic/Immunologic: Negative for environmental allergies, food allergies and immunocompromised state.   Neurological: Positive for dizziness, syncope, weakness and light-headedness. Negative for numbness and headaches.   Hematological: Negative for adenopathy.   Psychiatric/Behavioral: Negative for agitation, confusion and sleep disturbance. The patient is not nervous/anxious.      Objective:     Vital Signs (Most Recent):  Temp: 97.9 °F (36.6 °C) (03/29/19 1128)  Pulse: 81 (03/29/19 1247)  Resp: 18 (03/29/19 1247)  BP: 109/66 (03/29/19 1247)  SpO2: 100 % (03/29/19 1247) Vital Signs (24h Range):  Temp:  [97.9 °F (36.6 °C)] 97.9 °F (36.6 °C)  Pulse:  [] 81  Resp:  [6-19] 18  SpO2:  [97 %-100 %] 100 %  BP: ()/(55-80) 109/66     Weight: 72.6 kg (160 lb)  Body mass index is 22.32 kg/m².    Physical Exam   Constitutional: She is oriented to person, place, and time. She appears well-developed and well-nourished.   HENT:   Head: Normocephalic and atraumatic.   Right Ear: External ear normal.   Left Ear: External ear normal.   Nose: Nose normal.   Eyes: Pupils are equal, round, and reactive to light. Conjunctivae, EOM and lids are normal.   Neck: Trachea normal, normal range of motion and full  passive range of motion without pain. Neck supple.   Cardiovascular: Normal rate, regular rhythm, S1 normal, S2 normal, normal heart sounds, intact distal pulses and normal pulses.   Pulmonary/Chest: Effort normal and breath sounds normal.   Abdominal: Soft. Normal aorta and bowel sounds are normal.   Musculoskeletal: Normal range of motion.   Neurological: She is alert and oriented to person, place, and time. She has normal reflexes.   Skin: Skin is warm, dry and intact.   Psychiatric: She has a normal mood and affect. Her speech is normal and behavior is normal. Judgment and thought content normal. Cognition and memory are normal.   Nursing note and vitals reviewed.        CRANIAL NERVES     CN III, IV, VI   Pupils are equal, round, and reactive to light.  Extraocular motions are normal.        Significant Labs:   BMP:   Recent Labs   Lab 03/29/19  1032   *   *   K 3.6      CO2 21*   BUN 19   CREATININE 1.1   CALCIUM 8.6*     CBC:   Recent Labs   Lab 03/29/19  1032   WBC 12.19   HGB 6.5*   HCT 21.6*   *     CMP:   Recent Labs   Lab 03/29/19  1032   *   K 3.6      CO2 21*   *   BUN 19   CREATININE 1.1   CALCIUM 8.6*   PROT 6.8   ALBUMIN 2.9*   BILITOT 0.2   ALKPHOS 63   AST 14   ALT 7*   ANIONGAP 11   EGFRNONAA 49.9*       Significant Imaging: I have reviewed and interpreted all pertinent imaging results/findings within the past 24 hours.    Assessment/Plan:     * GI bleed  03/29/2019.  Chronic blood-loss anemia symptomatic anemia GI bleed.  Patient has melena stool.  Patient is does is on a regular basis.  She usually comes in 10-14 days and has received 3 units of blood.  Patient is in stage GI cancer gastric ulceration there has been no further attempt to treat this patient aggressively simply because of her stage of disease. Patient will be admitted under observation and transfused and possibly discharged home at that time.  Patient is very active in the emergency  room at this time patient is very lucid and alert she states that she can walk and she can't walk out if we would let her this time.        VTE Risk Mitigation (From admission, onward)        Ordered     IP VTE HIGH RISK PATIENT  Once      03/29/19 1303     Place RORY hose  Until discontinued      03/29/19 1303             Buck Hudson MD  Department of Hospital Medicine   [facility] - ED

## 2019-03-29 NOTE — HOSPITAL COURSE
Patient she will be placed under observation.  She was received 3 units of blood and in determine whether not she can be discharged home at that time.  03/30/2019.  Patient be discharged home.  Patient even though felt to have syncopal episode yesterday and collapse in laboratory.  By the time I saw her in emergency room the patient was able to ambulate she is transferred to a Room patient ambulated from the stretcher to the bathroom to the bed.  She has been transfused her H&H is 9 and 29 the patient will follow up with around the read CFNP and will be scheduled for outpatient transfusions.

## 2019-03-29 NOTE — PLAN OF CARE
03/29/19 1759   HOWELL Message   Medicare Outpatient and Observation Notification regarding financial responsibility Given to patient/caregiver;Explained to patient/caregiver;Signed/date by patient/caregiver   Date HOWELL was signed 03/29/19   Time HOWELL was signed 1618

## 2019-03-29 NOTE — PLAN OF CARE
03/29/19 1749   Discharge Assessment   Assessment Type Discharge Planning Assessment   Confirmed/corrected address and phone number on facesheet? Yes   Assessment information obtained from? Caregiver   Expected Length of Stay (days) 1   Communicated expected length of stay with patient/caregiver yes   Prior to hospitilization cognitive status: Alert/Oriented   Prior to hospitalization functional status: Independent   Current cognitive status: Alert/Oriented   Current Functional Status: Needs Assistance   Lives With grandchild(samy)   Able to Return to Prior Arrangements yes   Is patient able to care for self after discharge? Yes   Who are your caregiver(s) and their phone number(s)? Zoey Pardo granddaughter [phone]; Parisa Moore daughter [phone]   Patient's perception of discharge disposition home or selfcare   Readmission Within the Last 30 Days current reason for admission unrelated to previous admission   If yes, most recent facility name: [facility]   Patient currently being followed by outpatient case management? No   Patient currently receives any other outside agency services? Yes   How many hours a day does the patient receive services? 7   Name and contact number of agency or person providing outside services Nursing Management M-F 12-2:30pm; 3-7:30PM   Is it the patient/care giver preference to resume care with the current outside agency? Yes   Equipment Currently Used at Home bedside commode;shower chair   Do you have any problems affording any of your prescribed medications? No   Is the patient taking medications as prescribed? yes   Does the patient have transportation home? Yes   Transportation Anticipated family or friend will provide   Does the patient receive services at the [facility]? No   Discharge Plan A Home with family   DME Needed Upon Discharge  none   Patient/Family in Agreement with Plan yes   Readmission Questionnaire   At the time of your discharge,  did someone talk to you about what your health problems were? Yes   At the time of discharge, did someone talk to you about what to watch out for regarding worsening of your health problem? Yes   At the time of discharge, did someone talk to you about what to do if you experienced worsening of your health problem? Yes   At the time of discharge, did someone talk to you about which medication to take when you left the hospital and which ones to stop taking? Yes   At the time of discharge, did someone talk to you about when and where to follow up with a doctor after you left the hospital? Yes   What do you believe caused you to be sick enough to be re-admitted? stomach problems   How often do you need to have someone help you when you read instructions, pamphlets, or other written material from your doctor or pharmacy? Never   Do you have problems taking your medications as prescribed? No   Do you have any problems affording any of  your prescribed medications? No   Do you have problems obtaining/receiving your medications? No   Does the patient have transportation to healthcare appointments? Yes   Living Arrangements house   Does the patient have family/friends to help with healtcare needs after discharge? yes   Does your caregiver provide all the help you need? Yes   Are you currently feeling confused? No   Are you currently having problems thinking? No   Are you currently having memory problems? Yes   Have you felt down, depressed, or hopeless? 0   Have you felt little interest or pleasure in doing things? 0   In the last 7 days, my sleep quality was: very good   Patient in room with daughter & granddaughter present. Receives services M-F from Nursing Management. Denies any discharge needs at this time.

## 2019-03-29 NOTE — ED PROVIDER NOTES
Encounter Date: 3/29/2019       History     Chief Complaint   Patient presents with    Loss of Consciousness     syncopal episode in lab department while having blood drawn     73-year-old female presents to the ED brought in from the laboratory outpatient department here after apparently having a syncopal or near syncopal episode in the laboratory department she was apparently being prepared to have her labs drawn she is known to have continued GI bleeding with recurrent anemia likely related to GI condition involving cancer, she has had recurrent admissions for transfusion related to this problem she is accompanied by 2 grand daughters        Review of patient's allergies indicates:   Allergen Reactions    Codeine Other (See Comments)     Pt shakes and hallucinates    Pcn [penicillins] Anxiety and Other (See Comments)     Past Medical History:   Diagnosis Date    Anemia     Cancer     kidney    Dementia     Encounter for blood transfusion     2018    High cholesterol 2018    Hypertension     No Medication     Renal disorder     Wears dentures     Uppers     Past Surgical History:   Procedure Laterality Date     SECTION      COLONOSCOPY N/A 2018    [facility]    CYSTOSCOPY WITH RETROGRADE PYELOGRAM Left 1/15/2016    Performed by Maribell [facility]acon MD at [facility] OR    EGD (ESOPHAGOGASTRODUODENOSCOPY) N/A 2018    [facility] by [facility] at [facility] ENDO    EYE SURGERY      Rt eye Catarac    HYSTERECTOMY      IMAGING, GI TRACT, INTRALUMINAL, VIA CAPSULE N/A 2018    Performed by Rocco Ross MD at [facility] ENDO    KIDNEY SURGERY Left 2018    Left kidney removed due to cancer    lasix surgery      NEPHRECTOMY-RADICAL   Left 2016    Performed by [facility]ell [facility]acon MD at [facility] OR    REPAIR-HERNIA-VENTRAL N/A 2016    Performed by Marco A Randhawa MD at [facility] OR    RESECTION-BOWEL  2016     Performed by [facility] at [facility] OR    URETEROSCOPY  1/15/2016    Performed by [facility]ell [facility]acon MD at [facility] OR     Family History   Adopted: Yes     Social History     Tobacco Use    Smoking status: Former Smoker     Packs/day: 1.00     Years: 50.00     Pack years: 50.00     Last attempt to quit: 10/6/2018     Years since quittin.4    Smokeless tobacco: Never Used    Tobacco comment: pt has stopped; encouraged to remain stopped   Substance Use Topics    Alcohol use: No     Frequency: Never     Comment: last alcohol intake friday    Drug use: No     Review of Systems   Unable to perform ROS: Acuity of condition       Physical Exam     Initial Vitals   BP Pulse Resp Temp SpO2   19 1022 19 1022 19 1022 19 1128 19 1022   (!) 88/55 76 18 97.9 °F (36.6 °C) 97 %      MAP       --                Physical Exam    Nursing note and vitals reviewed.  Constitutional: She appears well-developed and well-nourished. She is not diaphoretic. No distress.   HENT:   Head: Normocephalic and atraumatic.   Right Ear: External ear normal.   Left Ear: External ear normal.   Eyes: Pupils are equal, round, and reactive to light. Right eye exhibits no discharge. Left eye exhibits no discharge.   Neck: No tracheal deviation present. No JVD present.   Cardiovascular: Exam reveals no friction rub.    No murmur heard.  Pulmonary/Chest: No stridor. No respiratory distress. She has no wheezes. She has no rales.   Abdominal: Bowel sounds are normal. She exhibits no distension.   Rectal exam reveals reddish brown stool and melena in the rectal vault no lesions or palpable   Musculoskeletal: Normal range of motion.   Neurological:   Patient seems dazed and is a poor historian   Skin: Skin is warm. There is pallor.   Patient is pale the skin is cool         ED Course   Procedures  Labs Reviewed   CBC W/ AUTO DIFFERENTIAL - Abnormal; Notable for the following components:       Result Value    RBC 2.49  (*)     Hemoglobin 6.5 (*)     Hematocrit 21.6 (*)     MCH 26.1 (*)     MCHC 30.1 (*)     RDW 14.6 (*)     Platelets 482 (*)     MPV 8.8 (*)     Gran # (ANC) 7.9 (*)     Immature Grans (Abs) 0.05 (*)     All other components within normal limits   COMPREHENSIVE METABOLIC PANEL - Abnormal; Notable for the following components:    Sodium 135 (*)     CO2 21 (*)     Glucose 116 (*)     Calcium 8.6 (*)     Albumin 2.9 (*)     ALT 7 (*)     eGFR if  57.6 (*)     eGFR if non  49.9 (*)     All other components within normal limits   PROTIME-INR - Abnormal; Notable for the following components:    Prothrombin Time 14.0 (*)     All other components within normal limits   OCCULT BLOOD X 1, STOOL - Abnormal; Notable for the following components:    Occult Blood Positive (*)     All other components within normal limits   POCT GLUCOSE - Abnormal; Notable for the following components:    POCT Glucose 126 (*)     All other components within normal limits   TYPE & SCREEN   PREPARE RBC SOFT         case discussed twice with Dr. Hudson  Imaging Results    None                               Clinical Impression:       ICD-10-CM ICD-9-CM   1. Gastrointestinal hemorrhage with melena K92.1 578.1   2. GI cancer C26.9 159.9   3. Near syncope R55 780.2                                Stewart Jhaveri MD  03/29/19 7853

## 2019-03-29 NOTE — SUBJECTIVE & OBJECTIVE
Past Medical History:   Diagnosis Date    Anemia     Cancer     kidney    Dementia     Encounter for blood transfusion     2018    High cholesterol 2018    Hypertension     No Medication     Renal disorder     Wears dentures     Uppers       Past Surgical History:   Procedure Laterality Date     SECTION      COLONOSCOPY N/A 2018    [facility]    CYSTOSCOPY WITH RETROGRADE PYELOGRAM Left 1/15/2016    [facility]    EGD (ESOPHAGOGASTRODUODENOSCOPY) N/A 2018    [facility] END[facility]    EYE SURGERY      Rt eye Catarac    HYSTERECTOMY      IMAGING, GI TRACT, INTRALUMINAL, VIA CAPSULE N/A 2018    Performed by R[facility] Ross [facility] at [facility] ENDO    KIDNEY SURGERY Left 2018    Left kidney removed due to cancer    lasix surgery      NEPHRECTOMY-RADICAL   Left 2016    [facility] at [facility]    REPAIR-HERNIA-VENTRAL N/A 2016    Performed by Marco A [facility]awa [facility] at [facility] OR    RESECTION-BOWEL  2016    [facility] by [facility]awa [facility] at [facility] OR    URETEROSCOPY  1/15/2016    Performed by [facility]acon [facility] at [facility] OR       Review of patient's allergies indicates:   Allergen Reactions    Codeine Other (See Comments)     Pt shakes and hallucinates    Pcn [penicillins] Anxiety and Other (See Comments)       No current facility-administered medications on file prior to encounter.      Current Outpatient Medications on File Prior to Encounter   Medication Sig    cyanocobalamin-cobamamide (B12) 5,000-100 mcg Lozg     ferrous sulfate 325 (65 FE) MG EC tablet Take 325 mg by mouth once daily.    losartan (COZAAR) 50 MG tablet Take 50 mg by mouth once daily.    pantoprazole (PROTONIX) 40 MG tablet Take 1 tablet (40 mg total) by mouth once daily.    sucralfate (CARAFATE) 100 mg/mL suspension Take 10 mLs (1 g total) by  mouth 4 (four) times daily before meals and nightly.     Family History     None        Tobacco Use    Smoking status: Former Smoker     Packs/day: 1.00     Years: 50.00     Pack years: 50.00     Last attempt to quit: 10/6/2018     Years since quittin.4    Smokeless tobacco: Never Used    Tobacco comment: pt has stopped; encouraged to remain stopped   Substance and Sexual Activity    Alcohol use: No     Frequency: Never     Comment: last alcohol intake friday    Drug use: No    Sexual activity: Never     Review of Systems   Constitutional: Positive for activity change. Negative for appetite change, chills, diaphoresis, fatigue, fever and unexpected weight change.   HENT: Negative for congestion, drooling, hearing loss and trouble swallowing.    Eyes: Negative for pain and visual disturbance.   Respiratory: Negative.  Negative for apnea, cough, choking, chest tightness, shortness of breath and wheezing.    Cardiovascular: Negative for chest pain, palpitations and leg swelling.   Gastrointestinal: Negative for abdominal distention, abdominal pain, blood in stool, constipation, diarrhea, nausea and vomiting.   Endocrine: Negative for polydipsia, polyphagia and polyuria.   Genitourinary: Negative for difficulty urinating, dysuria and flank pain.   Musculoskeletal: Negative for arthralgias, back pain, gait problem, joint swelling, neck pain and neck stiffness.   Skin: Negative for color change, rash and wound.   Allergic/Immunologic: Negative for environmental allergies, food allergies and immunocompromised state.   Neurological: Positive for dizziness, syncope, weakness and light-headedness. Negative for numbness and headaches.   Hematological: Negative for adenopathy.   Psychiatric/Behavioral: Negative for agitation, confusion and sleep disturbance. The patient is not nervous/anxious.      Objective:     Vital Signs (Most Recent):  Temp: 97.9 °F (36.6 °C) (19 1128)  Pulse: 81 (19 1247)  Resp: 18  (03/29/19 1247)  BP: 109/66 (03/29/19 1247)  SpO2: 100 % (03/29/19 1247) Vital Signs (24h Range):  Temp:  [97.9 °F (36.6 °C)] 97.9 °F (36.6 °C)  Pulse:  [] 81  Resp:  [6-19] 18  SpO2:  [97 %-100 %] 100 %  BP: ()/(55-80) 109/66     Weight: 72.6 kg (160 lb)  Body mass index is 22.32 kg/m².    Physical Exam   Constitutional: She is oriented to person, place, and time. She appears well-developed and well-nourished.   HENT:   Head: Normocephalic and atraumatic.   Right Ear: External ear normal.   Left Ear: External ear normal.   Nose: Nose normal.   Eyes: Pupils are equal, round, and reactive to light. Conjunctivae, EOM and lids are normal.   Neck: Trachea normal, normal range of motion and full passive range of motion without pain. Neck supple.   Cardiovascular: Normal rate, regular rhythm, S1 normal, S2 normal, normal heart sounds, intact distal pulses and normal pulses.   Pulmonary/Chest: Effort normal and breath sounds normal.   Abdominal: Soft. Normal aorta and bowel sounds are normal.   Musculoskeletal: Normal range of motion.   Neurological: She is alert and oriented to person, place, and time. She has normal reflexes.   Skin: Skin is warm, dry and intact.   Psychiatric: She has a normal mood and affect. Her speech is normal and behavior is normal. Judgment and thought content normal. Cognition and memory are normal.   Nursing note and vitals reviewed.        CRANIAL NERVES     CN III, IV, VI   Pupils are equal, round, and reactive to light.  Extraocular motions are normal.        Significant Labs:   BMP:   Recent Labs   Lab 03/29/19  1032   *   *   K 3.6      CO2 21*   BUN 19   CREATININE 1.1   CALCIUM 8.6*     CBC:   Recent Labs   Lab 03/29/19  1032   WBC 12.19   HGB 6.5*   HCT 21.6*   *     CMP:   Recent Labs   Lab 03/29/19  1032   *   K 3.6      CO2 21*   *   BUN 19   CREATININE 1.1   CALCIUM 8.6*   PROT 6.8   ALBUMIN 2.9*   BILITOT 0.2   ALKPHOS 63    AST 14   ALT 7*   ANIONGAP 11   EGFRNONAA 49.9*       Significant Imaging: I have reviewed and interpreted all pertinent imaging results/findings within the past 24 hours.

## 2019-03-29 NOTE — PLAN OF CARE
Problem: Adult Inpatient Plan of Care  Goal: Readiness for Transition of Care    Intervention: Mutually Develop Transition Plan     03/29/19 1749 03/29/19 1803   OTHER   Communicated expected length of stay with patient/caregiver yes  --    Is patient able to care for self after discharge? Yes  --    Who are your caregiver(s) and their phone number(s)? Zoey Pardo granddaughter [phone]; Parisa Moore daughter [phone]  --    Patient currently receives home health services?  --  No   (RETIRED) Discharge Needs Assessment   How many people do you have in your home that can help with your care after discharge?  --  2   Discharge Needs Assessment   Readmission Within the Last 30 Days current reason for admission unrelated to previous admission  --    Equipment Currently Used at Home bedside commode;shower chair  --    Transportation Anticipated family or friend will provide  --    Social Work Plan   Patient/Family in Agreement with Plan yes  --    Living Environment   Able to Return to Prior Arrangements yes  --    (RETIRED) Current Health   Expected Length of Stay (days) 1  --    (RETIRED) Social Work Plan   Patient's perception of discharge disposition home or selfcare  --

## 2019-03-29 NOTE — NURSING
1319: Report received from MIKE Conroy  1336: Patient arrived to floor from ER via stretcher.  Patient ambulated into bed with standby assist x1.  VS taken and WNL.  Bed in lowest and locked position with SR upx2 and bed alarm on.  Granddaughter currently at bedside.  No distress noted or verbalized by patient at this time.   1443: First unit of blood started at this time.  Patient educated on signs and symptoms to report, verbalized understanding.  Will continue to monitor.

## 2019-03-29 NOTE — HPI
03/29/2019.  Patient has long complex history symptomatic anemia chronic blood loss anemia secondary to a GI cancer peptic ulcer disease. Patient's H&H today is 6.5/21.  It was attempting to place the patient as an outpatient to transfuse her today shows syncopal episode in lab and was sent to the emergency room.  The patient will be placed in observation since there is some question whether not she could walk out on her own power at this time.  My interview in the emergency room I feel the patient probably could she said she could dense I do hear if she does get out.  Patient uses transfused every 10-14 days.  Patient has end-stage GI cancer chronic gastric ulceration.

## 2019-03-29 NOTE — TELEPHONE ENCOUNTER
"Spoke with Alvarado from [facility] case mgmt. States "MR saw this pt today. She went to go get labs drawn and she passed out while in the lab. Her count was low and we sent her to ER, ER admitted her to OPS for a couple units of blood and to be monitored. We want to try and have labs drawn again so we can give her a schedule so she can stop going to ER for blood".    Requesting pt to be seen 4/3/19 in morning. Informed I would need to notify NP for approval due to no available appts. Verbalized an understanding.   Requesting call back- [phone], for confirmation.   Please advise.   "

## 2019-03-29 NOTE — ASSESSMENT & PLAN NOTE
03/29/2019.  Chronic blood-loss anemia symptomatic anemia GI bleed.  Patient has melena stool.  Patient is does is on a regular basis.  She usually comes in 10-14 days and has received 3 units of blood.  Patient is in stage GI cancer gastric ulceration there has been no further attempt to treat this patient aggressively simply because of her stage of disease. Patient will be admitted under observation and transfused and possibly discharged home at that time.  Patient is very active in the emergency room at this time patient is very lucid and alert she states that she can walk and she can't walk out if we would let her this time.

## 2019-03-30 VITALS
TEMPERATURE: 97 F | BODY MASS INDEX: 22.35 KG/M2 | DIASTOLIC BLOOD PRESSURE: 65 MMHG | HEART RATE: 72 BPM | HEIGHT: 71 IN | RESPIRATION RATE: 18 BRPM | SYSTOLIC BLOOD PRESSURE: 107 MMHG | WEIGHT: 159.63 LBS | OXYGEN SATURATION: 100 %

## 2019-03-30 LAB
BASOPHILS # BLD AUTO: 0.05 K/UL (ref 0–0.2)
BASOPHILS NFR BLD: 0.6 % (ref 0–1.9)
DIFFERENTIAL METHOD: ABNORMAL
EOSINOPHIL # BLD AUTO: 0.1 K/UL (ref 0–0.5)
EOSINOPHIL NFR BLD: 1.4 % (ref 0–8)
ERYTHROCYTE [DISTWIDTH] IN BLOOD BY AUTOMATED COUNT: 15.3 % (ref 11.5–14.5)
HCT VFR BLD AUTO: 28.7 % (ref 37–48.5)
HGB BLD-MCNC: 9.2 G/DL (ref 12–16)
IMM GRANULOCYTES # BLD AUTO: 0.03 K/UL (ref 0–0.04)
IMM GRANULOCYTES NFR BLD AUTO: 0.3 % (ref 0–0.5)
LYMPHOCYTES # BLD AUTO: 1.4 K/UL (ref 1–4.8)
LYMPHOCYTES NFR BLD: 15.5 % (ref 18–48)
MCH RBC QN AUTO: 27.1 PG (ref 27–31)
MCHC RBC AUTO-ENTMCNC: 32.1 G/DL (ref 32–36)
MCV RBC AUTO: 84 FL (ref 82–98)
MONOCYTES # BLD AUTO: 0.6 K/UL (ref 0.3–1)
MONOCYTES NFR BLD: 6.8 % (ref 4–15)
NEUTROPHILS # BLD AUTO: 6.9 K/UL (ref 1.8–7.7)
NEUTROPHILS NFR BLD: 75.4 % (ref 38–73)
NRBC BLD-RTO: 0 /100 WBC
PLATELET # BLD AUTO: 381 K/UL (ref 150–350)
PMV BLD AUTO: 8.8 FL (ref 9.2–12.9)
RBC # BLD AUTO: 3.4 M/UL (ref 4–5.4)
WBC # BLD AUTO: 9.09 K/UL (ref 3.9–12.7)

## 2019-03-30 PROCEDURE — 85025 COMPLETE CBC W/AUTO DIFF WBC: CPT

## 2019-03-30 PROCEDURE — 36415 COLL VENOUS BLD VENIPUNCTURE: CPT

## 2019-03-30 PROCEDURE — G0378 HOSPITAL OBSERVATION PER HR: HCPCS

## 2019-03-30 PROCEDURE — 25000003 PHARM REV CODE 250: Performed by: INTERNAL MEDICINE

## 2019-03-30 RX ADMIN — SUCRALFATE 1 G: 1 SUSPENSION ORAL at 07:03

## 2019-03-30 NOTE — DISCHARGE SUMMARY
[facility] Medicine  Discharge Summary      Patient Name: Oriana Tobar  MRN: [id number]  Admission Date: 3/29/2019  Hospital Length of Stay: 0 days  Discharge Date and Time:  03/30/2019 7:49 AM  Attending Physician: Buck Hudson MD   Discharging Provider: Buck Hudson MD  Primary Care Provider: Carine Baltazar NP      HPI:   03/29/2019.  Patient has long complex history symptomatic anemia chronic blood loss anemia secondary to a GI cancer peptic ulcer disease. Patient's H&H today is 6.5/21.  It was attempting to place the patient as an outpatient to transfuse her today shows syncopal episode in lab and was sent to the emergency room.  The patient will be placed in observation since there is some question whether not she could walk out on her own power at this time.  My interview in the emergency room I feel the patient probably could she said she could dense I do hear if she does get out.  Patient uses transfused every 10-14 days.  Patient has end-stage GI cancer chronic gastric ulceration.    * No surgery found *      Hospital Course:   Patient she will be placed under observation.  She was received 3 units of blood and in determine whether not she can be discharged home at that time.  03/30/2019.  Patient be discharged home.  Patient even though felt to have syncopal episode yesterday and collapse in laboratory.  By the time I saw her in emergency room the patient was able to ambulate she is transferred to a Room patient ambulated from the stretcher to the bathroom to the bed.  She has been transfused her H&H is 9 and 29 the patient will follow up with around the [facility]note 2] and will be scheduled for outpatient transfusions.     Consults:     * GI bleed  03/29/2019.  Chronic blood-loss anemia symptomatic anemia GI bleed.  Patient has melena stool.  Patient is does is on a regular basis.  She usually comes in 10-14 days and has received 3 units of  blood.  Patient is in stage GI cancer gastric ulceration there has been no further attempt to treat this patient aggressively simply because of her stage of disease. Patient will be admitted under observation and transfused and possibly discharged home at that time.  Patient is very active in the emergency room at this time patient is very lucid and alert she states that she can walk and she can't walk out if we would let her this time.  03/30/2019.  Patient been transfused H&H is 9 and 29.  Will DC the patient home follow-up with my ran [facility] as an outpatient.  Set up for outpatient transfusions every 2 in 14 days.      Final Active Diagnoses:    Diagnosis Date Noted POA    PRINCIPAL PROBLEM:  GI bleed [K92.2] 11/06/2018 Yes      Problems Resolved During this Admission:       Discharged Condition: fair    Disposition: Home or Self Care    Follow Up:  Follow-up Information     Lucia Springer NP.    Specialty:  Family Medicine  Contact information:  [address] [address] [phone]             Lucia Springer NP.    Specialty:  Family Medicine  Contact information:  [address] [address] [phone]                 Patient Instructions:      Prepare RBC 3 Units; anemia   Standing Status: Future Standing Exp. Date: 04/28/20     Order Specific Question Answer Comments   Number of Units 3 Units    Reason to prepare multiple units (e.g. Emergency): anemia    Purpose of Preparation: Other (Specify)    Other reason for preparation: anemia        Significant Diagnostic Studies: Labs:   BMP:   Recent Labs   Lab 03/29/19  1032   *   *   K 3.6      CO2 21*   BUN 19   CREATININE 1.1   CALCIUM 8.6*   , CMP   Recent Labs   Lab 03/29/19  1032   *   K 3.6      CO2 21*   *   BUN 19   CREATININE 1.1   CALCIUM 8.6*   PROT 6.8   ALBUMIN 2.9*   BILITOT 0.2   ALKPHOS 63   AST 14   ALT 7*   ANIONGAP 11    ESTGFRAFRICA 57.6*   EGFRNONAA 49.9*   , CBC   Recent Labs   Lab 03/29/19  1032 03/30/19  0607   WBC 12.19 9.09   HGB 6.5* 9.2*   HCT 21.6* 28.7*   * 381*    and All labs within the past 24 hours have been reviewed    Pending Diagnostic Studies:     None         Medications:  Reconciled Home Medications:      Medication List      CONTINUE taking these medications    B12 5,000-100 mcg Lozg  Generic drug:  cyanocobalamin-cobamamide     ferrous sulfate 325 (65 FE) MG EC tablet  Take 325 mg by mouth once daily.     losartan 50 MG tablet  Commonly known as:  COZAAR  Take 50 mg by mouth once daily.     pantoprazole 40 MG tablet  Commonly known as:  PROTONIX  Take 1 tablet (40 mg total) by mouth once daily.     sucralfate 100 mg/mL suspension  Commonly known as:  CARAFATE  Take 10 mLs (1 g total) by mouth 4 (four) times daily before meals and nightly.            Indwelling Lines/Drains at time of discharge:   Lines/Drains/Airways          None          Time spent on the discharge of patient: 35 minutes  Patient was seen and examined on the date of discharge and determined to be suitable for discharge.         Buck Hudson MD  Department of Hospital Medicine  [facility] - Med Surg

## 2019-03-30 NOTE — ASSESSMENT & PLAN NOTE
03/29/2019.  Chronic blood-loss anemia symptomatic anemia GI bleed.  Patient has melena stool.  Patient is does is on a regular basis.  She usually comes in 10-14 days and has received 3 units of blood.  Patient is in stage GI cancer gastric ulceration there has been no further attempt to treat this patient aggressively simply because of her stage of disease. Patient will be admitted under observation and transfused and possibly discharged home at that time.  Patient is very active in the emergency room at this time patient is very lucid and alert she states that she can walk and she can't walk out if we would let her this time.  03/30/2019.  Patient been transfused H&H is 9 and 29.  Will DC the patient home follow-up with my ran nik LIU as an outpatient.  Set up for outpatient transfusions every 2 in 14 days.

## 2019-03-30 NOTE — NURSING
Discharge instructions reviewed with patient and patient's granddaughter, verbalized understanding.  Patient wheeled out to private vehicle upon discharge accompanied by patient's granddaughter,  No distress noted or verbalized by patient at this time.

## 2019-03-30 NOTE — NURSING
Blood transfusing per orders. Assisted up to BSC, voids freely. Clear yellow urine.   Free from distress.

## 2019-03-30 NOTE — PLAN OF CARE
Problem: Anemia  Goal: Anemia Symptom Improvement    Intervention: Monitor and Manage Anemia  Transfusing blood per orders. Monitoring blood H&H.

## 2019-04-01 NOTE — TELEPHONE ENCOUNTER
Spoke with Alicia from [facility] case mgmt. Informed of NP communication. Verbalized an understanding.

## 2019-04-01 NOTE — TELEPHONE ENCOUNTER
"Spoke with Alicia from [facility] case mg[facility]. Informed of NP communication. States "The pt was d/c'd over the weekend. They said she saw her one time and they are not going back because of no chemo. So we just need to schedule her with someone".     Please advise.   "

## 2019-04-01 NOTE — PLAN OF CARE
04/01/19 1611   Final Note   Assessment Type Final Discharge Note   Anticipated Discharge Disposition Home   What phone number can be called within the next 1-3 days to see how you are doing after discharge? [phone]   Hospital Follow Up  Appt(s) scheduled? Yes   Discharge plans and expectations educations in teach back method with documentation complete? Yes   Called patient's daughter with follow up for tomorrow with Dr Hudson as Lucia Springer states she really needed to follow up with Dr Irene. Demonstrated understanding by verbal feedback. Denies any discharge needs at this time.

## 2019-04-08 ENCOUNTER — LAB VISIT (OUTPATIENT)
Dept: LAB | Facility: HOSPITAL | Age: 74
End: 2019-04-08
Attending: INTERNAL MEDICINE
Payer: MEDICARE

## 2019-04-08 DIAGNOSIS — K92.2 ACUTE GASTROINTESTINAL HEMORRHAGE: Primary | ICD-10-CM

## 2019-04-08 LAB
BASOPHILS # BLD AUTO: 0.07 K/UL (ref 0–0.2)
BASOPHILS NFR BLD: 0.6 % (ref 0–1.9)
DIFFERENTIAL METHOD: ABNORMAL
EOSINOPHIL # BLD AUTO: 0.1 K/UL (ref 0–0.5)
EOSINOPHIL NFR BLD: 1.2 % (ref 0–8)
ERYTHROCYTE [DISTWIDTH] IN BLOOD BY AUTOMATED COUNT: 15.4 % (ref 11.5–14.5)
HCT VFR BLD AUTO: 28.9 % (ref 37–48.5)
HGB BLD-MCNC: 8.7 G/DL (ref 12–16)
IMM GRANULOCYTES # BLD AUTO: 0.04 K/UL (ref 0–0.04)
IMM GRANULOCYTES NFR BLD AUTO: 0.4 % (ref 0–0.5)
LYMPHOCYTES # BLD AUTO: 1.7 K/UL (ref 1–4.8)
LYMPHOCYTES NFR BLD: 15.2 % (ref 18–48)
MCH RBC QN AUTO: 26.5 PG (ref 27–31)
MCHC RBC AUTO-ENTMCNC: 30.1 G/DL (ref 32–36)
MCV RBC AUTO: 88 FL (ref 82–98)
MONOCYTES # BLD AUTO: 0.8 K/UL (ref 0.3–1)
MONOCYTES NFR BLD: 7.2 % (ref 4–15)
NEUTROPHILS # BLD AUTO: 8.2 K/UL (ref 1.8–7.7)
NEUTROPHILS NFR BLD: 75.4 % (ref 38–73)
NRBC BLD-RTO: 0 /100 WBC
PLATELET # BLD AUTO: 574 K/UL (ref 150–350)
PMV BLD AUTO: 8.8 FL (ref 9.2–12.9)
RBC # BLD AUTO: 3.28 M/UL (ref 4–5.4)
WBC # BLD AUTO: 10.92 K/UL (ref 3.9–12.7)

## 2019-04-08 PROCEDURE — 85025 COMPLETE CBC W/AUTO DIFF WBC: CPT

## 2019-04-08 PROCEDURE — 36415 COLL VENOUS BLD VENIPUNCTURE: CPT

## 2019-04-11 ENCOUNTER — TELEPHONE (OUTPATIENT)
Dept: FAMILY MEDICINE | Facility: CLINIC | Age: 74
End: 2019-04-11

## 2019-04-11 ENCOUNTER — LAB VISIT (OUTPATIENT)
Dept: LAB | Facility: HOSPITAL | Age: 74
End: 2019-04-11
Attending: NURSE PRACTITIONER
Payer: MEDICARE

## 2019-04-11 DIAGNOSIS — K92.2 GASTROINTESTINAL HEMORRHAGE, UNSPECIFIED GASTROINTESTINAL HEMORRHAGE TYPE: ICD-10-CM

## 2019-04-11 LAB
ANION GAP SERPL CALC-SCNC: 8 MMOL/L (ref 8–16)
BASOPHILS # BLD AUTO: 0.06 K/UL (ref 0–0.2)
BASOPHILS NFR BLD: 0.6 % (ref 0–1.9)
BUN SERPL-MCNC: 8 MG/DL (ref 8–23)
CALCIUM SERPL-MCNC: 8.6 MG/DL (ref 8.7–10.5)
CHLORIDE SERPL-SCNC: 106 MMOL/L (ref 95–110)
CO2 SERPL-SCNC: 24 MMOL/L (ref 23–29)
CREAT SERPL-MCNC: 1.1 MG/DL (ref 0.5–1.4)
DIFFERENTIAL METHOD: ABNORMAL
EOSINOPHIL # BLD AUTO: 0.1 K/UL (ref 0–0.5)
EOSINOPHIL NFR BLD: 1 % (ref 0–8)
ERYTHROCYTE [DISTWIDTH] IN BLOOD BY AUTOMATED COUNT: 15.9 % (ref 11.5–14.5)
EST. GFR  (AFRICAN AMERICAN): 57.6 ML/MIN/1.73 M^2
EST. GFR  (NON AFRICAN AMERICAN): 49.9 ML/MIN/1.73 M^2
GLUCOSE SERPL-MCNC: 108 MG/DL (ref 70–110)
HCT VFR BLD AUTO: 28.1 % (ref 37–48.5)
HGB BLD-MCNC: 8.4 G/DL (ref 12–16)
IMM GRANULOCYTES # BLD AUTO: 0.03 K/UL (ref 0–0.04)
IMM GRANULOCYTES NFR BLD AUTO: 0.3 % (ref 0–0.5)
LYMPHOCYTES # BLD AUTO: 1.8 K/UL (ref 1–4.8)
LYMPHOCYTES NFR BLD: 17.6 % (ref 18–48)
MCH RBC QN AUTO: 26.2 PG (ref 27–31)
MCHC RBC AUTO-ENTMCNC: 29.9 G/DL (ref 32–36)
MCV RBC AUTO: 88 FL (ref 82–98)
MONOCYTES # BLD AUTO: 0.7 K/UL (ref 0.3–1)
MONOCYTES NFR BLD: 7.2 % (ref 4–15)
NEUTROPHILS # BLD AUTO: 7.3 K/UL (ref 1.8–7.7)
NEUTROPHILS NFR BLD: 73.3 % (ref 38–73)
NRBC BLD-RTO: 0 /100 WBC
PLATELET # BLD AUTO: 560 K/UL (ref 150–350)
PMV BLD AUTO: 8.7 FL (ref 9.2–12.9)
POTASSIUM SERPL-SCNC: 3.8 MMOL/L (ref 3.5–5.1)
RBC # BLD AUTO: 3.21 M/UL (ref 4–5.4)
SODIUM SERPL-SCNC: 138 MMOL/L (ref 136–145)
WBC # BLD AUTO: 9.96 K/UL (ref 3.9–12.7)

## 2019-04-11 PROCEDURE — 80048 BASIC METABOLIC PNL TOTAL CA: CPT

## 2019-04-11 PROCEDURE — 36415 COLL VENOUS BLD VENIPUNCTURE: CPT

## 2019-04-11 PROCEDURE — 85025 COMPLETE CBC W/AUTO DIFF WBC: CPT

## 2019-04-11 NOTE — TELEPHONE ENCOUNTER
See lab result note.     ----- Message from Melony Sharif sent at 4/11/2019  4:41 PM CDT -----  Contact: Daughter Maryjane [phone]  Patient's daughter returned your call, please call back.  Thank you!

## 2019-04-17 ENCOUNTER — LAB VISIT (OUTPATIENT)
Dept: LAB | Facility: HOSPITAL | Age: 74
End: 2019-04-17
Attending: INTERNAL MEDICINE
Payer: MEDICARE

## 2019-04-17 DIAGNOSIS — K92.2 ACUTE GASTROINTESTINAL HEMORRHAGE: Primary | ICD-10-CM

## 2019-04-17 LAB
BASOPHILS # BLD AUTO: 0.06 K/UL (ref 0–0.2)
BASOPHILS NFR BLD: 0.5 % (ref 0–1.9)
DIFFERENTIAL METHOD: ABNORMAL
EOSINOPHIL # BLD AUTO: 0.1 K/UL (ref 0–0.5)
EOSINOPHIL NFR BLD: 0.8 % (ref 0–8)
ERYTHROCYTE [DISTWIDTH] IN BLOOD BY AUTOMATED COUNT: 15.8 % (ref 11.5–14.5)
HCT VFR BLD AUTO: 24.8 % (ref 37–48.5)
HGB BLD-MCNC: 7.3 G/DL (ref 12–16)
IMM GRANULOCYTES # BLD AUTO: 0.08 K/UL (ref 0–0.04)
IMM GRANULOCYTES NFR BLD AUTO: 0.6 % (ref 0–0.5)
LYMPHOCYTES # BLD AUTO: 1.9 K/UL (ref 1–4.8)
LYMPHOCYTES NFR BLD: 14.7 % (ref 18–48)
MCH RBC QN AUTO: 25.3 PG (ref 27–31)
MCHC RBC AUTO-ENTMCNC: 29.4 G/DL (ref 32–36)
MCV RBC AUTO: 86 FL (ref 82–98)
MONOCYTES # BLD AUTO: 1.1 K/UL (ref 0.3–1)
MONOCYTES NFR BLD: 8.6 % (ref 4–15)
NEUTROPHILS # BLD AUTO: 9.5 K/UL (ref 1.8–7.7)
NEUTROPHILS NFR BLD: 74.8 % (ref 38–73)
NRBC BLD-RTO: 0 /100 WBC
PLATELET # BLD AUTO: 559 K/UL (ref 150–350)
PMV BLD AUTO: 8.4 FL (ref 9.2–12.9)
RBC # BLD AUTO: 2.89 M/UL (ref 4–5.4)
WBC # BLD AUTO: 12.72 K/UL (ref 3.9–12.7)

## 2019-04-17 PROCEDURE — 36415 COLL VENOUS BLD VENIPUNCTURE: CPT

## 2019-04-17 PROCEDURE — 85025 COMPLETE CBC W/AUTO DIFF WBC: CPT

## 2019-04-19 ENCOUNTER — LAB VISIT (OUTPATIENT)
Dept: LAB | Facility: HOSPITAL | Age: 74
End: 2019-04-19
Attending: INTERNAL MEDICINE
Payer: MEDICARE

## 2019-04-19 DIAGNOSIS — K92.2 GASTROINTESTINAL HEMORRHAGE, UNSPECIFIED GASTROINTESTINAL HEMORRHAGE TYPE: ICD-10-CM

## 2019-04-19 DIAGNOSIS — K92.2 ACUTE GASTROINTESTINAL HEMORRHAGE: Primary | ICD-10-CM

## 2019-04-19 LAB
BASOPHILS # BLD AUTO: 0.07 K/UL (ref 0–0.2)
BASOPHILS NFR BLD: 0.6 % (ref 0–1.9)
DIFFERENTIAL METHOD: ABNORMAL
EOSINOPHIL # BLD AUTO: 0.1 K/UL (ref 0–0.5)
EOSINOPHIL NFR BLD: 1 % (ref 0–8)
ERYTHROCYTE [DISTWIDTH] IN BLOOD BY AUTOMATED COUNT: 15.9 % (ref 11.5–14.5)
HCT VFR BLD AUTO: 25 % (ref 37–48.5)
HGB BLD-MCNC: 7.1 G/DL (ref 12–16)
IMM GRANULOCYTES # BLD AUTO: 0.08 K/UL (ref 0–0.04)
IMM GRANULOCYTES NFR BLD AUTO: 0.7 % (ref 0–0.5)
LYMPHOCYTES # BLD AUTO: 2.4 K/UL (ref 1–4.8)
LYMPHOCYTES NFR BLD: 19.6 % (ref 18–48)
MCH RBC QN AUTO: 24.7 PG (ref 27–31)
MCHC RBC AUTO-ENTMCNC: 28.4 G/DL (ref 32–36)
MCV RBC AUTO: 87 FL (ref 82–98)
MONOCYTES # BLD AUTO: 0.9 K/UL (ref 0.3–1)
MONOCYTES NFR BLD: 7 % (ref 4–15)
NEUTROPHILS # BLD AUTO: 8.7 K/UL (ref 1.8–7.7)
NEUTROPHILS NFR BLD: 71.1 % (ref 38–73)
NRBC BLD-RTO: 0 /100 WBC
PLATELET # BLD AUTO: 620 K/UL (ref 150–350)
PMV BLD AUTO: 8.8 FL (ref 9.2–12.9)
RBC # BLD AUTO: 2.88 M/UL (ref 4–5.4)
WBC # BLD AUTO: 12.22 K/UL (ref 3.9–12.7)

## 2019-04-19 PROCEDURE — 85025 COMPLETE CBC W/AUTO DIFF WBC: CPT

## 2019-04-19 PROCEDURE — 36415 COLL VENOUS BLD VENIPUNCTURE: CPT

## 2019-04-22 ENCOUNTER — LAB VISIT (OUTPATIENT)
Dept: LAB | Facility: HOSPITAL | Age: 74
End: 2019-04-22
Attending: NURSE PRACTITIONER
Payer: MEDICARE

## 2019-04-22 DIAGNOSIS — K92.2 GASTROINTESTINAL HEMORRHAGE, UNSPECIFIED GASTROINTESTINAL HEMORRHAGE TYPE: ICD-10-CM

## 2019-04-22 LAB
ANION GAP SERPL CALC-SCNC: 14 MMOL/L (ref 8–16)
BASOPHILS # BLD AUTO: 0.07 K/UL (ref 0–0.2)
BASOPHILS NFR BLD: 0.7 % (ref 0–1.9)
BUN SERPL-MCNC: 12 MG/DL (ref 8–23)
CALCIUM SERPL-MCNC: 8.9 MG/DL (ref 8.7–10.5)
CHLORIDE SERPL-SCNC: 104 MMOL/L (ref 95–110)
CO2 SERPL-SCNC: 22 MMOL/L (ref 23–29)
CREAT SERPL-MCNC: 1 MG/DL (ref 0.5–1.4)
DIFFERENTIAL METHOD: ABNORMAL
EOSINOPHIL # BLD AUTO: 0.2 K/UL (ref 0–0.5)
EOSINOPHIL NFR BLD: 1.6 % (ref 0–8)
ERYTHROCYTE [DISTWIDTH] IN BLOOD BY AUTOMATED COUNT: 15.8 % (ref 11.5–14.5)
EST. GFR  (AFRICAN AMERICAN): >60 ML/MIN/1.73 M^2
EST. GFR  (NON AFRICAN AMERICAN): 56 ML/MIN/1.73 M^2
GLUCOSE SERPL-MCNC: 120 MG/DL (ref 70–110)
HCT VFR BLD AUTO: 26.4 % (ref 37–48.5)
HGB BLD-MCNC: 7.5 G/DL (ref 12–16)
IMM GRANULOCYTES # BLD AUTO: 0.15 K/UL (ref 0–0.04)
IMM GRANULOCYTES NFR BLD AUTO: 1.5 % (ref 0–0.5)
LYMPHOCYTES # BLD AUTO: 2 K/UL (ref 1–4.8)
LYMPHOCYTES NFR BLD: 19.1 % (ref 18–48)
MCH RBC QN AUTO: 24.7 PG (ref 27–31)
MCHC RBC AUTO-ENTMCNC: 28.4 G/DL (ref 32–36)
MCV RBC AUTO: 87 FL (ref 82–98)
MONOCYTES # BLD AUTO: 0.6 K/UL (ref 0.3–1)
MONOCYTES NFR BLD: 6.2 % (ref 4–15)
NEUTROPHILS # BLD AUTO: 7.3 K/UL (ref 1.8–7.7)
NEUTROPHILS NFR BLD: 70.9 % (ref 38–73)
NRBC BLD-RTO: 0 /100 WBC
PLATELET # BLD AUTO: 673 K/UL (ref 150–350)
PMV BLD AUTO: 8.9 FL (ref 9.2–12.9)
POTASSIUM SERPL-SCNC: 3.7 MMOL/L (ref 3.5–5.1)
RBC # BLD AUTO: 3.04 M/UL (ref 4–5.4)
SODIUM SERPL-SCNC: 140 MMOL/L (ref 136–145)
WBC # BLD AUTO: 10.3 K/UL (ref 3.9–12.7)

## 2019-04-22 PROCEDURE — 36415 COLL VENOUS BLD VENIPUNCTURE: CPT

## 2019-04-22 PROCEDURE — 85025 COMPLETE CBC W/AUTO DIFF WBC: CPT

## 2019-04-22 PROCEDURE — 80048 BASIC METABOLIC PNL TOTAL CA: CPT

## 2019-04-23 NOTE — PROGRESS NOTES
Late Entry:  4/22/19 0955: Reviewed Opt orders with Delia Belle. Called Dr. Hudson to report CBC collected on 4/22/19. No Type & Cross to be collected at this time, no transfusion of PRBC at this time per Dr. Hudson.  Plan of care discussed with pt and family by Delia Belle.

## 2019-04-25 ENCOUNTER — TELEPHONE (OUTPATIENT)
Dept: FAMILY MEDICINE | Facility: CLINIC | Age: 74
End: 2019-04-25

## 2019-04-29 ENCOUNTER — DOCUMENTATION ONLY (OUTPATIENT)
Dept: MEDSURG UNIT | Facility: HOSPITAL | Age: 74
End: 2019-04-29

## 2019-04-29 ENCOUNTER — HOSPITAL ENCOUNTER (OUTPATIENT)
Facility: HOSPITAL | Age: 74
Discharge: HOME | End: 2019-04-30
Attending: EMERGENCY MEDICINE | Admitting: INTERNAL MEDICINE
Payer: MEDICARE

## 2019-04-29 DIAGNOSIS — D64.9 SYMPTOMATIC ANEMIA: Primary | ICD-10-CM

## 2019-04-29 LAB
ABO + RH BLD: NORMAL
BLD GP AB SCN CELLS X3 SERPL QL: NORMAL
INR PPP: 1.2 (ref 0.8–1.2)
PROTHROMBIN TIME: 13.4 SEC (ref 9–12.5)

## 2019-04-29 PROCEDURE — 86850 RBC ANTIBODY SCREEN: CPT | Mod: 91

## 2019-04-29 PROCEDURE — 94761 N-INVAS EAR/PLS OXIMETRY MLT: CPT

## 2019-04-29 PROCEDURE — 86880 COOMBS TEST DIRECT: CPT

## 2019-04-29 PROCEDURE — 99285 EMERGENCY DEPT VISIT HI MDM: CPT | Mod: 25

## 2019-04-29 PROCEDURE — 25000003 PHARM REV CODE 250: Performed by: INTERNAL MEDICINE

## 2019-04-29 PROCEDURE — 27000221 HC OXYGEN, UP TO 24 HOURS

## 2019-04-29 PROCEDURE — 86922 COMPATIBILITY TEST ANTIGLOB: CPT

## 2019-04-29 PROCEDURE — G0378 HOSPITAL OBSERVATION PER HR: HCPCS

## 2019-04-29 PROCEDURE — 25000003 PHARM REV CODE 250: Performed by: EMERGENCY MEDICINE

## 2019-04-29 PROCEDURE — 86870 RBC ANTIBODY IDENTIFICATION: CPT

## 2019-04-29 PROCEDURE — 85610 PROTHROMBIN TIME: CPT

## 2019-04-29 PROCEDURE — 86850 RBC ANTIBODY SCREEN: CPT

## 2019-04-29 PROCEDURE — 36430 TRANSFUSION BLD/BLD COMPNT: CPT

## 2019-04-29 PROCEDURE — P9016 RBC LEUKOCYTES REDUCED: HCPCS

## 2019-04-29 PROCEDURE — 86900 BLOOD TYPING SEROLOGIC ABO: CPT

## 2019-04-29 PROCEDURE — 36415 COLL VENOUS BLD VENIPUNCTURE: CPT

## 2019-04-29 RX ORDER — HYDROCODONE BITARTRATE AND ACETAMINOPHEN 500; 5 MG/1; MG/1
TABLET ORAL
Status: DISCONTINUED | OUTPATIENT
Start: 2019-04-29 | End: 2019-04-30 | Stop reason: HOSPADM

## 2019-04-29 RX ORDER — PANTOPRAZOLE SODIUM 40 MG/1
40 TABLET, DELAYED RELEASE ORAL DAILY
Status: DISCONTINUED | OUTPATIENT
Start: 2019-04-29 | End: 2019-04-30 | Stop reason: HOSPADM

## 2019-04-29 RX ORDER — SODIUM CHLORIDE 0.9 % (FLUSH) 0.9 %
10 SYRINGE (ML) INJECTION
Status: DISCONTINUED | OUTPATIENT
Start: 2019-04-29 | End: 2019-04-30 | Stop reason: HOSPADM

## 2019-04-29 RX ORDER — SUCRALFATE 1 G/10ML
1 SUSPENSION ORAL
Status: DISCONTINUED | OUTPATIENT
Start: 2019-04-29 | End: 2019-04-30 | Stop reason: HOSPADM

## 2019-04-29 RX ADMIN — PANTOPRAZOLE SODIUM 40 MG: 40 TABLET, DELAYED RELEASE ORAL at 02:04

## 2019-04-29 RX ADMIN — SUCRALFATE 1 G: 1 SUSPENSION ORAL at 06:04

## 2019-04-29 RX ADMIN — SODIUM CHLORIDE 500 ML: 9 INJECTION, SOLUTION INTRAVENOUS at 10:04

## 2019-04-29 RX ADMIN — SUCRALFATE 1 G: 1 SUSPENSION ORAL at 09:04

## 2019-04-29 NOTE — H&P
[facility] Medicine  History & Physical    Patient Name: Oriana Tobar  MRN: [id number]  Admission Date: 2019  Attending Physician: Buck Hudson MD   Primary Care Provider: Buck Hudson MD         Patient information was obtained from patient, relative(s) and ER records.     Subjective:     Principal Problem:Symptomatic anemia    Chief Complaint:   Chief Complaint   Patient presents with    Anemia     near syncopal episode in lab         HPI: Chronic blood loss anemia near syncope      Past Medical History:   Diagnosis Date    Anemia     Cancer     kidney    Dementia     Encounter for blood transfusion     2018    High cholesterol 2018    Hypertension     No Medication     Renal disorder     Wears dentures     Uppers       Past Surgical History:   Procedure Laterality Date     SECTION      COLONOSCOPY N/A 2018    [facility]    CYSTOSCOPY WITH RETROGRADE PYELOGRAM Left 1/15/2016    [facility] OR    EGD (ESOPHAGOGASTRODUODENOSCOPY) N/A 2018    Performed by [facility] ENDO    EYE SURGERY      Rt eye Catarac    HYSTERECTOMY      IMAGING, GI TRACT, INTRALUMINAL, VIA CAPSULE N/A 2018    Performed by [facility] at [facility] ENDO    KIDNEY SURGERY Left 2018    Left kidney removed due to cancer    lasix surgery      NEPHRECTOMY-RADICAL   Left 2016    [facility]    REPAIR-HERNIA-VENTRAL N/A 2016    Performed by [facility] A [facility]hawa [facility] at [facility] OR    RESECTION-BOWEL  2016    [facility] at [facility]    URETEROSCOPY  1/15/2016    Performed by [facility] at [facility] OR       Review of patient's allergies indicates:   Allergen Reactions    Codeine Other (See Comments)     Pt shakes and hallucinates    Pcn  [penicillins] Anxiety and Other (See Comments)       No current facility-administered medications on file prior to encounter.      Current Outpatient Medications on File Prior to Encounter   Medication Sig    cyanocobalamin-cobamamide (B12) 5,000-100 mcg Lozg     ferrous sulfate 325 (65 FE) MG EC tablet Take 325 mg by mouth once daily.    losartan (COZAAR) 50 MG tablet Take 50 mg by mouth once daily.    pantoprazole (PROTONIX) 40 MG tablet Take 1 tablet (40 mg total) by mouth once daily.    sucralfate (CARAFATE) 100 mg/mL suspension Take 10 mLs (1 g total) by mouth 4 (four) times daily before meals and nightly.     Family History     None        Tobacco Use    Smoking status: Former Smoker     Packs/day: 1.00     Years: 50.00     Pack years: 50.00     Last attempt to quit: 10/6/2018     Years since quittin.5    Smokeless tobacco: Never Used    Tobacco comment: pt has stopped; encouraged to remain stopped   Substance and Sexual Activity    Alcohol use: No     Frequency: Never     Comment: last alcohol intake friday    Drug use: No    Sexual activity: Never     Review of Systems   Constitutional: Negative for activity change, appetite change, chills, diaphoresis, fatigue, fever and unexpected weight change.   HENT: Negative for congestion, drooling, hearing loss and trouble swallowing.    Eyes: Negative for pain and visual disturbance.   Respiratory: Negative.  Negative for apnea, cough, choking, chest tightness, shortness of breath and wheezing.    Cardiovascular: Negative for chest pain, palpitations and leg swelling.   Gastrointestinal: Negative for abdominal distention, abdominal pain, blood in stool, constipation, diarrhea, nausea and vomiting.        Stomach cancer chronic blood loss   Endocrine: Negative for polydipsia, polyphagia and polyuria.   Genitourinary: Negative for difficulty urinating, dysuria and flank pain.   Musculoskeletal: Negative for arthralgias, back pain, gait problem, joint  swelling, neck pain and neck stiffness.   Skin: Negative for color change, rash and wound.   Allergic/Immunologic: Negative for environmental allergies, food allergies and immunocompromised state.   Neurological: Positive for dizziness, syncope, weakness and light-headedness. Negative for numbness and headaches.   Hematological: Negative for adenopathy.   Psychiatric/Behavioral: Negative for agitation, confusion and sleep disturbance. The patient is not nervous/anxious.      Objective:     Vital Signs (Most Recent):  Temp: 97.7 °F (36.5 °C) (04/29/19 1250)  Pulse: 83 (04/29/19 1250)  Resp: 20 (04/29/19 1250)  BP: 126/60 (04/29/19 1250)  SpO2: 100 % (04/29/19 1132) Vital Signs (24h Range):  Temp:  [97.7 °F (36.5 °C)-98.4 °F (36.9 °C)] 97.7 °F (36.5 °C)  Pulse:  [63-91] 83  Resp:  [17-31] 20  SpO2:  [100 %] 100 %  BP: ()/(37-71) 126/60     Weight: 70.3 kg (155 lb)  Body mass index is 21.62 kg/m².    Physical Exam   Constitutional: She is oriented to person, place, and time. She appears well-developed and well-nourished.   HENT:   Head: Normocephalic and atraumatic.   Right Ear: External ear normal.   Left Ear: External ear normal.   Nose: Nose normal.   Eyes: Pupils are equal, round, and reactive to light. Conjunctivae, EOM and lids are normal.   Neck: Trachea normal, normal range of motion and full passive range of motion without pain. Neck supple.   Cardiovascular: Normal rate, regular rhythm, S1 normal, S2 normal, normal heart sounds, intact distal pulses and normal pulses.   Pulmonary/Chest: Effort normal and breath sounds normal.   Abdominal: Soft. Normal aorta and bowel sounds are normal. There is tenderness.   Musculoskeletal: Normal range of motion.   Neurological: She is alert and oriented to person, place, and time. She has normal reflexes.   Skin: Skin is warm, dry and intact. There is pallor.   Psychiatric: She has a normal mood and affect. Her speech is normal and behavior is normal. Judgment and  thought content normal. Cognition and memory are normal.   Nursing note and vitals reviewed.        CRANIAL NERVES     CN III, IV, VI   Pupils are equal, round, and reactive to light.  Extraocular motions are normal.        Significant Labs:   BMP:   Recent Labs   Lab 04/29/19  0923   *      K 3.5      CO2 20*   BUN 12   CREATININE 1.2   CALCIUM 8.2*     CBC:   Recent Labs   Lab 04/29/19  0923   WBC 12.28   HGB 6.2*   HCT 21.6*   *     CMP:   Recent Labs   Lab 04/29/19  0923      K 3.5      CO2 20*   *   BUN 12   CREATININE 1.2   CALCIUM 8.2*   ANIONGAP 11   EGFRNONAA 44.9*     All pertinent labs within the past 24 hours have been reviewed.    Significant Imaging: I have reviewed and interpreted all pertinent imaging results/findings within the past 24 hours.    Assessment/Plan:     * Symptomatic anemia  Gastric cancer chronic blood loss with chronic transfusions q 3 weeks        VTE Risk Mitigation (From admission, onward)        Ordered     IP VTE LOW RISK PATIENT  Once      04/29/19 1258     Place RORY hose  Until discontinued      04/29/19 1258             Buck Hudson MD  Department of Hospital Medicine   [facility]

## 2019-04-29 NOTE — ED PROVIDER NOTES
Encounter Date: 2019       History     Chief Complaint   Patient presents with    Anemia     near syncopal episode in lab      73-year-old female with history of gastric mass and recurrence anemia requiring transfusion now presents from outpatient laboratory services were she had a near syncopal collapse and was found by the rapid response team to be hypotensive    She is placed in room 2 and assessed    Supine her blood pressure is greater than 100 and she is in no acute distress        Review of patient's allergies indicates:   Allergen Reactions    Codeine Other (See Comments)     Pt shakes and hallucinates    Pcn [penicillins] Anxiety and Other (See Comments)     Past Medical History:   Diagnosis Date    Anemia     Cancer     kidney    Dementia     Encounter for blood transfusion     2018    High cholesterol 2018    Hypertension     No Medication     Renal disorder     Wears dentures     Uppers     Past Surgical History:   Procedure Laterality Date     SECTION      COLONOSCOPY N/A 2018    [facility]    CYSTOSCOPY WITH RETROGRADE PYELOGRAM Left 1/15/2016    [facility] OR    EGD (ESOPHAGOGASTRODUODENOSCOPY) N/A 2018    Performed by [facility] at [facility] ENDO    EYE SURGERY      Rt eye Catarac    HYSTERECTOMY      IMAGING, GI TRACT, INTRALUMINAL, VIA CAPSULE N/A 2018    Performed by Rocco Ross [facility] at [facility] ENDO    KIDNEY SURGERY Left 2018    Left kidney removed due to cancer    lasix surgery      NEPHRECTOMY-RADICAL   Left 2016    [facility]formed by [facility] at [facility] OR    REPAIR-HERNIA-VENTRAL N/A 2016    Performed by Marco A [facility]hawa MD at [facility] OR    RESECTION-BOWEL  2016    Performed by Marco [facility]awa MD at [facility] OR    URETEROSCOPY  1/15/2016    Performed by [facility]acon [facility] at [facility] OR     Family History   Adopted: Yes      Social History     Tobacco Use    Smoking status: Former Smoker     Packs/day: 1.00     Years: 50.00     Pack years: 50.00     Last attempt to quit: 10/6/2018     Years since quittin.5    Smokeless tobacco: Never Used    Tobacco comment: pt has stopped; encouraged to remain stopped   Substance Use Topics    Alcohol use: No     Frequency: Never     Comment: last alcohol intake friday    Drug use: No     Review of Systems   Constitutional: Negative.    HENT: Negative.    Respiratory: Negative.    Cardiovascular: Negative.    Gastrointestinal: Positive for blood in stool (Stools chronically melanotic and she is on iron therapy).   Genitourinary: Negative.    Musculoskeletal: Negative.    Skin: Negative.    Neurological: Positive for dizziness, syncope and light-headedness.   Psychiatric/Behavioral: Negative for confusion.   All other systems reviewed and are negative.      Physical Exam     Initial Vitals   BP Pulse Resp Temp SpO2   19 0924 19 0924 19 0924 19 1032 19 0924   (!) 83/37 63 18 98.4 °F (36.9 °C) 100 %      MAP       --                Physical Exam    Nursing note and vitals reviewed.  Constitutional: She appears well-developed and well-nourished. She is not diaphoretic. No distress.   HENT:   Head: Normocephalic and atraumatic.   Nose: Nose normal.   Mouth/Throat: Oropharynx is clear and moist. No oropharyngeal exudate.   Eyes: Conjunctivae and EOM are normal. Pupils are equal, round, and reactive to light. Right eye exhibits no discharge. Left eye exhibits no discharge. No scleral icterus (Pale sclera ).   Neck: Normal range of motion. Neck supple.   Cardiovascular: Normal rate, regular rhythm, normal heart sounds and intact distal pulses. Exam reveals no gallop and no friction rub.    No murmur heard.  Pulmonary/Chest: Breath sounds normal. No respiratory distress. She has no wheezes. She has no rhonchi. She has no rales.   Abdominal: Soft. Bowel sounds are  normal. She exhibits no distension and no mass. There is no tenderness. There is no rebound and no guarding.   Musculoskeletal: Normal range of motion. She exhibits no edema or tenderness.   Lymphadenopathy:     She has no cervical adenopathy.   Neurological: She is alert. She has normal strength. No cranial nerve deficit or sensory deficit.   Disoriented chronically (dementia)   Skin: Skin is warm and dry. Capillary refill takes less than 2 seconds. No rash noted. No erythema. No pallor.   Psychiatric: She has a normal mood and affect.         ED Course   Procedures  Labs Reviewed   PROTIME-INR - Abnormal; Notable for the following components:       Result Value    Prothrombin Time 13.4 (*)     All other components within normal limits   TYPE & SCREEN   PREPARE RBC SOFT     EKG Readings: (Independently Interpreted)   Rhythm: Normal Sinus Rhythm. Ectopy: No Ectopy. Conduction: Normal. ST Segments: Normal ST Segments. T Waves: Normal. Clinical Impression: Normal Sinus Rhythm   9:43 a.m.  Normal sinus rhythm 64 beats per minute  Acute ST T wave changes suggestive of ischemia         Imaging Results    None          Medical Decision Making:   Clinical Tests:   Lab Tests: Ordered and Reviewed  ED Management:  Profound anemia - with symptoms of near-syncope and hypotension    Patient is admitted observation for blood transfusion    Given the many transfusions she has had in the past is reports she has now developed antibody and will take hours to obtain appropriate packed red blood cells      Other:   I have discussed this case with another health care provider.       <> Summary of the Discussion: Dr Hudson -                      Clinical Impression:       ICD-10-CM ICD-9-CM   1. Symptomatic anemia D64.9 285.9         Disposition:   Disposition: Placed in Observation  Condition: Stable                        Davy Eddy MD  04/29/19 3359

## 2019-04-29 NOTE — PLAN OF CARE
04/29/19 1808   Discharge Assessment   Assessment Type Discharge Planning Assessment   Confirmed/corrected address and phone number on facesheet? Yes   Assessment information obtained from? Patient;Caregiver   Expected Length of Stay (days) 1   Communicated expected length of stay with patient/caregiver yes   Prior to hospitilization cognitive status: Alert/Oriented   Prior to hospitalization functional status: Independent   Current cognitive status: Alert/Oriented   Current Functional Status: Independent   Lives With child(samy), adult;grandchild(samy)   Able to Return to Prior Arrangements yes   Is patient able to care for self after discharge? Yes  (with assistance)   Who are your caregiver(s) and their phone number(s)? Parisa Tobar daughter [phone]   Patient's perception of discharge disposition home or selfcare   Readmission Within the Last 30 Days no previous admission in last 30 days   Patient currently being followed by outpatient case management? No   Patient currently receives any other outside agency services? Yes   How many hours a day does the patient receive services? 7   Name and contact number of agency or person providing outside services Nursing Management aides 12-2:30pm; 3-7:30pm   Is it the patient/care giver preference to resume care with the current outside agency? Yes   Equipment Currently Used at Home bedside commode;shower chair;grab bar   Do you have any problems affording any of your prescribed medications? No   Is the patient taking medications as prescribed? yes   Does the patient have transportation home? Yes   Transportation Anticipated family or friend will provide   Does the patient receive services at the [facility]? No   Discharge Plan A Home with family   DME Needed Upon Discharge  none   Patient/Family in Agreement with Plan yes   Patient lives at home with her grand daughter & daughter. Daughter at bedside. Patient has been getting labs done 3 times a week.  Patient has richard M-F with Nursing Management. States as soon as blood is done I want to go home. Denies any discharge needs at this time.

## 2019-04-29 NOTE — PLAN OF CARE
04/29/19 1806   HOWELL Message   Medicare Outpatient and Observation Notification regarding financial responsibility Given to patient/caregiver;Explained to patient/caregiver;Signed/date by patient/caregiver   Date HOWELL was signed 04/29/19   Time HOWELL was signed 1800

## 2019-04-29 NOTE — SUBJECTIVE & OBJECTIVE
Past Medical History:   Diagnosis Date    Anemia     Cancer     kidney    Dementia     Encounter for blood transfusion     2018    High cholesterol 2018    Hypertension     No Medication     Renal disorder     Wears dentures     Uppers       Past Surgical History:   Procedure Laterality Date     SECTION      COLONOSCOPY N/A 2018    [facility]    CYSTOSCOPY WITH RETROGRADE PYELOGRAM Left 1/15/2016    [facility]    EGD (ESOPHAGOGASTRODUODENOSCOPY) N/A 2018    [facility] by [facility]    EYE SURGERY      Rt eye Catarac    HYSTERECTOMY      IMAGING, GI TRACT, INTRALUMINAL, VIA CAPSULE N/A 2018    Per[facility] by Rocco Ross [facility] at [facility] END[facility]    KIDNEY SURGERY Left 2018    Left kidney removed due to cancer    lasix surgery      NEPHRECTOMY-RADICAL   Left 2016    [facility]    REPAIR-HERNIA-VENTRAL N/A 2016    [facility] Marco [facility]hawa [facility] at [facility]    RESECTION-BOWEL  2016    [facility]    URETEROSCOPY  1/15/2016    [facility] OR       Review of patient's allergies indicates:   Allergen Reactions    Codeine Other (See Comments)     Pt shakes and hallucinates    Pcn [penicillins] Anxiety and Other (See Comments)       No current facility-administered medications on file prior to encounter.      Current Outpatient Medications on File Prior to Encounter   Medication Sig    cyanocobalamin-cobamamide (B12) 5,000-100 mcg Lozg     ferrous sulfate 325 (65 FE) MG EC tablet Take 325 mg by mouth once daily.    losartan (COZAAR) 50 MG tablet Take 50 mg by mouth once daily.    pantoprazole (PROTONIX) 40 MG tablet Take 1 tablet (40 mg total) by mouth once daily.    sucralfate (CARAFATE) 100 mg/mL suspension Take 10 mLs (1 g total) by  mouth 4 (four) times daily before meals and nightly.     Family History     None        Tobacco Use    Smoking status: Former Smoker     Packs/day: 1.00     Years: 50.00     Pack years: 50.00     Last attempt to quit: 10/6/2018     Years since quittin.5    Smokeless tobacco: Never Used    Tobacco comment: pt has stopped; encouraged to remain stopped   Substance and Sexual Activity    Alcohol use: No     Frequency: Never     Comment: last alcohol intake friday    Drug use: No    Sexual activity: Never     Review of Systems   Constitutional: Negative for activity change, appetite change, chills, diaphoresis, fatigue, fever and unexpected weight change.   HENT: Negative for congestion, drooling, hearing loss and trouble swallowing.    Eyes: Negative for pain and visual disturbance.   Respiratory: Negative.  Negative for apnea, cough, choking, chest tightness, shortness of breath and wheezing.    Cardiovascular: Negative for chest pain, palpitations and leg swelling.   Gastrointestinal: Negative for abdominal distention, abdominal pain, blood in stool, constipation, diarrhea, nausea and vomiting.        Stomach cancer chronic blood loss   Endocrine: Negative for polydipsia, polyphagia and polyuria.   Genitourinary: Negative for difficulty urinating, dysuria and flank pain.   Musculoskeletal: Negative for arthralgias, back pain, gait problem, joint swelling, neck pain and neck stiffness.   Skin: Negative for color change, rash and wound.   Allergic/Immunologic: Negative for environmental allergies, food allergies and immunocompromised state.   Neurological: Positive for dizziness, syncope, weakness and light-headedness. Negative for numbness and headaches.   Hematological: Negative for adenopathy.   Psychiatric/Behavioral: Negative for agitation, confusion and sleep disturbance. The patient is not nervous/anxious.      Objective:     Vital Signs (Most Recent):  Temp: 97.7 °F (36.5 °C) (19 1250)  Pulse: 83  (04/29/19 1250)  Resp: 20 (04/29/19 1250)  BP: 126/60 (04/29/19 1250)  SpO2: 100 % (04/29/19 1132) Vital Signs (24h Range):  Temp:  [97.7 °F (36.5 °C)-98.4 °F (36.9 °C)] 97.7 °F (36.5 °C)  Pulse:  [63-91] 83  Resp:  [17-31] 20  SpO2:  [100 %] 100 %  BP: ()/(37-71) 126/60     Weight: 70.3 kg (155 lb)  Body mass index is 21.62 kg/m².    Physical Exam   Constitutional: She is oriented to person, place, and time. She appears well-developed and well-nourished.   HENT:   Head: Normocephalic and atraumatic.   Right Ear: External ear normal.   Left Ear: External ear normal.   Nose: Nose normal.   Eyes: Pupils are equal, round, and reactive to light. Conjunctivae, EOM and lids are normal.   Neck: Trachea normal, normal range of motion and full passive range of motion without pain. Neck supple.   Cardiovascular: Normal rate, regular rhythm, S1 normal, S2 normal, normal heart sounds, intact distal pulses and normal pulses.   Pulmonary/Chest: Effort normal and breath sounds normal.   Abdominal: Soft. Normal aorta and bowel sounds are normal. There is tenderness.   Musculoskeletal: Normal range of motion.   Neurological: She is alert and oriented to person, place, and time. She has normal reflexes.   Skin: Skin is warm, dry and intact. There is pallor.   Psychiatric: She has a normal mood and affect. Her speech is normal and behavior is normal. Judgment and thought content normal. Cognition and memory are normal.   Nursing note and vitals reviewed.        CRANIAL NERVES     CN III, IV, VI   Pupils are equal, round, and reactive to light.  Extraocular motions are normal.        Significant Labs:   BMP:   Recent Labs   Lab 04/29/19  0923   *      K 3.5      CO2 20*   BUN 12   CREATININE 1.2   CALCIUM 8.2*     CBC:   Recent Labs   Lab 04/29/19  0923   WBC 12.28   HGB 6.2*   HCT 21.6*   *     CMP:   Recent Labs   Lab 04/29/19  0923      K 3.5      CO2 20*   *   BUN 12   CREATININE  1.2   CALCIUM 8.2*   ANIONGAP 11   EGFRNONAA 44.9*     All pertinent labs within the past 24 hours have been reviewed.    Significant Imaging: I have reviewed and interpreted all pertinent imaging results/findings within the past 24 hours.

## 2019-04-29 NOTE — PLAN OF CARE
Problem: Adult Inpatient Plan of Care  Goal: Readiness for Transition of Care    Intervention: Mutually Develop Transition Plan     04/29/19 1808 04/29/19 1818   OTHER   Communicated expected length of stay with patient/caregiver yes  --    Is patient able to care for self after discharge? Yes  (with assistance)  --    Who are your caregiver(s) and their phone number(s)? Parisa Tobar daughter [phone]  --    Patient currently receives home health services?  --  No   (RETIRED) Discharge Needs Assessment   How many people do you have in your home that can help with your care after discharge?  --  3   Discharge Needs Assessment   Readmission Within the Last 30 Days no previous admission in last 30 days  --    Equipment Currently Used at Home bedside commode;shower chair;grab bar  --    Transportation Anticipated family or friend will provide  --    Social Work Plan   Patient/Family in Agreement with Plan yes  --    Living Environment   Able to Return to Prior Arrangements yes  --    (RETIRED) Current Health   Expected Length of Stay (days) 1  --    (RETIRED) Social Work Plan   Patient's perception of discharge disposition home or selfcare  --

## 2019-04-29 NOTE — HOSPITAL COURSE
IVFS blood transfusion  04/30/2019.  Patient has had 3 units of blood has reached maximum medical improvement and can be discharged home she will have recurring CBCs done for recurring blood transfusions.  Patient has gastric carcinoma chronic blood loss.

## 2019-04-30 VITALS
DIASTOLIC BLOOD PRESSURE: 64 MMHG | SYSTOLIC BLOOD PRESSURE: 134 MMHG | BODY MASS INDEX: 24.75 KG/M2 | OXYGEN SATURATION: 100 % | TEMPERATURE: 98 F | WEIGHT: 176.81 LBS | HEIGHT: 71 IN | RESPIRATION RATE: 17 BRPM | HEART RATE: 70 BPM

## 2019-04-30 LAB
BASOPHILS # BLD AUTO: 0.08 K/UL (ref 0–0.2)
BASOPHILS NFR BLD: 0.8 % (ref 0–1.9)
BLD PROD TYP BPU: NORMAL
BLOOD UNIT EXPIRATION DATE: NORMAL
BLOOD UNIT TYPE CODE: 6200
BLOOD UNIT TYPE: NORMAL
CODING SYSTEM: NORMAL
DIFFERENTIAL METHOD: ABNORMAL
DISPENSE STATUS: NORMAL
EOSINOPHIL # BLD AUTO: 0.1 K/UL (ref 0–0.5)
EOSINOPHIL NFR BLD: 1.3 % (ref 0–8)
ERYTHROCYTE [DISTWIDTH] IN BLOOD BY AUTOMATED COUNT: 15.1 % (ref 11.5–14.5)
HCT VFR BLD AUTO: 30.6 % (ref 37–48.5)
HGB BLD-MCNC: 9.4 G/DL (ref 12–16)
IMM GRANULOCYTES # BLD AUTO: 0.03 K/UL (ref 0–0.04)
IMM GRANULOCYTES NFR BLD AUTO: 0.3 % (ref 0–0.5)
LYMPHOCYTES # BLD AUTO: 2.2 K/UL (ref 1–4.8)
LYMPHOCYTES NFR BLD: 21.3 % (ref 18–48)
MCH RBC QN AUTO: 26.9 PG (ref 27–31)
MCHC RBC AUTO-ENTMCNC: 30.7 G/DL (ref 32–36)
MCV RBC AUTO: 88 FL (ref 82–98)
MONOCYTES # BLD AUTO: 0.8 K/UL (ref 0.3–1)
MONOCYTES NFR BLD: 7.9 % (ref 4–15)
NEUTROPHILS # BLD AUTO: 7.1 K/UL (ref 1.8–7.7)
NEUTROPHILS NFR BLD: 68.4 % (ref 38–73)
NRBC BLD-RTO: 0 /100 WBC
NUM UNITS TRANS PACKED RBC: NORMAL
PLATELET # BLD AUTO: 393 K/UL (ref 150–350)
PMV BLD AUTO: 9.1 FL (ref 9.2–12.9)
RBC # BLD AUTO: 3.49 M/UL (ref 4–5.4)
WBC # BLD AUTO: 10.4 K/UL (ref 3.9–12.7)

## 2019-04-30 PROCEDURE — 36415 COLL VENOUS BLD VENIPUNCTURE: CPT

## 2019-04-30 PROCEDURE — 36430 TRANSFUSION BLD/BLD COMPNT: CPT

## 2019-04-30 PROCEDURE — 25000003 PHARM REV CODE 250: Performed by: INTERNAL MEDICINE

## 2019-04-30 PROCEDURE — G0378 HOSPITAL OBSERVATION PER HR: HCPCS

## 2019-04-30 PROCEDURE — 85025 COMPLETE CBC W/AUTO DIFF WBC: CPT

## 2019-04-30 PROCEDURE — P9016 RBC LEUKOCYTES REDUCED: HCPCS

## 2019-04-30 RX ADMIN — SUCRALFATE 1 G: 1 SUSPENSION ORAL at 05:04

## 2019-04-30 RX ADMIN — PANTOPRAZOLE SODIUM 40 MG: 40 TABLET, DELAYED RELEASE ORAL at 08:04

## 2019-04-30 NOTE — PLAN OF CARE
04/30/19 0745   Final Note   Assessment Type Final Discharge Note   Anticipated Discharge Disposition Home   What phone number can be called within the next 1-3 days to see how you are doing after discharge? [phone]   Hospital Follow Up  Appt(s) scheduled? No   Discharge plans and expectations educations in teach back method with documentation complete? Yes   Patient's daughter in room. Patient ready to go home. Instructed that I will call her daughter with follow up appointment as Dr Hudson's office is closed at this time. States that will be fine. She wants to talk to him about ordering the blood before her count gets too low because when she gets below 7 that is when she passes out & is having problems with dizziness. She will discuss that with Dr Hudson when she sees him.

## 2019-04-30 NOTE — DISCHARGE SUMMARY
[facility] Hospital Medicine  Discharge Summary      Patient Name: Oriana Tobar  MRN: [id number]  Admission Date: 4/29/2019  Hospital Length of Stay: 0 days  Discharge Date and Time:  04/30/2019 6:48 AM  Attending Physician: Buck Hudson MD   Discharging Provider: Buck Hudson MD  Primary Care Provider: Buck Hudson MD      HPI:   Chronic blood loss anemia near syncope      * No surgery found *      Hospital Course:   IVFS blood transfusion  04/30/2019.  Patient has had 3 units of blood has reached maximum medical improvement and can be discharged home she will have recurring CBCs done for recurring blood transfusions.  Patient has gastric carcinoma chronic blood loss.     Consults:     * Symptomatic anemia  Gastric cancer chronic blood loss with chronic transfusions q 3 weeks  04/30/2019.  Patient be discharged home today.  She has received 3 units of blood she has had maximum medical improvement.  I have discussed with the patient and the family possibility of port since she is a very hard stick when blood count is very low is hard to obtain blood.  I will discuss with General surgery possibility of report in his case.  Follow up in office.  Q three-week blood counts for blood transfusion because this is about the times that the patient does need blood.        Final Active Diagnoses:    Diagnosis Date Noted POA    PRINCIPAL PROBLEM:  Symptomatic anemia [D64.9] 10/17/2018 Yes      Problems Resolved During this Admission:       Discharged Condition: fair    Disposition: Home or Self Care    Follow Up:  Follow-up Information     Buck Hudson MD.    Specialties:  Hospitalist, Family Medicine, Internal Medicine, Cardiology  Contact information:  [address] [address] [phone]                 Patient Instructions:   No discharge procedures on file.    Significant Diagnostic Studies: Labs:   BMP:   Recent Labs   Lab 04/29/19  0923    *      K 3.5      CO2 20*   BUN 12   CREATININE 1.2   CALCIUM 8.2*   , CMP   Recent Labs   Lab 04/29/19  0923      K 3.5      CO2 20*   *   BUN 12   CREATININE 1.2   CALCIUM 8.2*   ANIONGAP 11   ESTGFRAFRICA 51.8*   EGFRNONAA 44.9*   , CBC   Recent Labs   Lab 04/29/19 0923 04/30/19 0634   WBC 12.28 10.40   HGB 6.2* 9.4*   HCT 21.6* 30.6*   * 393*    and All labs within the past 24 hours have been reviewed    Pending Diagnostic Studies:     Procedure Component Value Units Date/Time    CBC auto differential [[id number]] Collected:  04/30/19 0634    Order Status:  Sent Lab Status:  In process Updated:  04/30/19 0637    Specimen:  Blood          Medications:  Reconciled Home Medications:      Medication List      CONTINUE taking these medications    B12 5,000-100 mcg Lozg  Generic drug:  cyanocobalamin-cobamamide     ferrous sulfate 325 (65 FE) MG EC tablet  Take 325 mg by mouth once daily.     losartan 50 MG tablet  Commonly known as:  COZAAR  Take 50 mg by mouth once daily.     pantoprazole 40 MG tablet  Commonly known as:  PROTONIX  Take 1 tablet (40 mg total) by mouth once daily.     sucralfate 100 mg/mL suspension  Commonly known as:  CARAFATE  Take 10 mLs (1 g total) by mouth 4 (four) times daily before meals and nightly.            Indwelling Lines/Drains at time of discharge:   Lines/Drains/Airways          None          Time spent on the discharge of patient: 35 minutes  Patient was seen and examined on the date of discharge and determined to be suitable for discharge.         Buck Hudson MD  Department of Hospital Medicine  [facility]

## 2019-04-30 NOTE — PLAN OF CARE
Problem: Adult Inpatient Plan of Care  Goal: Rounds/Family Conference     04/30/19 0302   Interdisciplinary Rounds/Family Conf   Participants nursing

## 2019-04-30 NOTE — PLAN OF CARE
Problem: Adult Inpatient Plan of Care  Goal: Absence of Hospital-Acquired Illness or Injury    Intervention: Prevent VTE (venous thromboembolism)     04/29/19 2100   Prevent or Manage Embolism   VTE Prevention/Management remove, assess skin and reapply compression stockings

## 2019-04-30 NOTE — PLAN OF CARE
Problem: Anemia  Goal: Anemia Symptom Improvement    Intervention: Monitor and Manage Anemia     04/30/19 0306   Monitor and Manage Anemia   Fatigue Management activity schedule adjusted;activity assistance provided;fatigue-related activity identified;frequent rest breaks encouraged   Monitor and Manage Anemia   Oral Nutrition Promotion rest periods promoted;safe use of adaptive equipment encouraged;social interaction promoted

## 2019-04-30 NOTE — PLAN OF CARE
Problem: Adult Inpatient Plan of Care  Goal: Optimal Comfort and Wellbeing    Intervention: Provide Person-Centered Care     04/29/19 1921   Support Dyspnea Relief   Trust Relationship/Rapport care explained;choices provided;emotional support provided;empathic listening provided;questions answered;reassurance provided;questions encouraged;thoughts/feelings acknowledged

## 2019-04-30 NOTE — ASSESSMENT & PLAN NOTE
Gastric cancer chronic blood loss with chronic transfusions q 3 weeks  04/30/2019.  Patient be discharged home today.  She has received 3 units of blood she has had maximum medical improvement.  I have discussed with the patient and the family possibility of port since she is a very hard stick when blood count is very low is hard to obtain blood.  I will discuss with General surgery possibility of report in his case.  Follow up in office.  Q three-week blood counts for blood transfusion because this is about the times that the patient does need blood.

## 2019-04-30 NOTE — PLAN OF CARE
Problem: Adult Inpatient Plan of Care  Goal: Absence of Hospital-Acquired Illness or Injury    Intervention: Identify and Manage Fall Risk     04/30/19 0302   Optimize Balance and Safe Activity   Safety Promotion/Fall Prevention assistive device/personal item within reach;bed alarm set;nonskid shoes/socks when out of bed;pulse ox;side rails raised x 2;toileting scheduled

## 2019-04-30 NOTE — NURSING
IV removed from left hand without difficulty.  Catheter intact. Pressure bandage applied. Patient tolerated well.  Discharge instructions reviewed with patient and family.  Verbalized understanding.  Patient escorted to personal vehicle via wheelchair.  NAD noted.  Respirations even and unlabored.  Instructed to call for any questions or concerns.  MIKE GAVIRIA

## 2019-04-30 NOTE — PLAN OF CARE
04/30/19 1124   Final Note   Assessment Type Final Discharge Note   Left message of voice mail of Parisa with follow up appointment with Dr Hudson for Wed May 1st at 10:40.

## 2019-04-30 NOTE — PLAN OF CARE
Problem: Fall Injury Risk  Goal: Absence of Fall and Fall-Related Injury    Intervention: Promote Injury-Free Environment     04/30/19 0301   Optimize Dane and Functional Mobility   Environmental Safety Modification assistive device/personal items within reach;clutter free environment maintained;lighting adjusted;room organization consistent   Optimize Balance and Safe Activity   Safety Promotion/Fall Prevention bed alarm set;pulse ox;side rails raised x 2

## 2019-04-30 NOTE — PLAN OF CARE
Problem: Fall Injury Risk  Goal: Absence of Fall and Fall-Related Injury    Intervention: Identify and Manage Contributors to Fall Injury Risk     04/30/19 0000   Identify and Manage Contributors to Fall Injury Risk   Self-Care Promotion BADL personal objects within reach

## 2019-05-03 ENCOUNTER — HOSPITAL ENCOUNTER (EMERGENCY)
Facility: HOSPITAL | Age: 74
Discharge: HOME | End: 2019-05-03
Attending: EMERGENCY MEDICINE
Payer: MEDICARE

## 2019-05-03 VITALS
TEMPERATURE: 98 F | RESPIRATION RATE: 20 BRPM | SYSTOLIC BLOOD PRESSURE: 110 MMHG | DIASTOLIC BLOOD PRESSURE: 57 MMHG | HEART RATE: 73 BPM | BODY MASS INDEX: 24.64 KG/M2 | OXYGEN SATURATION: 98 % | WEIGHT: 176 LBS | HEIGHT: 71 IN

## 2019-05-03 DIAGNOSIS — K52.9 GASTROENTERITIS: Primary | ICD-10-CM

## 2019-05-03 LAB
ALBUMIN SERPL BCP-MCNC: 3.2 G/DL (ref 3.5–5.2)
ALP SERPL-CCNC: 59 U/L (ref 55–135)
ALT SERPL W/O P-5'-P-CCNC: 8 U/L (ref 10–44)
ANION GAP SERPL CALC-SCNC: 10 MMOL/L (ref 8–16)
AST SERPL-CCNC: 15 U/L (ref 10–40)
BASOPHILS # BLD AUTO: 0.05 K/UL (ref 0–0.2)
BASOPHILS NFR BLD: 0.5 % (ref 0–1.9)
BILIRUB SERPL-MCNC: 0.6 MG/DL (ref 0.1–1)
BUN SERPL-MCNC: 19 MG/DL (ref 8–23)
CALCIUM SERPL-MCNC: 8.6 MG/DL (ref 8.7–10.5)
CHLORIDE SERPL-SCNC: 105 MMOL/L (ref 95–110)
CO2 SERPL-SCNC: 20 MMOL/L (ref 23–29)
CREAT SERPL-MCNC: 1.2 MG/DL (ref 0.5–1.4)
DIFFERENTIAL METHOD: ABNORMAL
EOSINOPHIL # BLD AUTO: 0.1 K/UL (ref 0–0.5)
EOSINOPHIL NFR BLD: 1.2 % (ref 0–8)
ERYTHROCYTE [DISTWIDTH] IN BLOOD BY AUTOMATED COUNT: 15 % (ref 11.5–14.5)
EST. GFR  (AFRICAN AMERICAN): 51.8 ML/MIN/1.73 M^2
EST. GFR  (NON AFRICAN AMERICAN): 44.9 ML/MIN/1.73 M^2
GLUCOSE SERPL-MCNC: 104 MG/DL (ref 70–110)
HCT VFR BLD AUTO: 33.1 % (ref 37–48.5)
HGB BLD-MCNC: 10 G/DL (ref 12–16)
IMM GRANULOCYTES # BLD AUTO: 0.04 K/UL (ref 0–0.04)
IMM GRANULOCYTES NFR BLD AUTO: 0.4 % (ref 0–0.5)
LYMPHOCYTES # BLD AUTO: 1.4 K/UL (ref 1–4.8)
LYMPHOCYTES NFR BLD: 14.5 % (ref 18–48)
MCH RBC QN AUTO: 26.3 PG (ref 27–31)
MCHC RBC AUTO-ENTMCNC: 30.2 G/DL (ref 32–36)
MCV RBC AUTO: 87 FL (ref 82–98)
MONOCYTES # BLD AUTO: 0.9 K/UL (ref 0.3–1)
MONOCYTES NFR BLD: 9.5 % (ref 4–15)
NEUTROPHILS # BLD AUTO: 7.2 K/UL (ref 1.8–7.7)
NEUTROPHILS NFR BLD: 73.9 % (ref 38–73)
NRBC BLD-RTO: 0 /100 WBC
PLATELET # BLD AUTO: 479 K/UL (ref 150–350)
PMV BLD AUTO: 9.6 FL (ref 9.2–12.9)
POTASSIUM SERPL-SCNC: 3.7 MMOL/L (ref 3.5–5.1)
PROT SERPL-MCNC: 7.2 G/DL (ref 6–8.4)
RBC # BLD AUTO: 3.8 M/UL (ref 4–5.4)
SODIUM SERPL-SCNC: 135 MMOL/L (ref 136–145)
WBC # BLD AUTO: 9.71 K/UL (ref 3.9–12.7)

## 2019-05-03 PROCEDURE — 85025 COMPLETE CBC W/AUTO DIFF WBC: CPT

## 2019-05-03 PROCEDURE — 80053 COMPREHEN METABOLIC PANEL: CPT

## 2019-05-03 PROCEDURE — 99283 EMERGENCY DEPT VISIT LOW MDM: CPT

## 2019-05-03 RX ORDER — DIPHENOXYLATE HYDROCHLORIDE AND ATROPINE SULFATE 2.5; .025 MG/1; MG/1
1 TABLET ORAL 4 TIMES DAILY PRN
Qty: 15 TABLET | Refills: 0 | Status: SHIPPED | OUTPATIENT
Start: 2019-05-03 | End: 2019-05-13

## 2019-05-05 ENCOUNTER — HOSPITAL ENCOUNTER (EMERGENCY)
Facility: HOSPITAL | Age: 74
Discharge: HOME | End: 2019-05-05
Attending: FAMILY MEDICINE
Payer: MEDICARE

## 2019-05-05 VITALS
HEART RATE: 80 BPM | OXYGEN SATURATION: 99 % | SYSTOLIC BLOOD PRESSURE: 123 MMHG | DIASTOLIC BLOOD PRESSURE: 66 MMHG | BODY MASS INDEX: 19.6 KG/M2 | TEMPERATURE: 98 F | HEIGHT: 71 IN | WEIGHT: 140 LBS

## 2019-05-05 DIAGNOSIS — K92.1 BLOOD IN STOOL: Primary | ICD-10-CM

## 2019-05-05 LAB
ALBUMIN SERPL BCP-MCNC: 3.3 G/DL (ref 3.5–5.2)
ALP SERPL-CCNC: 55 U/L (ref 55–135)
ALT SERPL W/O P-5'-P-CCNC: 6 U/L (ref 10–44)
ANION GAP SERPL CALC-SCNC: 10 MMOL/L (ref 8–16)
AST SERPL-CCNC: 13 U/L (ref 10–40)
BASOPHILS # BLD AUTO: 0.05 K/UL (ref 0–0.2)
BASOPHILS NFR BLD: 0.5 % (ref 0–1.9)
BILIRUB SERPL-MCNC: 0.7 MG/DL (ref 0.1–1)
BUN SERPL-MCNC: 19 MG/DL (ref 8–23)
CALCIUM SERPL-MCNC: 8.8 MG/DL (ref 8.7–10.5)
CHLORIDE SERPL-SCNC: 107 MMOL/L (ref 95–110)
CO2 SERPL-SCNC: 18 MMOL/L (ref 23–29)
CREAT SERPL-MCNC: 1.1 MG/DL (ref 0.5–1.4)
DIFFERENTIAL METHOD: ABNORMAL
EOSINOPHIL # BLD AUTO: 0.1 K/UL (ref 0–0.5)
EOSINOPHIL NFR BLD: 0.7 % (ref 0–8)
ERYTHROCYTE [DISTWIDTH] IN BLOOD BY AUTOMATED COUNT: 14.6 % (ref 11.5–14.5)
EST. GFR  (AFRICAN AMERICAN): 57.6 ML/MIN/1.73 M^2
EST. GFR  (NON AFRICAN AMERICAN): 49.9 ML/MIN/1.73 M^2
GLUCOSE SERPL-MCNC: 113 MG/DL (ref 70–110)
HCT VFR BLD AUTO: 33 % (ref 37–48.5)
HGB BLD-MCNC: 10 G/DL (ref 12–16)
IMM GRANULOCYTES # BLD AUTO: 0.04 K/UL (ref 0–0.04)
IMM GRANULOCYTES NFR BLD AUTO: 0.4 % (ref 0–0.5)
LYMPHOCYTES # BLD AUTO: 1.6 K/UL (ref 1–4.8)
LYMPHOCYTES NFR BLD: 16 % (ref 18–48)
MCH RBC QN AUTO: 26.5 PG (ref 27–31)
MCHC RBC AUTO-ENTMCNC: 30.3 G/DL (ref 32–36)
MCV RBC AUTO: 87 FL (ref 82–98)
MONOCYTES # BLD AUTO: 0.9 K/UL (ref 0.3–1)
MONOCYTES NFR BLD: 8.4 % (ref 4–15)
NEUTROPHILS # BLD AUTO: 7.6 K/UL (ref 1.8–7.7)
NEUTROPHILS NFR BLD: 74 % (ref 38–73)
NRBC BLD-RTO: 0 /100 WBC
PLATELET # BLD AUTO: 486 K/UL (ref 150–350)
PMV BLD AUTO: 9.1 FL (ref 9.2–12.9)
POTASSIUM SERPL-SCNC: 3.7 MMOL/L (ref 3.5–5.1)
PROT SERPL-MCNC: 7.2 G/DL (ref 6–8.4)
RBC # BLD AUTO: 3.78 M/UL (ref 4–5.4)
SODIUM SERPL-SCNC: 135 MMOL/L (ref 136–145)
WBC # BLD AUTO: 10.2 K/UL (ref 3.9–12.7)

## 2019-05-05 PROCEDURE — 80053 COMPREHEN METABOLIC PANEL: CPT

## 2019-05-05 PROCEDURE — 85025 COMPLETE CBC W/AUTO DIFF WBC: CPT

## 2019-05-05 PROCEDURE — 99283 EMERGENCY DEPT VISIT LOW MDM: CPT

## 2019-05-05 NOTE — ED TRIAGE NOTES
Pt granddaugter reports seeing bright red streaks of blood in the toilet after pt had a bowel movement.  Pt has stage IV Lung and stomach cancer.

## 2019-05-06 NOTE — DISCHARGE INSTRUCTIONS
Take medications as directed.  Return to the ER at any time if condition changes or worsens or any new symptoms develop.  Follow-up with primary care provider in the next 24-48 hours if no improvement is noted.

## 2019-05-07 NOTE — PROGRESS NOTES
CC  I am bleeding    Oriana Tobar is a 73 y.o.    Pt with recurrent renal cell carcinoma. SHe was admitted to the hospital and is weak. SHe is transfusion dependent at this time with recurrent GI bleeding   SHe is weak and rem,ains with melena    All notes reviewed from hospitalization  Past Medical History:   Diagnosis Date    Anemia     Cancer     kidney    Dementia     Encounter for blood transfusion     2018    High cholesterol 2018    Hypertension     No Medication     Renal disorder     Wears dentures     Uppers     Past Surgical History:   Procedure Laterality Date     SECTION      COLONOSCOPY N/A 2018    [facility]    CYSTOSCOPY WITH RETROGRADE PYELOGRAM Left 1/15/2016    Performed by [facility]ell [facility]acon [facility] at [facility] OR    EGD (ESOPHAGOGASTRODUODENOSCOPY) N/A 2018    [facility] by [facility] at [facility]    EYE SURGERY      Rt eye Catarac    HYSTERECTOMY      IMAGING, GI TRACT, INTRALUMINAL, VIA CAPSULE N/A 2018    Performed by Rocco Ross MD at [facility] ENDO    KIDNEY SURGERY Left 2018    Left kidney removed due to cancer    lasix surgery      NEPHRECTOMY-RADICAL   Left 2016    [facility] at [facility]    REPAIR-HERNIA-VENTRAL N/A 2016    Performed by Marco A [facility]hawa MD at [facility] OR    RESECTION-BOWEL  2016    [facility]awa [facility] at [facility] OR    URETEROSCOPY  1/15/2016    Performed by [facility]ell [facility]acon [facility] at [facility] OR       Current Outpatient Medications:     cyanocobalamin-cobamamide (B12) 5,000-100 mcg Lozg, , Disp: , Rfl:     diphenoxylate-atropine 2.5-0.025 mg (LOMOTIL) 2.5-0.025 mg per tablet, Take 1 tablet by mouth 4 (four) times daily as needed for Diarrhea., Disp: 15 tablet, Rfl: 0    ferrous sulfate 325 (65 FE) MG EC tablet, Take 325 mg by mouth once daily., Disp: , Rfl:     losartan (COZAAR) 50 MG tablet, Take 50  "mg by mouth once daily., Disp: , Rfl:     pantoprazole (PROTONIX) 40 MG tablet, Take 1 tablet (40 mg total) by mouth once daily., Disp: 30 tablet, Rfl: 11    sucralfate (CARAFATE) 100 mg/mL suspension, Take 10 mLs (1 g total) by mouth 4 (four) times daily before meals and nightly., Disp: 1 Bottle, Rfl: 11  Review of patient's allergies indicates:   Allergen Reactions    Codeine Other (See Comments)     Pt shakes and hallucinates    Pcn [penicillins] Anxiety and Other (See Comments)     Social History     Tobacco Use    Smoking status: Former Smoker     Packs/day: 1.00     Years: 50.00     Pack years: 50.00     Last attempt to quit: 10/6/2018     Years since quittin.5    Smokeless tobacco: Never Used    Tobacco comment: pt has stopped; encouraged to remain stopped   Substance Use Topics    Alcohol use: No     Frequency: Never     Comment: last alcohol intake friday    Drug use: No     Family History   Adopted: Yes       CONSTITUTIONAL: No fevers, chills, night sweats, wt. loss, appetite changes  SKIN: no rashes or itching  ENT: No headaches, head trauma, vision changes, or eye pain  LYMPH NODES: None noticed   CV: No chest pain, palpitations.   RESP:+ dyspnea on exertion, cno cough, wheezing, or hemoptysis  GI: No nausea, emesis, diarrhea, constipation, melena, hematochezia, pain.   : No dysuria, hematuria, urgency, or frequency   HEME: No easy bruising, bleeding problems  PSYCHIATRIC: No depression, anxiety, psychosis, hallucinations.  NEURO: No seizures, memory loss, +dizziness or difficulty sleeping  MSK: No arthralgias or joint swelling  + weak     /66   Pulse (!) 113   Resp 14   Ht 5' 10" (1.778 m)   Wt 70.9 kg (156 lb 4.8 oz)   LMP  (LMP Unknown)   BMI 22.43 kg/m²     LMP  (LMP Unknown)   Gen: NAD, A and O x3,   Psych: pleasant affect, normal thought process  Eyes: Pupils round and non dilated, EOM intact  Nose: Nares patent  OP clear, mucosa patent  Neck: suppple, no JVD, trachea " midline  Lungs: CTAB, no wheezes  CV: S1S2 with RRR, No mrg  Abd: soft, NTND, + BS, No HSM, no ascites  Extr: No CCE, MEJIA, good capillary refill  Neuro: steady gait, CNs grossly intact  Skin: intact, no lesions noted  Rheum: No joint swelling    Lab Results   Component Value Date    WBC 10.20 05/05/2019    HGB 10.0 (L) 05/05/2019    HCT 33.0 (L) 05/05/2019    MCV 87 05/05/2019     (H) 05/05/2019     CMP  Sodium   Date Value Ref Range Status   05/05/2019 135 (L) 136 - 145 mmol/L Final     Potassium   Date Value Ref Range Status   05/05/2019 3.7 3.5 - 5.1 mmol/L Final     Chloride   Date Value Ref Range Status   05/05/2019 107 95 - 110 mmol/L Final     CO2   Date Value Ref Range Status   05/05/2019 18 (L) 23 - 29 mmol/L Final     Glucose   Date Value Ref Range Status   05/05/2019 113 (H) 70 - 110 mg/dL Final     BUN, Bld   Date Value Ref Range Status   05/05/2019 19 8 - 23 mg/dL Final     Creatinine   Date Value Ref Range Status   05/05/2019 1.1 0.5 - 1.4 mg/dL Final     Calcium   Date Value Ref Range Status   05/05/2019 8.8 8.7 - 10.5 mg/dL Final     Total Protein   Date Value Ref Range Status   05/05/2019 7.2 6.0 - 8.4 g/dL Final     Albumin   Date Value Ref Range Status   05/05/2019 3.3 (L) 3.5 - 5.2 g/dL Final     Total Bilirubin   Date Value Ref Range Status   05/05/2019 0.7 0.1 - 1.0 mg/dL Final     Comment:     For infants and newborns, interpretation of results should be based  on gestational age, weight and in agreement with clinical  observations.  Premature Infant recommended reference ranges:  Up to 24 hours.............<8.0 mg/dL  Up to 48 hours............<12.0 mg/dL  3-5 days..................<15.0 mg/dL  6-29 days.................<15.0 mg/dL       Alkaline Phosphatase   Date Value Ref Range Status   05/05/2019 55 55 - 135 U/L Final     AST   Date Value Ref Range Status   05/05/2019 13 10 - 40 U/L Final     ALT   Date Value Ref Range Status   05/05/2019 6 (L) 10 - 44 U/L Final     Anion Gap    Date Value Ref Range Status   05/05/2019 10 8 - 16 mmol/L Final     eGFR if    Date Value Ref Range Status   05/05/2019 57.6 (A) >60 mL/min/1.73 m^2 Final     eGFR if non    Date Value Ref Range Status   05/05/2019 49.9 (A) >60 mL/min/1.73 m^2 Final     Comment:     Calculation used to obtain the estimated glomerular filtration  rate (eGFR) is the CKD-EPI equation.          Gastric carcinoma  -     CT Chest With Contrast; Future; Expected date: 05/08/2019  -     CT Abdomen Pelvis W Wo Contrast; Future; Expected date: 05/08/2019  -     octreotide (SANDOSTATIN) 100 mcg/mL Soln; Inject 2 mLs (200 mcg total) into the vein once daily.  Dispense: 40 mL; Refill: 0    Renal cell carcinoma, unspecified laterality  -     CT Chest With Contrast; Future; Expected date: 05/08/2019  -     CT Abdomen Pelvis W Wo Contrast; Future; Expected date: 05/08/2019  -     octreotide (SANDOSTATIN) 100 mcg/mL Soln; Inject 2 mLs (200 mcg total) into the vein once daily.  Dispense: 40 mL; Refill: 0    Liver mass    Mesenteric mass    Status post partial colectomy    Gastrointestinal hemorrhage, unspecified gastrointestinal hemorrhage type  -     octreotide (SANDOSTATIN) 100 mcg/mL Soln; Inject 2 mLs (200 mcg total) into the vein once daily.  Dispense: 40 mL; Refill: 0    Gastrointestinal hemorrhage with melena    Anemia, unspecified type    Time spent with patient :over 50   Over 50% in counseling  Trial of sandostatin to control bleeding as GI is unable to help given the location of her tumor  She is transfusion dependent   Cbc next visit  Start sandostatin SC for two weeks then 30-40 mg IM monthly   RTC after CT scans for staging of recurrence   Thank you for allowing me to evaluate and participate in the care of this pleasant patient. Please fell free to call me with any questions or concerns.    Warmly,  Lauren Irene MD    This note was dictated with Vaibhavon and briefly proofread. Please excuse any sentences  that may be unclear or words misspelled

## 2019-05-08 ENCOUNTER — INITIAL CONSULT (OUTPATIENT)
Dept: HEMATOLOGY/ONCOLOGY | Facility: CLINIC | Age: 74
End: 2019-05-08
Payer: MEDICARE

## 2019-05-08 ENCOUNTER — TELEPHONE (OUTPATIENT)
Dept: PHARMACY | Facility: CLINIC | Age: 74
End: 2019-05-08

## 2019-05-08 VITALS
BODY MASS INDEX: 22.38 KG/M2 | SYSTOLIC BLOOD PRESSURE: 118 MMHG | WEIGHT: 156.31 LBS | RESPIRATION RATE: 14 BRPM | HEIGHT: 70 IN | DIASTOLIC BLOOD PRESSURE: 66 MMHG | HEART RATE: 113 BPM

## 2019-05-08 DIAGNOSIS — R16.0 LIVER MASS: ICD-10-CM

## 2019-05-08 DIAGNOSIS — Z90.49 STATUS POST PARTIAL COLECTOMY: ICD-10-CM

## 2019-05-08 DIAGNOSIS — C64.9 RENAL CELL CARCINOMA, UNSPECIFIED LATERALITY: ICD-10-CM

## 2019-05-08 DIAGNOSIS — K92.1 GASTROINTESTINAL HEMORRHAGE WITH MELENA: ICD-10-CM

## 2019-05-08 DIAGNOSIS — C16.9 GASTRIC CARCINOMA: Primary | ICD-10-CM

## 2019-05-08 DIAGNOSIS — K92.2 GASTROINTESTINAL HEMORRHAGE, UNSPECIFIED GASTROINTESTINAL HEMORRHAGE TYPE: ICD-10-CM

## 2019-05-08 DIAGNOSIS — D64.9 ANEMIA, UNSPECIFIED TYPE: ICD-10-CM

## 2019-05-08 DIAGNOSIS — K63.89 MESENTERIC MASS: ICD-10-CM

## 2019-05-08 PROCEDURE — 1101F PT FALLS ASSESS-DOCD LE1/YR: CPT | Mod: CPTII,S$GLB,, | Performed by: INTERNAL MEDICINE

## 2019-05-08 PROCEDURE — 3074F SYST BP LT 130 MM HG: CPT | Mod: CPTII,S$GLB,, | Performed by: INTERNAL MEDICINE

## 2019-05-08 PROCEDURE — 1101F PR PT FALLS ASSESS DOC 0-1 FALLS W/OUT INJ PAST YR: ICD-10-PCS | Mod: CPTII,S$GLB,, | Performed by: INTERNAL MEDICINE

## 2019-05-08 PROCEDURE — 99999 PR PBB SHADOW E&M-EST. PATIENT-LVL III: CPT | Mod: PBBFAC,,, | Performed by: INTERNAL MEDICINE

## 2019-05-08 PROCEDURE — 3288F PR FALLS RISK ASSESSMENT DOCUMENTED: ICD-10-PCS | Mod: CPTII,S$GLB,, | Performed by: INTERNAL MEDICINE

## 2019-05-08 PROCEDURE — 3074F PR MOST RECENT SYSTOLIC BLOOD PRESSURE < 130 MM HG: ICD-10-PCS | Mod: CPTII,S$GLB,, | Performed by: INTERNAL MEDICINE

## 2019-05-08 PROCEDURE — 99215 PR OFFICE/OUTPT VISIT, EST, LEVL V, 40-54 MIN: ICD-10-PCS | Mod: S$GLB,,, | Performed by: INTERNAL MEDICINE

## 2019-05-08 PROCEDURE — 3078F DIAST BP <80 MM HG: CPT | Mod: CPTII,S$GLB,, | Performed by: INTERNAL MEDICINE

## 2019-05-08 PROCEDURE — 3078F PR MOST RECENT DIASTOLIC BLOOD PRESSURE < 80 MM HG: ICD-10-PCS | Mod: CPTII,S$GLB,, | Performed by: INTERNAL MEDICINE

## 2019-05-08 PROCEDURE — 99999 PR PBB SHADOW E&M-EST. PATIENT-LVL III: ICD-10-PCS | Mod: PBBFAC,,, | Performed by: INTERNAL MEDICINE

## 2019-05-08 PROCEDURE — 3288F FALL RISK ASSESSMENT DOCD: CPT | Mod: CPTII,S$GLB,, | Performed by: INTERNAL MEDICINE

## 2019-05-08 PROCEDURE — 99215 OFFICE O/P EST HI 40 MIN: CPT | Mod: S$GLB,,, | Performed by: INTERNAL MEDICINE

## 2019-05-08 RX ORDER — OCTREOTIDE ACETATE 100 UG/ML
200 INJECTION, SOLUTION INTRAVENOUS; SUBCUTANEOUS DAILY
Qty: 40 ML | Refills: 0 | Status: SHIPPED | OUTPATIENT
Start: 2019-05-08 | End: 2019-10-25 | Stop reason: HOSPADM

## 2019-05-08 NOTE — PLAN OF CARE
START OFF PATHWAY REGIMEN - [Other Dx]            Octreotide LAR [place]ot (Sandostatin(R) LAR Depot)           Additional Orders: In patients with dialysis-dependent renal failure or   established cirrhosis, reduced initial octreotide dosing is recommended; see   octreotide prescribing information for details.    **Always confirm dose/schedule in your pharmacy ordering system**        Patient Characteristics:  Intent of Therapy:  Non-Curative / Palliative Intent, Discussed with Patient

## 2019-05-08 NOTE — LETTER
May 8, 2019      Rocco Ross MD  [address] [facility]  [address]  [place] [address]           [facility]  [address] [place] [address]  Phone: [phone]          Patient: Oriana Tobar   MR Number: [id number]   YOB: 1945   Date of Visit: 5/8/2019       Dear Dr. Rocco Ross:    Thank you for referring Oriana Tobar to me for evaluation. Attached you will find relevant portions of my assessment and plan of care.    If you have questions, please do not hesitate to call me. I look forward to following Oriana Tobar along with you.    Sincerely,    Lauren Irene MD    Enclosure  CC:  No Recipients    If you would like to receive this communication electronically, please contact [email or web address] or [phone] to request more information on [employer] access.    For providers and/or their staff who would like to refer a patient to [facility], please contact us through our one-stop-shop provider referral line, [facility], at [phone].    If you feel you have received this communication in error or would no longer like to receive these types of communications, please e-mail [email or web address]

## 2019-05-13 ENCOUNTER — HOSPITAL ENCOUNTER (OUTPATIENT)
Dept: RADIOLOGY | Facility: HOSPITAL | Age: 74
Discharge: HOME | End: 2019-05-13
Attending: INTERNAL MEDICINE
Payer: MEDICARE

## 2019-05-13 DIAGNOSIS — C64.9 RENAL CELL CARCINOMA, UNSPECIFIED LATERALITY: ICD-10-CM

## 2019-05-13 DIAGNOSIS — C16.9 GASTRIC CARCINOMA: ICD-10-CM

## 2019-05-13 PROCEDURE — 71260 CT THORAX DX C+: CPT | Mod: 26,,, | Performed by: RADIOLOGY

## 2019-05-13 PROCEDURE — 71260 CT CHEST ABDOMEN PELVIS W W/O CONTRAST (XPD): ICD-10-PCS | Mod: 26,,, | Performed by: RADIOLOGY

## 2019-05-13 PROCEDURE — 74178 CT ABD&PLV WO CNTR FLWD CNTR: CPT | Mod: TC

## 2019-05-13 PROCEDURE — 74178 CT CHEST ABDOMEN PELVIS W W/O CONTRAST (XPD): ICD-10-PCS | Mod: 26,,, | Performed by: RADIOLOGY

## 2019-05-13 PROCEDURE — 74178 CT ABD&PLV WO CNTR FLWD CNTR: CPT | Mod: 26,,, | Performed by: RADIOLOGY

## 2019-05-13 PROCEDURE — 25500020 PHARM REV CODE 255: Performed by: INTERNAL MEDICINE

## 2019-05-13 RX ADMIN — IOHEXOL 15 ML: 300 INJECTION, SOLUTION INTRAVENOUS at 07:05

## 2019-05-13 RX ADMIN — IOHEXOL 75 ML: 350 INJECTION, SOLUTION INTRAVENOUS at 08:05

## 2019-05-13 RX ADMIN — IOHEXOL 15 ML: 300 INJECTION, SOLUTION INTRAVENOUS at 06:05

## 2019-05-13 NOTE — ED PROVIDER NOTES
Encounter Date: 2019       History     Chief Complaint   Patient presents with    Hematuria    Rectal Bleeding     Pt presents to ED with history of rectal bleeding today x1. Also with hematuria. Pt has history of malignancy, frequent episodes of anemia requiring transfusion. Pt and family not ready for hospice. Pt denies any dizziness or lightheadedness. Daughter states they want CBC checked.         Review of patient's allergies indicates:   Allergen Reactions    Codeine Other (See Comments)     Pt shakes and hallucinates    Pcn [penicillins] Anxiety and Other (See Comments)     Past Medical History:   Diagnosis Date    Anemia     Cancer     kidney    Dementia     Encounter for blood transfusion     2018    High cholesterol 2018    Hypertension     No Medication     Renal disorder     Wears dentures     Uppers     Past Surgical History:   Procedure Laterality Date     SECTION      COLONOSCOPY N/A 2018    [facility]    CYSTOSCOPY WITH RETROGRADE PYELOGRAM Left 1/15/2016    [facility]    EGD (ESOPHAGOGASTRODUODENOSCOPY) N/A 2018    Performed by [facility] at [facility] ENDO    EYE SURGERY      Rt eye Catarac    HYSTERECTOMY      IMAGING, GI TRACT, INTRALUMINAL, VIA CAPSULE N/A 2018    [facility] by [facility] at [facility] ENDO    KIDNEY SURGERY Left 2018    Left kidney removed due to cancer    lasix surgery      NEPHRECTOMY-RADICAL   Left 2016    [facility]    REPAIR-HERNIA-VENTRAL N/A 2016    [facility]formed by [facility]awa [facility] at [facility] OR    RESECTION-BOWEL  2016    [facility] by [facility] at [facility]    URETEROSCOPY  1/15/2016    [facility] by [facility] at [facility] OR     Family History   Adopted: Yes     Social History     Tobacco Use    Smoking status: Former Smoker     Packs/day:  1.00     Years: 50.00     Pack years: 50.00     Last attempt to quit: 10/6/2018     Years since quittin.6    Smokeless tobacco: Never Used    Tobacco comment: pt has stopped; encouraged to remain stopped   Substance Use Topics    Alcohol use: No     Frequency: Never     Comment: last alcohol intake friday    Drug use: No     Review of Systems   Constitutional: Negative.  Negative for fever.   HENT: Negative.    Eyes: Negative.    Respiratory: Negative.    Cardiovascular: Negative.    Gastrointestinal: Positive for blood in stool. Negative for abdominal pain, nausea, rectal pain and vomiting.   Endocrine: Negative.    Genitourinary: Negative.    Musculoskeletal: Negative.    Allergic/Immunologic: Negative.    Neurological: Negative.  Negative for dizziness and light-headedness.   Hematological: Negative.    Psychiatric/Behavioral: Negative.        Physical Exam     Initial Vitals   BP Pulse Resp Temp SpO2   19 -- 19   122/77 80  98.1 °F (36.7 °C) 99 %      MAP       --                Physical Exam    Nursing note and vitals reviewed.  Constitutional: She appears well-developed and well-nourished. She is not diaphoretic. No distress.   HENT:   Head: Normocephalic and atraumatic.   Eyes: Conjunctivae and EOM are normal. Pupils are equal, round, and reactive to light.   Neck: Normal range of motion. Neck supple.   Cardiovascular: Normal rate, regular rhythm, normal heart sounds and intact distal pulses. Exam reveals no gallop and no friction rub.    No murmur heard.  Pulmonary/Chest: Breath sounds normal. No respiratory distress. She has no wheezes. She has no rales.   Abdominal: Soft. Bowel sounds are normal. She exhibits no distension. There is no tenderness.   Musculoskeletal: Normal range of motion. She exhibits no edema.   Neurological: She is alert and oriented to person, place, and time. She has normal strength.   Skin: Skin is warm and dry. Capillary  refill takes less than 2 seconds. No rash noted. No erythema.   Psychiatric: She has a normal mood and affect. Her behavior is normal. Judgment and thought content normal.         ED Course   Procedures  Labs Reviewed   CBC W/ AUTO DIFFERENTIAL - Abnormal; Notable for the following components:       Result Value    RBC 3.78 (*)     Hemoglobin 10.0 (*)     Hematocrit 33.0 (*)     Mean Corpuscular Hemoglobin 26.5 (*)     Mean Corpuscular Hemoglobin Conc 30.3 (*)     RDW 14.6 (*)     Platelets 486 (*)     MPV 9.1 (*)     Gran% 74.0 (*)     Lymph% 16.0 (*)     All other components within normal limits   COMPREHENSIVE METABOLIC PANEL - Abnormal; Notable for the following components:    Sodium 135 (*)     CO2 18 (*)     Glucose 113 (*)     Albumin 3.3 (*)     ALT 6 (*)     eGFR if  57.6 (*)     eGFR if non  49.9 (*)     All other components within normal limits          Imaging Results    None          Medical Decision Making:   ED Management:  Reassurance given to pt and daughter, results of CBC d/w them. Recommended f/u with PCP as scheduled, return to ED for any further problems.                    ED Course as of May 13 1823   Sun May 05, 2019   1920 Spoke with family member and pt, they have f/u scheduled with PCP and onc., encouraged to return to ED if any further bleeding.     [MD]      ED Course User Index  [MD] Kareen North MD     Clinical Impression:       ICD-10-CM ICD-9-CM   1. Blood in stool K92.1 578.1                                Kareen North MD  05/13/19 1826

## 2019-05-16 ENCOUNTER — INFUSION (OUTPATIENT)
Dept: INFUSION THERAPY | Facility: HOSPITAL | Age: 74
End: 2019-05-16
Attending: INTERNAL MEDICINE
Payer: MEDICARE

## 2019-05-16 ENCOUNTER — LAB VISIT (OUTPATIENT)
Dept: LAB | Facility: HOSPITAL | Age: 74
End: 2019-05-16
Attending: INTERNAL MEDICINE
Payer: MEDICARE

## 2019-05-16 VITALS
RESPIRATION RATE: 18 BRPM | HEART RATE: 94 BPM | TEMPERATURE: 97 F | DIASTOLIC BLOOD PRESSURE: 68 MMHG | SYSTOLIC BLOOD PRESSURE: 140 MMHG

## 2019-05-16 DIAGNOSIS — K92.1 GASTROINTESTINAL HEMORRHAGE WITH MELENA: Primary | ICD-10-CM

## 2019-05-16 DIAGNOSIS — D64.9 ANEMIA, UNSPECIFIED TYPE: ICD-10-CM

## 2019-05-16 DIAGNOSIS — C16.9 GASTRIC CARCINOMA: ICD-10-CM

## 2019-05-16 DIAGNOSIS — C16.2 MALIGNANT NEOPLASM OF BODY OF STOMACH: ICD-10-CM

## 2019-05-16 DIAGNOSIS — K29.20: Primary | ICD-10-CM

## 2019-05-16 DIAGNOSIS — D50.0 CHRONIC BLOOD LOSS ANEMIA: ICD-10-CM

## 2019-05-16 LAB
BASOPHILS # BLD AUTO: 0.04 K/UL (ref 0–0.2)
BASOPHILS NFR BLD: 0.4 % (ref 0–1.9)
DIFFERENTIAL METHOD: ABNORMAL
EOSINOPHIL # BLD AUTO: 0.1 K/UL (ref 0–0.5)
EOSINOPHIL NFR BLD: 1.5 % (ref 0–8)
ERYTHROCYTE [DISTWIDTH] IN BLOOD BY AUTOMATED COUNT: 15.4 % (ref 11.5–14.5)
HCT VFR BLD AUTO: 32.4 % (ref 37–48.5)
HGB BLD-MCNC: 9.6 G/DL (ref 12–16)
IMM GRANULOCYTES # BLD AUTO: 0.04 K/UL (ref 0–0.04)
IMM GRANULOCYTES NFR BLD AUTO: 0.4 % (ref 0–0.5)
LYMPHOCYTES # BLD AUTO: 2.3 K/UL (ref 1–4.8)
LYMPHOCYTES NFR BLD: 24 % (ref 18–48)
MCH RBC QN AUTO: 25.7 PG (ref 27–31)
MCHC RBC AUTO-ENTMCNC: 29.6 G/DL (ref 32–36)
MCV RBC AUTO: 87 FL (ref 82–98)
MONOCYTES # BLD AUTO: 0.8 K/UL (ref 0.3–1)
MONOCYTES NFR BLD: 8.2 % (ref 4–15)
NEUTROPHILS # BLD AUTO: 6.3 K/UL (ref 1.8–7.7)
NEUTROPHILS NFR BLD: 65.5 % (ref 38–73)
NRBC BLD-RTO: 0 /100 WBC
PLATELET # BLD AUTO: 638 K/UL (ref 150–350)
PMV BLD AUTO: 9.9 FL (ref 9.2–12.9)
RBC # BLD AUTO: 3.74 M/UL (ref 4–5.4)
WBC # BLD AUTO: 9.59 K/UL (ref 3.9–12.7)

## 2019-05-16 PROCEDURE — 36415 COLL VENOUS BLD VENIPUNCTURE: CPT

## 2019-05-16 PROCEDURE — 85025 COMPLETE CBC W/AUTO DIFF WBC: CPT

## 2019-05-16 NOTE — NURSING
0900:  Patient arrived to [facility] for Sandostatin injection ambulating with daughters present.  Vital Signs stable.  No new complaints.  Medications released.  [facility] Pharmacist called to inform me that medication was not in Pharmacy at this time and he would speak with Suellen regarding order?  Apologized to patient and family and informed that we would call ASAP when medication arrived to rescheduled patient for ordered treatment.  Patient left [facility] ambulating with daughters present and in no distress.-KS

## 2019-05-24 ENCOUNTER — INFUSION (OUTPATIENT)
Dept: INFUSION THERAPY | Facility: HOSPITAL | Age: 74
End: 2019-05-24
Attending: INTERNAL MEDICINE
Payer: MEDICARE

## 2019-05-24 ENCOUNTER — LAB VISIT (OUTPATIENT)
Dept: LAB | Facility: HOSPITAL | Age: 74
End: 2019-05-24
Attending: INTERNAL MEDICINE
Payer: MEDICARE

## 2019-05-24 VITALS
RESPIRATION RATE: 18 BRPM | DIASTOLIC BLOOD PRESSURE: 59 MMHG | HEART RATE: 96 BPM | TEMPERATURE: 97 F | SYSTOLIC BLOOD PRESSURE: 126 MMHG | OXYGEN SATURATION: 100 %

## 2019-05-24 DIAGNOSIS — D64.9 ANEMIA, UNSPECIFIED TYPE: ICD-10-CM

## 2019-05-24 DIAGNOSIS — K92.1 GASTROINTESTINAL HEMORRHAGE WITH MELENA: Primary | ICD-10-CM

## 2019-05-24 DIAGNOSIS — C16.2 MALIGNANT NEOPLASM OF BODY OF STOMACH: ICD-10-CM

## 2019-05-24 DIAGNOSIS — K92.1 BLOOD IN STOOL: Primary | ICD-10-CM

## 2019-05-24 DIAGNOSIS — D50.0 CHRONIC BLOOD LOSS ANEMIA: ICD-10-CM

## 2019-05-24 DIAGNOSIS — C16.9 GASTRIC CARCINOMA: ICD-10-CM

## 2019-05-24 LAB
BASOPHILS # BLD AUTO: 0.04 K/UL (ref 0–0.2)
BASOPHILS NFR BLD: 0.5 % (ref 0–1.9)
DIFFERENTIAL METHOD: ABNORMAL
EOSINOPHIL # BLD AUTO: 0.1 K/UL (ref 0–0.5)
EOSINOPHIL NFR BLD: 1.8 % (ref 0–8)
ERYTHROCYTE [DISTWIDTH] IN BLOOD BY AUTOMATED COUNT: 15.1 % (ref 11.5–14.5)
HCT VFR BLD AUTO: 32 % (ref 37–48.5)
HGB BLD-MCNC: 9.6 G/DL (ref 12–16)
IMM GRANULOCYTES # BLD AUTO: 0.03 K/UL (ref 0–0.04)
IMM GRANULOCYTES NFR BLD AUTO: 0.4 % (ref 0–0.5)
LYMPHOCYTES # BLD AUTO: 1.4 K/UL (ref 1–4.8)
LYMPHOCYTES NFR BLD: 19.1 % (ref 18–48)
MCH RBC QN AUTO: 25.6 PG (ref 27–31)
MCHC RBC AUTO-ENTMCNC: 30 G/DL (ref 32–36)
MCV RBC AUTO: 85 FL (ref 82–98)
MONOCYTES # BLD AUTO: 0.5 K/UL (ref 0.3–1)
MONOCYTES NFR BLD: 7 % (ref 4–15)
NEUTROPHILS # BLD AUTO: 5.2 K/UL (ref 1.8–7.7)
NEUTROPHILS NFR BLD: 71.2 % (ref 38–73)
NRBC BLD-RTO: 0 /100 WBC
PLATELET # BLD AUTO: 499 K/UL (ref 150–350)
PMV BLD AUTO: 9.1 FL (ref 9.2–12.9)
RBC # BLD AUTO: 3.75 M/UL (ref 4–5.4)
WBC # BLD AUTO: 7.33 K/UL (ref 3.9–12.7)

## 2019-05-24 PROCEDURE — 63600175 PHARM REV CODE 636 W HCPCS: Mod: JG | Performed by: INTERNAL MEDICINE

## 2019-05-24 PROCEDURE — 85025 COMPLETE CBC W/AUTO DIFF WBC: CPT

## 2019-05-24 PROCEDURE — 36415 COLL VENOUS BLD VENIPUNCTURE: CPT

## 2019-05-24 PROCEDURE — 96401 CHEMO ANTI-NEOPL SQ/IM: CPT

## 2019-05-24 RX ADMIN — OCTREOTIDE ACETATE 30 MG: KIT at 10:05

## 2019-05-30 ENCOUNTER — LAB VISIT (OUTPATIENT)
Dept: LAB | Facility: HOSPITAL | Age: 74
End: 2019-05-30
Attending: INTERNAL MEDICINE
Payer: MEDICARE

## 2019-05-30 DIAGNOSIS — K92.2 ACUTE GASTROINTESTINAL HEMORRHAGE: Primary | ICD-10-CM

## 2019-05-30 LAB
BASOPHILS # BLD AUTO: 0.05 K/UL (ref 0–0.2)
BASOPHILS NFR BLD: 0.6 % (ref 0–1.9)
DIFFERENTIAL METHOD: ABNORMAL
EOSINOPHIL # BLD AUTO: 0.1 K/UL (ref 0–0.5)
EOSINOPHIL NFR BLD: 1.5 % (ref 0–8)
ERYTHROCYTE [DISTWIDTH] IN BLOOD BY AUTOMATED COUNT: 15 % (ref 11.5–14.5)
HCT VFR BLD AUTO: 33.7 % (ref 37–48.5)
HGB BLD-MCNC: 9.7 G/DL (ref 12–16)
IMM GRANULOCYTES # BLD AUTO: 0.02 K/UL (ref 0–0.04)
IMM GRANULOCYTES NFR BLD AUTO: 0.3 % (ref 0–0.5)
LYMPHOCYTES # BLD AUTO: 1.9 K/UL (ref 1–4.8)
LYMPHOCYTES NFR BLD: 24.5 % (ref 18–48)
MCH RBC QN AUTO: 25 PG (ref 27–31)
MCHC RBC AUTO-ENTMCNC: 28.8 G/DL (ref 32–36)
MCV RBC AUTO: 87 FL (ref 82–98)
MONOCYTES # BLD AUTO: 0.6 K/UL (ref 0.3–1)
MONOCYTES NFR BLD: 7.3 % (ref 4–15)
NEUTROPHILS # BLD AUTO: 5.2 K/UL (ref 1.8–7.7)
NEUTROPHILS NFR BLD: 65.8 % (ref 38–73)
NRBC BLD-RTO: 0 /100 WBC
PLATELET # BLD AUTO: 501 K/UL (ref 150–350)
PMV BLD AUTO: 9.6 FL (ref 9.2–12.9)
RBC # BLD AUTO: 3.88 M/UL (ref 4–5.4)
WBC # BLD AUTO: 7.85 K/UL (ref 3.9–12.7)

## 2019-05-30 PROCEDURE — 36415 COLL VENOUS BLD VENIPUNCTURE: CPT

## 2019-05-30 PROCEDURE — 85025 COMPLETE CBC W/AUTO DIFF WBC: CPT

## 2019-06-05 ENCOUNTER — OFFICE VISIT (OUTPATIENT)
Dept: HEMATOLOGY/ONCOLOGY | Facility: CLINIC | Age: 74
End: 2019-06-05
Payer: MEDICARE

## 2019-06-05 VITALS
RESPIRATION RATE: 16 BRPM | SYSTOLIC BLOOD PRESSURE: 128 MMHG | TEMPERATURE: 97 F | DIASTOLIC BLOOD PRESSURE: 74 MMHG | HEART RATE: 80 BPM | BODY MASS INDEX: 21.7 KG/M2 | WEIGHT: 155 LBS | HEIGHT: 71 IN

## 2019-06-05 DIAGNOSIS — C16.2 MALIGNANT NEOPLASM OF BODY OF STOMACH: Primary | ICD-10-CM

## 2019-06-05 DIAGNOSIS — D64.9 SYMPTOMATIC ANEMIA: ICD-10-CM

## 2019-06-05 DIAGNOSIS — C78.7 LIVER METASTASES: ICD-10-CM

## 2019-06-05 DIAGNOSIS — Z90.5 STATUS POST NEPHRECTOMY: ICD-10-CM

## 2019-06-05 DIAGNOSIS — K92.1 GASTROINTESTINAL HEMORRHAGE WITH MELENA: ICD-10-CM

## 2019-06-05 DIAGNOSIS — C78.00 MALIGNANT NEOPLASM METASTATIC TO LUNG, UNSPECIFIED LATERALITY: ICD-10-CM

## 2019-06-05 DIAGNOSIS — N28.89 RENAL MASS: ICD-10-CM

## 2019-06-05 DIAGNOSIS — N83.8 OVARIAN MASS, LEFT: ICD-10-CM

## 2019-06-05 DIAGNOSIS — N83.8 OVARIAN MASS, RIGHT: ICD-10-CM

## 2019-06-05 DIAGNOSIS — D50.0 IRON DEFICIENCY ANEMIA DUE TO CHRONIC BLOOD LOSS: ICD-10-CM

## 2019-06-05 DIAGNOSIS — R53.1 WEAK: ICD-10-CM

## 2019-06-05 DIAGNOSIS — C78.6 PERITONEAL CARCINOMATOSIS: ICD-10-CM

## 2019-06-05 DIAGNOSIS — C64.9 RENAL CELL CARCINOMA, UNSPECIFIED LATERALITY: ICD-10-CM

## 2019-06-05 PROBLEM — D50.9 IRON DEFICIENCY ANEMIA: Status: ACTIVE | Noted: 2019-06-05

## 2019-06-05 PROCEDURE — 3074F SYST BP LT 130 MM HG: CPT | Mod: CPTII,S$GLB,, | Performed by: INTERNAL MEDICINE

## 2019-06-05 PROCEDURE — 99999 PR PBB SHADOW E&M-EST. PATIENT-LVL III: CPT | Mod: PBBFAC,,, | Performed by: INTERNAL MEDICINE

## 2019-06-05 PROCEDURE — 99215 PR OFFICE/OUTPT VISIT, EST, LEVL V, 40-54 MIN: ICD-10-PCS | Mod: S$GLB,,, | Performed by: INTERNAL MEDICINE

## 2019-06-05 PROCEDURE — 3288F PR FALLS RISK ASSESSMENT DOCUMENTED: ICD-10-PCS | Mod: CPTII,S$GLB,, | Performed by: INTERNAL MEDICINE

## 2019-06-05 PROCEDURE — 1101F PR PT FALLS ASSESS DOC 0-1 FALLS W/OUT INJ PAST YR: ICD-10-PCS | Mod: CPTII,S$GLB,, | Performed by: INTERNAL MEDICINE

## 2019-06-05 PROCEDURE — 3078F PR MOST RECENT DIASTOLIC BLOOD PRESSURE < 80 MM HG: ICD-10-PCS | Mod: CPTII,S$GLB,, | Performed by: INTERNAL MEDICINE

## 2019-06-05 PROCEDURE — 99215 OFFICE O/P EST HI 40 MIN: CPT | Mod: S$GLB,,, | Performed by: INTERNAL MEDICINE

## 2019-06-05 PROCEDURE — 3078F DIAST BP <80 MM HG: CPT | Mod: CPTII,S$GLB,, | Performed by: INTERNAL MEDICINE

## 2019-06-05 PROCEDURE — 3074F PR MOST RECENT SYSTOLIC BLOOD PRESSURE < 130 MM HG: ICD-10-PCS | Mod: CPTII,S$GLB,, | Performed by: INTERNAL MEDICINE

## 2019-06-05 PROCEDURE — 3288F FALL RISK ASSESSMENT DOCD: CPT | Mod: CPTII,S$GLB,, | Performed by: INTERNAL MEDICINE

## 2019-06-05 PROCEDURE — 99999 PR PBB SHADOW E&M-EST. PATIENT-LVL III: ICD-10-PCS | Mod: PBBFAC,,, | Performed by: INTERNAL MEDICINE

## 2019-06-05 PROCEDURE — 1101F PT FALLS ASSESS-DOCD LE1/YR: CPT | Mod: CPTII,S$GLB,, | Performed by: INTERNAL MEDICINE

## 2019-06-05 RX ORDER — HEPARIN 100 UNIT/ML
500 SYRINGE INTRAVENOUS
Status: CANCELLED | OUTPATIENT
Start: 2019-06-05

## 2019-06-05 RX ORDER — HEPARIN 100 UNIT/ML
500 SYRINGE INTRAVENOUS
Status: CANCELLED | OUTPATIENT
Start: 2019-06-12

## 2019-06-05 RX ORDER — SODIUM CHLORIDE 0.9 % (FLUSH) 0.9 %
10 SYRINGE (ML) INJECTION
Status: CANCELLED | OUTPATIENT
Start: 2019-06-05

## 2019-06-05 RX ORDER — ONDANSETRON 4 MG/1
4 TABLET, FILM COATED ORAL EVERY 8 HOURS PRN
COMMUNITY

## 2019-06-05 RX ORDER — SODIUM CHLORIDE 0.9 % (FLUSH) 0.9 %
10 SYRINGE (ML) INJECTION
Status: CANCELLED | OUTPATIENT
Start: 2019-06-12

## 2019-06-05 NOTE — PROGRESS NOTES
CC  I had my shot     Oriana Tobar is a 73 y.o.    Pt with recurrent renal cell carcinoma. She has  Had no recent tissue biopsy since 2018 when she was diagnosed with RCC   She is here for review of scans at this time which shows diffuse metastatic disease to lung, liver , peritoneum with bilateral ovarian masses.   Pt unsure the last time she had blood in stool   She is having GERD better with carafate   She is on supplements : She is living in her house with her grand daughter and her spouse   Past Medical History:   Diagnosis Date    Anemia     Cancer     kidney    Dementia     Encounter for blood transfusion     2018    High cholesterol 2018    Hypertension     No Medication     Renal disorder     Wears dentures     Uppers     Past Surgical History:   Procedure Laterality Date     SECTION      COLONOSCOPY N/A 2018    Performed by [facility] at [facility]    CYSTOSCOPY WITH RETROGRADE PYELOGRAM Left 1/15/2016    [facility]formed by [facility] at [facility] OR    EGD (ESOPHAGOGASTRODUODENOSCOPY) N/A 2018    Performed by [facility] at [facility] ENDO    EYE SURGERY      Rt eye Catarac    HYSTERECTOMY      IMAGING, GI TRACT, INTRALUMINAL, VIA CAPSULE N/A 2018    Performed by Rocco Ross [facility] at [facility] ENDO    KIDNEY SURGERY Left 2018    Left kidney removed due to cancer    lasix surgery      NEPHRECTOMY-RADICAL   Left 2016    [facility] at [facility]    REPAIR-HERNIA-VENTRAL N/A 2016    Performed by Marco [facility]hawa [facility] at [facility] OR    RESECTION-BOWEL  2016    [facility]awa [facility] at [facility] OR    URETEROSCOPY  1/15/2016    [facility] by [facility] at [facility] OR       Current Outpatient Medications:     cyanocobalamin-cobamamide (B12) 5,000-100 mcg Lozg, , Disp: , Rfl:     ferrous sulfate 325 (65 FE) MG EC tablet, Take 325 mg by mouth once daily., Disp: , Rfl:      Lactobacillus rhamnosus GG (CULTURELLE) 10 billion cell capsule, Take 1 capsule by mouth once daily., Disp: , Rfl:     loperamide HCl/simethicone (IMODIUM ADVANCED ORAL), Take by mouth., Disp: , Rfl:     octreotide (SANDOSTATIN) 100 mcg/mL Soln, Inject 2 mLs (200 mcg total) into the vein once daily., Disp: 40 mL, Rfl: 0    ondansetron (ZOFRAN) 4 MG tablet, Take 4 mg by mouth every 8 (eight) hours as needed for Nausea., Disp: , Rfl:     pantoprazole (PROTONIX) 40 MG tablet, Take 1 tablet (40 mg total) by mouth once daily., Disp: 30 tablet, Rfl: 11    sucralfate (CARAFATE) 100 mg/mL suspension, Take 10 mLs (1 g total) by mouth 4 (four) times daily before meals and nightly., Disp: 1 Bottle, Rfl: 11  Review of patient's allergies indicates:   Allergen Reactions    Codeine Other (See Comments)     Pt shakes and hallucinates    Pcn [penicillins] Anxiety and Other (See Comments)     Social History     Tobacco Use    Smoking status: Former Smoker     Packs/day: 1.00     Years: 50.00     Pack years: 50.00     Last attempt to quit: 10/6/2018     Years since quittin.6    Smokeless tobacco: Never Used    Tobacco comment: pt has stopped; encouraged to remain stopped   Substance Use Topics    Alcohol use: No     Frequency: Never     Comment: last alcohol intake friday    Drug use: No     Family History   Adopted: Yes       CONSTITUTIONAL: No fevers, chills, night sweats, ++ appetite changes ++ weight stable   SKIN: no rashes or itching  ENT: No headaches, head trauma, vision changes, or eye pain  LYMPH NODES: None noticed   CV: No chest pain, palpitations.   RESP:+ dyspnea on exertion, cno cough, wheezing, or hemoptysis  GI: No nausea, emesis, diarrhea, constipation, melena, hematochezia, pain.   No further vomiting with zofran  No further diarrhea with imodium   : No dysuria, hematuria, urgency, or frequency   HEME: No easy bruising, bleeding problems  PSYCHIATRIC: No depression, anxiety, psychosis,  "hallucinations.  NEURO: No seizures, memory loss, +dizziness   MSK: No arthralgias or joint swelling  + weak     /74   Pulse 80   Temp 97 °F (36.1 °C) (Oral)   Resp 16   Ht 5' 11" (1.803 m)   Wt 70.3 kg (155 lb)   LMP  (LMP Unknown)   BMI 21.62 kg/m²     /74   Pulse 80   Temp 97 °F (36.1 °C) (Oral)   Resp 16   Ht 5' 11" (1.803 m)   Wt 70.3 kg (155 lb)   LMP  (LMP Unknown)   BMI 21.62 kg/m²   Gen: NAD, A and O x3,   Psych: flat affect, normal thought process  Eyes: Pupils round and non dilated, EOM intact  Nose: Nares patent  OP clear, mucosa patent  Neck: suppple, no JVD, trachea midline  Lungs: CTAB, no wheezes  CV: S1S2 with RRR, No mrg  Abd: soft, NTND, + BS, No HSM, no ascites  Extr: No CCE, MEJIA decreased strength   Neuro: CNs grossly intact  Skin: intact, no lesions noted  Rheum: No joint swelling    Lab Results   Component Value Date    WBC 7.85 05/30/2019    HGB 9.7 (L) 05/30/2019    HCT 33.7 (L) 05/30/2019    MCV 87 05/30/2019     (H) 05/30/2019     CMP  Sodium   Date Value Ref Range Status   05/05/2019 135 (L) 136 - 145 mmol/L Final     Potassium   Date Value Ref Range Status   05/05/2019 3.7 3.5 - 5.1 mmol/L Final     Chloride   Date Value Ref Range Status   05/05/2019 107 95 - 110 mmol/L Final     CO2   Date Value Ref Range Status   05/05/2019 18 (L) 23 - 29 mmol/L Final     Glucose   Date Value Ref Range Status   05/05/2019 113 (H) 70 - 110 mg/dL Final     BUN, Bld   Date Value Ref Range Status   05/05/2019 19 8 - 23 mg/dL Final     Creatinine   Date Value Ref Range Status   05/05/2019 1.1 0.5 - 1.4 mg/dL Final     Calcium   Date Value Ref Range Status   05/05/2019 8.8 8.7 - 10.5 mg/dL Final     Total Protein   Date Value Ref Range Status   05/05/2019 7.2 6.0 - 8.4 g/dL Final     Albumin   Date Value Ref Range Status   05/05/2019 3.3 (L) 3.5 - 5.2 g/dL Final     Total Bilirubin   Date Value Ref Range Status   05/05/2019 0.7 0.1 - 1.0 mg/dL Final     Comment:     For " infants and newborns, interpretation of results should be based  on gestational age, weight and in agreement with clinical  observations.  Premature Infant recommended reference ranges:  Up to 24 hours.............<8.0 mg/dL  Up to 48 hours............<12.0 mg/dL  3-5 days..................<15.0 mg/dL  6-29 days.................<15.0 mg/dL       Alkaline Phosphatase   Date Value Ref Range Status   05/05/2019 55 55 - 135 U/L Final     AST   Date Value Ref Range Status   05/05/2019 13 10 - 40 U/L Final     ALT   Date Value Ref Range Status   05/05/2019 6 (L) 10 - 44 U/L Final     Anion Gap   Date Value Ref Range Status   05/05/2019 10 8 - 16 mmol/L Final     eGFR if    Date Value Ref Range Status   05/05/2019 57.6 (A) >60 mL/min/1.73 m^2 Final     eGFR if non    Date Value Ref Range Status   05/05/2019 49.9 (A) >60 mL/min/1.73 m^2 Final     Comment:     Calculation used to obtain the estimated glomerular filtration  rate (eGFR) is the CKD-EPI equation.      CT Chest Abdomen Pelvis W W/O Contrast (XPD)   Order: [id number]   Status:  Final result   Visible to patient:  Yes (Patient Portal) Next appt:  None Dx:  Gastric carcinoma; Renal cell carcino...   Details     Reading Physician Reading Date Result Priority   Ramin Singh MD 5/13/2019 STAT      Narrative     EXAMINATION:  CT CHEST ABDOMEN PELVIS W W/O CONTRAST (XPD)    CLINICAL HISTORY:  RCC;Malignant neoplasm of stomach, unspecified    TECHNIQUE:  Low dose axial, sagittal and coronal reformations were performed from the thoracic inlet to the pubic symphysis following the IV administration of 75 mL of Omnipaque 350.  The chest images are with  contrast and the abdomen images are with and without contrast.  30 mL of oral Omnipaque was given.    COMPARISON:  CT abdomen pelvis 10/23/2018.  CT chest 01/13/2016.    FINDINGS:  There are numerous bilateral soft tissue pulmonary nodules ranging in size from 0.3-1.7 cm consistent with  pulmonary metastatic disease.  These have increased in size and number when compared to the patient's previous examination.  No focal consolidation.  Pulmonary vascularity is unremarkable.    No significant pleural or pericardial effusions.  No suspicious endobronchial lesion.  There are peripherally enhancing subcarinal masses measuring 1.8 and 1.5 cm most consistent with metastatic lymphadenopathy.  These are new compared to the chest CT from 2016.  No significant hilar, mediastinal or axillary lymphadenopathy.    The liver and spleen are normal in size and attenuation.  Numerous chronic nonenhancing hepatic cysts.  Within the dome of the right hepatic lobe there are poorly defined round hypoattenuating nodules with subtle peripheral enhancement which have increased in size over the interval currently measuring up to 1.3 cm in diameter.  These are suspicious for hepatic metastatic disease.  There has been interval development of a poorly defined enhancing 2.2 cm nodule of the right hepatic lobe as well as a smaller 1.4 cm partially exophytic enhancing nodule of the inferior right hepatic lobe consistent with metastatic disease.  No perihepatic ascites.  Gallbladder is distended with dependent calcified gallstones.  Pancreas is normal in size and attenuation.  There are perisplenic and perigastric varices consistent with underlying portal hypertension.    Patient is status post prior left nephrectomy and adrenalectomy.  The right kidney is normal in size and enhances homogeneously.  Within the left mid abdomen there is an enlarging peripherally enhancing soft tissue attenuation mass currently measuring 6.4 cm AP x 4.1 cm transverse by 6.2 cm cc (previously measuring 3.7 cm AP x 3.7 cm transverse by 4.1 cm cc).  This is consistent with either residual or recurrent neoplasia.    Large amount of stool throughout the colon.  Scattered diverticula within the colon.  No mesenteric inflammatory changes.  Surgical clips  within the distal colon from previous partial colectomy.  No CT evidence for bowel obstruction.    The bladder is partially distended.  The uterus is normal in size and attenuation.  There are bilateral heterogeneously enhancing soft tissue masses of the right and left ovary the largest on the right measuring 7.4 cm and largest on left measuring 3.4 cm.  This is most consistent with ovarian neoplasia.  There is a stable left ovarian dermoid/teratoma.    There are numerous enhancing soft tissue masses of the mesentery which have increased in number and size over the interval consistent with metastatic disease.  The largest mass measures 4.6 cm AP x 5.5 cm transverse within the midline anterior lower abdomen and appears to abut and invade adjacent small bowel.  No dilated loops of small bowel to suggest resulting obstruction.  New smaller peripherally enhancing soft tissue masses within the pelvis the largest measuring 2.6 cm in diameter also consistent with metastatic disease.    Mild thoracolumbar dextroscoliosis.  Mild bone demineralization.      Impression       1. Interval progression of pulmonary metastatic disease with metastatic subcarinal lymphadenopathy.  2. Interval progression of hepatic metastatic disease.  3. Interval increase in size and number of enhancing mesenteric and pelvic soft tissue masses consistent with progression of metastatic disease.  4. Enlarging bilateral ovarian masses consistent with neoplasia.  5. Chronic dermoid/teratoma of the left ovary.  6. Chronic hepatic cysts.  7. Perisplenic and perigastric varices consistent with portal hypertension.  8. Previous left adrenalectomy and nephrectomy.  9. Enlarging heterogeneously enhancing soft tissue mass of the left abdomen again consistent with either residual or recurrent neoplasia.  10. Bone demineralization.      Electronically signed by: Ramin Singh  Date: 05/13/2019  Time: 10:12             Last Resulted: 05/13/19 1                Malignant neoplasm of body of stomach    Renal cell carcinoma, unspecified laterality    Liver metastases  2.2 cm nodule of the right hepatic lobe as well as a smaller 1.4 cm partially exophytic enhancing nodule of the inferior right hepatic lobe consistent with metastatic disease    Status post nephrectomy - Left    Renal mass    Malignant neoplasm metastatic to lung, unspecified laterality    Peritoneal carcinomatosis    Ovarian mass, left    Ovarian mass, right    Weak    Symptomatic anemia           tissue diagnosis imperative  Sending her for liver biopsy : discussed treatment for RCC vs possible GI or ovarian cancer   Continue  sandostatin to control bleeding as GI is unable to help given the location of her tumor  Proceed with IV iron as well to help with anemia and reactive thrombocytosis   sandostatin SC for two weeks then 30-40 mg IM monthly     Thank you for allowing me to evaluate and participate in the care of this pleasant patient. Please fell free to call me with any questions or concerns.    WarmlyLauren MD    This note was dictated with Dragon and briefly proofread. Please excuse any sentences that may be unclear or words misspelled

## 2019-06-11 ENCOUNTER — LAB VISIT (OUTPATIENT)
Dept: LAB | Facility: HOSPITAL | Age: 74
End: 2019-06-11
Attending: INTERNAL MEDICINE
Payer: MEDICARE

## 2019-06-11 ENCOUNTER — INFUSION (OUTPATIENT)
Dept: INFUSION THERAPY | Facility: HOSPITAL | Age: 74
End: 2019-06-11
Attending: INTERNAL MEDICINE
Payer: MEDICARE

## 2019-06-11 VITALS
DIASTOLIC BLOOD PRESSURE: 65 MMHG | OXYGEN SATURATION: 100 % | SYSTOLIC BLOOD PRESSURE: 152 MMHG | TEMPERATURE: 98 F | RESPIRATION RATE: 20 BRPM | HEART RATE: 63 BPM

## 2019-06-11 DIAGNOSIS — D50.0 IRON DEFICIENCY ANEMIA DUE TO CHRONIC BLOOD LOSS: ICD-10-CM

## 2019-06-11 DIAGNOSIS — K92.1 GASTROINTESTINAL HEMORRHAGE WITH MELENA: Primary | ICD-10-CM

## 2019-06-11 DIAGNOSIS — K92.2 GI BLEED: Primary | ICD-10-CM

## 2019-06-11 LAB
BASOPHILS # BLD AUTO: 0.07 K/UL (ref 0–0.2)
BASOPHILS NFR BLD: 0.7 % (ref 0–1.9)
DIFFERENTIAL METHOD: ABNORMAL
EOSINOPHIL # BLD AUTO: 0.1 K/UL (ref 0–0.5)
EOSINOPHIL NFR BLD: 0.7 % (ref 0–8)
ERYTHROCYTE [DISTWIDTH] IN BLOOD BY AUTOMATED COUNT: 15.6 % (ref 11.5–14.5)
HCT VFR BLD AUTO: 37.8 % (ref 37–48.5)
HGB BLD-MCNC: 10.9 G/DL (ref 12–16)
IMM GRANULOCYTES # BLD AUTO: 0.03 K/UL (ref 0–0.04)
IMM GRANULOCYTES NFR BLD AUTO: 0.3 % (ref 0–0.5)
LYMPHOCYTES # BLD AUTO: 2.2 K/UL (ref 1–4.8)
LYMPHOCYTES NFR BLD: 22.5 % (ref 18–48)
MCH RBC QN AUTO: 24.4 PG (ref 27–31)
MCHC RBC AUTO-ENTMCNC: 28.8 G/DL (ref 32–36)
MCV RBC AUTO: 85 FL (ref 82–98)
MONOCYTES # BLD AUTO: 0.7 K/UL (ref 0.3–1)
MONOCYTES NFR BLD: 6.9 % (ref 4–15)
NEUTROPHILS # BLD AUTO: 6.8 K/UL (ref 1.8–7.7)
NEUTROPHILS NFR BLD: 68.9 % (ref 38–73)
NRBC BLD-RTO: 0 /100 WBC
PLATELET # BLD AUTO: 552 K/UL (ref 150–350)
PMV BLD AUTO: 9.5 FL (ref 9.2–12.9)
RBC # BLD AUTO: 4.47 M/UL (ref 4–5.4)
WBC # BLD AUTO: 9.93 K/UL (ref 3.9–12.7)

## 2019-06-11 PROCEDURE — 36415 COLL VENOUS BLD VENIPUNCTURE: CPT

## 2019-06-11 PROCEDURE — 85025 COMPLETE CBC W/AUTO DIFF WBC: CPT

## 2019-06-11 PROCEDURE — 96365 THER/PROPH/DIAG IV INF INIT: CPT

## 2019-06-11 PROCEDURE — 63600175 PHARM REV CODE 636 W HCPCS: Mod: JG | Performed by: INTERNAL MEDICINE

## 2019-06-11 PROCEDURE — 25000003 PHARM REV CODE 250: Performed by: INTERNAL MEDICINE

## 2019-06-11 PROCEDURE — A4216 STERILE WATER/SALINE, 10 ML: HCPCS | Performed by: INTERNAL MEDICINE

## 2019-06-11 RX ORDER — SODIUM CHLORIDE 0.9 % (FLUSH) 0.9 %
10 SYRINGE (ML) INJECTION
Status: DISCONTINUED | OUTPATIENT
Start: 2019-06-11 | End: 2019-06-11 | Stop reason: HOSPADM

## 2019-06-11 RX ADMIN — FERRIC CARBOXYMALTOSE INJECTION 750 MG: 50 INJECTION, SOLUTION INTRAVENOUS at 08:06

## 2019-06-11 RX ADMIN — Medication 10 ML: at 08:06

## 2019-06-18 ENCOUNTER — LAB VISIT (OUTPATIENT)
Dept: LAB | Facility: HOSPITAL | Age: 74
End: 2019-06-18
Attending: INTERNAL MEDICINE
Payer: MEDICARE

## 2019-06-18 ENCOUNTER — INFUSION (OUTPATIENT)
Dept: INFUSION THERAPY | Facility: HOSPITAL | Age: 74
End: 2019-06-18
Payer: MEDICARE

## 2019-06-18 VITALS
HEART RATE: 59 BPM | RESPIRATION RATE: 18 BRPM | OXYGEN SATURATION: 96 % | SYSTOLIC BLOOD PRESSURE: 133 MMHG | TEMPERATURE: 98 F | DIASTOLIC BLOOD PRESSURE: 65 MMHG

## 2019-06-18 DIAGNOSIS — K92.1 GASTROINTESTINAL HEMORRHAGE WITH MELENA: Primary | ICD-10-CM

## 2019-06-18 DIAGNOSIS — D50.0 IRON DEFICIENCY ANEMIA DUE TO CHRONIC BLOOD LOSS: ICD-10-CM

## 2019-06-18 DIAGNOSIS — K92.2 GI BLEED: Primary | ICD-10-CM

## 2019-06-18 LAB
BASOPHILS # BLD AUTO: 0.03 K/UL (ref 0–0.2)
BASOPHILS NFR BLD: 0.3 % (ref 0–1.9)
DIFFERENTIAL METHOD: ABNORMAL
EOSINOPHIL # BLD AUTO: 0.1 K/UL (ref 0–0.5)
EOSINOPHIL NFR BLD: 0.7 % (ref 0–8)
ERYTHROCYTE [DISTWIDTH] IN BLOOD BY AUTOMATED COUNT: 18.3 % (ref 11.5–14.5)
HCT VFR BLD AUTO: 36.4 % (ref 37–48.5)
HGB BLD-MCNC: 10.7 G/DL (ref 12–16)
IMM GRANULOCYTES # BLD AUTO: 0.05 K/UL (ref 0–0.04)
IMM GRANULOCYTES NFR BLD AUTO: 0.6 % (ref 0–0.5)
LYMPHOCYTES # BLD AUTO: 1.6 K/UL (ref 1–4.8)
LYMPHOCYTES NFR BLD: 18.3 % (ref 18–48)
MCH RBC QN AUTO: 25.3 PG (ref 27–31)
MCHC RBC AUTO-ENTMCNC: 29.4 G/DL (ref 32–36)
MCV RBC AUTO: 86 FL (ref 82–98)
MONOCYTES # BLD AUTO: 0.8 K/UL (ref 0.3–1)
MONOCYTES NFR BLD: 9.4 % (ref 4–15)
NEUTROPHILS # BLD AUTO: 6.3 K/UL (ref 1.8–7.7)
NEUTROPHILS NFR BLD: 70.7 % (ref 38–73)
NRBC BLD-RTO: 0 /100 WBC
PLATELET # BLD AUTO: 416 K/UL (ref 150–350)
PMV BLD AUTO: 9 FL (ref 9.2–12.9)
RBC # BLD AUTO: 4.23 M/UL (ref 4–5.4)
WBC # BLD AUTO: 8.94 K/UL (ref 3.9–12.7)

## 2019-06-18 PROCEDURE — 85025 COMPLETE CBC W/AUTO DIFF WBC: CPT

## 2019-06-18 PROCEDURE — 63600175 PHARM REV CODE 636 W HCPCS: Mod: JG | Performed by: INTERNAL MEDICINE

## 2019-06-18 PROCEDURE — A4216 STERILE WATER/SALINE, 10 ML: HCPCS | Performed by: INTERNAL MEDICINE

## 2019-06-18 PROCEDURE — 36415 COLL VENOUS BLD VENIPUNCTURE: CPT

## 2019-06-18 PROCEDURE — 25000003 PHARM REV CODE 250: Performed by: INTERNAL MEDICINE

## 2019-06-18 RX ORDER — SODIUM CHLORIDE 0.9 % (FLUSH) 0.9 %
10 SYRINGE (ML) INJECTION
Status: DISCONTINUED | OUTPATIENT
Start: 2019-06-18 | End: 2019-06-18 | Stop reason: HOSPADM

## 2019-06-18 RX ADMIN — FERRIC CARBOXYMALTOSE INJECTION 750 MG: 50 INJECTION, SOLUTION INTRAVENOUS at 08:06

## 2019-06-18 RX ADMIN — Medication 10 ML: at 08:06

## 2019-06-18 NOTE — NURSING
0811 patient to opt with [facility] vital signs taken. RIANNA RN  0811 Sabrina Oseguera RN came to start iv 22 gauge jelco to right hand times 1. This nurses attempted times 1 without success. RIANNA RN  0881 infusion started of infectafer. RIANNA MCKEON

## 2019-06-19 ENCOUNTER — OFFICE VISIT (OUTPATIENT)
Dept: HEMATOLOGY/ONCOLOGY | Facility: CLINIC | Age: 74
End: 2019-06-19
Payer: MEDICARE

## 2019-06-19 VITALS
HEART RATE: 100 BPM | BODY MASS INDEX: 21.56 KG/M2 | DIASTOLIC BLOOD PRESSURE: 74 MMHG | SYSTOLIC BLOOD PRESSURE: 110 MMHG | WEIGHT: 154 LBS | OXYGEN SATURATION: 99 % | HEIGHT: 71 IN | RESPIRATION RATE: 18 BRPM | TEMPERATURE: 98 F

## 2019-06-19 DIAGNOSIS — C78.00 MALIGNANT NEOPLASM METASTATIC TO LUNG, UNSPECIFIED LATERALITY: ICD-10-CM

## 2019-06-19 DIAGNOSIS — C16.9 GASTRIC CARCINOMA: Primary | ICD-10-CM

## 2019-06-19 DIAGNOSIS — C78.7 LIVER METASTASES: ICD-10-CM

## 2019-06-19 DIAGNOSIS — C64.9 RENAL CELL CARCINOMA, UNSPECIFIED LATERALITY: ICD-10-CM

## 2019-06-19 DIAGNOSIS — C78.6 PERITONEAL CARCINOMATOSIS: ICD-10-CM

## 2019-06-19 PROCEDURE — 1101F PT FALLS ASSESS-DOCD LE1/YR: CPT | Mod: CPTII,S$GLB,, | Performed by: INTERNAL MEDICINE

## 2019-06-19 PROCEDURE — 99999 PR PBB SHADOW E&M-EST. PATIENT-LVL III: ICD-10-PCS | Mod: PBBFAC,,, | Performed by: INTERNAL MEDICINE

## 2019-06-19 PROCEDURE — 3074F SYST BP LT 130 MM HG: CPT | Mod: CPTII,S$GLB,, | Performed by: INTERNAL MEDICINE

## 2019-06-19 PROCEDURE — 99999 PR PBB SHADOW E&M-EST. PATIENT-LVL III: CPT | Mod: PBBFAC,,, | Performed by: INTERNAL MEDICINE

## 2019-06-19 PROCEDURE — 3288F FALL RISK ASSESSMENT DOCD: CPT | Mod: CPTII,S$GLB,, | Performed by: INTERNAL MEDICINE

## 2019-06-19 PROCEDURE — 3078F DIAST BP <80 MM HG: CPT | Mod: CPTII,S$GLB,, | Performed by: INTERNAL MEDICINE

## 2019-06-19 PROCEDURE — 3074F PR MOST RECENT SYSTOLIC BLOOD PRESSURE < 130 MM HG: ICD-10-PCS | Mod: CPTII,S$GLB,, | Performed by: INTERNAL MEDICINE

## 2019-06-19 PROCEDURE — 1101F PR PT FALLS ASSESS DOC 0-1 FALLS W/OUT INJ PAST YR: ICD-10-PCS | Mod: CPTII,S$GLB,, | Performed by: INTERNAL MEDICINE

## 2019-06-19 PROCEDURE — 3288F PR FALLS RISK ASSESSMENT DOCUMENTED: ICD-10-PCS | Mod: CPTII,S$GLB,, | Performed by: INTERNAL MEDICINE

## 2019-06-19 PROCEDURE — 3078F PR MOST RECENT DIASTOLIC BLOOD PRESSURE < 80 MM HG: ICD-10-PCS | Mod: CPTII,S$GLB,, | Performed by: INTERNAL MEDICINE

## 2019-06-19 PROCEDURE — 99215 OFFICE O/P EST HI 40 MIN: CPT | Mod: S$GLB,,, | Performed by: INTERNAL MEDICINE

## 2019-06-19 PROCEDURE — 99215 PR OFFICE/OUTPT VISIT, EST, LEVL V, 40-54 MIN: ICD-10-PCS | Mod: S$GLB,,, | Performed by: INTERNAL MEDICINE

## 2019-06-19 NOTE — PROGRESS NOTES
CC  I feel ok     Oriana Tobar is a 73 y.o.    Pt with recurrent renal cell carcinoma. She has  Had no recent tissue biopsy since 2018 when she was diagnosed with RCC via biopsy of a left upper quadrant peritoneal mass  Patient did not take treatment for malignancy at that time per her own choice.  She then developed GI hemorrhaging was found to have a gastric mass consistent with carcinoma.  Latest scans revealed diffuse metastatic disease to lung, liver , peritoneum with bilateral ovarian masses.     Since last visit she has completed 2 doses of IV iron and 2 doses of Sandostatin  She lives with her family and is eating better.  No further evidence of melena or hematochezia  She is requiring Zofran p.r.n. nausea and probiotics to help with intermittent diarrhea    Past Medical History:   Diagnosis Date    Anemia     Cancer     kidney    Dementia     Encounter for blood transfusion     11/4/2018    High cholesterol 06/23/2018    Hypertension     No Medication     Renal disorder     Wears dentures     Uppers         Current Outpatient Medications:     cyanocobalamin-cobamamide (B12) 5,000-100 mcg Mabelg, , Disp: , Rfl:     ferrous sulfate 325 (65 FE) MG EC tablet, Take 325 mg by mouth once daily., Disp: , Rfl:     Lactobacillus rhamnosus GG (CULTURELLE) 10 billion cell capsule, Take 1 capsule by mouth once daily., Disp: , Rfl:     loperamide HCl/simethicone (IMODIUM ADVANCED ORAL), Take by mouth., Disp: , Rfl:     octreotide (SANDOSTATIN) 100 mcg/mL Soln, Inject 2 mLs (200 mcg total) into the vein once daily., Disp: 40 mL, Rfl: 0    ondansetron (ZOFRAN) 4 MG tablet, Take 4 mg by mouth every 8 (eight) hours as needed for Nausea., Disp: , Rfl:     pantoprazole (PROTONIX) 40 MG tablet, Take 1 tablet (40 mg total) by mouth once daily., Disp: 30 tablet, Rfl: 11    sucralfate (CARAFATE) 100 mg/mL suspension, Take 10 mLs (1 g total) by mouth 4 (four) times daily before meals and nightly., Disp: 1  "Bottle, Rfl: 11  No current facility-administered medications for this visit.   Review of patient's allergies indicates:   Allergen Reactions    Codeine Other (See Comments)     Pt shakes and hallucinates    Pcn [penicillins] Anxiety and Other (See Comments)     Social History     Tobacco Use    Smoking status: Former Smoker     Packs/day: 1.00     Years: 50.00     Pack years: 50.00     Last attempt to quit: 10/6/2018     Years since quittin.7    Smokeless tobacco: Never Used    Tobacco comment: pt has stopped; encouraged to remain stopped   Substance Use Topics    Alcohol use: No     Frequency: Never     Comment: last alcohol intake friday    Drug use: No     Family History   Adopted: Yes       CONSTITUTIONAL: No fevers, chills, night sweats, weight is stabilizing  SKIN: no rashes or itching  ENT: No headaches, head trauma, vision changes, or eye pain  LYMPH NODES: None noticed   CV: No chest pain, palpitations.   RESP:+ dyspnea on exertion, cno cough, wheezing, or hemoptysis  GI: No nausea, emesis, diarrhea, constipation, melena, hematochezia, pain.   No further vomiting with zofran  No further diarrhea with imodium   : No dysuria, hematuria, urgency, or frequency   HEME: No easy bruising, bleeding problems  PSYCHIATRIC: No depression, anxiety, psychosis, hallucinations.  NEURO: No seizures, memory loss, slight weakness  MSK: No arthralgias or joint swelling         /74   Pulse 100   Temp 97.8 °F (36.6 °C)   Resp 18   Ht 5' 11" (1.803 m)   Wt 69.9 kg (154 lb)   LMP  (LMP Unknown)   SpO2 99%   BMI 21.48 kg/m²   Gen: NAD, A and O x3, in a wheelchair today increasing shortness of breath with least bit of exertion  Psych: flat affect, normal thought process  Eyes: Pupils round and non dilated, EOM intact  Nose: Nares patent  OP clear, mucosa patent  Neck: suppple, no JVD, trachea midline no palpable thyromegaly  Lungs: CTAB, no wheezes  CV: S1S2 with RRR, No mrg  Abd: soft, NTND, + BS, No " HSM, no ascites  Extr: No CCE, MEJIA decreased strength primarily lower extremities  Neuro: CNs grossly intact  Skin: intact, no lesions noted  Rheum: No joint swelling    Lab Results   Component Value Date    WBC 8.94 06/18/2019    HGB 10.7 (L) 06/18/2019    HCT 36.4 (L) 06/18/2019    MCV 86 06/18/2019     (H) 06/18/2019     CMP  Sodium   Date Value Ref Range Status   05/05/2019 135 (L) 136 - 145 mmol/L Final     Potassium   Date Value Ref Range Status   05/05/2019 3.7 3.5 - 5.1 mmol/L Final     Chloride   Date Value Ref Range Status   05/05/2019 107 95 - 110 mmol/L Final     CO2   Date Value Ref Range Status   05/05/2019 18 (L) 23 - 29 mmol/L Final     Glucose   Date Value Ref Range Status   05/05/2019 113 (H) 70 - 110 mg/dL Final     BUN, Bld   Date Value Ref Range Status   05/05/2019 19 8 - 23 mg/dL Final     Creatinine   Date Value Ref Range Status   05/05/2019 1.1 0.5 - 1.4 mg/dL Final     Calcium   Date Value Ref Range Status   05/05/2019 8.8 8.7 - 10.5 mg/dL Final     Total Protein   Date Value Ref Range Status   05/05/2019 7.2 6.0 - 8.4 g/dL Final     Albumin   Date Value Ref Range Status   05/05/2019 3.3 (L) 3.5 - 5.2 g/dL Final     Total Bilirubin   Date Value Ref Range Status   05/05/2019 0.7 0.1 - 1.0 mg/dL Final     Comment:     For infants and newborns, interpretation of results should be based  on gestational age, weight and in agreement with clinical  observations.  Premature Infant recommended reference ranges:  Up to 24 hours.............<8.0 mg/dL  Up to 48 hours............<12.0 mg/dL  3-5 days..................<15.0 mg/dL  6-29 days.................<15.0 mg/dL       Alkaline Phosphatase   Date Value Ref Range Status   05/05/2019 55 55 - 135 U/L Final     AST   Date Value Ref Range Status   05/05/2019 13 10 - 40 U/L Final     ALT   Date Value Ref Range Status   05/05/2019 6 (L) 10 - 44 U/L Final     Anion Gap   Date Value Ref Range Status   05/05/2019 10 8 - 16 mmol/L Final     eGFR if     Date Value Ref Range Status   05/05/2019 57.6 (A) >60 mL/min/1.73 m^2 Final     eGFR if non    Date Value Ref Range Status   05/05/2019 49.9 (A) >60 mL/min/1.73 m^2 Final     Comment:     Calculation used to obtain the estimated glomerular filtration  rate (eGFR) is the CKD-EPI equation.      CT Chest Abdomen Pelvis W W/O Contrast (XPD)   Order: [id number]   Status:  Final result   Visible to patient:  Yes (Patient Portal) Next appt:  None Dx:  Gastric carcinoma; Renal cell carcino...   Details     Reading Physician Reading Date Result Priority   Ramin Singh MD 5/13/2019 STAT      Narrative     EXAMINATION:  CT CHEST ABDOMEN PELVIS W W/O CONTRAST (XPD)    CLINICAL HISTORY:  RCC;Malignant neoplasm of stomach, unspecified    TECHNIQUE:  Low dose axial, sagittal and coronal reformations were performed from the thoracic inlet to the pubic symphysis following the IV administration of 75 mL of Omnipaque 350.  The chest images are with  contrast and the abdomen images are with and without contrast.  30 mL of oral Omnipaque was given.    COMPARISON:  CT abdomen pelvis 10/23/2018.  CT chest 01/13/2016.    FINDINGS:  There are numerous bilateral soft tissue pulmonary nodules ranging in size from 0.3-1.7 cm consistent with pulmonary metastatic disease.  These have increased in size and number when compared to the patient's previous examination.  No focal consolidation.  Pulmonary vascularity is unremarkable.    No significant pleural or pericardial effusions.  No suspicious endobronchial lesion.  There are peripherally enhancing subcarinal masses measuring 1.8 and 1.5 cm most consistent with metastatic lymphadenopathy.  These are new compared to the chest CT from 2016.  No significant hilar, mediastinal or axillary lymphadenopathy.    The liver and spleen are normal in size and attenuation.  Numerous chronic nonenhancing hepatic cysts.  Within the dome of the right hepatic lobe there  are poorly defined round hypoattenuating nodules with subtle peripheral enhancement which have increased in size over the interval currently measuring up to 1.3 cm in diameter.  These are suspicious for hepatic metastatic disease.  There has been interval development of a poorly defined enhancing 2.2 cm nodule of the right hepatic lobe as well as a smaller 1.4 cm partially exophytic enhancing nodule of the inferior right hepatic lobe consistent with metastatic disease.  No perihepatic ascites.  Gallbladder is distended with dependent calcified gallstones.  Pancreas is normal in size and attenuation.  There are perisplenic and perigastric varices consistent with underlying portal hypertension.    Patient is status post prior left nephrectomy and adrenalectomy.  The right kidney is normal in size and enhances homogeneously.  Within the left mid abdomen there is an enlarging peripherally enhancing soft tissue attenuation mass currently measuring 6.4 cm AP x 4.1 cm transverse by 6.2 cm cc (previously measuring 3.7 cm AP x 3.7 cm transverse by 4.1 cm cc).  This is consistent with either residual or recurrent neoplasia.    Large amount of stool throughout the colon.  Scattered diverticula within the colon.  No mesenteric inflammatory changes.  Surgical clips within the distal colon from previous partial colectomy.  No CT evidence for bowel obstruction.    The bladder is partially distended.  The uterus is normal in size and attenuation.  There are bilateral heterogeneously enhancing soft tissue masses of the right and left ovary the largest on the right measuring 7.4 cm and largest on left measuring 3.4 cm.  This is most consistent with ovarian neoplasia.  There is a stable left ovarian dermoid/teratoma.    There are numerous enhancing soft tissue masses of the mesentery which have increased in number and size over the interval consistent with metastatic disease.  The largest mass measures 4.6 cm AP x 5.5 cm transverse  within the midline anterior lower abdomen and appears to abut and invade adjacent small bowel.  No dilated loops of small bowel to suggest resulting obstruction.  New smaller peripherally enhancing soft tissue masses within the pelvis the largest measuring 2.6 cm in diameter also consistent with metastatic disease.    Mild thoracolumbar dextroscoliosis.  Mild bone demineralization.      Impression       1. Interval progression of pulmonary metastatic disease with metastatic subcarinal lymphadenopathy.  2. Interval progression of hepatic metastatic disease.  3. Interval increase in size and number of enhancing mesenteric and pelvic soft tissue masses consistent with progression of metastatic disease.  4. Enlarging bilateral ovarian masses consistent with neoplasia.  5. Chronic dermoid/teratoma of the left ovary.  6. Chronic hepatic cysts.  7. Perisplenic and perigastric varices consistent with portal hypertension.  8. Previous left adrenalectomy and nephrectomy.  9. Enlarging heterogeneously enhancing soft tissue mass of the left abdomen again consistent with either residual or recurrent neoplasia.  10. Bone demineralization.      Electronically signed by: Ramin Singh  Date: 05/13/2019  Time: 10:12             Last Resulted: 05/13/19 1               Gastric carcinoma  -     CBC auto differential; Future; Expected date: 06/19/2019  -     Iron and TIBC; Future; Expected date: 06/19/2019  -     Comprehensive metabolic panel; Future; Expected date: 06/19/2019    Liver metastases  -     CBC auto differential; Future; Expected date: 06/19/2019  -     Iron and TIBC; Future; Expected date: 06/19/2019  -     Comprehensive metabolic panel; Future; Expected date: 06/19/2019    Malignant neoplasm metastatic to lung, unspecified laterality  -     CBC auto differential; Future; Expected date: 06/19/2019  -     Iron and TIBC; Future; Expected date: 06/19/2019  -     Comprehensive metabolic panel; Future; Expected date:  06/19/2019    Renal cell carcinoma, unspecified laterality  -     CBC auto differential; Future; Expected date: 06/19/2019  -     Iron and TIBC; Future; Expected date: 06/19/2019  -     Comprehensive metabolic panel; Future; Expected date: 06/19/2019    Peritoneal carcinomatosis    Other orders  -     octreotide (SANDOSTATIN LAR) injection 30 mg          Anemia stable after 2 doses of IV iron check iron stores next visit  Proceed with liver biopsy to ascertain whether the peritoneal carcinomatosis from a gastric malignancy versus recurrent renal cell carcinoma  Lengthy discussion is treatment options which are available to her since her ECOG performance status is 2  tissue diagnosis imperative follow-up after liver biopsy which is scheduled for June 26  Re-reviewed her octreotide which was scheduled for July however she needs a dose June 21st and this will be monthly to prevent further GI hemorrhage  Sending her for liver biopsy : discussed treatment for RCC vs possible GI or ovarian cancer   Continue medications for GERD  Continue oral iron and probiotics  No pain at the moment   No recent falls   sandostatin SC for two weeks then 30-40 mg IM monthly     Thank you for allowing me to evaluate and participate in the care of this pleasant patient. Please fell free to call me with any questions or concerns.    Lauren Ramirez MD    This note was dictated with Dragon and briefly proofread. Please excuse any sentences that may be unclear or words misspelled

## 2019-06-19 NOTE — LETTER
June 19, 2019      Rocco Ross MD  [address]  [address]  [place] [address]           [facility]  [address] [place] [address]  Phone: [phone]          Patient: Oriana Tobar   MR Number: [id number]   YOB: 1945   Date of Visit: 6/19/2019       Dear Dr. Rocco Ross:    Thank you for referring Oriana Tobar to me for evaluation. Attached you will find relevant portions of my assessment and plan of care.    If you have questions, please do not hesitate to call me. I look forward to following Oriana Tobar along with you.    Sincerely,    Lauren Irene MD    Enclosure  CC:  No Recipients    If you would like to receive this communication electronically, please contact [email or web address] or [phone] to request more information on [facility] Link access.    For providers and/or their staff who would like to refer a patient to [facility], please contact us through our one-stop-shop provider referral line, [facility], at [phone].    If you feel you have received this communication in error or would no longer like to receive these types of communications, please e-mail [email or web address]

## 2019-06-20 ENCOUNTER — INFUSION (OUTPATIENT)
Dept: INFUSION THERAPY | Facility: HOSPITAL | Age: 74
End: 2019-06-20
Payer: MEDICARE

## 2019-06-20 VITALS
DIASTOLIC BLOOD PRESSURE: 60 MMHG | SYSTOLIC BLOOD PRESSURE: 126 MMHG | RESPIRATION RATE: 16 BRPM | HEART RATE: 84 BPM | TEMPERATURE: 95 F | OXYGEN SATURATION: 100 %

## 2019-06-20 DIAGNOSIS — K92.1 GASTROINTESTINAL HEMORRHAGE WITH MELENA: Primary | ICD-10-CM

## 2019-06-20 DIAGNOSIS — C16.2 MALIGNANT NEOPLASM OF BODY OF STOMACH: ICD-10-CM

## 2019-06-20 DIAGNOSIS — C16.9 GASTRIC CARCINOMA: ICD-10-CM

## 2019-06-20 DIAGNOSIS — D64.9 ANEMIA, UNSPECIFIED TYPE: ICD-10-CM

## 2019-06-20 DIAGNOSIS — D50.0 CHRONIC BLOOD LOSS ANEMIA: ICD-10-CM

## 2019-06-20 PROCEDURE — 96401 CHEMO ANTI-NEOPL SQ/IM: CPT

## 2019-06-20 PROCEDURE — 63600175 PHARM REV CODE 636 W HCPCS: Mod: JG | Performed by: INTERNAL MEDICINE

## 2019-06-20 RX ADMIN — OCTREOTIDE ACETATE 30 MG: KIT at 11:06

## 2019-06-25 DIAGNOSIS — C78.7 LIVER METASTASES: Primary | ICD-10-CM

## 2019-07-01 DIAGNOSIS — C64.9 RENAL CELL CARCINOMA, UNSPECIFIED LATERALITY: Primary | ICD-10-CM

## 2019-07-01 DIAGNOSIS — C78.7 LIVER METASTASES: ICD-10-CM

## 2019-07-08 ENCOUNTER — TELEPHONE (OUTPATIENT)
Dept: CARDIOLOGY | Facility: HOSPITAL | Age: 74
End: 2019-07-08

## 2019-07-08 ENCOUNTER — HOSPITAL ENCOUNTER (OUTPATIENT)
Dept: RADIOLOGY | Facility: HOSPITAL | Age: 74
Discharge: HOME | End: 2019-07-08
Attending: INTERNAL MEDICINE
Payer: MEDICARE

## 2019-07-08 VITALS — DIASTOLIC BLOOD PRESSURE: 74 MMHG | SYSTOLIC BLOOD PRESSURE: 139 MMHG | HEART RATE: 60 BPM

## 2019-07-08 DIAGNOSIS — C78.7 LIVER METASTASES: ICD-10-CM

## 2019-07-08 DIAGNOSIS — C64.9 RENAL CELL CARCINOMA, UNSPECIFIED LATERALITY: Primary | ICD-10-CM

## 2019-07-08 DIAGNOSIS — C64.9 RENAL CELL CARCINOMA, UNSPECIFIED LATERALITY: ICD-10-CM

## 2019-07-08 PROCEDURE — 76705 US ABDOMEN LIMITED: ICD-10-PCS | Mod: 26,,, | Performed by: RADIOLOGY

## 2019-07-08 PROCEDURE — 76705 ECHO EXAM OF ABDOMEN: CPT | Mod: 26,,, | Performed by: RADIOLOGY

## 2019-07-08 PROCEDURE — 76705 ECHO EXAM OF ABDOMEN: CPT | Mod: TC

## 2019-07-08 NOTE — NURSING
Unable to do biopsy at this time pt became nauseated with dry heaves and unable to lay still. Will do with sedation tomorrow per dr chow. Pt vs stable. Pt with mild chills noted. Daughter concerned and claims the chills and nausea occurred during a previous biopsy as well.

## 2019-07-16 ENCOUNTER — LAB VISIT (OUTPATIENT)
Dept: LAB | Facility: HOSPITAL | Age: 74
End: 2019-07-16
Attending: INTERNAL MEDICINE
Payer: MEDICARE

## 2019-07-16 DIAGNOSIS — C78.00 MALIGNANT NEOPLASM METASTATIC TO LUNG, UNSPECIFIED LATERALITY: ICD-10-CM

## 2019-07-16 DIAGNOSIS — C16.9 GASTRIC CARCINOMA: ICD-10-CM

## 2019-07-16 DIAGNOSIS — C78.7 LIVER METASTASES: ICD-10-CM

## 2019-07-16 DIAGNOSIS — C64.9 RENAL CELL CARCINOMA, UNSPECIFIED LATERALITY: ICD-10-CM

## 2019-07-16 LAB
ALBUMIN SERPL BCP-MCNC: 2.9 G/DL (ref 3.5–5.2)
ALP SERPL-CCNC: 84 U/L (ref 55–135)
ALT SERPL W/O P-5'-P-CCNC: 6 U/L (ref 10–44)
ANION GAP SERPL CALC-SCNC: 10 MMOL/L (ref 8–16)
AST SERPL-CCNC: 15 U/L (ref 10–40)
BASOPHILS # BLD AUTO: 0.06 K/UL (ref 0–0.2)
BASOPHILS NFR BLD: 0.9 % (ref 0–1.9)
BILIRUB SERPL-MCNC: 0.8 MG/DL (ref 0.1–1)
BUN SERPL-MCNC: 8 MG/DL (ref 8–23)
CALCIUM SERPL-MCNC: 7.9 MG/DL (ref 8.7–10.5)
CHLORIDE SERPL-SCNC: 103 MMOL/L (ref 95–110)
CO2 SERPL-SCNC: 23 MMOL/L (ref 23–29)
CREAT SERPL-MCNC: 1.1 MG/DL (ref 0.5–1.4)
DIFFERENTIAL METHOD: ABNORMAL
EOSINOPHIL # BLD AUTO: 0 K/UL (ref 0–0.5)
EOSINOPHIL NFR BLD: 0.5 % (ref 0–8)
ERYTHROCYTE [DISTWIDTH] IN BLOOD BY AUTOMATED COUNT: 22.1 % (ref 11.5–14.5)
EST. GFR  (AFRICAN AMERICAN): 57.2 ML/MIN/1.73 M^2
EST. GFR  (NON AFRICAN AMERICAN): 49.6 ML/MIN/1.73 M^2
GLUCOSE SERPL-MCNC: 116 MG/DL (ref 70–110)
HCT VFR BLD AUTO: 39.1 % (ref 37–48.5)
HGB BLD-MCNC: 12.1 G/DL (ref 12–16)
IMM GRANULOCYTES # BLD AUTO: 0.03 K/UL (ref 0–0.04)
IMM GRANULOCYTES NFR BLD AUTO: 0.5 % (ref 0–0.5)
IRON SERPL-MCNC: 31 UG/DL (ref 30–160)
LYMPHOCYTES # BLD AUTO: 1.5 K/UL (ref 1–4.8)
LYMPHOCYTES NFR BLD: 22.7 % (ref 18–48)
MCH RBC QN AUTO: 26.9 PG (ref 27–31)
MCHC RBC AUTO-ENTMCNC: 30.9 G/DL (ref 32–36)
MCV RBC AUTO: 87 FL (ref 82–98)
MONOCYTES # BLD AUTO: 0.6 K/UL (ref 0.3–1)
MONOCYTES NFR BLD: 8.3 % (ref 4–15)
NEUTROPHILS # BLD AUTO: 4.5 K/UL (ref 1.8–7.7)
NEUTROPHILS NFR BLD: 67.1 % (ref 38–73)
NRBC BLD-RTO: 0 /100 WBC
PLATELET # BLD AUTO: 398 K/UL (ref 150–350)
PMV BLD AUTO: 9.5 FL (ref 9.2–12.9)
POTASSIUM SERPL-SCNC: 3.8 MMOL/L (ref 3.5–5.1)
PROT SERPL-MCNC: 7.2 G/DL (ref 6–8.4)
RBC # BLD AUTO: 4.49 M/UL (ref 4–5.4)
SATURATED IRON: 24 % (ref 20–50)
SODIUM SERPL-SCNC: 136 MMOL/L (ref 136–145)
TOTAL IRON BINDING CAPACITY: 129 UG/DL (ref 250–450)
TRANSFERRIN SERPL-MCNC: 87 MG/DL (ref 200–375)
WBC # BLD AUTO: 6.65 K/UL (ref 3.9–12.7)

## 2019-07-16 PROCEDURE — 80053 COMPREHEN METABOLIC PANEL: CPT

## 2019-07-16 PROCEDURE — 36415 COLL VENOUS BLD VENIPUNCTURE: CPT

## 2019-07-16 PROCEDURE — 83540 ASSAY OF IRON: CPT

## 2019-07-16 PROCEDURE — 85025 COMPLETE CBC W/AUTO DIFF WBC: CPT

## 2019-07-17 ENCOUNTER — TELEPHONE (OUTPATIENT)
Dept: HEMATOLOGY/ONCOLOGY | Facility: CLINIC | Age: 74
End: 2019-07-17

## 2019-07-18 ENCOUNTER — INFUSION (OUTPATIENT)
Dept: INFUSION THERAPY | Facility: HOSPITAL | Age: 74
End: 2019-07-18
Payer: MEDICARE

## 2019-07-18 VITALS
DIASTOLIC BLOOD PRESSURE: 65 MMHG | HEART RATE: 72 BPM | OXYGEN SATURATION: 99 % | RESPIRATION RATE: 18 BRPM | SYSTOLIC BLOOD PRESSURE: 138 MMHG | TEMPERATURE: 98 F

## 2019-07-18 DIAGNOSIS — C16.9 GASTRIC CARCINOMA: ICD-10-CM

## 2019-07-18 DIAGNOSIS — D64.9 ANEMIA, UNSPECIFIED TYPE: ICD-10-CM

## 2019-07-18 DIAGNOSIS — D50.0 CHRONIC BLOOD LOSS ANEMIA: ICD-10-CM

## 2019-07-18 DIAGNOSIS — K92.1 GASTROINTESTINAL HEMORRHAGE WITH MELENA: Primary | ICD-10-CM

## 2019-07-18 DIAGNOSIS — C16.2 MALIGNANT NEOPLASM OF BODY OF STOMACH: ICD-10-CM

## 2019-07-18 PROCEDURE — 96372 THER/PROPH/DIAG INJ SC/IM: CPT

## 2019-07-18 PROCEDURE — 63600175 PHARM REV CODE 636 W HCPCS: Mod: JG | Performed by: INTERNAL MEDICINE

## 2019-07-18 RX ADMIN — OCTREOTIDE ACETATE 30 MG: KIT at 11:07

## 2019-08-14 ENCOUNTER — OFFICE VISIT (OUTPATIENT)
Dept: HEMATOLOGY/ONCOLOGY | Facility: CLINIC | Age: 74
End: 2019-08-14
Payer: MEDICARE

## 2019-08-14 ENCOUNTER — HOSPITAL ENCOUNTER (EMERGENCY)
Facility: HOSPITAL | Age: 74
Discharge: HOME | End: 2019-08-14
Attending: FAMILY MEDICINE
Payer: MEDICARE

## 2019-08-14 ENCOUNTER — TELEPHONE (OUTPATIENT)
Dept: HEMATOLOGY/ONCOLOGY | Facility: CLINIC | Age: 74
End: 2019-08-14

## 2019-08-14 VITALS
HEIGHT: 71 IN | BODY MASS INDEX: 20.3 KG/M2 | DIASTOLIC BLOOD PRESSURE: 71 MMHG | OXYGEN SATURATION: 98 % | HEART RATE: 97 BPM | RESPIRATION RATE: 18 BRPM | WEIGHT: 145 LBS | SYSTOLIC BLOOD PRESSURE: 121 MMHG | TEMPERATURE: 99 F

## 2019-08-14 VITALS
TEMPERATURE: 98 F | HEIGHT: 71 IN | WEIGHT: 145 LBS | BODY MASS INDEX: 20.3 KG/M2 | OXYGEN SATURATION: 97 % | HEART RATE: 63 BPM | DIASTOLIC BLOOD PRESSURE: 55 MMHG | SYSTOLIC BLOOD PRESSURE: 115 MMHG | RESPIRATION RATE: 18 BRPM

## 2019-08-14 DIAGNOSIS — R55 NEAR SYNCOPE: Primary | ICD-10-CM

## 2019-08-14 DIAGNOSIS — R64 CACHECTIC: ICD-10-CM

## 2019-08-14 DIAGNOSIS — C78.00 MALIGNANT NEOPLASM METASTATIC TO LUNG, UNSPECIFIED LATERALITY: ICD-10-CM

## 2019-08-14 DIAGNOSIS — R42 LIGHT HEADEDNESS: Primary | ICD-10-CM

## 2019-08-14 DIAGNOSIS — C16.9 GASTRIC CARCINOMA: ICD-10-CM

## 2019-08-14 DIAGNOSIS — D64.9 ANEMIA, UNSPECIFIED TYPE: ICD-10-CM

## 2019-08-14 DIAGNOSIS — C78.6 PERITONEAL CARCINOMATOSIS: ICD-10-CM

## 2019-08-14 DIAGNOSIS — C64.9 RENAL CELL CARCINOMA, UNSPECIFIED LATERALITY: ICD-10-CM

## 2019-08-14 DIAGNOSIS — R53.1 WEAKNESS: ICD-10-CM

## 2019-08-14 LAB
ALBUMIN SERPL BCP-MCNC: 2.6 G/DL (ref 3.5–5.2)
ALP SERPL-CCNC: 82 U/L (ref 55–135)
ALT SERPL W/O P-5'-P-CCNC: 6 U/L (ref 10–44)
ANION GAP SERPL CALC-SCNC: 10 MMOL/L (ref 8–16)
AST SERPL-CCNC: 14 U/L (ref 10–40)
BASOPHILS # BLD AUTO: 0.05 K/UL (ref 0–0.2)
BASOPHILS NFR BLD: 0.6 % (ref 0–1.9)
BILIRUB SERPL-MCNC: 0.5 MG/DL (ref 0.1–1)
BUN SERPL-MCNC: 9 MG/DL (ref 8–23)
CALCIUM SERPL-MCNC: 7.7 MG/DL (ref 8.7–10.5)
CHLORIDE SERPL-SCNC: 104 MMOL/L (ref 95–110)
CO2 SERPL-SCNC: 21 MMOL/L (ref 23–29)
CREAT SERPL-MCNC: 0.9 MG/DL (ref 0.5–1.4)
DIFFERENTIAL METHOD: ABNORMAL
EOSINOPHIL # BLD AUTO: 0.1 K/UL (ref 0–0.5)
EOSINOPHIL NFR BLD: 0.6 % (ref 0–8)
ERYTHROCYTE [DISTWIDTH] IN BLOOD BY AUTOMATED COUNT: 21.2 % (ref 11.5–14.5)
EST. GFR  (AFRICAN AMERICAN): >60 ML/MIN/1.73 M^2
EST. GFR  (NON AFRICAN AMERICAN): >60 ML/MIN/1.73 M^2
GLUCOSE SERPL-MCNC: 120 MG/DL (ref 70–110)
HCT VFR BLD AUTO: 30.7 % (ref 37–48.5)
HGB BLD-MCNC: 9.5 G/DL (ref 12–16)
IMM GRANULOCYTES # BLD AUTO: 0.06 K/UL (ref 0–0.04)
IMM GRANULOCYTES NFR BLD AUTO: 0.7 % (ref 0–0.5)
LYMPHOCYTES # BLD AUTO: 1.5 K/UL (ref 1–4.8)
LYMPHOCYTES NFR BLD: 17 % (ref 18–48)
MCH RBC QN AUTO: 28.9 PG (ref 27–31)
MCHC RBC AUTO-ENTMCNC: 30.9 G/DL (ref 32–36)
MCV RBC AUTO: 93 FL (ref 82–98)
MONOCYTES # BLD AUTO: 0.5 K/UL (ref 0.3–1)
MONOCYTES NFR BLD: 6.3 % (ref 4–15)
NEUTROPHILS # BLD AUTO: 6.4 K/UL (ref 1.8–7.7)
NEUTROPHILS NFR BLD: 74.8 % (ref 38–73)
NRBC BLD-RTO: 0 /100 WBC
PLATELET # BLD AUTO: 399 K/UL (ref 150–350)
PMV BLD AUTO: 8.9 FL (ref 9.2–12.9)
POTASSIUM SERPL-SCNC: 3.7 MMOL/L (ref 3.5–5.1)
PROT SERPL-MCNC: 6.5 G/DL (ref 6–8.4)
RBC # BLD AUTO: 3.29 M/UL (ref 4–5.4)
SODIUM SERPL-SCNC: 135 MMOL/L (ref 136–145)
WBC # BLD AUTO: 8.53 K/UL (ref 3.9–12.7)

## 2019-08-14 PROCEDURE — 80053 COMPREHEN METABOLIC PANEL: CPT

## 2019-08-14 PROCEDURE — 1101F PR PT FALLS ASSESS DOC 0-1 FALLS W/OUT INJ PAST YR: ICD-10-PCS | Mod: CPTII,S$GLB,, | Performed by: INTERNAL MEDICINE

## 2019-08-14 PROCEDURE — 3288F PR FALLS RISK ASSESSMENT DOCUMENTED: ICD-10-PCS | Mod: CPTII,S$GLB,, | Performed by: INTERNAL MEDICINE

## 2019-08-14 PROCEDURE — 96360 HYDRATION IV INFUSION INIT: CPT

## 2019-08-14 PROCEDURE — 3288F FALL RISK ASSESSMENT DOCD: CPT | Mod: CPTII,S$GLB,, | Performed by: INTERNAL MEDICINE

## 2019-08-14 PROCEDURE — 63600175 PHARM REV CODE 636 W HCPCS: Performed by: FAMILY MEDICINE

## 2019-08-14 PROCEDURE — 85025 COMPLETE CBC W/AUTO DIFF WBC: CPT

## 2019-08-14 PROCEDURE — 3074F PR MOST RECENT SYSTOLIC BLOOD PRESSURE < 130 MM HG: ICD-10-PCS | Mod: CPTII,S$GLB,, | Performed by: INTERNAL MEDICINE

## 2019-08-14 PROCEDURE — 3078F DIAST BP <80 MM HG: CPT | Mod: CPTII,S$GLB,, | Performed by: INTERNAL MEDICINE

## 2019-08-14 PROCEDURE — 3074F SYST BP LT 130 MM HG: CPT | Mod: CPTII,S$GLB,, | Performed by: INTERNAL MEDICINE

## 2019-08-14 PROCEDURE — 99999 PR PBB SHADOW E&M-EST. PATIENT-LVL III: CPT | Mod: PBBFAC,,, | Performed by: INTERNAL MEDICINE

## 2019-08-14 PROCEDURE — 99999 PR PBB SHADOW E&M-EST. PATIENT-LVL III: ICD-10-PCS | Mod: PBBFAC,,, | Performed by: INTERNAL MEDICINE

## 2019-08-14 PROCEDURE — 99215 PR OFFICE/OUTPT VISIT, EST, LEVL V, 40-54 MIN: ICD-10-PCS | Mod: S$GLB,,, | Performed by: INTERNAL MEDICINE

## 2019-08-14 PROCEDURE — 99284 EMERGENCY DEPT VISIT MOD MDM: CPT | Mod: 25

## 2019-08-14 PROCEDURE — 99215 OFFICE O/P EST HI 40 MIN: CPT | Mod: S$GLB,,, | Performed by: INTERNAL MEDICINE

## 2019-08-14 PROCEDURE — 3078F PR MOST RECENT DIASTOLIC BLOOD PRESSURE < 80 MM HG: ICD-10-PCS | Mod: CPTII,S$GLB,, | Performed by: INTERNAL MEDICINE

## 2019-08-14 PROCEDURE — 1101F PT FALLS ASSESS-DOCD LE1/YR: CPT | Mod: CPTII,S$GLB,, | Performed by: INTERNAL MEDICINE

## 2019-08-14 RX ORDER — FERROUS SULFATE 325(65) MG
1 TABLET ORAL DAILY
Refills: 1 | COMMUNITY
Start: 2019-07-08 | End: 2019-08-14 | Stop reason: SDUPTHER

## 2019-08-14 RX ORDER — SODIUM CHLORIDE 9 MG/ML
1000 INJECTION, SOLUTION INTRAVENOUS
Status: COMPLETED | OUTPATIENT
Start: 2019-08-14 | End: 2019-08-14

## 2019-08-14 RX ORDER — PANTOPRAZOLE SODIUM 40 MG/1
40 TABLET, DELAYED RELEASE ORAL DAILY
Refills: 3 | COMMUNITY
Start: 2019-07-22 | End: 2019-10-02 | Stop reason: SDUPTHER

## 2019-08-14 RX ADMIN — SODIUM CHLORIDE 1000 ML: 0.9 INJECTION, SOLUTION INTRAVENOUS at 09:08

## 2019-08-14 NOTE — LETTER
August 14, 2019      Rocco Ross MD  [address]  [address]  [place] [address]           [facility]  [address] [place] [address]  Phone: [phone]          Patient: Oriana Tobar   MR Number: [id number]   YOB: 1945   Date of Visit: 8/14/2019       Dear Dr. Rocco Ross:    Thank you for referring Oriana Tobar to me for evaluation. Attached you will find relevant portions of my assessment and plan of care.    If you have questions, please do not hesitate to call me. I look forward to following Oriana Tobar along with you.    Sincerely,    Lauren Irene MD    Enclosure  CC:  No Recipients    If you would like to receive this communication electronically, please contact [email or web address] or [phone] to request more information on [facility] Link access.    For providers and/or their staff who would like to refer a patient to [facility], please contact us through our one-stop-shop provider referral line, [facility], at [phone].    If you feel you have received this communication in error or would no longer like to receive these types of communications, please e-mail [email or web address]

## 2019-08-14 NOTE — TELEPHONE ENCOUNTER
Message left notifying patient that Dr Teto silva d not order any medications for her, but she sent her to ED for fluids.

## 2019-08-14 NOTE — TELEPHONE ENCOUNTER
----- Message from Katerine Ho sent at 8/14/2019  2:26 PM CDT -----  Contact: daughter  Type: Needs Medical Advice    Who Called: Maryjane Ruiz Call Back Number: [phone] (home)   Additional Information: patient is at the pharm and the medication that the Dr told her that would be sent today is not at the pharmacy the patient was see today by Dr. Irene would like to know when it will be sent she dont rem the name of the Rx

## 2019-08-14 NOTE — ED TRIAGE NOTES
Pt was in Dr. Irene's office for appointment accompanied by daughter. Daughter states pt became really cold & dizzy & lethargic so nurses from Teto's office transported pt to ER.

## 2019-08-14 NOTE — PROGRESS NOTES
CC pt with continued nausea     Oriana Tobar is a 74 y.o.    Pt with recurrent renal cell carcinoma. She has  Had no recent tissue biopsy since 2018 when she was diagnosed with RCC via biopsy of a left upper quadrant peritoneal mass  Patient did not take treatment for malignancy at that time per her own choice.  She then developed GI hemorrhaging was found to have a gastric mass consistent with carcinoma.  Latest scans revealed diffuse metastatic disease to lung, liver , peritoneum with bilateral ovarian masses.     History completed 2 doses of IV iron and 2 doses of Sandostatin  She lives with her family and is eating better.  No further evidence of melena or hematochezia  She is requiring Zofran p.r.n. nausea and probiotics to help with intermittent diarrhea    Pt with 8 pound weight loss, She is sick to her stomach and was unable to complete the prior scheduled biopsy   Sandostatin injections helping control GI bleed  Pt was to get US guided biopsy of the liver masses in July but she became ill on the table and started gagging hence the procedure had to be stopped  She is waiting to be rescheduled with sedation    Past Medical History:   Diagnosis Date    Anemia     Cancer     kidney    Dementia     Encounter for blood transfusion     11/4/2018    High cholesterol 06/23/2018    Hypertension     No Medication     Renal disorder     Wears dentures     Uppers         Current Outpatient Medications:     cyanocobalamin-cobamamide (B12) 5,000-100 mcg Lozg, , Disp: , Rfl:     ferrous sulfate 325 (65 FE) MG EC tablet, Take 325 mg by mouth once daily., Disp: , Rfl:     Lactobacillus rhamnosus GG (CULTURELLE) 10 billion cell capsule, Take 1 capsule by mouth once daily., Disp: , Rfl:     loperamide HCl/simethicone (IMODIUM ADVANCED ORAL), Take by mouth., Disp: , Rfl:     octreotide (SANDOSTATIN) 100 mcg/mL Soln, Inject 2 mLs (200 mcg total) into the vein once daily., Disp: 40 mL, Rfl: 0     "ondansetron (ZOFRAN) 4 MG tablet, Take 4 mg by mouth every 8 (eight) hours as needed for Nausea., Disp: , Rfl:     pantoprazole (PROTONIX) 40 MG tablet, Take 1 tablet (40 mg total) by mouth once daily., Disp: 30 tablet, Rfl: 11    sucralfate (CARAFATE) 100 mg/mL suspension, Take 10 mLs (1 g total) by mouth 4 (four) times daily before meals and nightly., Disp: 1 Bottle, Rfl: 11  Review of patient's allergies indicates:   Allergen Reactions    Codeine Other (See Comments)     Pt shakes and hallucinates    Pcn [penicillins] Anxiety and Other (See Comments)     Social History     Tobacco Use    Smoking status: Former Smoker     Packs/day: 1.00     Years: 50.00     Pack years: 50.00     Last attempt to quit: 10/6/2018     Years since quittin.8    Smokeless tobacco: Never Used    Tobacco comment: pt has stopped; encouraged to remain stopped   Substance Use Topics    Alcohol use: No     Frequency: Never     Comment: last alcohol intake friday    Drug use: No     Family History   Adopted: Yes       CONSTITUTIONAL: No fevers, chills, night sweats, weight  Loss with progression   SKIN: no rashes or itching  ENT: No headaches, head trauma, vision changes, or eye pain  LYMPH NODES: None noticed   CV: No chest pain, palpitations.   RESP:+ dyspnea on exertion, cno cough, wheezing, or hemoptysis  GI: + nausea, no emesis,  + diarrhea, no constipation, melena, hematochezia, pain.   No further vomiting with zofran  No further diarrhea with imodium   : No dysuria, hematuria, urgency, or frequency   HEME: No easy bruising, bleeding problems  PSYCHIATRIC: No depression, anxiety, psychosis, hallucinations.  NEURO: No seizures, memory loss, slight weakness  MSK: No arthralgias or joint swelling         /71   Pulse 97   Temp 98.6 °F (37 °C) (Oral)   Resp 18   Ht 5' 11" (1.803 m)   Wt 65.8 kg (145 lb)   LMP  (LMP Unknown)   SpO2 98%   BMI 20.22 kg/m²     Gen:  Patient appears diaphoretic he is reporting that she " feels nauseated and feels that she is about to pass out  Psych: flat affect  Eyes: Pupils round and non dilated, EOM intact no nystagmus  Nose: Nares patent  Neck: suppple, no JVD, trachea midline    Lungs: CTAB, no wheezes no fremitus   CV: S1S2 with RRR, No mrg  Abd: soft, NTND, + BS, No HSM, no ascites  Extr: No CCE, MEJIA decreased strength   Skin: intact, no lesions noted  Rheum: No joint swelling    Lab Results   Component Value Date    WBC 6.65 07/16/2019    HGB 12.1 07/16/2019    HCT 39.1 07/16/2019    MCV 87 07/16/2019     (H) 07/16/2019     CMP  Sodium   Date Value Ref Range Status   07/16/2019 136 136 - 145 mmol/L Final     Potassium   Date Value Ref Range Status   07/16/2019 3.8 3.5 - 5.1 mmol/L Final     Chloride   Date Value Ref Range Status   07/16/2019 103 95 - 110 mmol/L Final     CO2   Date Value Ref Range Status   07/16/2019 23 23 - 29 mmol/L Final     Glucose   Date Value Ref Range Status   07/16/2019 116 (H) 70 - 110 mg/dL Final     BUN, Bld   Date Value Ref Range Status   07/16/2019 8 8 - 23 mg/dL Final     Creatinine   Date Value Ref Range Status   07/16/2019 1.1 0.5 - 1.4 mg/dL Final     Calcium   Date Value Ref Range Status   07/16/2019 7.9 (L) 8.7 - 10.5 mg/dL Final     Total Protein   Date Value Ref Range Status   07/16/2019 7.2 6.0 - 8.4 g/dL Final     Albumin   Date Value Ref Range Status   07/16/2019 2.9 (L) 3.5 - 5.2 g/dL Final     Total Bilirubin   Date Value Ref Range Status   07/16/2019 0.8 0.1 - 1.0 mg/dL Final     Comment:     For infants and newborns, interpretation of results should be based  on gestational age, weight and in agreement with clinical  observations.  Premature Infant recommended reference ranges:  Up to 24 hours.............<8.0 mg/dL  Up to 48 hours............<12.0 mg/dL  3-5 days..................<15.0 mg/dL  6-29 days.................<15.0 mg/dL       Alkaline Phosphatase   Date Value Ref Range Status   07/16/2019 84 55 - 135 U/L Final     AST   Date  Value Ref Range Status   07/16/2019 15 10 - 40 U/L Final     ALT   Date Value Ref Range Status   07/16/2019 6 (L) 10 - 44 U/L Final     Anion Gap   Date Value Ref Range Status   07/16/2019 10 8 - 16 mmol/L Final     eGFR if    Date Value Ref Range Status   07/16/2019 57.2 (A) >60 mL/min/1.73 m^2 Final     eGFR if non    Date Value Ref Range Status   07/16/2019 49.6 (A) >60 mL/min/1.73 m^2 Final     Comment:     Calculation used to obtain the estimated glomerular filtration  rate (eGFR) is the CKD-EPI equation.      CT Chest Abdomen Pelvis W W/O Contrast (XPD)   Order: [id number]   Status:  Final result   Visible to patient:  Yes (Patient Portal) Next appt:  None Dx:  Gastric carcinoma; Renal cell carcino...   Details     Reading Physician Reading Date Result Priority   Ramin Singh MD 5/13/2019 STAT      Narrative     EXAMINATION:  CT CHEST ABDOMEN PELVIS W W/O CONTRAST (XPD)    CLINICAL HISTORY:  RCC;Malignant neoplasm of stomach, unspecified    TECHNIQUE:  Low dose axial, sagittal and coronal reformations were performed from the thoracic inlet to the pubic symphysis following the IV administration of 75 mL of Omnipaque 350.  The chest images are with  contrast and the abdomen images are with and without contrast.  30 mL of oral Omnipaque was given.    COMPARISON:  CT abdomen pelvis 10/23/2018.  CT chest 01/13/2016.    FINDINGS:  There are numerous bilateral soft tissue pulmonary nodules ranging in size from 0.3-1.7 cm consistent with pulmonary metastatic disease.  These have increased in size and number when compared to the patient's previous examination.  No focal consolidation.  Pulmonary vascularity is unremarkable.    No significant pleural or pericardial effusions.  No suspicious endobronchial lesion.  There are peripherally enhancing subcarinal masses measuring 1.8 and 1.5 cm most consistent with metastatic lymphadenopathy.  These are new compared to the chest CT from  2016.  No significant hilar, mediastinal or axillary lymphadenopathy.    The liver and spleen are normal in size and attenuation.  Numerous chronic nonenhancing hepatic cysts.  Within the dome of the right hepatic lobe there are poorly defined round hypoattenuating nodules with subtle peripheral enhancement which have increased in size over the interval currently measuring up to 1.3 cm in diameter.  These are suspicious for hepatic metastatic disease.  There has been interval development of a poorly defined enhancing 2.2 cm nodule of the right hepatic lobe as well as a smaller 1.4 cm partially exophytic enhancing nodule of the inferior right hepatic lobe consistent with metastatic disease.  No perihepatic ascites.  Gallbladder is distended with dependent calcified gallstones.  Pancreas is normal in size and attenuation.  There are perisplenic and perigastric varices consistent with underlying portal hypertension.    Patient is status post prior left nephrectomy and adrenalectomy.  The right kidney is normal in size and enhances homogeneously.  Within the left mid abdomen there is an enlarging peripherally enhancing soft tissue attenuation mass currently measuring 6.4 cm AP x 4.1 cm transverse by 6.2 cm cc (previously measuring 3.7 cm AP x 3.7 cm transverse by 4.1 cm cc).  This is consistent with either residual or recurrent neoplasia.    Large amount of stool throughout the colon.  Scattered diverticula within the colon.  No mesenteric inflammatory changes.  Surgical clips within the distal colon from previous partial colectomy.  No CT evidence for bowel obstruction.    The bladder is partially distended.  The uterus is normal in size and attenuation.  There are bilateral heterogeneously enhancing soft tissue masses of the right and left ovary the largest on the right measuring 7.4 cm and largest on left measuring 3.4 cm.  This is most consistent with ovarian neoplasia.  There is a stable left ovarian  dermoid/teratoma.    There are numerous enhancing soft tissue masses of the mesentery which have increased in number and size over the interval consistent with metastatic disease.  The largest mass measures 4.6 cm AP x 5.5 cm transverse within the midline anterior lower abdomen and appears to abut and invade adjacent small bowel.  No dilated loops of small bowel to suggest resulting obstruction.  New smaller peripherally enhancing soft tissue masses within the pelvis the largest measuring 2.6 cm in diameter also consistent with metastatic disease.    Mild thoracolumbar dextroscoliosis.  Mild bone demineralization.      Impression       1. Interval progression of pulmonary metastatic disease with metastatic subcarinal lymphadenopathy.  2. Interval progression of hepatic metastatic disease.  3. Interval increase in size and number of enhancing mesenteric and pelvic soft tissue masses consistent with progression of metastatic disease.  4. Enlarging bilateral ovarian masses consistent with neoplasia.  5. Chronic dermoid/teratoma of the left ovary.  6. Chronic hepatic cysts.  7. Perisplenic and perigastric varices consistent with portal hypertension.  8. Previous left adrenalectomy and nephrectomy.  9. Enlarging heterogeneously enhancing soft tissue mass of the left abdomen again consistent with either residual or recurrent neoplasia.  10. Bone demineralization.      Electronically signed by: Ramin Singh  Date: 05/13/2019  Time: 10:12             Last Resulted: 05/13/19 1           Post IV iron last week     Light headedness    Gastric carcinoma    Renal cell carcinoma, unspecified laterality    Peritoneal carcinomatosis    Malignant neoplasm metastatic to lung, unspecified laterality    Weakness    Cachectic        + light headedness with nausea and verbally states she feels that she is going to pass out  Weak and may need admit or blood products   I discussed try to do this as an outpatient but I am concerned as to  her clinical presentation at this moment would feel safer if she is evaluated in the emergency room  I appreciate the emergency room staff I called ahead and spoke with the nurse to give a quick update on her condition    Proceed with liver biopsy wants rescheduled by Radiology to ascertain whether the peritoneal carcinomatosis from a gastric malignancy versus recurrent renal cell carcinoma  Lengthy discussion is treatment options which are available to her since her ECOG performance status is 2  tissue diagnosis imperative follow-up after liver biopsy    Re-reviewed her octreotide which was scheduled for July however she needs a dose June 21st and this will be monthly to prevent further GI hemorrhage  Sending her for liver biopsy : discussed treatment for RCC vs possible GI or ovarian cancer   Continue medications for GERD  Continue oral iron and probiotics    Had a discussion of possible hospice last visit and family and patient are not at that point to except their services.  They would feel more comfortable having a firm diagnosis of the hepatic metastases.      sandostatin SC for two weeks then 30-40 mg IM monthly     Thank you for allowing me to evaluate and participate in the care of this pleasant patient. Please fell free to call me with any questions or concerns.    Warmly,  Lauren Irene MD    This note was dictated with Dragon and briefly proofread. Please excuse any sentences that may be unclear or words misspelled

## 2019-08-15 ENCOUNTER — TELEPHONE (OUTPATIENT)
Dept: HEMATOLOGY/ONCOLOGY | Facility: CLINIC | Age: 74
End: 2019-08-15

## 2019-08-15 ENCOUNTER — INFUSION (OUTPATIENT)
Dept: INFUSION THERAPY | Facility: HOSPITAL | Age: 74
End: 2019-08-15
Payer: MEDICARE

## 2019-08-15 VITALS
OXYGEN SATURATION: 98 % | TEMPERATURE: 97 F | DIASTOLIC BLOOD PRESSURE: 65 MMHG | HEART RATE: 100 BPM | SYSTOLIC BLOOD PRESSURE: 112 MMHG | RESPIRATION RATE: 16 BRPM

## 2019-08-15 DIAGNOSIS — D64.9 ANEMIA, UNSPECIFIED TYPE: ICD-10-CM

## 2019-08-15 DIAGNOSIS — C16.9 GASTRIC CARCINOMA: ICD-10-CM

## 2019-08-15 DIAGNOSIS — C16.2 MALIGNANT NEOPLASM OF BODY OF STOMACH: ICD-10-CM

## 2019-08-15 DIAGNOSIS — D50.0 CHRONIC BLOOD LOSS ANEMIA: ICD-10-CM

## 2019-08-15 DIAGNOSIS — K92.1 GASTROINTESTINAL HEMORRHAGE WITH MELENA: Primary | ICD-10-CM

## 2019-08-15 PROCEDURE — 63600175 PHARM REV CODE 636 W HCPCS: Mod: JG | Performed by: INTERNAL MEDICINE

## 2019-08-15 PROCEDURE — 96372 THER/PROPH/DIAG INJ SC/IM: CPT

## 2019-08-15 RX ADMIN — OCTREOTIDE ACETATE 30 MG: KIT at 11:08

## 2019-08-15 NOTE — TELEPHONE ENCOUNTER
Spoke with daughter and she was notified that DR sosa does not want to erik her anything until after liver biopsy because it could add to her constipation and dizziness.

## 2019-08-15 NOTE — TELEPHONE ENCOUNTER
----- Message from Lino Diaz sent at 8/15/2019  9:51 AM CDT -----  Contact: patient  On Wednesday, August 14th, patient was in an exam room to see Dr Irene, but was not well and was sent to the ED.  She is in [place] today for her infusion and is inquiring about her Rx's.  One is an appetiete enhancer, the other is Ultram for restless legs.  Please call when this has been addressed. [phone]  Thank you so much!

## 2019-08-15 NOTE — NURSING
0991 PT PRESENTED TO THE [facility] DEPT FOR INJECTION/PT IS AAOx3/COOPERATIVE AND PLEASANT WITH STAFF/PT WAS BROUGHT TO OPT BY HER DAUGHTER WHO IS WITH HER AT THIS TIME. PT SITTING UP IN WC. DBRN.  1036 PT ASSISTED INTO RECLINER/DAUGHTER WITH PT/CALL LIGHT WITHIN REACH. DBRN.

## 2019-08-17 NOTE — ED PROVIDER NOTES
Encounter Date: 2019       History     Chief Complaint   Patient presents with    Near Syncope     74-year-old female presents brought in the ED from local physician's office, Dr. Irene, heme/onc, where she was being re-evaluated for persistent anemia she is currently on some new treatments and has a history of kidney cancer and dementia she has had prior transfusions        Review of patient's allergies indicates:   Allergen Reactions    Codeine Other (See Comments)     Pt shakes and hallucinates    Pcn [penicillins] Anxiety and Other (See Comments)     Past Medical History:   Diagnosis Date    Anemia     Cancer     kidney    Dementia     Encounter for blood transfusion     2018    High cholesterol 2018    Hypertension     No Medication     Renal disorder     Wears dentures     Uppers     Past Surgical History:   Procedure Laterality Date     SECTION      COLONOSCOPY N/A 2018    [facility]    CYSTOSCOPY WITH RETROGRADE PYELOGRAM Left 1/15/2016    Per[facility] at [facility] OR    EGD (ESOPHAGOGASTRODUODENOSCOPY) N/A 2018    Per[facility] by [facility] ENDO    EYE SURGERY      Rt eye Catarac    HYSTERECTOMY      IMAGING, GI TRACT, INTRALUMINAL, VIA CAPSULE N/A 2018    Performed by [facility]occo Ross [facility] at [facility] ENDO    KIDNEY SURGERY Left 2018    Left kidney removed due to cancer    lasix surgery      NEPHRECTOMY-RADICAL   Left 2016    Per[facility] at [facility] OR    REPAIR-HERNIA-VENTRAL N/A 2016    Performed by Marco A [facility]hawa [facility] at [facility] OR    RESECTION-BOWEL  2016    Performed [facility] at [facility] OR    URETEROSCOPY  1/15/2016    Performed by [facility] at [facility] OR     Family History   Adopted: Yes     Social History     Tobacco Use    Smoking status: Former Smoker     Packs/day: 1.00     Years: 50.00     Pack  years: 50.00     Last attempt to quit: 10/6/2018     Years since quittin.8    Smokeless tobacco: Never Used    Tobacco comment: pt has stopped; encouraged to remain stopped   Substance Use Topics    Alcohol use: No     Frequency: Never     Comment: last alcohol intake friday    Drug use: No     Review of Systems   Constitutional: Negative for fever.   HENT: Negative for sore throat.    Respiratory: Negative for shortness of breath.    Cardiovascular: Negative for chest pain.   Gastrointestinal: Positive for abdominal pain. Negative for nausea.   Genitourinary: Negative for dysuria.   Musculoskeletal: Negative for back pain.   Skin: Negative for rash.   Neurological: Negative for weakness.   Hematological: Does not bruise/bleed easily.       Physical Exam     Initial Vitals [19 0913]   BP Pulse Resp Temp SpO2   (!) 110/57 68 18 98.2 °F (36.8 °C) 96 %      MAP       --         Physical Exam    Nursing note and vitals reviewed.  Constitutional: She appears well-developed and well-nourished. She is not diaphoretic. No distress.   HENT:   Head: Normocephalic and atraumatic.   Right Ear: External ear normal.   Left Ear: External ear normal.   Eyes: Pupils are equal, round, and reactive to light. Right eye exhibits no discharge. Left eye exhibits no discharge.   Neck: No tracheal deviation present. No JVD present.   Cardiovascular: Exam reveals no friction rub.    No murmur heard.  Pulmonary/Chest: No stridor. No respiratory distress. She has no wheezes. She has no rales.   Abdominal: Bowel sounds are normal. She exhibits no distension.   Musculoskeletal: Normal range of motion.   Neurological: She is alert.   Skin: Skin is warm.   Psychiatric: She has a normal mood and affect.         ED Course   Procedures  Labs Reviewed   CBC W/ AUTO DIFFERENTIAL - Abnormal; Notable for the following components:       Result Value    RBC 3.29 (*)     Hemoglobin 9.5 (*)     Hematocrit 30.7 (*)     Mean Corpuscular  Hemoglobin Conc 30.9 (*)     RDW 21.2 (*)     Platelets 399 (*)     MPV 8.9 (*)     Immature Granulocytes 0.7 (*)     Immature Grans (Abs) 0.06 (*)     Gran% 74.8 (*)     Lymph% 17.0 (*)     All other components within normal limits   COMPREHENSIVE METABOLIC PANEL - Abnormal; Notable for the following components:    Sodium 135 (*)     CO2 21 (*)     Glucose 120 (*)     Calcium 7.7 (*)     Albumin 2.6 (*)     ALT 6 (*)     All other components within normal limits          Imaging Results    None          Medical Decision Making:   ED Management:  Upon arrival the patient felt as if she needed to have a bowel movement and use the bedside commode to have a large bowel movement the patient felt considerably better  Elapsed parameters were improved from past, the patient's situation was discussed with her hematologist Dr. Irene, patient will follow up as an outpatient and does not require transfusion at this time shoe and her caregivers were given instructions as to how to keep her from becoming constipated in the future                      Clinical Impression:       ICD-10-CM ICD-9-CM   1. Near syncope R55 780.2   2. Anemia, unspecified type D64.9 285.9                                Stewart Jhaveri MD  08/17/19 0692

## 2019-08-19 ENCOUNTER — TELEPHONE (OUTPATIENT)
Dept: HEMATOLOGY/ONCOLOGY | Facility: CLINIC | Age: 74
End: 2019-08-19

## 2019-08-19 DIAGNOSIS — C64.9 RENAL CELL CARCINOMA, UNSPECIFIED LATERALITY: Primary | ICD-10-CM

## 2019-08-19 NOTE — TELEPHONE ENCOUNTER
----- Message from Lauren Irene MD sent at 8/15/2019 10:12 AM CDT -----  Needs f u in 2 weeks please :)

## 2019-08-22 ENCOUNTER — HOSPITAL ENCOUNTER (EMERGENCY)
Facility: HOSPITAL | Age: 74
Discharge: HOME | End: 2019-08-22
Attending: INTERNAL MEDICINE
Payer: MEDICARE

## 2019-08-22 VITALS
RESPIRATION RATE: 16 BRPM | HEART RATE: 75 BPM | WEIGHT: 153 LBS | SYSTOLIC BLOOD PRESSURE: 111 MMHG | HEIGHT: 71 IN | OXYGEN SATURATION: 98 % | BODY MASS INDEX: 21.42 KG/M2 | TEMPERATURE: 99 F | DIASTOLIC BLOOD PRESSURE: 54 MMHG

## 2019-08-22 DIAGNOSIS — C34.90 PRIMARY MALIGNANT NEOPLASM OF LUNG METASTATIC TO OTHER SITE, UNSPECIFIED LATERALITY: Primary | ICD-10-CM

## 2019-08-22 LAB
ALBUMIN SERPL BCP-MCNC: 2.4 G/DL (ref 3.5–5.2)
ALP SERPL-CCNC: 80 U/L (ref 55–135)
ALT SERPL W/O P-5'-P-CCNC: 8 U/L (ref 10–44)
ANION GAP SERPL CALC-SCNC: 9 MMOL/L (ref 8–16)
AST SERPL-CCNC: 17 U/L (ref 10–40)
BASOPHILS # BLD AUTO: 0.06 K/UL (ref 0–0.2)
BASOPHILS NFR BLD: 0.6 % (ref 0–1.9)
BILIRUB SERPL-MCNC: 0.7 MG/DL (ref 0.1–1)
BUN SERPL-MCNC: 12 MG/DL (ref 8–23)
CALCIUM SERPL-MCNC: 7.3 MG/DL (ref 8.7–10.5)
CHLORIDE SERPL-SCNC: 102 MMOL/L (ref 95–110)
CO2 SERPL-SCNC: 20 MMOL/L (ref 23–29)
CREAT SERPL-MCNC: 1.1 MG/DL (ref 0.5–1.4)
DIFFERENTIAL METHOD: ABNORMAL
EOSINOPHIL # BLD AUTO: 0.1 K/UL (ref 0–0.5)
EOSINOPHIL NFR BLD: 0.6 % (ref 0–8)
ERYTHROCYTE [DISTWIDTH] IN BLOOD BY AUTOMATED COUNT: 19.3 % (ref 11.5–14.5)
EST. GFR  (AFRICAN AMERICAN): 57.2 ML/MIN/1.73 M^2
EST. GFR  (NON AFRICAN AMERICAN): 49.6 ML/MIN/1.73 M^2
GLUCOSE SERPL-MCNC: 129 MG/DL (ref 70–110)
HCT VFR BLD AUTO: 27.3 % (ref 37–48.5)
HGB BLD-MCNC: 8.2 G/DL (ref 12–16)
IMM GRANULOCYTES # BLD AUTO: 0.11 K/UL (ref 0–0.04)
IMM GRANULOCYTES NFR BLD AUTO: 1 % (ref 0–0.5)
LYMPHOCYTES # BLD AUTO: 1.8 K/UL (ref 1–4.8)
LYMPHOCYTES NFR BLD: 16.3 % (ref 18–48)
MCH RBC QN AUTO: 29.9 PG (ref 27–31)
MCHC RBC AUTO-ENTMCNC: 30 G/DL (ref 32–36)
MCV RBC AUTO: 100 FL (ref 82–98)
MONOCYTES # BLD AUTO: 0.9 K/UL (ref 0.3–1)
MONOCYTES NFR BLD: 8.2 % (ref 4–15)
NEUTROPHILS # BLD AUTO: 7.9 K/UL (ref 1.8–7.7)
NEUTROPHILS NFR BLD: 73.3 % (ref 38–73)
NRBC BLD-RTO: 0 /100 WBC
PLATELET # BLD AUTO: 374 K/UL (ref 150–350)
PMV BLD AUTO: 9.3 FL (ref 9.2–12.9)
POTASSIUM SERPL-SCNC: 3.7 MMOL/L (ref 3.5–5.1)
PROT SERPL-MCNC: 6.2 G/DL (ref 6–8.4)
RBC # BLD AUTO: 2.74 M/UL (ref 4–5.4)
SODIUM SERPL-SCNC: 131 MMOL/L (ref 136–145)
WBC # BLD AUTO: 10.73 K/UL (ref 3.9–12.7)

## 2019-08-22 PROCEDURE — 99284 EMERGENCY DEPT VISIT MOD MDM: CPT | Mod: 25

## 2019-08-22 PROCEDURE — 63600175 PHARM REV CODE 636 W HCPCS

## 2019-08-22 PROCEDURE — 85025 COMPLETE CBC W/AUTO DIFF WBC: CPT

## 2019-08-22 PROCEDURE — 63600175 PHARM REV CODE 636 W HCPCS: Performed by: INTERNAL MEDICINE

## 2019-08-22 PROCEDURE — 96372 THER/PROPH/DIAG INJ SC/IM: CPT

## 2019-08-22 PROCEDURE — 80053 COMPREHEN METABOLIC PANEL: CPT

## 2019-08-22 RX ORDER — DEXAMETHASONE SODIUM PHOSPHATE 100 MG/10ML
10 INJECTION INTRAMUSCULAR; INTRAVENOUS
Status: COMPLETED | OUTPATIENT
Start: 2019-08-22 | End: 2019-08-22

## 2019-08-22 RX ORDER — METHYLPREDNISOLONE ACETATE 40 MG/ML
INJECTION, SUSPENSION INTRA-ARTICULAR; INTRALESIONAL; INTRAMUSCULAR; SOFT TISSUE
Status: COMPLETED
Start: 2019-08-22 | End: 2019-08-22

## 2019-08-22 RX ORDER — METHYLPREDNISOLONE ACETATE 40 MG/ML
80 INJECTION, SUSPENSION INTRA-ARTICULAR; INTRALESIONAL; INTRAMUSCULAR; SOFT TISSUE
Status: DISCONTINUED | OUTPATIENT
Start: 2019-08-22 | End: 2019-08-22 | Stop reason: HOSPADM

## 2019-08-22 RX ADMIN — METHYLPREDNISOLONE ACETATE 80 MG: 40 INJECTION, SUSPENSION INTRA-ARTICULAR; INTRALESIONAL; INTRAMUSCULAR; SOFT TISSUE at 01:08

## 2019-08-22 RX ADMIN — DEXAMETHASONE SODIUM PHOSPHATE 10 MG: 10 INJECTION INTRAMUSCULAR; INTRAVENOUS at 01:08

## 2019-08-22 NOTE — ED NOTES
Bed: Exam 04  Expected date: 8/22/19  Expected time: 11:56 AM  Means of arrival: Ambulance Service  Comments:

## 2019-08-22 NOTE — ED PROVIDER NOTES
Encounter Date: 2019       History     Chief Complaint   Patient presents with    Fatigue    Weakness     generalized     Patient brought in by the family at the patient's request because she is weak.  She is very fatigue.  She feels like she needs blood.  She has had multiple blood transfusions in the recent past.  Patient has metastatic cancer with progression to multiple organs.  Patient is not on hospice.  She does have home health.  She has no syncopal episodes in no acute distress. She is just weak.  She has lost considerable amount of weight she is not sure how much        Review of patient's allergies indicates:   Allergen Reactions    Codeine Other (See Comments)     Pt shakes and hallucinates    Pcn [penicillins] Anxiety and Other (See Comments)     Past Medical History:   Diagnosis Date    Anemia     Cancer     kidney    Dementia     Encounter for blood transfusion     2018    High cholesterol 2018    Hypertension     No Medication     Renal disorder     Wears dentures     Uppers     Past Surgical History:   Procedure Laterality Date     SECTION      COLONOSCOPY N/A 2018    [facility]    CYSTOSCOPY WITH RETROGRADE PYELOGRAM Left 1/15/2016    [facility]    EGD (ESOPHAGOGASTRODUODENOSCOPY) N/A 2018    Performed by [facility] ENDO    EYE SURGERY      Rt eye Catarac    HYSTERECTOMY      IMAGING, GI TRACT, INTRALUMINAL, VIA CAPSULE N/A 2018    [facility]formed by Rocco Ross [facility] at [facility] ENDO    KIDNEY SURGERY Left 2018    Left kidney removed due to cancer    lasix surgery      NEPHRECTOMY-RADICAL   Left 2016    [facility]    REPAIR-HERNIA-VENTRAL N/A 2016    [facility]awa [facility] at [facility] OR    RESECTION-BOWEL  2016    [facility] at [facility]    URETEROSCOPY   1/15/2016    [facility]     Family History   Adopted: Yes     Social History     Tobacco Use    Smoking status: Former Smoker     Packs/day: 1.00     Years: 50.00     Pack years: 50.00     Last attempt to quit: 10/6/2018     Years since quittin.8    Smokeless tobacco: Never Used    Tobacco comment: pt has stopped; encouraged to remain stopped   Substance Use Topics    Alcohol use: Yes     Frequency: Never     Comment: occasional    Drug use: No     Review of Systems   Constitutional: Negative for fever.   HENT: Negative for sore throat.    Respiratory: Negative for shortness of breath.    Cardiovascular: Negative for chest pain.   Gastrointestinal: Negative for nausea.   Genitourinary: Negative for dysuria.   Musculoskeletal: Negative for back pain.   Skin: Negative for rash.   Neurological: Negative for weakness.   Hematological: Does not bruise/bleed easily.   All other systems reviewed and are negative.      Physical Exam     Initial Vitals [19 1157]   BP Pulse Resp Temp SpO2   (!) 95/56 78 16 98.5 °F (36.9 °C) 97 %      MAP       --         Physical Exam    Nursing note and vitals reviewed.  Constitutional: Vital signs are normal. She appears well-developed and well-nourished. She is active and cooperative.   Cachectic   HENT:   Head: Normocephalic and atraumatic.   Eyes: Conjunctivae, EOM and lids are normal. Pupils are equal, round, and reactive to light. Lids are everted and swept, no foreign bodies found.   Neck: Trachea normal, normal range of motion and full passive range of motion without pain. Neck supple.   Cardiovascular: Normal rate, regular rhythm, S1 normal, S2 normal, normal heart sounds, intact distal pulses and normal pulses.  No extrasystoles are present.    Pulmonary/Chest: Breath sounds normal.   Abdominal: Soft. Normal appearance and bowel sounds are normal.   Musculoskeletal: Normal range of motion.   Neurological: She is alert and  oriented to person, place, and time. She has normal strength and normal reflexes. GCS score is 15. GCS eye subscore is 4. GCS verbal subscore is 5. GCS motor subscore is 6.   Skin: Skin is warm, dry and intact. Capillary refill takes less than 2 seconds.   Psychiatric: She has a normal mood and affect. Her speech is normal and behavior is normal. Judgment and thought content normal. Cognition and memory are normal.         ED Course   Procedures  Labs Reviewed   CBC W/ AUTO DIFFERENTIAL - Abnormal; Notable for the following components:       Result Value    RBC 2.74 (*)     Hemoglobin 8.2 (*)     Hematocrit 27.3 (*)     Mean Corpuscular Volume 100 (*)     Mean Corpuscular Hemoglobin Conc 30.0 (*)     RDW 19.3 (*)     Platelets 374 (*)     Immature Granulocytes 1.0 (*)     Gran # (ANC) 7.9 (*)     Immature Grans (Abs) 0.11 (*)     Gran% 73.3 (*)     Lymph% 16.3 (*)     All other components within normal limits   COMPREHENSIVE METABOLIC PANEL - Abnormal; Notable for the following components:    Sodium 131 (*)     CO2 20 (*)     Glucose 129 (*)     Calcium 7.3 (*)     Albumin 2.4 (*)     ALT 8 (*)     eGFR if  57.2 (*)     eGFR if non  49.6 (*)     All other components within normal limits          Imaging Results    None          Medical Decision Making:   Clinical Tests:   Lab Tests: Ordered and Reviewed  The following lab test(s) were unremarkable: CBC and CMP       <> Summary of Lab: Lab studies of fairly adequate.  She had her hematocrit 27% which is slightly down but not requiring transfusion.  Otherwise a CMP and CBC are normal.  ED Management:  Patient presents with fatigue and weakness. She has known severe metastatic multiple organ cancer.  Laboratory studies are relatively unremarkable.  I had a discussion with both patient and the attendant about going hospice.  The patient is adamant that she does not have terminal cancer and hospice is not at this time necessary.  She feels  like if she is walking and talking she is okay                      Clinical Impression:       ICD-10-CM ICD-9-CM   1. Primary malignant neoplasm of lung metastatic to other site, unspecified laterality C34.90 162.9         Disposition:   Disposition: Discharged  Condition: Stable                        Miguel Lou MD  08/22/19 2406

## 2019-08-22 NOTE — ED TRIAGE NOTES
Pt presents with generalized weakness and fatigue. Pt has hx of stage IV stomach and lung cancer. Pt has had to have multiple blood transfusions in the past.

## 2019-08-28 ENCOUNTER — INFUSION (OUTPATIENT)
Dept: INFUSION THERAPY | Facility: HOSPITAL | Age: 74
End: 2019-08-28
Attending: INTERNAL MEDICINE
Payer: MEDICARE

## 2019-08-28 ENCOUNTER — HOSPITAL ENCOUNTER (EMERGENCY)
Facility: HOSPITAL | Age: 74
Discharge: HOME | End: 2019-08-28
Attending: EMERGENCY MEDICINE | Admitting: INTERNAL MEDICINE
Payer: MEDICARE

## 2019-08-28 ENCOUNTER — TELEPHONE (OUTPATIENT)
Dept: HEMATOLOGY/ONCOLOGY | Facility: CLINIC | Age: 74
End: 2019-08-28

## 2019-08-28 VITALS
OXYGEN SATURATION: 100 % | HEART RATE: 72 BPM | WEIGHT: 153 LBS | HEIGHT: 66 IN | DIASTOLIC BLOOD PRESSURE: 53 MMHG | RESPIRATION RATE: 12 BRPM | SYSTOLIC BLOOD PRESSURE: 87 MMHG | BODY MASS INDEX: 24.59 KG/M2

## 2019-08-28 VITALS
HEART RATE: 65 BPM | WEIGHT: 163 LBS | BODY MASS INDEX: 26.31 KG/M2 | DIASTOLIC BLOOD PRESSURE: 50 MMHG | RESPIRATION RATE: 18 BRPM | SYSTOLIC BLOOD PRESSURE: 108 MMHG | TEMPERATURE: 97 F | OXYGEN SATURATION: 100 %

## 2019-08-28 DIAGNOSIS — D50.0 CHRONIC BLOOD LOSS ANEMIA: ICD-10-CM

## 2019-08-28 DIAGNOSIS — D64.9 ANEMIA, UNSPECIFIED TYPE: Primary | ICD-10-CM

## 2019-08-28 DIAGNOSIS — R55 SYNCOPE: ICD-10-CM

## 2019-08-28 DIAGNOSIS — D64.9 ACUTE ON CHRONIC ANEMIA: Primary | ICD-10-CM

## 2019-08-28 LAB
ABO + RH BLD: NORMAL
BLD GP AB SCN CELLS X3 SERPL QL: NORMAL
BLD PROD TYP BPU: NORMAL
BLD PROD TYP BPU: NORMAL
BLOOD UNIT EXPIRATION DATE: NORMAL
BLOOD UNIT EXPIRATION DATE: NORMAL
BLOOD UNIT TYPE CODE: 6200
BLOOD UNIT TYPE CODE: 6200
BLOOD UNIT TYPE: NORMAL
BLOOD UNIT TYPE: NORMAL
CODING SYSTEM: NORMAL
CODING SYSTEM: NORMAL
DISPENSE STATUS: NORMAL
DISPENSE STATUS: NORMAL
NUM UNITS TRANS PACKED RBC: NORMAL
NUM UNITS TRANS PACKED RBC: NORMAL

## 2019-08-28 PROCEDURE — 93010 ELECTROCARDIOGRAM REPORT: CPT | Mod: ,,, | Performed by: INTERNAL MEDICINE

## 2019-08-28 PROCEDURE — 36430 TRANSFUSION BLD/BLD COMPNT: CPT

## 2019-08-28 PROCEDURE — 93005 ELECTROCARDIOGRAM TRACING: CPT

## 2019-08-28 PROCEDURE — 36000 PLACE NEEDLE IN VEIN: CPT

## 2019-08-28 PROCEDURE — 93010 EKG 12-LEAD: ICD-10-PCS | Mod: ,,, | Performed by: INTERNAL MEDICINE

## 2019-08-28 PROCEDURE — 36415 COLL VENOUS BLD VENIPUNCTURE: CPT

## 2019-08-28 PROCEDURE — 63600175 PHARM REV CODE 636 W HCPCS: Performed by: EMERGENCY MEDICINE

## 2019-08-28 PROCEDURE — 99284 EMERGENCY DEPT VISIT MOD MDM: CPT | Mod: 25

## 2019-08-28 PROCEDURE — G0378 HOSPITAL OBSERVATION PER HR: HCPCS

## 2019-08-28 PROCEDURE — 86922 COMPATIBILITY TEST ANTIGLOB: CPT

## 2019-08-28 PROCEDURE — P9016 RBC LEUKOCYTES REDUCED: HCPCS

## 2019-08-28 PROCEDURE — 86850 RBC ANTIBODY SCREEN: CPT

## 2019-08-28 RX ORDER — SODIUM CHLORIDE 9 MG/ML
1000 INJECTION, SOLUTION INTRAVENOUS
Status: DISCONTINUED | OUTPATIENT
Start: 2019-08-28 | End: 2019-08-28

## 2019-08-28 RX ORDER — SODIUM CHLORIDE 0.9 % (FLUSH) 0.9 %
10 SYRINGE (ML) INJECTION
Status: DISCONTINUED | OUTPATIENT
Start: 2019-08-28 | End: 2019-08-28

## 2019-08-28 RX ORDER — HYDROCODONE BITARTRATE AND ACETAMINOPHEN 500; 5 MG/1; MG/1
TABLET ORAL
Status: DISCONTINUED | OUTPATIENT
Start: 2019-08-28 | End: 2019-08-28

## 2019-08-28 RX ORDER — HYDROCODONE BITARTRATE AND ACETAMINOPHEN 500; 5 MG/1; MG/1
TABLET ORAL ONCE
Status: CANCELLED | OUTPATIENT
Start: 2019-08-28 | End: 2019-08-28

## 2019-08-28 RX ORDER — HYDROCODONE BITARTRATE AND ACETAMINOPHEN 500; 5 MG/1; MG/1
TABLET ORAL ONCE
Status: DISCONTINUED | OUTPATIENT
Start: 2019-08-28 | End: 2019-08-28 | Stop reason: HOSPADM

## 2019-08-28 RX ORDER — HYDROCODONE BITARTRATE AND ACETAMINOPHEN 500; 5 MG/1; MG/1
TABLET ORAL
Status: DISCONTINUED | OUTPATIENT
Start: 2019-08-28 | End: 2019-08-28 | Stop reason: HOSPADM

## 2019-08-28 RX ADMIN — SODIUM CHLORIDE 500 ML: 0.9 INJECTION, SOLUTION INTRAVENOUS at 08:08

## 2019-08-28 NOTE — ED NOTES
Pt here for op lab tests, had near syncopal episode in lab here for evaluation. Had appt with dr crawley

## 2019-08-28 NOTE — ED PROVIDER NOTES
Encounter Date: 2019       History     Chief Complaint   Patient presents with    Loss of Consciousness     75yo female presents to the ED from outpatient lab for a near syncopal episode with no LOC after her blood draw, which the daughter states has been a recurring theme. She is scheduled for prn transfusions and additionally has follow up with Dr. Irene, where she was being followed for persistent anemia secondary to cancer. The patient voices complaint of generalized weakness. Denies chest pain, dyspnea, palpitations, diaphoresis.         Review of patient's allergies indicates:   Allergen Reactions    Codeine Other (See Comments)     Pt shakes and hallucinates    Pcn [penicillins] Anxiety and Other (See Comments)     Past Medical History:   Diagnosis Date    Anemia     Cancer     kidney    Dementia     Encounter for blood transfusion     2018    High cholesterol 2018    Hypertension     No Medication     Renal disorder     Wears dentures     Uppers     Past Surgical History:   Procedure Laterality Date     SECTION      COLONOSCOPY N/A 2018    [facility]    CYSTOSCOPY WITH RETROGRADE PYELOGRAM Left 1/15/2016    [facility] at [facility] OR    EGD (ESOPHAGOGASTRODUODENOSCOPY) N/A 2018    [facility] by [facility] END[facility]    EYE SURGERY      Rt eye Catarac    HYSTERECTOMY      IMAGING, GI TRACT, INTRALUMINAL, VIA CAPSULE N/A 2018    Performed by [facility] at [facility] ENDO    KIDNEY SURGERY Left 2018    Left kidney removed due to cancer    lasix surgery      NEPHRECTOMY-RADICAL   Left 2016    [facility] at [facility] OR    REPAIR-HERNIA-VENTRAL N/A 2016    [facility]formed [facility] at [facility] OR    RESECTION-BOWEL  2016    [facility] at [facility] OR    URETEROSCOPY  1/15/2016    Performed by Maribell MILLER  [facility]     Family History   Adopted: Yes     Social History     Tobacco Use    Smoking status: Former Smoker     Packs/day: 1.00     Years: 50.00     Pack years: 50.00     Last attempt to quit: 10/6/2018     Years since quittin.8    Smokeless tobacco: Never Used    Tobacco comment: pt has stopped; encouraged to remain stopped   Substance Use Topics    Alcohol use: Yes     Frequency: Never     Comment: occasional    Drug use: No     Review of Systems   Constitutional: Positive for fatigue. Negative for appetite change, chills, diaphoresis and fever.   HENT: Negative for congestion, ear pain, rhinorrhea, sinus pressure, sinus pain, sore throat and tinnitus.    Eyes: Negative for photophobia and visual disturbance.   Respiratory: Negative for cough, chest tightness, shortness of breath and wheezing.    Cardiovascular: Negative for chest pain, palpitations and leg swelling.   Gastrointestinal: Negative for abdominal pain, constipation, diarrhea, nausea and vomiting.   Endocrine: Negative for cold intolerance, heat intolerance, polydipsia, polyphagia and polyuria.   Genitourinary: Negative for decreased urine volume, difficulty urinating, dysuria, flank pain, frequency, hematuria and urgency.   Musculoskeletal: Negative for arthralgias, back pain, gait problem, joint swelling, myalgias, neck pain and neck stiffness.   Skin: Negative for color change, pallor, rash and wound.   Allergic/Immunologic: Negative for immunocompromised state.   Neurological: Positive for syncope and weakness. Negative for dizziness, light-headedness, numbness and headaches.   Hematological: Negative for adenopathy. Does not bruise/bleed easily.   Psychiatric/Behavioral: Negative for decreased concentration, dysphoric mood and sleep disturbance. The patient is not nervous/anxious.    All other systems reviewed and are negative.      Physical Exam     Initial Vitals [19 0747]   BP Pulse Resp Temp SpO2   (!) 86/52  79 20 -- 100 %      MAP       --         Physical Exam    Nursing note and vitals reviewed.  Constitutional: She appears well-developed and well-nourished. She is not diaphoretic. No distress.   HENT:   Head: Normocephalic and atraumatic.   Right Ear: External ear normal.   Left Ear: External ear normal.   Nose: Nose normal.   Mouth/Throat: Oropharynx is clear and moist.   Eyes: Conjunctivae are normal. Pupils are equal, round, and reactive to light. No scleral icterus.   Neck: Normal range of motion. Neck supple. No JVD present.   Cardiovascular: Normal rate, regular rhythm, normal heart sounds and intact distal pulses.   Pulmonary/Chest: Breath sounds normal. No respiratory distress. She has no wheezes. She has no rhonchi. She has no rales. She exhibits no tenderness.   Abdominal: Soft. Bowel sounds are normal. She exhibits no distension. There is no tenderness. There is no rebound and no guarding.   Musculoskeletal: Normal range of motion. She exhibits no edema or tenderness.   Lymphadenopathy:     She has no cervical adenopathy.   Neurological: She is alert and oriented to person, place, and time. GCS score is 15. GCS eye subscore is 4. GCS verbal subscore is 5. GCS motor subscore is 6.   Skin: Skin is warm and dry. Capillary refill takes less than 2 seconds. No rash noted. No erythema. There is pallor.   Psychiatric: She has a normal mood and affect. Her behavior is normal. Judgment and thought content normal.         ED Course   Procedures  Labs Reviewed   COMPREHENSIVE METABOLIC PANEL   APTT   PROTIME-INR   TYPE & SCREEN          Imaging Results    None          Medical Decision Making:   Differential Diagnosis:   Chronic anemia requiring routine transfusion  ED Management:  Spoke with Dr. Hudson and outpatient lab, who are arranging for outpatient transfusion orders. BP and symptoms have improved with IV hydration. Pt discharged and taken directly to receive outpatient transfusion.                        Clinical Impression:       ICD-10-CM ICD-9-CM   1. Acute on chronic anemia D64.9 285.9   2. Syncope R55 780.2         Disposition:   Disposition: Discharged  Condition: Stable                        Melva Acosta MD  08/28/19 2982

## 2019-08-28 NOTE — TELEPHONE ENCOUNTER
Spoke to pt daughter to r/s missed apt with Dr. Irene today as requested. Pt daughter confirmed apt date and time r/s and verbalized understanding. Apt reminder printed and placed in the mail for pt.

## 2019-08-28 NOTE — NURSING
"1021 Pt arrived via wc from ed for outpatient blood transfusion. Pt ambulated with assistance to infusion bed 6. Pt denies dizziness or sob at this time. Pt reports fatigue and being cold intolerant. Pt and daughter informed on plan of care and disposition. Pt states,"Good i'm glad I don't have to stay all night." Pt noted to have a 20 g iv jelco noted to left hand from ed. 10 ml saline flushed without difficulty. No pain or swelling at site. Vital signs obtained and wnl. Pt placed on continuous pulse ox and nibp readings. Pt provided multiple warm blankets. Daughter at bedside. Consent done in ed and with patient. Awaiting blood products to be ready. Pt nor family have any further request. Call light within reach and sr up x 2. DS   1100 Pt resting with chest rising and falling under bundle of blankets. Vital signs wnl. Blood infusing without difficulty. Left and 20 g unremarkable. Will continue to monitor. DS  1145 Pt sitting up eating lunch at this time. Daughter at bedside. Pt nor family have any questions or concerns. Pt denies any further needs at this time. DS  1415 2nd prbc transfusion completed. Left hand site unremarkable. Pt denies chest pain or sob. Lung sounds clear bilaterally. Vital signs stable.  Pt has no signs or symptoms of reactions. Pt states,"Can I go now." Explained that patient should wait 1 hr post transfusion time. Pt and family verbalize understanding. DS  1515 Vital signs stable. 20 g jelco d/c from left hand with catheter intact. Coban and gauze dressing applied. Pt sat up on side of bed and then ambulated to chair with very little assistance. Pt denies sob, dizziness, or lightheadedness. Pt states,"I'm getting out of here now." Pt and daughter instructed to follow up with Dr. Teto MASTERSON. Daughter states,"I'm stopping by there when we leave here." Pt nor daughter have any further questions or concerns. Pt instructed to return to ed for any sob, chest pain, lightheadedness, or near " syncopal episodes. Patient and daughter verbalize understanding. Pt taken via wheelchair by daughter for discharge. NATHAN

## 2019-08-28 NOTE — TELEPHONE ENCOUNTER
----- Message from Lino Diaz sent at 8/28/2019  3:31 PM CDT -----  Contact: patient  Please call patient to reschedule her appointment.  Thank you

## 2019-08-28 NOTE — ED NOTES
Patient placed on continuous cardiac monitor, automatic blood pressure cuff and continuous pulse oximeter.   no vomiting/no nausea

## 2019-09-05 ENCOUNTER — TELEPHONE (OUTPATIENT)
Dept: HEMATOLOGY/ONCOLOGY | Facility: CLINIC | Age: 74
End: 2019-09-05

## 2019-09-16 ENCOUNTER — HOSPITAL ENCOUNTER (OUTPATIENT)
Facility: HOSPITAL | Age: 74
Discharge: HOME | End: 2019-09-17
Attending: EMERGENCY MEDICINE | Admitting: INTERNAL MEDICINE
Payer: MEDICARE

## 2019-09-16 ENCOUNTER — TELEPHONE (OUTPATIENT)
Dept: HEMATOLOGY/ONCOLOGY | Facility: CLINIC | Age: 74
End: 2019-09-16

## 2019-09-16 DIAGNOSIS — R53.1 WEAKNESS: ICD-10-CM

## 2019-09-16 DIAGNOSIS — D64.9 SYMPTOMATIC ANEMIA: ICD-10-CM

## 2019-09-16 LAB
ABO + RH BLD: NORMAL
ALBUMIN SERPL BCP-MCNC: 2.2 G/DL (ref 3.5–5.2)
ALP SERPL-CCNC: 72 U/L (ref 55–135)
ALT SERPL W/O P-5'-P-CCNC: 7 U/L (ref 10–44)
ANION GAP SERPL CALC-SCNC: 12 MMOL/L (ref 8–16)
APTT BLDCRRT: 28.3 SEC (ref 21–32)
AST SERPL-CCNC: 16 U/L (ref 10–40)
BASOPHILS # BLD AUTO: 0.05 K/UL (ref 0–0.2)
BASOPHILS NFR BLD: 0.4 % (ref 0–1.9)
BILIRUB SERPL-MCNC: 0.3 MG/DL (ref 0.1–1)
BLD GP AB SCN CELLS X3 SERPL QL: NORMAL
BLD PROD TYP BPU: NORMAL
BLD PROD TYP BPU: NORMAL
BLOOD UNIT EXPIRATION DATE: NORMAL
BLOOD UNIT EXPIRATION DATE: NORMAL
BLOOD UNIT TYPE CODE: 6200
BLOOD UNIT TYPE CODE: 6200
BLOOD UNIT TYPE: NORMAL
BLOOD UNIT TYPE: NORMAL
BUN SERPL-MCNC: 16 MG/DL (ref 8–23)
CALCIUM SERPL-MCNC: 7.4 MG/DL (ref 8.7–10.5)
CHLORIDE SERPL-SCNC: 103 MMOL/L (ref 95–110)
CO2 SERPL-SCNC: 18 MMOL/L (ref 23–29)
CODING SYSTEM: NORMAL
CODING SYSTEM: NORMAL
CREAT SERPL-MCNC: 1 MG/DL (ref 0.5–1.4)
DIFFERENTIAL METHOD: ABNORMAL
DISPENSE STATUS: NORMAL
DISPENSE STATUS: NORMAL
EOSINOPHIL # BLD AUTO: 0.1 K/UL (ref 0–0.5)
EOSINOPHIL NFR BLD: 0.6 % (ref 0–8)
ERYTHROCYTE [DISTWIDTH] IN BLOOD BY AUTOMATED COUNT: 18.2 % (ref 11.5–14.5)
EST. GFR  (AFRICAN AMERICAN): >60 ML/MIN/1.73 M^2
EST. GFR  (NON AFRICAN AMERICAN): 55.6 ML/MIN/1.73 M^2
GLUCOSE SERPL-MCNC: 154 MG/DL (ref 70–110)
HCT VFR BLD AUTO: 20.8 % (ref 37–48.5)
HGB BLD-MCNC: 6.2 G/DL (ref 12–16)
IMM GRANULOCYTES # BLD AUTO: 0.1 K/UL (ref 0–0.04)
IMM GRANULOCYTES NFR BLD AUTO: 0.8 % (ref 0–0.5)
INR PPP: 1.3 (ref 0.8–1.2)
LYMPHOCYTES # BLD AUTO: 4.4 K/UL (ref 1–4.8)
LYMPHOCYTES NFR BLD: 36.2 % (ref 18–48)
MAGNESIUM SERPL-MCNC: 1.5 MG/DL (ref 1.6–2.6)
MCH RBC QN AUTO: 28.8 PG (ref 27–31)
MCHC RBC AUTO-ENTMCNC: 29.8 G/DL (ref 32–36)
MCV RBC AUTO: 97 FL (ref 82–98)
MONOCYTES # BLD AUTO: 0.9 K/UL (ref 0.3–1)
MONOCYTES NFR BLD: 7.6 % (ref 4–15)
NEUTROPHILS # BLD AUTO: 6.6 K/UL (ref 1.8–7.7)
NEUTROPHILS NFR BLD: 54.4 % (ref 38–73)
NRBC BLD-RTO: 0 /100 WBC
NUM UNITS TRANS PACKED RBC: NORMAL
NUM UNITS TRANS PACKED RBC: NORMAL
PLATELET # BLD AUTO: 518 K/UL (ref 150–350)
PMV BLD AUTO: 9.2 FL (ref 9.2–12.9)
POTASSIUM SERPL-SCNC: 3 MMOL/L (ref 3.5–5.1)
PROT SERPL-MCNC: 5.3 G/DL (ref 6–8.4)
PROTHROMBIN TIME: 13.8 SEC (ref 9–12.5)
RBC # BLD AUTO: 2.15 M/UL (ref 4–5.4)
SODIUM SERPL-SCNC: 133 MMOL/L (ref 136–145)
TROPONIN I SERPL DL<=0.01 NG/ML-MCNC: 0.03 NG/ML (ref 0.02–0.5)
WBC # BLD AUTO: 12.04 K/UL (ref 3.9–12.7)

## 2019-09-16 PROCEDURE — 63600175 PHARM REV CODE 636 W HCPCS: Performed by: EMERGENCY MEDICINE

## 2019-09-16 PROCEDURE — 86922 COMPATIBILITY TEST ANTIGLOB: CPT | Mod: 59

## 2019-09-16 PROCEDURE — 93005 ELECTROCARDIOGRAM TRACING: CPT

## 2019-09-16 PROCEDURE — 27000221 HC OXYGEN, UP TO 24 HOURS

## 2019-09-16 PROCEDURE — 85730 THROMBOPLASTIN TIME PARTIAL: CPT

## 2019-09-16 PROCEDURE — 86850 RBC ANTIBODY SCREEN: CPT

## 2019-09-16 PROCEDURE — P9016 RBC LEUKOCYTES REDUCED: HCPCS

## 2019-09-16 PROCEDURE — 99285 EMERGENCY DEPT VISIT HI MDM: CPT | Mod: 25

## 2019-09-16 PROCEDURE — 85025 COMPLETE CBC W/AUTO DIFF WBC: CPT

## 2019-09-16 PROCEDURE — G0378 HOSPITAL OBSERVATION PER HR: HCPCS

## 2019-09-16 PROCEDURE — 25000003 PHARM REV CODE 250: Performed by: INTERNAL MEDICINE

## 2019-09-16 PROCEDURE — 84484 ASSAY OF TROPONIN QUANT: CPT

## 2019-09-16 PROCEDURE — 85610 PROTHROMBIN TIME: CPT

## 2019-09-16 PROCEDURE — 36415 COLL VENOUS BLD VENIPUNCTURE: CPT

## 2019-09-16 PROCEDURE — 94760 N-INVAS EAR/PLS OXIMETRY 1: CPT

## 2019-09-16 PROCEDURE — 83735 ASSAY OF MAGNESIUM: CPT

## 2019-09-16 PROCEDURE — 80053 COMPREHEN METABOLIC PANEL: CPT

## 2019-09-16 RX ORDER — HYDROCODONE BITARTRATE AND ACETAMINOPHEN 500; 5 MG/1; MG/1
TABLET ORAL
Status: DISCONTINUED | OUTPATIENT
Start: 2019-09-16 | End: 2019-09-17 | Stop reason: HOSPADM

## 2019-09-16 RX ORDER — SODIUM CHLORIDE AND POTASSIUM CHLORIDE 150; 900 MG/100ML; MG/100ML
1000 INJECTION, SOLUTION INTRAVENOUS
Status: DISCONTINUED | OUTPATIENT
Start: 2019-09-16 | End: 2019-09-16

## 2019-09-16 RX ORDER — SODIUM CHLORIDE 0.9 % (FLUSH) 0.9 %
10 SYRINGE (ML) INJECTION
Status: DISCONTINUED | OUTPATIENT
Start: 2019-09-16 | End: 2019-09-17 | Stop reason: HOSPADM

## 2019-09-16 RX ORDER — ACETAMINOPHEN 325 MG/1
650 TABLET ORAL EVERY 6 HOURS PRN
Status: DISCONTINUED | OUTPATIENT
Start: 2019-09-16 | End: 2019-09-17 | Stop reason: HOSPADM

## 2019-09-16 RX ADMIN — SODIUM CHLORIDE 1000 ML: 0.9 INJECTION, SOLUTION INTRAVENOUS at 09:09

## 2019-09-16 RX ADMIN — ACETAMINOPHEN 650 MG: 325 TABLET, FILM COATED ORAL at 12:09

## 2019-09-16 NOTE — PLAN OF CARE
09/16/19 1344   HOWELL Message   Medicare Outpatient and Observation Notification regarding financial responsibility Given to patient/caregiver;Explained to patient/caregiver;Signed/date by patient/caregiver   Date HOWELL was signed 09/16/19   Time HOWELL was signed 132

## 2019-09-16 NOTE — ED NOTES
Patient placed on continuous cardiac monitor, automatic blood pressure cuff and continuous pulse oximeter and oxygen via NC at 2 lpm.

## 2019-09-16 NOTE — ED PROVIDER NOTES
Encounter Date: 2019       History     Chief Complaint   Patient presents with    near syncope     75yo female presents to the ED for evaluation of a near syncopal episode with no LOC and generalized weakness. She is well known to this ED as she requires prn transfusions and is following with Dr. Irene for persistent anemia secondary to gastric cancer. The patient voices complaint of generalized weakness today. Denies chest pain, dyspnea, palpitations, diaphoresis.    ED and outpatient lab staff attempted to have scheduled transfusions for Ms. Tobar at her last ED visit. However, this was not successful.         Review of patient's allergies indicates:   Allergen Reactions    Codeine Other (See Comments)     Pt shakes and hallucinates    Pcn [penicillins] Anxiety and Other (See Comments)     Past Medical History:   Diagnosis Date    Anemia     Cancer     kidney    Dementia     Encounter for blood transfusion     2018    High cholesterol 2018    Hypertension     No Medication     Renal disorder     Wears dentures     Uppers     Past Surgical History:   Procedure Laterality Date     SECTION      COLONOSCOPY N/A 2018    [facility]    CYSTOSCOPY WITH RETROGRADE PYELOGRAM Left 1/15/2016    Per[facility] by [facility] at [facility] OR    EGD (ESOPHAGOGASTRODUODENOSCOPY) N/A 2018    [facility]formed by [facility] END[facility]    EYE SURGERY      Rt eye Catarac    HYSTERECTOMY      IMAGING, GI TRACT, INTRALUMINAL, VIA CAPSULE N/A 2018    Performed by Rocco Ross [facility] at [facility] ENDO    KIDNEY SURGERY Left 2018    Left kidney removed due to cancer    lasix surgery      NEPHRECTOMY-RADICAL   Left 2016    [facility] at [facility] OR    REPAIR-HERNIA-VENTRAL N/A 2016    [facility] by [facility] A [facility]hawa MD at [facility] OR    RESECTION-BOWEL  2016    Performed by Marco A Randhawa,  MD at [facility] OR    URETEROSCOPY  1/15/2016    Performed by [facility]ell [facility]acon [facility] at [facility] OR     Family History   Adopted: Yes     Social History     Tobacco Use    Smoking status: Former Smoker     Packs/day: 1.00     Years: 50.00     Pack years: 50.00     Last attempt to quit: 10/6/2018     Years since quittin.9    Smokeless tobacco: Never Used    Tobacco comment: pt has stopped; encouraged to remain stopped   Substance Use Topics    Alcohol use: Yes     Frequency: Never     Comment: occasional    Drug use: No     Review of Systems   Constitutional: Negative for appetite change, chills, diaphoresis, fatigue and fever.   HENT: Negative for congestion, ear pain, rhinorrhea, sinus pressure, sinus pain, sore throat and tinnitus.    Eyes: Negative for photophobia and visual disturbance.   Respiratory: Negative for cough, chest tightness, shortness of breath and wheezing.    Cardiovascular: Negative for chest pain, palpitations and leg swelling.   Gastrointestinal: Negative for abdominal pain, constipation, diarrhea, nausea and vomiting.   Endocrine: Negative for cold intolerance, heat intolerance, polydipsia, polyphagia and polyuria.   Genitourinary: Negative for decreased urine volume, difficulty urinating, dysuria, flank pain, frequency, hematuria and urgency.   Musculoskeletal: Negative for arthralgias, back pain, gait problem, joint swelling, myalgias, neck pain and neck stiffness.   Skin: Negative for color change, pallor, rash and wound.   Allergic/Immunologic: Negative for immunocompromised state.   Neurological: Positive for weakness. Negative for dizziness, syncope, light-headedness, numbness and headaches.   Hematological: Negative for adenopathy. Does not bruise/bleed easily.   Psychiatric/Behavioral: Negative for decreased concentration, dysphoric mood and sleep disturbance. The patient is not nervous/anxious.    All other systems reviewed and are negative.      Physical Exam     Initial Vitals  [09/16/19 0847]   BP Pulse Resp Temp SpO2   (!) 63/44 76 18 -- 97 %      MAP       --         Physical Exam    Nursing note and vitals reviewed.  Constitutional: She appears well-developed and well-nourished. She is not diaphoretic. No distress.   HENT:   Head: Normocephalic and atraumatic.   Right Ear: External ear normal.   Left Ear: External ear normal.   Nose: Nose normal.   Mouth/Throat: Oropharynx is clear and moist.   Eyes: Conjunctivae are normal. Pupils are equal, round, and reactive to light. No scleral icterus.   Neck: Normal range of motion. Neck supple. No JVD present.   Cardiovascular: Normal rate, regular rhythm, normal heart sounds and intact distal pulses.   Pulmonary/Chest: Breath sounds normal. No respiratory distress. She has no wheezes. She has no rhonchi. She has no rales. She exhibits no tenderness.   Abdominal: Soft. Bowel sounds are normal. She exhibits no distension. There is no tenderness. There is no rebound and no guarding.   Musculoskeletal: Normal range of motion. She exhibits no edema or tenderness.   Lymphadenopathy:     She has no cervical adenopathy.   Neurological: She is alert and oriented to person, place, and time. GCS score is 15. GCS eye subscore is 4. GCS verbal subscore is 5. GCS motor subscore is 6.   Skin: Skin is warm and dry. Capillary refill takes less than 2 seconds. No rash noted. No erythema. There is pallor.   Psychiatric: She has a normal mood and affect. Her behavior is normal. Judgment and thought content normal.         ED Course   Procedures  Labs Reviewed   CBC W/ AUTO DIFFERENTIAL - Abnormal; Notable for the following components:       Result Value    RBC 2.15 (*)     Hemoglobin 6.2 (*)     Hematocrit 20.8 (*)     Mean Corpuscular Hemoglobin Conc 29.8 (*)     RDW 18.2 (*)     Platelets 518 (*)     Immature Granulocytes 0.8 (*)     Immature Grans (Abs) 0.10 (*)     All other components within normal limits   COMPREHENSIVE METABOLIC PANEL - Abnormal; Notable  for the following components:    Sodium 133 (*)     Potassium 3.0 (*)     CO2 18 (*)     Glucose 154 (*)     Calcium 7.4 (*)     Total Protein 5.3 (*)     Albumin 2.2 (*)     ALT 7 (*)     eGFR if non  55.6 (*)     All other components within normal limits   PROTIME-INR - Abnormal; Notable for the following components:    Prothrombin Time 13.8 (*)     INR 1.3 (*)     All other components within normal limits   MAGNESIUM - Abnormal; Notable for the following components:    Magnesium 1.5 (*)     All other components within normal limits   APTT   TROPONIN I   TYPE & SCREEN     EKG Readings: (Independently Interpreted)   Initial Reading: No STEMI. Rhythm: Normal Sinus Rhythm. Heart Rate: 93. Ectopy: No Ectopy. Conduction: Normal. ST Segments: Normal ST Segments. Axis: Normal. Clinical Impression: Normal Sinus Rhythm   T wave inversion in lateral leads       Imaging Results    None          Medical Decision Making:   Differential Diagnosis:   Near syncope/syncope, chronic anemia, gastric malignancy  ED Management:  Case discussed with Dr. Hudson, who accepts admission for transfusion.                       Clinical Impression:       ICD-10-CM ICD-9-CM   1. Weakness R53.1 780.79   2. Symptomatic anemia D64.9 285.9         Disposition:   Disposition: Admitted                        Melva Acosta MD  09/25/19 1532

## 2019-09-16 NOTE — TELEPHONE ENCOUNTER
----- Message from Leroy Leach sent at 9/16/2019  8:18 AM CDT -----  Contact: Zoey Pardo (Relative)  Type: Needs Medical Advice    Who Called:  Zoey Pardo (Relative)  Best Call Back Number: [phone]  Additional Information: Requesting to speak with office about getting blood transfusion early because she feels like her blood levels have fallen below normal. Please advise for sooner infusion appointment. Patient is on her way to infusion center now

## 2019-09-16 NOTE — NURSING
PT YAHIR BLOOD INFUSION WITHOUT PROBLEMS/SITE OUTER EDGE OF RAC HAS NO S/SX OF INFILTRATION NOTED. PT IS SITTING UP IN BED WATCHING TV.

## 2019-09-16 NOTE — NURSING
BLOOD INFUSION COMPLETED @ 1628/NS INFUSING @ 50CC/HR UNTIL SECOND UNIT IS STARTED. PT YAHIR INFUSION WELL.   Spouse

## 2019-09-16 NOTE — NURSING
"2ND TRANSFUSION STARTED/CALL LIGHT WITHIN REACH/DOOR TO HALLWAY LEFT OPEN. RN OFFERED TO ASSIST PT UP TO THE BATHROOM, PT STATED "I DON'T HAVE TO GO RIGHT NOW".  "

## 2019-09-16 NOTE — NURSING
PT ASSISTED WITH MEAL SETUP/PT IS ABLE TO FEED HERSELF. CALL LIGHT WITHIN REACH. PT AYHIR BLOOD INFUSION WELL/SITE R OUTER EDGE AC WITHOUT S/SX OF INFILTRATION.

## 2019-09-16 NOTE — PLAN OF CARE
09/16/19 1334   Discharge Assessment   Assessment Type Discharge Planning Assessment   Confirmed/corrected address and phone number on facesheet? Yes   Assessment information obtained from? Patient   Expected Length of Stay (days) 2   Communicated expected length of stay with patient/caregiver yes   Prior to hospitilization cognitive status: Alert/Oriented   Prior to hospitalization functional status: Assistive Equipment;Needs Assistance   Current cognitive status: Alert/Oriented   Current Functional Status: Assistive Equipment;Needs Assistance   Facility Arrived From: Home   Lives With child(samy), adult;grandchild(samy)  (Lives with daughter  Parisa Tobar [phone] and granddaughter Zoey Pardo [phone])   Able to Return to Prior Arrangements yes   Is patient able to care for self after discharge? Yes  (With assistance from family)   Who are your caregiver(s) and their phone number(s)? Lives with batsheva Tobar [phone] and granddaughter Zoey Pardo [phone]   Patient's perception of discharge disposition home or selfcare   Readmission Within the Last 30 Days no previous admission in last 30 days   Patient currently being followed by outpatient case management? No   Patient currently receives any other outside agency services? Yes  (Nursing Management Aides )   How many hours a day does the patient receive services? 8   Name and contact number of agency or person providing outside services Nursing Management- no number available at this time   Is it the patient/care giver preference to resume care with the current outside agency? Yes   Equipment Currently Used at Home wheelchair;walker, standard;bedside commode   Do you have any problems affording any of your prescribed medications? No   Is the patient taking medications as prescribed? yes   Does the patient have transportation home? Yes   Transportation Anticipated family or friend will provide   Does the patient receive  [facility] at [facility]? No   Discharge Plan A Home with family   DME Needed Upon Discharge  none   Patient/Family in Agreement with Plan yes       Patient resting in bed, awake and alert.  States she lives with daughter (Maryjane Tobar [phone]) and   Granddaughter  (Zoey Pardo [phone]) Has  Sitters with Nursing Management to provide care M-F 12-2:30 and 3-7:30pm. Case Management to follow for further discharge needs.

## 2019-09-16 NOTE — H&P
[facility] Medicine  History & Physical    Patient Name: Oriana Tobar  MRN: [id number]  Admission Date: 2019  Attending Physician: Buck Hudson MD   Primary Care Provider: Buck Hudson MD         Patient information was obtained from patient, relative(s) and ER records.     Subjective:     Principal Problem:Symptomatic anemia    Chief Complaint:   Chief Complaint   Patient presents with    near syncope        HPI: STOMACH CANCER RECURRENT BLEEDING RECURRENT TRANSFUSIONS     Past Medical History:   Diagnosis Date    Anemia     Cancer     kidney    Dementia     Encounter for blood transfusion     2018    High cholesterol 2018    Hypertension     No Medication     Renal disorder     Wears dentures     Uppers       Past Surgical History:   Procedure Laterality Date     SECTION      COLONOSCOPY N/A 2018    [facility]    CYSTOSCOPY WITH RETROGRADE PYELOGRAM Left 1/15/2016    [facility]    EGD (ESOPHAGOGASTRODUODENOSCOPY) N/A 2018    [facility]    EYE SURGERY      Rt eye Catarac    HYSTERECTOMY      IMAGING, GI TRACT, INTRALUMINAL, VIA CAPSULE N/A 2018    [facility] END[facility]    KIDNEY SURGERY Left 2018    Left kidney removed due to cancer    lasix surgery      NEPHRECTOMY-RADICAL   Left 2016    [facility]    REPAIR-HERNIA-VENTRAL N/A 2016    [facility]    RESECTION-BOWEL  2016    [facility]    URETEROSCOPY  1/15/2016    [facility] OR       Review of patient's allergies indicates:   Allergen Reactions    Codeine Other (See Comments)     Pt shakes and hallucinates    Pcn [penicillins] Anxiety  and Other (See Comments)       No current facility-administered medications on file prior to encounter.      Current Outpatient Medications on File Prior to Encounter   Medication Sig    cyanocobalamin-cobamamide (B12) 5,000-100 mcg Lozg     ferrous sulfate 325 (65 FE) MG EC tablet Take 325 mg by mouth once daily.    Lactobacillus rhamnosus GG (CULTURELLE) 10 billion cell capsule Take 1 capsule by mouth once daily.    loperamide HCl/simethicone (IMODIUM ADVANCED ORAL) Take by mouth.    ondansetron (ZOFRAN) 4 MG tablet Take 4 mg by mouth every 8 (eight) hours as needed for Nausea.    pantoprazole (PROTONIX) 40 MG tablet Take 40 mg by mouth once daily.    sucralfate (CARAFATE) 100 mg/mL suspension Take 10 mLs (1 g total) by mouth 4 (four) times daily before meals and nightly.    octreotide (SANDOSTATIN) 100 mcg/mL Soln Inject 2 mLs (200 mcg total) into the vein once daily.     Family History     None        Tobacco Use    Smoking status: Former Smoker     Packs/day: 1.00     Years: 50.00     Pack years: 50.00     Last attempt to quit: 10/6/2018     Years since quittin.9    Smokeless tobacco: Never Used    Tobacco comment: pt has stopped; encouraged to remain stopped   Substance and Sexual Activity    Alcohol use: Yes     Frequency: Never     Comment: occasional    Drug use: No    Sexual activity: Never     Review of Systems   Constitutional: Positive for activity change and appetite change. Negative for chills, diaphoresis, fatigue, fever and unexpected weight change.   HENT: Negative for congestion, drooling, hearing loss and trouble swallowing.    Eyes: Negative for pain and visual disturbance.   Respiratory: Positive for shortness of breath. Negative for apnea, cough, choking, chest tightness and wheezing.    Cardiovascular: Negative for chest pain, palpitations and leg swelling.   Gastrointestinal: Negative for abdominal distention, abdominal pain, blood in stool, constipation, diarrhea, nausea  and vomiting.   Endocrine: Negative for polydipsia, polyphagia and polyuria.   Genitourinary: Negative for difficulty urinating, dysuria and flank pain.   Musculoskeletal: Negative for arthralgias, back pain, gait problem, joint swelling, neck pain and neck stiffness.   Skin: Negative for color change, rash and wound.   Allergic/Immunologic: Negative for environmental allergies, food allergies and immunocompromised state.   Neurological: Positive for dizziness, syncope and weakness. Negative for numbness and headaches.   Hematological: Negative for adenopathy.   Psychiatric/Behavioral: Negative for agitation, confusion and sleep disturbance. The patient is not nervous/anxious.      Objective:     Vital Signs (Most Recent):  Temp: 99.9 °F (37.7 °C) (09/16/19 1229)  Pulse: 77 (09/16/19 1229)  Resp: 17 (09/16/19 1229)  BP: (!) 108/55 (09/16/19 1229)  SpO2: 100 % (09/16/19 1229) Vital Signs (24h Range):  Temp:  [99.2 °F (37.3 °C)-99.9 °F (37.7 °C)] 99.9 °F (37.7 °C)  Pulse:  [76-96] 77  Resp:  [14-18] 17  SpO2:  [95 %-100 %] 100 %  BP: ()/(44-58) 108/55     Weight: 63.5 kg (140 lb)  Body mass index is 20.09 kg/m².    Physical Exam   Constitutional: She is oriented to person, place, and time. She appears well-developed and well-nourished.   CACHETIC   HENT:   Head: Normocephalic and atraumatic.   Right Ear: External ear normal.   Left Ear: External ear normal.   Nose: Nose normal.   Eyes: Pupils are equal, round, and reactive to light. Conjunctivae, EOM and lids are normal.   Neck: Trachea normal, normal range of motion and full passive range of motion without pain. Neck supple.   Cardiovascular: Normal rate, regular rhythm, S1 normal, S2 normal, normal heart sounds, intact distal pulses and normal pulses.   Pulmonary/Chest: Effort normal and breath sounds normal.   Abdominal: Soft. Normal aorta and bowel sounds are normal.   Musculoskeletal: Normal range of motion.   WEAHNESS   Neurological: She is alert and  oriented to person, place, and time. She has normal reflexes.   Skin: Skin is warm, dry and intact.   Psychiatric: She has a normal mood and affect. Her speech is normal and behavior is normal. Judgment and thought content normal. Cognition and memory are normal.   Nursing note and vitals reviewed.        CRANIAL NERVES     CN III, IV, VI   Pupils are equal, round, and reactive to light.  Extraocular motions are normal.        Significant Labs:   CBC:   Recent Labs   Lab 09/16/19  0904   WBC 12.04   HGB 6.2*   HCT 20.8*   *     CMP:   Recent Labs   Lab 09/16/19  0904   *   K 3.0*      CO2 18*   *   BUN 16   CREATININE 1.0   CALCIUM 7.4*   PROT 5.3*   ALBUMIN 2.2*   BILITOT 0.3   ALKPHOS 72   AST 16   ALT 7*   ANIONGAP 12   EGFRNONAA 55.6*     All pertinent labs within the past 24 hours have been reviewed.    Significant Imaging: NONE    Assessment/Plan:     * Symptomatic anemia  RECURRENT GI BLOOD LOSS DUE STOMACH CANCER, REQUIRING RECURRENT BLOOD TRANSFUSIONS        VTE Risk Mitigation (From admission, onward)        Ordered     IP VTE HIGH RISK PATIENT  Once      09/16/19 1103     Place sequential compression device  Until discontinued      09/16/19 1103             Buck Hudson MD  Department of Hospital Medicine   [facility]

## 2019-09-16 NOTE — PLAN OF CARE
Problem: Adult Inpatient Plan of Care  Goal: Readiness for Transition of Care    Intervention: Mutually Develop Transition Plan     09/16/19 1334 09/16/19 1342   (RETIRED) Discharge Needs Assessment   How many people do you have in your home that can help with your care after discharge?  --  2   OTHER   Communicated expected length of stay with patient/caregiver yes  --    Is patient able to care for self after discharge? Yes  (With assistance from family)  --    Who are your caregiver(s) and their phone number(s)? Lives with daughter  Parisa Tobar [phone] and granddaughter Zoey Pardo [phone]  --    Patient currently receives home health services?  --  No  (Nursing aide assistance from Nursing Management)   Discharge Needs Assessment   Readmission Within the Last 30 Days no previous admission in last 30 days  --    Equipment Currently Used at Home wheelchair;walker, standard;bedside commode  --    Transportation Anticipated family or friend will provide  --    Social Work Plan   Patient/Family in Agreement with Plan yes  --    Living Environment   Able to Return to Prior Arrangements yes  --    (RETIRED) Current Health   Expected Length of Stay (days) 2  --    (RETIRED) Social Work Plan   Patient's perception of discharge disposition home or selfcare  --

## 2019-09-16 NOTE — SUBJECTIVE & OBJECTIVE
Past Medical History:   Diagnosis Date    Anemia     Cancer     kidney    Dementia     Encounter for blood transfusion     2018    High cholesterol 2018    Hypertension     No Medication     Renal disorder     Wears dentures     Uppers       Past Surgical History:   Procedure Laterality Date     SECTION      COLONOSCOPY N/A 2018    [facility]    CYSTOSCOPY WITH RETROGRADE PYELOGRAM Left 1/15/2016    [facility]    EGD (ESOPHAGOGASTRODUODENOSCOPY) N/A 2018    [facility]    EYE SURGERY      Rt eye Catarac    HYSTERECTOMY      IMAGING, GI TRACT, INTRALUMINAL, VIA CAPSULE N/A 2018    [facility]    KIDNEY SURGERY Left 2018    Left kidney removed due to cancer    lasix surgery      NEPHRECTOMY-RADICAL   Left 2016    [facility]    REPAIR-HERNIA-VENTRAL N/A 2016    [facility]    RESECTION-BOWEL  2016    [facility]    URETEROSCOPY  1/15/2016    [facility]       Review of patient's allergies indicates:   Allergen Reactions    Codeine Other (See Comments)     Pt shakes and hallucinates    Pcn [penicillins] Anxiety and Other (See Comments)       No current facility-administered medications on file prior to encounter.      Current Outpatient Medications on File Prior to Encounter   Medication Sig    cyanocobalamin-cobamamide (B12) 5,000-100 mcg Lozg     ferrous sulfate 325 (65 FE) MG EC tablet Take 325 mg by mouth once daily.    Lactobacillus rhamnosus GG (CULTURELLE) 10 billion cell capsule Take 1 capsule by mouth once daily.    loperamide HCl/simethicone (IMODIUM ADVANCED ORAL) Take by mouth.    ondansetron (ZOFRAN) 4 MG tablet Take 4 mg by mouth every  8 (eight) hours as needed for Nausea.    pantoprazole (PROTONIX) 40 MG tablet Take 40 mg by mouth once daily.    sucralfate (CARAFATE) 100 mg/mL suspension Take 10 mLs (1 g total) by mouth 4 (four) times daily before meals and nightly.    octreotide (SANDOSTATIN) 100 mcg/mL Soln Inject 2 mLs (200 mcg total) into the vein once daily.     Family History     None        Tobacco Use    Smoking status: Former Smoker     Packs/day: 1.00     Years: 50.00     Pack years: 50.00     Last attempt to quit: 10/6/2018     Years since quittin.9    Smokeless tobacco: Never Used    Tobacco comment: pt has stopped; encouraged to remain stopped   Substance and Sexual Activity    Alcohol use: Yes     Frequency: Never     Comment: occasional    Drug use: No    Sexual activity: Never     Review of Systems   Constitutional: Positive for activity change and appetite change. Negative for chills, diaphoresis, fatigue, fever and unexpected weight change.   HENT: Negative for congestion, drooling, hearing loss and trouble swallowing.    Eyes: Negative for pain and visual disturbance.   Respiratory: Positive for shortness of breath. Negative for apnea, cough, choking, chest tightness and wheezing.    Cardiovascular: Negative for chest pain, palpitations and leg swelling.   Gastrointestinal: Negative for abdominal distention, abdominal pain, blood in stool, constipation, diarrhea, nausea and vomiting.   Endocrine: Negative for polydipsia, polyphagia and polyuria.   Genitourinary: Negative for difficulty urinating, dysuria and flank pain.   Musculoskeletal: Negative for arthralgias, back pain, gait problem, joint swelling, neck pain and neck stiffness.   Skin: Negative for color change, rash and wound.   Allergic/Immunologic: Negative for environmental allergies, food allergies and immunocompromised state.   Neurological: Positive for dizziness, syncope and weakness. Negative for numbness and headaches.   Hematological: Negative for  adenopathy.   Psychiatric/Behavioral: Negative for agitation, confusion and sleep disturbance. The patient is not nervous/anxious.      Objective:     Vital Signs (Most Recent):  Temp: 99.9 °F (37.7 °C) (09/16/19 1229)  Pulse: 77 (09/16/19 1229)  Resp: 17 (09/16/19 1229)  BP: (!) 108/55 (09/16/19 1229)  SpO2: 100 % (09/16/19 1229) Vital Signs (24h Range):  Temp:  [99.2 °F (37.3 °C)-99.9 °F (37.7 °C)] 99.9 °F (37.7 °C)  Pulse:  [76-96] 77  Resp:  [14-18] 17  SpO2:  [95 %-100 %] 100 %  BP: ()/(44-58) 108/55     Weight: 63.5 kg (140 lb)  Body mass index is 20.09 kg/m².    Physical Exam   Constitutional: She is oriented to person, place, and time. She appears well-developed and well-nourished.   CACHETIC   HENT:   Head: Normocephalic and atraumatic.   Right Ear: External ear normal.   Left Ear: External ear normal.   Nose: Nose normal.   Eyes: Pupils are equal, round, and reactive to light. Conjunctivae, EOM and lids are normal.   Neck: Trachea normal, normal range of motion and full passive range of motion without pain. Neck supple.   Cardiovascular: Normal rate, regular rhythm, S1 normal, S2 normal, normal heart sounds, intact distal pulses and normal pulses.   Pulmonary/Chest: Effort normal and breath sounds normal.   Abdominal: Soft. Normal aorta and bowel sounds are normal.   Musculoskeletal: Normal range of motion.   WEAHNESS   Neurological: She is alert and oriented to person, place, and time. She has normal reflexes.   Skin: Skin is warm, dry and intact.   Psychiatric: She has a normal mood and affect. Her speech is normal and behavior is normal. Judgment and thought content normal. Cognition and memory are normal.   Nursing note and vitals reviewed.        CRANIAL NERVES     CN III, IV, VI   Pupils are equal, round, and reactive to light.  Extraocular motions are normal.        Significant Labs:   CBC:   Recent Labs   Lab 09/16/19  0904   WBC 12.04   HGB 6.2*   HCT 20.8*   *     CMP:   Recent Labs    Lab 09/16/19  0904   *   K 3.0*      CO2 18*   *   BUN 16   CREATININE 1.0   CALCIUM 7.4*   PROT 5.3*   ALBUMIN 2.2*   BILITOT 0.3   ALKPHOS 72   AST 16   ALT 7*   ANIONGAP 12   EGFRNONAA 55.6*     All pertinent labs within the past 24 hours have been reviewed.    Significant Imaging: NONE

## 2019-09-17 VITALS
RESPIRATION RATE: 17 BRPM | OXYGEN SATURATION: 100 % | DIASTOLIC BLOOD PRESSURE: 66 MMHG | WEIGHT: 139.13 LBS | HEART RATE: 100 BPM | HEIGHT: 70 IN | BODY MASS INDEX: 19.92 KG/M2 | TEMPERATURE: 96 F | SYSTOLIC BLOOD PRESSURE: 130 MMHG

## 2019-09-17 LAB
BASOPHILS # BLD AUTO: 0.06 K/UL (ref 0–0.2)
BASOPHILS NFR BLD: 0.6 % (ref 0–1.9)
DIFFERENTIAL METHOD: ABNORMAL
EOSINOPHIL # BLD AUTO: 0.1 K/UL (ref 0–0.5)
EOSINOPHIL NFR BLD: 1.3 % (ref 0–8)
ERYTHROCYTE [DISTWIDTH] IN BLOOD BY AUTOMATED COUNT: 17.2 % (ref 11.5–14.5)
HCT VFR BLD AUTO: 28.6 % (ref 37–48.5)
HGB BLD-MCNC: 9.1 G/DL (ref 12–16)
IMM GRANULOCYTES # BLD AUTO: 0.11 K/UL (ref 0–0.04)
IMM GRANULOCYTES NFR BLD AUTO: 1.2 % (ref 0–0.5)
LYMPHOCYTES # BLD AUTO: 1.9 K/UL (ref 1–4.8)
LYMPHOCYTES NFR BLD: 20.2 % (ref 18–48)
MCH RBC QN AUTO: 29.7 PG (ref 27–31)
MCHC RBC AUTO-ENTMCNC: 31.8 G/DL (ref 32–36)
MCV RBC AUTO: 94 FL (ref 82–98)
MONOCYTES # BLD AUTO: 1 K/UL (ref 0.3–1)
MONOCYTES NFR BLD: 11 % (ref 4–15)
NEUTROPHILS # BLD AUTO: 6.1 K/UL (ref 1.8–7.7)
NEUTROPHILS NFR BLD: 65.7 % (ref 38–73)
NRBC BLD-RTO: 0 /100 WBC
PLATELET # BLD AUTO: 404 K/UL (ref 150–350)
PMV BLD AUTO: 9.2 FL (ref 9.2–12.9)
RBC # BLD AUTO: 3.06 M/UL (ref 4–5.4)
WBC # BLD AUTO: 9.29 K/UL (ref 3.9–12.7)

## 2019-09-17 PROCEDURE — 85025 COMPLETE CBC W/AUTO DIFF WBC: CPT

## 2019-09-17 PROCEDURE — 36415 COLL VENOUS BLD VENIPUNCTURE: CPT

## 2019-09-17 PROCEDURE — 36430 TRANSFUSION BLD/BLD COMPNT: CPT

## 2019-09-17 PROCEDURE — G0378 HOSPITAL OBSERVATION PER HR: HCPCS

## 2019-09-17 NOTE — DISCHARGE SUMMARY
[facility] Medicine  Discharge Summary      Patient Name: Oriana Tobar  MRN: [id number]  Admission Date: 9/16/2019  Hospital Length of Stay: 0 days  Discharge Date and Time:  09/17/2019 10:43 AM  Attending Physician: Adin Tejada MD   Discharging Provider: Adin Tejada MD  Primary Care Provider: Adin Tejada MD      HPI:   STOMACH CANCER RECURRENT BLEEDING RECURRENT TRANSFUSIONS     * No surgery found *      Hospital Course:   BLOOD TRANSFUSION FOR SYMPTOMATIC ANEMIA  HCT 28.6 d/c home     Consults:   Consults (From admission, onward)        Status Ordering Provider     IP consult to case management  Once     Provider:  (Not yet assigned)    Acknowledged ADIN TEJADA          * Symptomatic anemia  RECURRENT GI BLOOD LOSS DUE STOMACH CANCER, REQUIRING RECURRENT BLOOD TRANSFUSIONS  HCT 28.6 D/C home      Final Active Diagnoses:    Diagnosis Date Noted POA    PRINCIPAL PROBLEM:  Symptomatic anemia [D64.9] 10/17/2018 Yes      Problems Resolved During this Admission:       Discharged Condition: fair    Disposition: Home or Self Care    Follow Up:  Follow-up Information     Adin Tejada MD.    Specialties:  Hospitalist, Family Medicine, Internal Medicine, Cardiology  Contact information:  [address] [place] [address] [phone]                 Patient Instructions:   No discharge procedures on file.    Significant Diagnostic Studies: Labs:   BMP:   Recent Labs   Lab 09/16/19  0904   *   *   K 3.0*      CO2 18*   BUN 16   CREATININE 1.0   CALCIUM 7.4*   MG 1.5*   , CMP   Recent Labs   Lab 09/16/19  0904   *   K 3.0*      CO2 18*   *   BUN 16   CREATININE 1.0   CALCIUM 7.4*   PROT 5.3*   ALBUMIN 2.2*   BILITOT 0.3   ALKPHOS 72   AST 16   ALT 7*   ANIONGAP 12   ESTGFRAFRICA >60.0   EGFRNONAA 55.6*   , CBC   Recent Labs   Lab 09/16/19  0904 09/17/19  0220   WBC 12.04 9.29   HGB 6.2* 9.1*    HCT 20.8* 28.6*   * 404*    and All labs within the past 24 hours have been reviewed    Pending Diagnostic Studies:     None         Medications:  Reconciled Home Medications:      Medication List      CONTINUE taking these medications    B12 5,000-100 mcg Lozg  Generic drug:  cyanocobalamin-cobamamide     ferrous sulfate 325 (65 FE) MG EC tablet  Take 325 mg by mouth once daily.     IMODIUM ADVANCED ORAL  Take by mouth.     Lactobacillus rhamnosus GG 10 billion cell capsule  Commonly known as:  CULTURELLE  Take 1 capsule by mouth once daily.     octreotide 100 mcg/mL Soln  Commonly known as:  SANDOSTATIN  Inject 2 mLs (200 mcg total) into the vein once daily.     ondansetron 4 MG tablet  Commonly known as:  ZOFRAN  Take 4 mg by mouth every 8 (eight) hours as needed for Nausea.     pantoprazole 40 MG tablet  Commonly known as:  PROTONIX  Take 40 mg by mouth once daily.     sucralfate 100 mg/mL suspension  Commonly known as:  CARAFATE  Take 10 mLs (1 g total) by mouth 4 (four) times daily before meals and nightly.            Indwelling Lines/Drains at time of discharge:   Lines/Drains/Airways          None          Time spent on the discharge of patient: 30 minutes  Patient was seen and examined on the date of discharge and determined to be suitable for discharge.         Buck Hudson MD  Department of Hospital Medicine  [facility]

## 2019-09-17 NOTE — PLAN OF CARE
Problem: Anemia  Goal: Anemia Symptom Improvement  Outcome: Ongoing (interventions implemented as appropriate)  Intervention: Monitor and Manage Anemia     09/17/19 0318   Monitor and Manage Anemia   Fatigue Management activity assistance provided;paced activity encouraged   Monitor and Manage Anemia   Oral Nutrition Promotion rest periods promoted;safe use of adaptive equipment encouraged

## 2019-09-17 NOTE — NURSING
PT/PTS DAUGHTER GIVEN AVS INSTRUCTIONS/POST INFUSION INSTRUCTIONS/PTS DAUGHTER VERBALIZED UNDERSTANDING. PTS DAUGHTER VERBALIZED FOLLOW UP DOCTOR VISIT WITH DR TEJADA AND DR HUANG. PT SITTING UP IN HER PERSONAL WC READY TO LEAVE. DAUGHTER AND PT LEFT THE MED SURG FLOOR TO POV/HOME.

## 2019-09-17 NOTE — PLAN OF CARE
09/17/19 1100   Final Note   Assessment Type Final Discharge Note   Anticipated Discharge Disposition Home   What phone number can be called within the next 1-3 days to see how you are doing after discharge? [phone]   Hospital Follow Up  Appt(s) scheduled? Yes   Discharge plans and expectations educations in teach back method with documentation complete? Yes   Verbal & written follow up appointment provided to patient & daughter. Also instructed on handout in discharge papers with signs & symptoms to watch out for. Demonstrated understanding by verbal feedback. Denies any other discharge needs at this time.

## 2019-09-17 NOTE — ASSESSMENT & PLAN NOTE
RECURRENT GI BLOOD LOSS DUE STOMACH CANCER, REQUIRING RECURRENT BLOOD TRANSFUSIONS  HCT 28.6 D/C home

## 2019-09-18 ENCOUNTER — INFUSION (OUTPATIENT)
Dept: INFUSION THERAPY | Facility: HOSPITAL | Age: 74
End: 2019-09-18
Payer: MEDICARE

## 2019-09-18 ENCOUNTER — OFFICE VISIT (OUTPATIENT)
Dept: HEMATOLOGY/ONCOLOGY | Facility: CLINIC | Age: 74
End: 2019-09-18
Payer: MEDICARE

## 2019-09-18 VITALS
OXYGEN SATURATION: 98 % | HEART RATE: 80 BPM | RESPIRATION RATE: 18 BRPM | TEMPERATURE: 97 F | SYSTOLIC BLOOD PRESSURE: 162 MMHG | DIASTOLIC BLOOD PRESSURE: 82 MMHG

## 2019-09-18 VITALS
DIASTOLIC BLOOD PRESSURE: 58 MMHG | HEART RATE: 99 BPM | HEIGHT: 71 IN | BODY MASS INDEX: 19.74 KG/M2 | OXYGEN SATURATION: 100 % | RESPIRATION RATE: 16 BRPM | WEIGHT: 141 LBS | SYSTOLIC BLOOD PRESSURE: 128 MMHG

## 2019-09-18 DIAGNOSIS — D50.0 CHRONIC BLOOD LOSS ANEMIA: ICD-10-CM

## 2019-09-18 DIAGNOSIS — C16.9 GASTRIC CARCINOMA: ICD-10-CM

## 2019-09-18 DIAGNOSIS — K29.70 GASTRITIS, PRESENCE OF BLEEDING UNSPECIFIED, UNSPECIFIED CHRONICITY, UNSPECIFIED GASTRITIS TYPE: ICD-10-CM

## 2019-09-18 DIAGNOSIS — D64.9 ANEMIA, UNSPECIFIED TYPE: ICD-10-CM

## 2019-09-18 DIAGNOSIS — C78.7 LIVER METASTASES: ICD-10-CM

## 2019-09-18 DIAGNOSIS — K92.1 GASTROINTESTINAL HEMORRHAGE WITH MELENA: Primary | ICD-10-CM

## 2019-09-18 DIAGNOSIS — R63.0 DECREASED APPETITE: ICD-10-CM

## 2019-09-18 DIAGNOSIS — C16.2 MALIGNANT NEOPLASM OF BODY OF STOMACH: ICD-10-CM

## 2019-09-18 DIAGNOSIS — R58 BLEEDING: ICD-10-CM

## 2019-09-18 DIAGNOSIS — C64.9 RENAL CELL CARCINOMA, UNSPECIFIED LATERALITY: Primary | ICD-10-CM

## 2019-09-18 DIAGNOSIS — R63.4 WEIGHT LOSS: ICD-10-CM

## 2019-09-18 PROCEDURE — 99497 PR ADVNCD CARE PLAN 30 MIN: ICD-10-PCS | Mod: S$GLB,,, | Performed by: INTERNAL MEDICINE

## 2019-09-18 PROCEDURE — 1101F PT FALLS ASSESS-DOCD LE1/YR: CPT | Mod: CPTII,S$GLB,, | Performed by: INTERNAL MEDICINE

## 2019-09-18 PROCEDURE — 3078F DIAST BP <80 MM HG: CPT | Mod: CPTII,S$GLB,, | Performed by: INTERNAL MEDICINE

## 2019-09-18 PROCEDURE — 99999 PR PBB SHADOW E&M-EST. PATIENT-LVL III: ICD-10-PCS | Mod: PBBFAC,,, | Performed by: INTERNAL MEDICINE

## 2019-09-18 PROCEDURE — 63600175 PHARM REV CODE 636 W HCPCS: Mod: JG | Performed by: INTERNAL MEDICINE

## 2019-09-18 PROCEDURE — 96372 THER/PROPH/DIAG INJ SC/IM: CPT

## 2019-09-18 PROCEDURE — 99497 ADVNCD CARE PLAN 30 MIN: CPT | Mod: S$GLB,,, | Performed by: INTERNAL MEDICINE

## 2019-09-18 PROCEDURE — 99215 OFFICE O/P EST HI 40 MIN: CPT | Mod: S$GLB,,, | Performed by: INTERNAL MEDICINE

## 2019-09-18 PROCEDURE — 3074F SYST BP LT 130 MM HG: CPT | Mod: CPTII,S$GLB,, | Performed by: INTERNAL MEDICINE

## 2019-09-18 PROCEDURE — 3078F PR MOST RECENT DIASTOLIC BLOOD PRESSURE < 80 MM HG: ICD-10-PCS | Mod: CPTII,S$GLB,, | Performed by: INTERNAL MEDICINE

## 2019-09-18 PROCEDURE — 3288F PR FALLS RISK ASSESSMENT DOCUMENTED: ICD-10-PCS | Mod: CPTII,S$GLB,, | Performed by: INTERNAL MEDICINE

## 2019-09-18 PROCEDURE — 1101F PR PT FALLS ASSESS DOC 0-1 FALLS W/OUT INJ PAST YR: ICD-10-PCS | Mod: CPTII,S$GLB,, | Performed by: INTERNAL MEDICINE

## 2019-09-18 PROCEDURE — 99999 PR PBB SHADOW E&M-EST. PATIENT-LVL III: CPT | Mod: PBBFAC,,, | Performed by: INTERNAL MEDICINE

## 2019-09-18 PROCEDURE — 3074F PR MOST RECENT SYSTOLIC BLOOD PRESSURE < 130 MM HG: ICD-10-PCS | Mod: CPTII,S$GLB,, | Performed by: INTERNAL MEDICINE

## 2019-09-18 PROCEDURE — 3288F FALL RISK ASSESSMENT DOCD: CPT | Mod: CPTII,S$GLB,, | Performed by: INTERNAL MEDICINE

## 2019-09-18 PROCEDURE — 99215 PR OFFICE/OUTPT VISIT, EST, LEVL V, 40-54 MIN: ICD-10-PCS | Mod: S$GLB,,, | Performed by: INTERNAL MEDICINE

## 2019-09-18 RX ORDER — SUCRALFATE 1 G/10ML
1 SUSPENSION ORAL
Qty: 1 BOTTLE | Refills: 5 | Status: SHIPPED | OUTPATIENT
Start: 2019-09-18 | End: 2019-10-25 | Stop reason: HOSPADM

## 2019-09-18 RX ORDER — MEGESTROL ACETATE 40 MG/ML
400 SUSPENSION ORAL DAILY
Qty: 240 ML | Refills: 2 | Status: SHIPPED | OUTPATIENT
Start: 2019-09-18 | End: 2019-10-02 | Stop reason: SDUPTHER

## 2019-09-18 RX ADMIN — OCTREOTIDE ACETATE 30 MG: KIT at 11:09

## 2019-09-18 NOTE — PROGRESS NOTES
CC  I feel better after the blood     Oriana Tobar is a 74 y.o.    Pt with recurrent renal cell carcinoma. She has  Had no recent tissue biopsy since 2018 when she was diagnosed with RCC via biopsy of a left upper quadrant peritoneal mass  Patient did not take treatment for malignancy at that time per her own choice.  She then developed GI hemorrhaging was found to have a gastric mass consistent with carcinoma.  Latest scans revealed diffuse metastatic disease to lung, liver , peritoneum with bilateral ovarian masses.     History completed 2 doses of IV iron and 2 doses of Sandostatin  She lives with her family and is eating better.  No further evidence of melena or hematochezia  She is requiring Zofran p.r.n. nausea and probiotics to help with intermittent diarrhea    Pt with continued weight loss,   Sandostatin injections helping control GI bleed  Pt was to get US guided biopsy of the liver masses in July but she cannot handle the procedure       Past Medical History:   Diagnosis Date    Anemia     Cancer     kidney    Dementia     Encounter for blood transfusion     11/4/2018    High cholesterol 06/23/2018    Hypertension     No Medication     Renal disorder     Wears dentures     Uppers         Current Outpatient Medications:     cyanocobalamin-cobamamide (B12) 5,000-100 mcg Lozg, , Disp: , Rfl:     ferrous sulfate 325 (65 FE) MG EC tablet, Take 325 mg by mouth once daily., Disp: , Rfl:     Lactobacillus rhamnosus GG (CULTURELLE) 10 billion cell capsule, Take 1 capsule by mouth once daily., Disp: , Rfl:     loperamide HCl/simethicone (IMODIUM ADVANCED ORAL), Take by mouth., Disp: , Rfl:     octreotide (SANDOSTATIN) 100 mcg/mL Soln, Inject 2 mLs (200 mcg total) into the vein once daily., Disp: 40 mL, Rfl: 0    ondansetron (ZOFRAN) 4 MG tablet, Take 4 mg by mouth every 8 (eight) hours as needed for Nausea., Disp: , Rfl:     pantoprazole (PROTONIX) 40 MG tablet, Take 40 mg by mouth once  "daily., Disp: , Rfl: 3    sucralfate (CARAFATE) 100 mg/mL suspension, Take 10 mLs (1 g total) by mouth 4 (four) times daily before meals and nightly., Disp: 1 Bottle, Rfl: 11  No current facility-administered medications for this visit.   Review of patient's allergies indicates:   Allergen Reactions    Codeine Other (See Comments)     Pt shakes and hallucinates    Pcn [penicillins] Anxiety and Other (See Comments)     Social History     Tobacco Use    Smoking status: Former Smoker     Packs/day: 1.00     Years: 50.00     Pack years: 50.00     Last attempt to quit: 10/6/2018     Years since quittin.9    Smokeless tobacco: Never Used    Tobacco comment: pt has stopped; encouraged to remain stopped   Substance Use Topics    Alcohol use: Yes     Frequency: Never     Comment: occasional    Drug use: No     Family History   Adopted: Yes       CONSTITUTIONAL: No fevers, chills, night sweats,  ++ weight  Loss with progression   SKIN: no rashes or itching  ENT: No headaches, head trauma, vision changes, or eye pain  LYMPH NODES: None noticed   CV: No chest pain, palpitations.   RESP:+ dyspnea on exertion, cno cough, wheezing, or hemoptysis  GI: + nausea, no emesis,  + diarrhea, no constipation, melena, hematochezia, pain.   No further vomiting with zofran  No further diarrhea with imodium   : No dysuria, hematuria, urgency, or frequency   HEME: No easy bruising, bleeding problems  PSYCHIATRIC: No depression, anxiety, psychosis, hallucinations.  NEURO: No seizures, memory loss, slight weakness  MSK: No arthralgias or joint swelling         BP (!) 128/58   Pulse 99   Resp 16   Ht 5' 11" (1.803 m)   Wt 64 kg (141 lb)   LMP  (LMP Unknown)   SpO2 100%   BMI 19.67 kg/m²     Gen:  Patient appears  Better after blood   Psych: flat affect  Eyes: Pupils round and non dilated, EOM intact no nystagmus  Nose: Nares patent  Neck: suppple, no JVD, trachea midline    Lungs: CTAB, no wheezes no fremitus   CV: S1S2 with " RRR, No mrg  Abd: soft, NTND, + BS, No HSM, no ascites  Extr: No CCE, MEJIA decreased strength   Skin: intact, no lesions noted  Rheum: No joint swelling    Lab Results   Component Value Date    WBC 9.29 09/17/2019    HGB 9.1 (L) 09/17/2019    HCT 28.6 (L) 09/17/2019    MCV 94 09/17/2019     (H) 09/17/2019     CMP  Sodium   Date Value Ref Range Status   09/16/2019 133 (L) 136 - 145 mmol/L Final     Potassium   Date Value Ref Range Status   09/16/2019 3.0 (L) 3.5 - 5.1 mmol/L Final     Chloride   Date Value Ref Range Status   09/16/2019 103 95 - 110 mmol/L Final     CO2   Date Value Ref Range Status   09/16/2019 18 (L) 23 - 29 mmol/L Final     Glucose   Date Value Ref Range Status   09/16/2019 154 (H) 70 - 110 mg/dL Final     BUN, Bld   Date Value Ref Range Status   09/16/2019 16 8 - 23 mg/dL Final     Creatinine   Date Value Ref Range Status   09/16/2019 1.0 0.5 - 1.4 mg/dL Final     Calcium   Date Value Ref Range Status   09/16/2019 7.4 (L) 8.7 - 10.5 mg/dL Final     Total Protein   Date Value Ref Range Status   09/16/2019 5.3 (L) 6.0 - 8.4 g/dL Final     Albumin   Date Value Ref Range Status   09/16/2019 2.2 (L) 3.5 - 5.2 g/dL Final     Total Bilirubin   Date Value Ref Range Status   09/16/2019 0.3 0.1 - 1.0 mg/dL Final     Comment:     For infants and newborns, interpretation of results should be based  on gestational age, weight and in agreement with clinical  observations.  Premature Infant recommended reference ranges:  Up to 24 hours.............<8.0 mg/dL  Up to 48 hours............<12.0 mg/dL  3-5 days..................<15.0 mg/dL  6-29 days.................<15.0 mg/dL       Alkaline Phosphatase   Date Value Ref Range Status   09/16/2019 72 55 - 135 U/L Final     AST   Date Value Ref Range Status   09/16/2019 16 10 - 40 U/L Final     ALT   Date Value Ref Range Status   09/16/2019 7 (L) 10 - 44 U/L Final     Anion Gap   Date Value Ref Range Status   09/16/2019 12 8 - 16 mmol/L Final     eGFR if African  American   Date Value Ref Range Status   09/16/2019 >60.0 >60 mL/min/1.73 m^2 Final     eGFR if non    Date Value Ref Range Status   09/16/2019 55.6 (A) >60 mL/min/1.73 m^2 Final     Comment:     Calculation used to obtain the estimated glomerular filtration  rate (eGFR) is the CKD-EPI equation.      CT Chest Abdomen Pelvis W W/O Contrast (XPD)   Order: [id number]   Status:  Final result   Visible to patient:  Yes (Patient Portal) Next appt:  None Dx:  Gastric carcinoma; Renal cell carcino...   Details     Reading Physician Reading Date Result Priority   Ramin Singh MD 5/13/2019 STAT      Narrative     EXAMINATION:  CT CHEST ABDOMEN PELVIS W W/O CONTRAST (XPD)    CLINICAL HISTORY:  RCC;Malignant neoplasm of stomach, unspecified    TECHNIQUE:  Low dose axial, sagittal and coronal reformations were performed from the thoracic inlet to the pubic symphysis following the IV administration of 75 mL of Omnipaque 350.  The chest images are with  contrast and the abdomen images are with and without contrast.  30 mL of oral Omnipaque was given.    COMPARISON:  CT abdomen pelvis 10/23/2018.  CT chest 01/13/2016.    FINDINGS:  There are numerous bilateral soft tissue pulmonary nodules ranging in size from 0.3-1.7 cm consistent with pulmonary metastatic disease.  These have increased in size and number when compared to the patient's previous examination.  No focal consolidation.  Pulmonary vascularity is unremarkable.    No significant pleural or pericardial effusions.  No suspicious endobronchial lesion.  There are peripherally enhancing subcarinal masses measuring 1.8 and 1.5 cm most consistent with metastatic lymphadenopathy.  These are new compared to the chest CT from 2016.  No significant hilar, mediastinal or axillary lymphadenopathy.    The liver and spleen are normal in size and attenuation.  Numerous chronic nonenhancing hepatic cysts.  Within the dome of the right hepatic lobe there are poorly  defined round hypoattenuating nodules with subtle peripheral enhancement which have increased in size over the interval currently measuring up to 1.3 cm in diameter.  These are suspicious for hepatic metastatic disease.  There has been interval development of a poorly defined enhancing 2.2 cm nodule of the right hepatic lobe as well as a smaller 1.4 cm partially exophytic enhancing nodule of the inferior right hepatic lobe consistent with metastatic disease.  No perihepatic ascites.  Gallbladder is distended with dependent calcified gallstones.  Pancreas is normal in size and attenuation.  There are perisplenic and perigastric varices consistent with underlying portal hypertension.    Patient is status post prior left nephrectomy and adrenalectomy.  The right kidney is normal in size and enhances homogeneously.  Within the left mid abdomen there is an enlarging peripherally enhancing soft tissue attenuation mass currently measuring 6.4 cm AP x 4.1 cm transverse by 6.2 cm cc (previously measuring 3.7 cm AP x 3.7 cm transverse by 4.1 cm cc).  This is consistent with either residual or recurrent neoplasia.    Large amount of stool throughout the colon.  Scattered diverticula within the colon.  No mesenteric inflammatory changes.  Surgical clips within the distal colon from previous partial colectomy.  No CT evidence for bowel obstruction.    The bladder is partially distended.  The uterus is normal in size and attenuation.  There are bilateral heterogeneously enhancing soft tissue masses of the right and left ovary the largest on the right measuring 7.4 cm and largest on left measuring 3.4 cm.  This is most consistent with ovarian neoplasia.  There is a stable left ovarian dermoid/teratoma.    There are numerous enhancing soft tissue masses of the mesentery which have increased in number and size over the interval consistent with metastatic disease.  The largest mass measures 4.6 cm AP x 5.5 cm transverse within the  midline anterior lower abdomen and appears to abut and invade adjacent small bowel.  No dilated loops of small bowel to suggest resulting obstruction.  New smaller peripherally enhancing soft tissue masses within the pelvis the largest measuring 2.6 cm in diameter also consistent with metastatic disease.    Mild thoracolumbar dextroscoliosis.  Mild bone demineralization.      Impression       1. Interval progression of pulmonary metastatic disease with metastatic subcarinal lymphadenopathy.  2. Interval progression of hepatic metastatic disease.  3. Interval increase in size and number of enhancing mesenteric and pelvic soft tissue masses consistent with progression of metastatic disease.  4. Enlarging bilateral ovarian masses consistent with neoplasia.  5. Chronic dermoid/teratoma of the left ovary.  6. Chronic hepatic cysts.  7. Perisplenic and perigastric varices consistent with portal hypertension.  8. Previous left adrenalectomy and nephrectomy.  9. Enlarging heterogeneously enhancing soft tissue mass of the left abdomen again consistent with either residual or recurrent neoplasia.  10. Bone demineralization.      Electronically signed by: Ramin Singh  Date: 05/13/2019  Time: 10:12             Last Resulted: 05/13/19 1           Post IV iron last week     Renal cell carcinoma, unspecified laterality  -     CBC auto differential; Future; Expected date: 10/02/2019  -     Iron and TIBC; Future; Expected date: 10/02/2019  -     CMP; Future; Expected date: 10/02/2019    Liver metastases  -     CBC auto differential; Future; Expected date: 10/02/2019  -     Iron and TIBC; Future; Expected date: 10/02/2019  -     CMP; Future; Expected date: 10/02/2019    Malignant neoplasm of body of stomach  -     CBC auto differential; Future; Expected date: 10/02/2019  -     Iron and TIBC; Future; Expected date: 10/02/2019  -     CMP; Future; Expected date: 10/02/2019    Bleeding  -     CBC auto differential; Future; Expected  date: 10/02/2019  -     Iron and TIBC; Future; Expected date: 10/02/2019  -     CMP; Future; Expected date: 10/02/2019    Gastritis, presence of bleeding unspecified, unspecified chronicity, unspecified gastritis type  -     sucralfate (CARAFATE) 100 mg/mL suspension; Take 10 mLs (1 g total) by mouth 4 (four) times daily before meals and nightly.  Dispense: 1 Bottle; Refill: 5    Weight loss  -     megestrol (MEGACE) 400 mg/10 mL (40 mg/mL) Susp; Take 10 mLs (400 mg total) by mouth once daily.  Dispense: 240 mL; Refill: 2    Decreased appetite  -     megestrol (MEGACE) 400 mg/10 mL (40 mg/mL) Susp; Take 10 mLs (400 mg total) by mouth once daily.  Dispense: 240 mL; Refill: 2    Other orders  -     octreotide (SANDOSTATIN LAR) injection 30 mg  -     octreotide (SANDOSTATIN LAR) injection 30 mg      Proceed with sandostatin today   No need for Iv iron as she just received a blood transfusion. Se does need to cont the monthly sandostatin as this is helping prevent her from bleeding out due to the gastric mass which is actively bleeding   SHe does not want hospice at this time  She understands the cancer will eventually cause her death  I will check her cbc and iron in two weeks to see if she needs iron replacement at that time   Lengthy discussion is treatment options which are available to her since her ECOG performance status is 3  Pt unable to lie flat and stilll for liver biopsy and no longer wants the procedure   Continue medications for GERD  Continue oral iron and probiotics  Explained end of life issues as well   Had a discussion of possible hospice last visit and family and patient are not at that point to except their services.   sandostatin SC  30-40 mg IM monthly     Thank you for allowing me to evaluate and participate in the care of this pleasant patient. Please fell free to call me with any questions or concerns.    [place],  Lauren Irene MD    This note was dictated with Dragon and briefly proofread.  Please excuse any sentences that may be unclear or words misspelled    Advance Care Planning     Power of   I initiated the process of advance care planning today and explained the importance of this process to the patient.  I introduced the concept of advance directives to the patient, as well. Then the patient received detailed information about the importance of designating a Health Care Power of  (HCPOA). She was also instructed to communicate with this person about their wishes for future healthcare, should she become sick and lose decision-making capacity. The patient has not previously appointed a HCPOA.   I spent a total time of 30 minutes discussing this issue with the patient.         Living Will  During this visit, I engaged the patient  in the advance care planning process.  The patient and I reviewed the role for advance directives and their purpose in directing future healthcare if the patient's unable to speak for him/herself.  At this point in time, the patient does have full decision-making capacity.  We discussed different extreme health states that she could experience, and reviewed what kind of medical care she would want in those situations.  The patient communicated that if she were comatose and had little chance of a meaningful recovery, she would want machines/life-sustaining treatments used.   I spent a total of  30 minutes engaging the patient in this advance care planning discussion.

## 2019-09-30 ENCOUNTER — LAB VISIT (OUTPATIENT)
Dept: LAB | Facility: HOSPITAL | Age: 74
End: 2019-09-30
Attending: INTERNAL MEDICINE
Payer: MEDICARE

## 2019-09-30 DIAGNOSIS — C16.2 MALIGNANT NEOPLASM OF BODY OF STOMACH: ICD-10-CM

## 2019-09-30 DIAGNOSIS — R58 BLEEDING: ICD-10-CM

## 2019-09-30 DIAGNOSIS — C64.9 RENAL CELL CARCINOMA, UNSPECIFIED LATERALITY: ICD-10-CM

## 2019-09-30 DIAGNOSIS — C78.7 LIVER METASTASES: ICD-10-CM

## 2019-09-30 LAB
ALBUMIN SERPL BCP-MCNC: 2.3 G/DL (ref 3.5–5.2)
ALP SERPL-CCNC: 87 U/L (ref 55–135)
ALT SERPL W/O P-5'-P-CCNC: 8 U/L (ref 10–44)
ANION GAP SERPL CALC-SCNC: 8 MMOL/L (ref 8–16)
AST SERPL-CCNC: 15 U/L (ref 10–40)
BASOPHILS # BLD AUTO: 0.08 K/UL (ref 0–0.2)
BASOPHILS NFR BLD: 0.7 % (ref 0–1.9)
BILIRUB SERPL-MCNC: 0.4 MG/DL (ref 0.1–1)
BUN SERPL-MCNC: 11 MG/DL (ref 8–23)
CALCIUM SERPL-MCNC: 7.6 MG/DL (ref 8.7–10.5)
CHLORIDE SERPL-SCNC: 109 MMOL/L (ref 95–110)
CO2 SERPL-SCNC: 17 MMOL/L (ref 23–29)
CREAT SERPL-MCNC: 1.1 MG/DL (ref 0.5–1.4)
DIFFERENTIAL METHOD: ABNORMAL
EOSINOPHIL # BLD AUTO: 0 K/UL (ref 0–0.5)
EOSINOPHIL NFR BLD: 0.3 % (ref 0–8)
ERYTHROCYTE [DISTWIDTH] IN BLOOD BY AUTOMATED COUNT: 16.4 % (ref 11.5–14.5)
EST. GFR  (AFRICAN AMERICAN): 57.2 ML/MIN/1.73 M^2
EST. GFR  (NON AFRICAN AMERICAN): 49.6 ML/MIN/1.73 M^2
GLUCOSE SERPL-MCNC: 130 MG/DL (ref 70–110)
HCT VFR BLD AUTO: 30.9 % (ref 37–48.5)
HGB BLD-MCNC: 9.7 G/DL (ref 12–16)
IMM GRANULOCYTES # BLD AUTO: 0.1 K/UL (ref 0–0.04)
IMM GRANULOCYTES NFR BLD AUTO: 0.8 % (ref 0–0.5)
IRON SERPL-MCNC: 50 UG/DL (ref 30–160)
LYMPHOCYTES # BLD AUTO: 2.4 K/UL (ref 1–4.8)
LYMPHOCYTES NFR BLD: 20.3 % (ref 18–48)
MCH RBC QN AUTO: 29.1 PG (ref 27–31)
MCHC RBC AUTO-ENTMCNC: 31.4 G/DL (ref 32–36)
MCV RBC AUTO: 93 FL (ref 82–98)
MONOCYTES # BLD AUTO: 0.7 K/UL (ref 0.3–1)
MONOCYTES NFR BLD: 5.9 % (ref 4–15)
NEUTROPHILS # BLD AUTO: 8.6 K/UL (ref 1.8–7.7)
NEUTROPHILS NFR BLD: 72 % (ref 38–73)
NRBC BLD-RTO: 0 /100 WBC
PLATELET # BLD AUTO: 352 K/UL (ref 150–350)
PMV BLD AUTO: 8.8 FL (ref 9.2–12.9)
POTASSIUM SERPL-SCNC: 3.6 MMOL/L (ref 3.5–5.1)
PROT SERPL-MCNC: 6 G/DL (ref 6–8.4)
RBC # BLD AUTO: 3.33 M/UL (ref 4–5.4)
SATURATED IRON: 33 % (ref 20–50)
SODIUM SERPL-SCNC: 134 MMOL/L (ref 136–145)
TOTAL IRON BINDING CAPACITY: 151 UG/DL (ref 250–450)
TRANSFERRIN SERPL-MCNC: 102 MG/DL (ref 200–375)
WBC # BLD AUTO: 11.99 K/UL (ref 3.9–12.7)

## 2019-09-30 PROCEDURE — 36415 COLL VENOUS BLD VENIPUNCTURE: CPT

## 2019-09-30 PROCEDURE — 80053 COMPREHEN METABOLIC PANEL: CPT

## 2019-09-30 PROCEDURE — 85025 COMPLETE CBC W/AUTO DIFF WBC: CPT

## 2019-09-30 PROCEDURE — 83540 ASSAY OF IRON: CPT

## 2019-10-02 ENCOUNTER — OFFICE VISIT (OUTPATIENT)
Dept: HEMATOLOGY/ONCOLOGY | Facility: CLINIC | Age: 74
End: 2019-10-02
Payer: MEDICARE

## 2019-10-02 VITALS
BODY MASS INDEX: 19.88 KG/M2 | HEART RATE: 114 BPM | HEIGHT: 71 IN | RESPIRATION RATE: 18 BRPM | OXYGEN SATURATION: 96 % | WEIGHT: 142 LBS | DIASTOLIC BLOOD PRESSURE: 62 MMHG | SYSTOLIC BLOOD PRESSURE: 112 MMHG

## 2019-10-02 DIAGNOSIS — R63.4 WEIGHT LOSS: ICD-10-CM

## 2019-10-02 DIAGNOSIS — D64.9 SYMPTOMATIC ANEMIA: ICD-10-CM

## 2019-10-02 DIAGNOSIS — R63.0 DECREASED APPETITE: ICD-10-CM

## 2019-10-02 DIAGNOSIS — C16.9 GASTRIC CARCINOMA: Primary | ICD-10-CM

## 2019-10-02 DIAGNOSIS — R58 BLEEDING: ICD-10-CM

## 2019-10-02 DIAGNOSIS — D50.9 IRON DEFICIENCY ANEMIA, UNSPECIFIED IRON DEFICIENCY ANEMIA TYPE: ICD-10-CM

## 2019-10-02 PROCEDURE — 3288F FALL RISK ASSESSMENT DOCD: CPT | Mod: CPTII,S$GLB,, | Performed by: INTERNAL MEDICINE

## 2019-10-02 PROCEDURE — 3288F PR FALLS RISK ASSESSMENT DOCUMENTED: ICD-10-PCS | Mod: CPTII,S$GLB,, | Performed by: INTERNAL MEDICINE

## 2019-10-02 PROCEDURE — 3074F PR MOST RECENT SYSTOLIC BLOOD PRESSURE < 130 MM HG: ICD-10-PCS | Mod: CPTII,S$GLB,, | Performed by: INTERNAL MEDICINE

## 2019-10-02 PROCEDURE — 1101F PR PT FALLS ASSESS DOC 0-1 FALLS W/OUT INJ PAST YR: ICD-10-PCS | Mod: CPTII,S$GLB,, | Performed by: INTERNAL MEDICINE

## 2019-10-02 PROCEDURE — 99215 PR OFFICE/OUTPT VISIT, EST, LEVL V, 40-54 MIN: ICD-10-PCS | Mod: S$GLB,,, | Performed by: INTERNAL MEDICINE

## 2019-10-02 PROCEDURE — 3078F DIAST BP <80 MM HG: CPT | Mod: CPTII,S$GLB,, | Performed by: INTERNAL MEDICINE

## 2019-10-02 PROCEDURE — 3074F SYST BP LT 130 MM HG: CPT | Mod: CPTII,S$GLB,, | Performed by: INTERNAL MEDICINE

## 2019-10-02 PROCEDURE — 99999 PR PBB SHADOW E&M-EST. PATIENT-LVL III: ICD-10-PCS | Mod: PBBFAC,,, | Performed by: INTERNAL MEDICINE

## 2019-10-02 PROCEDURE — 3078F PR MOST RECENT DIASTOLIC BLOOD PRESSURE < 80 MM HG: ICD-10-PCS | Mod: CPTII,S$GLB,, | Performed by: INTERNAL MEDICINE

## 2019-10-02 PROCEDURE — 99215 OFFICE O/P EST HI 40 MIN: CPT | Mod: S$GLB,,, | Performed by: INTERNAL MEDICINE

## 2019-10-02 PROCEDURE — 99999 PR PBB SHADOW E&M-EST. PATIENT-LVL III: CPT | Mod: PBBFAC,,, | Performed by: INTERNAL MEDICINE

## 2019-10-02 PROCEDURE — 1101F PT FALLS ASSESS-DOCD LE1/YR: CPT | Mod: CPTII,S$GLB,, | Performed by: INTERNAL MEDICINE

## 2019-10-02 RX ORDER — PANTOPRAZOLE SODIUM 40 MG/1
40 TABLET, DELAYED RELEASE ORAL DAILY
Qty: 30 TABLET | Refills: 0 | Status: SHIPPED | OUTPATIENT
Start: 2019-10-02

## 2019-10-02 RX ORDER — MEGESTROL ACETATE 40 MG/ML
400 SUSPENSION ORAL DAILY
Qty: 240 ML | Refills: 2 | Status: SHIPPED | OUTPATIENT
Start: 2019-10-02 | End: 2019-10-25 | Stop reason: HOSPADM

## 2019-10-02 RX ORDER — HEPARIN 100 UNIT/ML
500 SYRINGE INTRAVENOUS
Status: CANCELLED | OUTPATIENT
Start: 2019-10-02

## 2019-10-02 RX ORDER — SODIUM CHLORIDE 0.9 % (FLUSH) 0.9 %
10 SYRINGE (ML) INJECTION
Status: CANCELLED | OUTPATIENT
Start: 2019-10-09

## 2019-10-02 RX ORDER — SODIUM CHLORIDE 0.9 % (FLUSH) 0.9 %
10 SYRINGE (ML) INJECTION
Status: CANCELLED | OUTPATIENT
Start: 2019-10-02

## 2019-10-02 RX ORDER — HEPARIN 100 UNIT/ML
500 SYRINGE INTRAVENOUS
Status: CANCELLED | OUTPATIENT
Start: 2019-10-09

## 2019-10-02 NOTE — PROGRESS NOTES
CC  I  Feel ok some SOB     Oriana Tobar is a 74 y.o.    Pt with recurrent renal cell carcinoma. She has  Had no recent tissue biopsy since 2018 when she was diagnosed with RCC via biopsy of a left upper quadrant peritoneal mass  Patient did not take treatment for malignancy at that time per her own choice.  She then developed GI hemorrhaging was found to have a gastric mass consistent with carcinoma.  Latest scans revealed diffuse metastatic disease to lung, liver , peritoneum with bilateral ovarian masses.   History completed 2 doses of IV iron and  Sandostatin  She lives with her family and is eating better.  No further evidence of melena or hematochezia  She is requiring Zofran p.r.n. nausea and probiotics to help with intermittent diarrhea    Pt with weight gain !!!! With megace   Sandostatin injections helping control GI bleed  She is tired and anemia progressed with lower iron levels       Past Medical History:   Diagnosis Date    Anemia     Cancer     kidney    Dementia     Encounter for blood transfusion     11/4/2018    High cholesterol 06/23/2018    Hypertension     No Medication     Renal disorder     Wears dentures     Uppers         Current Outpatient Medications:     cyanocobalamin-cobamamide (B12) 5,000-100 mcg Lozg, , Disp: , Rfl:     ferrous sulfate 325 (65 FE) MG EC tablet, Take 325 mg by mouth once daily., Disp: , Rfl:     Lactobacillus rhamnosus GG (CULTURELLE) 10 billion cell capsule, Take 1 capsule by mouth once daily., Disp: , Rfl:     loperamide HCl/simethicone (IMODIUM ADVANCED ORAL), Take by mouth., Disp: , Rfl:     megestrol (MEGACE) 400 mg/10 mL (40 mg/mL) Susp, Take 10 mLs (400 mg total) by mouth once daily., Disp: 240 mL, Rfl: 2    octreotide (SANDOSTATIN) 100 mcg/mL Soln, Inject 2 mLs (200 mcg total) into the vein once daily., Disp: 40 mL, Rfl: 0    ondansetron (ZOFRAN) 4 MG tablet, Take 4 mg by mouth every 8 (eight) hours as needed for Nausea., Disp: , Rfl:  "    pantoprazole (PROTONIX) 40 MG tablet, Take 40 mg by mouth once daily., Disp: , Rfl: 3    sucralfate (CARAFATE) 100 mg/mL suspension, Take 10 mLs (1 g total) by mouth 4 (four) times daily before meals and nightly., Disp: 1 Bottle, Rfl: 5  Review of patient's allergies indicates:   Allergen Reactions    Codeine Other (See Comments)     Pt shakes and hallucinates    Pcn [penicillins] Anxiety and Other (See Comments)     Social History     Tobacco Use    Smoking status: Former Smoker     Packs/day: 1.00     Years: 50.00     Pack years: 50.00     Last attempt to quit: 10/6/2018     Years since quittin.9    Smokeless tobacco: Never Used    Tobacco comment: pt has stopped; encouraged to remain stopped   Substance Use Topics    Alcohol use: Yes     Frequency: Never     Comment: occasional    Drug use: No     Family History   Adopted: Yes       CONSTITUTIONAL: No fevers, chills, night sweats,  +  weight  Gain megace   SKIN: no rashes or itching  ENT: No headaches, head trauma, vision changes, or eye pain  LYMPH NODES: None noticed   CV: No chest pain, palpitations.   RESP:+ dyspnea on exertion, cno cough, wheezing, or hemoptysis  GI: + nausea, no emesis,  Stable diarrhea, no constipation, melena, hematochezia, pain.   No further vomiting with zofran  No further diarrhea with imodium   : No dysuria, hematuria, urgency, or frequency   HEME: No easy bruising, bleeding problems  PSYCHIATRIC: No depression, anxiety, psychosis, hallucinations.  NEURO: No seizures, memory loss, slight weakness  MSK: No arthralgias or joint swelling         /62   Pulse (!) 114   Resp 18   Ht 5' 11" (1.803 m)   Wt 64.4 kg (142 lb)   LMP  (LMP Unknown)   SpO2 96%   BMI 19.80 kg/m²     Gen:  Patient appears better   Psych: pleasant affect  Eyes: Pupils round and non dilated, EOM intact no nystagmus  Nose: Nares patent  Neck: suppple, no JVD, trachea midline    Lungs: CTAB, no wheezes no fremitus   CV: S1S2 with RR " Tachycardia , No mrg  Abd: soft, NTND, + BS, No HSM, no ascites  Extr: No CCE, MEJIA decreased strength   Skin: intact, no lesions noted  Rheum: No joint swelling    Lab Results   Component Value Date    WBC 11.99 09/30/2019    HGB 9.7 (L) 09/30/2019    HCT 30.9 (L) 09/30/2019    MCV 93 09/30/2019     (H) 09/30/2019     CMP  Sodium   Date Value Ref Range Status   09/30/2019 134 (L) 136 - 145 mmol/L Final     Potassium   Date Value Ref Range Status   09/30/2019 3.6 3.5 - 5.1 mmol/L Final     Chloride   Date Value Ref Range Status   09/30/2019 109 95 - 110 mmol/L Final     CO2   Date Value Ref Range Status   09/30/2019 17 (L) 23 - 29 mmol/L Final     Glucose   Date Value Ref Range Status   09/30/2019 130 (H) 70 - 110 mg/dL Final     BUN, Bld   Date Value Ref Range Status   09/30/2019 11 8 - 23 mg/dL Final     Creatinine   Date Value Ref Range Status   09/30/2019 1.1 0.5 - 1.4 mg/dL Final     Calcium   Date Value Ref Range Status   09/30/2019 7.6 (L) 8.7 - 10.5 mg/dL Final     Total Protein   Date Value Ref Range Status   09/30/2019 6.0 6.0 - 8.4 g/dL Final     Albumin   Date Value Ref Range Status   09/30/2019 2.3 (L) 3.5 - 5.2 g/dL Final     Total Bilirubin   Date Value Ref Range Status   09/30/2019 0.4 0.1 - 1.0 mg/dL Final     Comment:     For infants and newborns, interpretation of results should be based  on gestational age, weight and in agreement with clinical  observations.  Premature Infant recommended reference ranges:  Up to 24 hours.............<8.0 mg/dL  Up to 48 hours............<12.0 mg/dL  3-5 days..................<15.0 mg/dL  6-29 days.................<15.0 mg/dL       Alkaline Phosphatase   Date Value Ref Range Status   09/30/2019 87 55 - 135 U/L Final     AST   Date Value Ref Range Status   09/30/2019 15 10 - 40 U/L Final     ALT   Date Value Ref Range Status   09/30/2019 8 (L) 10 - 44 U/L Final     Anion Gap   Date Value Ref Range Status   09/30/2019 8 8 - 16 mmol/L Final     eGFR if     Date Value Ref Range Status   09/30/2019 57.2 (A) >60 mL/min/1.73 m^2 Final     eGFR if non    Date Value Ref Range Status   09/30/2019 49.6 (A) >60 mL/min/1.73 m^2 Final     Comment:     Calculation used to obtain the estimated glomerular filtration  rate (eGFR) is the CKD-EPI equation.      CT Chest Abdomen Pelvis W W/O Contrast (XPD)   Order: [id number]   Status:  Final result   Visible to patient:  Yes (Patient Portal) Next appt:  None Dx:  Gastric carcinoma; Renal cell carcino...   Details     Reading Physician Reading Date Result Priority   Ramin Singh MD 5/13/2019 STAT      Narrative     EXAMINATION:  CT CHEST ABDOMEN PELVIS W W/O CONTRAST (XPD)    CLINICAL HISTORY:  RCC;Malignant neoplasm of stomach, unspecified    TECHNIQUE:  Low dose axial, sagittal and coronal reformations were performed from the thoracic inlet to the pubic symphysis following the IV administration of 75 mL of Omnipaque 350.  The chest images are with  contrast and the abdomen images are with and without contrast.  30 mL of oral Omnipaque was given.    COMPARISON:  CT abdomen pelvis 10/23/2018.  CT chest 01/13/2016.    FINDINGS:  There are numerous bilateral soft tissue pulmonary nodules ranging in size from 0.3-1.7 cm consistent with pulmonary metastatic disease.  These have increased in size and number when compared to the patient's previous examination.  No focal consolidation.  Pulmonary vascularity is unremarkable.    No significant pleural or pericardial effusions.  No suspicious endobronchial lesion.  There are peripherally enhancing subcarinal masses measuring 1.8 and 1.5 cm most consistent with metastatic lymphadenopathy.  These are new compared to the chest CT from 2016.  No significant hilar, mediastinal or axillary lymphadenopathy.    The liver and spleen are normal in size and attenuation.  Numerous chronic nonenhancing hepatic cysts.  Within the dome of the right hepatic lobe there  are poorly defined round hypoattenuating nodules with subtle peripheral enhancement which have increased in size over the interval currently measuring up to 1.3 cm in diameter.  These are suspicious for hepatic metastatic disease.  There has been interval development of a poorly defined enhancing 2.2 cm nodule of the right hepatic lobe as well as a smaller 1.4 cm partially exophytic enhancing nodule of the inferior right hepatic lobe consistent with metastatic disease.  No perihepatic ascites.  Gallbladder is distended with dependent calcified gallstones.  Pancreas is normal in size and attenuation.  There are perisplenic and perigastric varices consistent with underlying portal hypertension.    Patient is status post prior left nephrectomy and adrenalectomy.  The right kidney is normal in size and enhances homogeneously.  Within the left mid abdomen there is an enlarging peripherally enhancing soft tissue attenuation mass currently measuring 6.4 cm AP x 4.1 cm transverse by 6.2 cm cc (previously measuring 3.7 cm AP x 3.7 cm transverse by 4.1 cm cc).  This is consistent with either residual or recurrent neoplasia.    Large amount of stool throughout the colon.  Scattered diverticula within the colon.  No mesenteric inflammatory changes.  Surgical clips within the distal colon from previous partial colectomy.  No CT evidence for bowel obstruction.    The bladder is partially distended.  The uterus is normal in size and attenuation.  There are bilateral heterogeneously enhancing soft tissue masses of the right and left ovary the largest on the right measuring 7.4 cm and largest on left measuring 3.4 cm.  This is most consistent with ovarian neoplasia.  There is a stable left ovarian dermoid/teratoma.    There are numerous enhancing soft tissue masses of the mesentery which have increased in number and size over the interval consistent with metastatic disease.  The largest mass measures 4.6 cm AP x 5.5 cm transverse  within the midline anterior lower abdomen and appears to abut and invade adjacent small bowel.  No dilated loops of small bowel to suggest resulting obstruction.  New smaller peripherally enhancing soft tissue masses within the pelvis the largest measuring 2.6 cm in diameter also consistent with metastatic disease.    Mild thoracolumbar dextroscoliosis.  Mild bone demineralization.      Impression       1. Interval progression of pulmonary metastatic disease with metastatic subcarinal lymphadenopathy.  2. Interval progression of hepatic metastatic disease.  3. Interval increase in size and number of enhancing mesenteric and pelvic soft tissue masses consistent with progression of metastatic disease.  4. Enlarging bilateral ovarian masses consistent with neoplasia.  5. Chronic dermoid/teratoma of the left ovary.  6. Chronic hepatic cysts.  7. Perisplenic and perigastric varices consistent with portal hypertension.  8. Previous left adrenalectomy and nephrectomy.  9. Enlarging heterogeneously enhancing soft tissue mass of the left abdomen again consistent with either residual or recurrent neoplasia.  10. Bone demineralization.      Electronically signed by: Ramin Singh  Date: 05/13/2019  Time: 10:12             Last Resulted: 05/13/19 1           Post IV iron last week     Gastric carcinoma    Weight loss  -     pantoprazole (PROTONIX) 40 MG tablet; Take 1 tablet (40 mg total) by mouth once daily.  Dispense: 30 tablet; Refill: 0  -     megestrol (MEGACE) 400 mg/10 mL (40 mg/mL) Susp; Take 10 mLs (400 mg total) by mouth once daily.  Dispense: 240 mL; Refill: 2    Decreased appetite  -     pantoprazole (PROTONIX) 40 MG tablet; Take 1 tablet (40 mg total) by mouth once daily.  Dispense: 30 tablet; Refill: 0  -     megestrol (MEGACE) 400 mg/10 mL (40 mg/mL) Susp; Take 10 mLs (400 mg total) by mouth once daily.  Dispense: 240 mL; Refill: 2    Symptomatic anemia  -     CBC auto differential; Future; Expected date:  10/02/2019  -     CMP; Future; Expected date: 10/02/2019    Bleeding  -     CBC auto differential; Future; Expected date: 10/02/2019  -     CMP; Future; Expected date: 10/02/2019    Iron deficiency anemia, unspecified iron deficiency anemia type  -     CBC auto differential; Future; Expected date: 10/02/2019  -     CMP; Future; Expected date: 10/02/2019    Other orders  -     ferric carboxymaltose (INJECTAFER) 750 mg in sodium chloride 0.9% 265 mL infusion  -     sodium chloride 0.9% 100 mL flush bag  -     sodium chloride 0.9% flush 10 mL  -     heparin, porcine (PF) 100 unit/mL injection flush 500 Units  -     alteplase injection 2 mg  -     ferric carboxymaltose (INJECTAFER) 750 mg in sodium chloride 0.9% 265 mL infusion  -     sodium chloride 0.9% 100 mL flush bag  -     sodium chloride 0.9% flush 10 mL  -     heparin, porcine (PF) 100 unit/mL injection flush 500 Units  -     alteplase injection 2 mg    proceed with IV iron   Had a discussion of possible hospice last visit and family and patient are not at that point to except their services.   sandostatin SC  30-40 mg IM monthly   Pt with stable RCC yet bleeding from gastric carcinoma  NO need to treat or monitor nephrectomy or adrenalectomy as this was addressed prior   Scans due in a few months   No pain   No falls yet she is weak and needs assistance   Thank you for allowing me to evaluate and participate in the care of this pleasant patient. Please fell free to call me with any questions or concerns.    Warmly,  Lauren Irene MD    This note was dictated with Dragon and briefly proofread. Please excuse any sentences that may be unclear or words misspelled    Advance Care Planning     Power of   I initiated the process of advance care planning today and explained the importance of this process to the patient.  I introduced the concept of advance directives to the patient, as well. Then the patient received detailed information about the importance of  designating a Health Care Power of  (HCPOA). She was also instructed to communicate with this person about their wishes for future healthcare, should she become sick and lose decision-making capacity. The patient has not previously appointed a HCPOA.   I spent a total time of 30 minutes discussing this issue with the patient.         Living Will  During this visit, I engaged the patient  in the advance care planning process.  The patient and I reviewed the role for advance directives and their purpose in directing future healthcare if the patient's unable to speak for him/herself.  At this point in time, the patient does have full decision-making capacity.  We discussed different extreme health states that she could experience, and reviewed what kind of medical care she would want in those situations.  The patient communicated that if she were comatose and had little chance of a meaningful recovery, she would want machines/life-sustaining treatments used.   I spent a total of  30 minutes engaging the patient in this advance care planning discussion.

## 2019-10-02 NOTE — LETTER
October 2, 2019      Rocco Ross MD  [address]  [address]  [facility] [address]           [facility]  [address] [place] [address]  Phone: [phone]          Patient: Oriana Tobar   MR Number: [id number]   YOB: 1945   Date of Visit: 10/2/2019       Dear Dr. Rocco Ross:    Thank you for referring Oriana Tobar to me for evaluation. Attached you will find relevant portions of my assessment and plan of care.    If you have questions, please do not hesitate to call me. I look forward to following Oriana Tobar along with you.    Sincerely,    Lauren Irene MD    Enclosure  CC:  No Recipients    If you would like to receive this communication electronically, please contact [email or web address] or [phone] to request more information on Search to Phone Link access.    For providers and/or their staff who would like to refer a patient to [facility], please contact us through our one-stop-shop provider referral line, [facility], at [phone].    If you feel you have received this communication in error or would no longer like to receive these types of communications, please e-mail [email or web address]

## 2019-10-07 ENCOUNTER — INFUSION (OUTPATIENT)
Dept: INFUSION THERAPY | Facility: HOSPITAL | Age: 74
End: 2019-10-07
Payer: MEDICARE

## 2019-10-07 ENCOUNTER — LAB VISIT (OUTPATIENT)
Dept: LAB | Facility: HOSPITAL | Age: 74
End: 2019-10-07
Attending: INTERNAL MEDICINE
Payer: MEDICARE

## 2019-10-07 VITALS
TEMPERATURE: 97 F | DIASTOLIC BLOOD PRESSURE: 62 MMHG | OXYGEN SATURATION: 100 % | SYSTOLIC BLOOD PRESSURE: 123 MMHG | RESPIRATION RATE: 18 BRPM | HEART RATE: 81 BPM

## 2019-10-07 DIAGNOSIS — D64.9 SYMPTOMATIC ANEMIA: ICD-10-CM

## 2019-10-07 DIAGNOSIS — R58 BLEEDING: ICD-10-CM

## 2019-10-07 DIAGNOSIS — D50.9 IRON DEFICIENCY ANEMIA, UNSPECIFIED IRON DEFICIENCY ANEMIA TYPE: ICD-10-CM

## 2019-10-07 DIAGNOSIS — K92.1 GASTROINTESTINAL HEMORRHAGE WITH MELENA: Primary | ICD-10-CM

## 2019-10-07 DIAGNOSIS — D50.0 IRON DEFICIENCY ANEMIA DUE TO CHRONIC BLOOD LOSS: ICD-10-CM

## 2019-10-07 LAB
ALBUMIN SERPL BCP-MCNC: 2.1 G/DL (ref 3.5–5.2)
ALP SERPL-CCNC: 75 U/L (ref 55–135)
ALT SERPL W/O P-5'-P-CCNC: 6 U/L (ref 10–44)
ANION GAP SERPL CALC-SCNC: 5 MMOL/L (ref 8–16)
AST SERPL-CCNC: 13 U/L (ref 10–40)
BASOPHILS # BLD AUTO: 0.03 K/UL (ref 0–0.2)
BASOPHILS NFR BLD: 0.3 % (ref 0–1.9)
BILIRUB SERPL-MCNC: 0.8 MG/DL (ref 0.1–1)
BUN SERPL-MCNC: 14 MG/DL (ref 8–23)
CALCIUM SERPL-MCNC: 7.4 MG/DL (ref 8.7–10.5)
CHLORIDE SERPL-SCNC: 108 MMOL/L (ref 95–110)
CO2 SERPL-SCNC: 19 MMOL/L (ref 23–29)
CREAT SERPL-MCNC: 1.2 MG/DL (ref 0.5–1.4)
DIFFERENTIAL METHOD: ABNORMAL
EOSINOPHIL # BLD AUTO: 0.1 K/UL (ref 0–0.5)
EOSINOPHIL NFR BLD: 0.7 % (ref 0–8)
ERYTHROCYTE [DISTWIDTH] IN BLOOD BY AUTOMATED COUNT: 16.3 % (ref 11.5–14.5)
EST. GFR  (AFRICAN AMERICAN): 51.4 ML/MIN/1.73 M^2
EST. GFR  (NON AFRICAN AMERICAN): 44.6 ML/MIN/1.73 M^2
GLUCOSE SERPL-MCNC: 120 MG/DL (ref 70–110)
HCT VFR BLD AUTO: 27.7 % (ref 37–48.5)
HGB BLD-MCNC: 8.7 G/DL (ref 12–16)
IMM GRANULOCYTES # BLD AUTO: 0.11 K/UL (ref 0–0.04)
IMM GRANULOCYTES NFR BLD AUTO: 1.2 % (ref 0–0.5)
LYMPHOCYTES # BLD AUTO: 1.6 K/UL (ref 1–4.8)
LYMPHOCYTES NFR BLD: 17 % (ref 18–48)
MCH RBC QN AUTO: 28.6 PG (ref 27–31)
MCHC RBC AUTO-ENTMCNC: 31.4 G/DL (ref 32–36)
MCV RBC AUTO: 91 FL (ref 82–98)
MONOCYTES # BLD AUTO: 0.7 K/UL (ref 0.3–1)
MONOCYTES NFR BLD: 7.4 % (ref 4–15)
NEUTROPHILS # BLD AUTO: 6.7 K/UL (ref 1.8–7.7)
NEUTROPHILS NFR BLD: 73.4 % (ref 38–73)
NRBC BLD-RTO: 0 /100 WBC
PLATELET # BLD AUTO: 500 K/UL (ref 150–350)
PMV BLD AUTO: 8.7 FL (ref 9.2–12.9)
POTASSIUM SERPL-SCNC: 3.1 MMOL/L (ref 3.5–5.1)
PROT SERPL-MCNC: 5.6 G/DL (ref 6–8.4)
RBC # BLD AUTO: 3.04 M/UL (ref 4–5.4)
SODIUM SERPL-SCNC: 132 MMOL/L (ref 136–145)
WBC # BLD AUTO: 9.14 K/UL (ref 3.9–12.7)

## 2019-10-07 PROCEDURE — 96374 THER/PROPH/DIAG INJ IV PUSH: CPT

## 2019-10-07 PROCEDURE — 63600175 PHARM REV CODE 636 W HCPCS: Mod: JG | Performed by: INTERNAL MEDICINE

## 2019-10-07 PROCEDURE — 80053 COMPREHEN METABOLIC PANEL: CPT

## 2019-10-07 PROCEDURE — 85025 COMPLETE CBC W/AUTO DIFF WBC: CPT

## 2019-10-07 PROCEDURE — 36415 COLL VENOUS BLD VENIPUNCTURE: CPT

## 2019-10-07 RX ORDER — SODIUM CHLORIDE 0.9 % (FLUSH) 0.9 %
10 SYRINGE (ML) INJECTION
Status: DISCONTINUED | OUTPATIENT
Start: 2019-10-07 | End: 2019-10-07 | Stop reason: HOSPADM

## 2019-10-07 RX ORDER — HEPARIN 100 UNIT/ML
500 SYRINGE INTRAVENOUS
Status: DISCONTINUED | OUTPATIENT
Start: 2019-10-07 | End: 2019-10-07 | Stop reason: HOSPADM

## 2019-10-07 RX ADMIN — FERRIC CARBOXYMALTOSE INJECTION 750 MG: 50 INJECTION, SOLUTION INTRAVENOUS at 09:10

## 2019-10-07 NOTE — NURSING
Pt resting with grand daughter at bedside. Pt voices no needs or concerns. Warm blankets and pillow provided. Pt denies any significant weakness, fatigue, or dizziness. Vital signs stable. Iron infusing w/o difficulty. DS  0907 Lab at bedside for blood draw. NATHAN

## 2019-10-11 ENCOUNTER — TELEPHONE (OUTPATIENT)
Dept: HEMATOLOGY/ONCOLOGY | Facility: CLINIC | Age: 74
End: 2019-10-11

## 2019-10-11 NOTE — TELEPHONE ENCOUNTER
----- Message from Lauren Irene MD sent at 10/10/2019  9:02 AM CDT -----  Can you please have her take 2 calcium with D nightly

## 2019-10-11 NOTE — TELEPHONE ENCOUNTER
Daughter notified that Dr Irene would like patient to start calcium with Vit d every night. Daughter verbalized understanding.

## 2019-10-14 ENCOUNTER — INFUSION (OUTPATIENT)
Dept: INFUSION THERAPY | Facility: HOSPITAL | Age: 74
End: 2019-10-14
Payer: MEDICARE

## 2019-10-14 ENCOUNTER — LAB VISIT (OUTPATIENT)
Dept: LAB | Facility: HOSPITAL | Age: 74
End: 2019-10-14
Attending: INTERNAL MEDICINE
Payer: MEDICARE

## 2019-10-14 VITALS
OXYGEN SATURATION: 100 % | RESPIRATION RATE: 18 BRPM | SYSTOLIC BLOOD PRESSURE: 147 MMHG | DIASTOLIC BLOOD PRESSURE: 65 MMHG | TEMPERATURE: 98 F | HEART RATE: 63 BPM

## 2019-10-14 DIAGNOSIS — K92.2 ACUTE GASTROINTESTINAL HEMORRHAGE: Primary | ICD-10-CM

## 2019-10-14 DIAGNOSIS — D50.0 IRON DEFICIENCY ANEMIA DUE TO CHRONIC BLOOD LOSS: ICD-10-CM

## 2019-10-14 DIAGNOSIS — K92.1 GASTROINTESTINAL HEMORRHAGE WITH MELENA: Primary | ICD-10-CM

## 2019-10-14 LAB
BASOPHILS # BLD AUTO: 0.04 K/UL (ref 0–0.2)
BASOPHILS NFR BLD: 0.4 % (ref 0–1.9)
DIFFERENTIAL METHOD: ABNORMAL
EOSINOPHIL # BLD AUTO: 0 K/UL (ref 0–0.5)
EOSINOPHIL NFR BLD: 0.4 % (ref 0–8)
ERYTHROCYTE [DISTWIDTH] IN BLOOD BY AUTOMATED COUNT: 17.7 % (ref 11.5–14.5)
HCT VFR BLD AUTO: 32 % (ref 37–48.5)
HGB BLD-MCNC: 9.8 G/DL (ref 12–16)
IMM GRANULOCYTES # BLD AUTO: 0.13 K/UL (ref 0–0.04)
IMM GRANULOCYTES NFR BLD AUTO: 1.2 % (ref 0–0.5)
LYMPHOCYTES # BLD AUTO: 2.1 K/UL (ref 1–4.8)
LYMPHOCYTES NFR BLD: 20.1 % (ref 18–48)
MCH RBC QN AUTO: 28.7 PG (ref 27–31)
MCHC RBC AUTO-ENTMCNC: 30.6 G/DL (ref 32–36)
MCV RBC AUTO: 94 FL (ref 82–98)
MONOCYTES # BLD AUTO: 0.7 K/UL (ref 0.3–1)
MONOCYTES NFR BLD: 6.9 % (ref 4–15)
NEUTROPHILS # BLD AUTO: 7.4 K/UL (ref 1.8–7.7)
NEUTROPHILS NFR BLD: 71 % (ref 38–73)
NRBC BLD-RTO: 0 /100 WBC
PLATELET # BLD AUTO: 565 K/UL (ref 150–350)
PMV BLD AUTO: 9.1 FL (ref 9.2–12.9)
RBC # BLD AUTO: 3.42 M/UL (ref 4–5.4)
WBC # BLD AUTO: 10.43 K/UL (ref 3.9–12.7)

## 2019-10-14 PROCEDURE — 36415 COLL VENOUS BLD VENIPUNCTURE: CPT

## 2019-10-14 PROCEDURE — 63600175 PHARM REV CODE 636 W HCPCS: Mod: JG | Performed by: INTERNAL MEDICINE

## 2019-10-14 PROCEDURE — 85025 COMPLETE CBC W/AUTO DIFF WBC: CPT

## 2019-10-14 PROCEDURE — 96374 THER/PROPH/DIAG INJ IV PUSH: CPT

## 2019-10-14 RX ORDER — HEPARIN 100 UNIT/ML
500 SYRINGE INTRAVENOUS
Status: DISCONTINUED | OUTPATIENT
Start: 2019-10-14 | End: 2019-10-14 | Stop reason: HOSPADM

## 2019-10-14 RX ORDER — SODIUM CHLORIDE 0.9 % (FLUSH) 0.9 %
10 SYRINGE (ML) INJECTION
Status: DISCONTINUED | OUTPATIENT
Start: 2019-10-14 | End: 2019-10-14 | Stop reason: HOSPADM

## 2019-10-14 RX ADMIN — FERRIC CARBOXYMALTOSE INJECTION 750 MG: 50 INJECTION, SOLUTION INTRAVENOUS at 08:10

## 2019-10-14 NOTE — NURSING
0808 Pt wheeled in w/c by grand daughter to room 11. Pt assisted to infusion chair. Pt denies weakness,dizziness,sob, or pains at this time. Pt reports feeling alright. Vital signs stable. 2 warm blankets provided per pt request. DS  0900 Rt fa site unremarkable. Pt has no complaints of itching, sob, pain, or headaches. Pt tolerated infusion well. Pt assisted to wheel chair and taken to vehicle for discharge. ds

## 2019-10-16 ENCOUNTER — TELEPHONE (OUTPATIENT)
Dept: HEMATOLOGY/ONCOLOGY | Facility: CLINIC | Age: 74
End: 2019-10-16

## 2019-10-16 NOTE — TELEPHONE ENCOUNTER
Patient's daughter, Maryjane called regarding needing blood transfusion. She is scheduled tomorrow for her infusion and she wants patient to have blood at same time.  Maryjane can be reached at [phone] or [phone]

## 2019-10-17 ENCOUNTER — INFUSION (OUTPATIENT)
Dept: INFUSION THERAPY | Facility: HOSPITAL | Age: 74
End: 2019-10-17
Payer: MEDICARE

## 2019-10-17 VITALS
HEART RATE: 74 BPM | SYSTOLIC BLOOD PRESSURE: 125 MMHG | DIASTOLIC BLOOD PRESSURE: 74 MMHG | OXYGEN SATURATION: 98 % | TEMPERATURE: 97 F | RESPIRATION RATE: 18 BRPM

## 2019-10-17 DIAGNOSIS — D64.9 ANEMIA, UNSPECIFIED TYPE: ICD-10-CM

## 2019-10-17 DIAGNOSIS — D50.0 CHRONIC BLOOD LOSS ANEMIA: ICD-10-CM

## 2019-10-17 DIAGNOSIS — C16.2 MALIGNANT NEOPLASM OF BODY OF STOMACH: ICD-10-CM

## 2019-10-17 DIAGNOSIS — K92.1 GASTROINTESTINAL HEMORRHAGE WITH MELENA: Primary | ICD-10-CM

## 2019-10-17 DIAGNOSIS — C16.9 GASTRIC CARCINOMA: ICD-10-CM

## 2019-10-17 PROCEDURE — 63600175 PHARM REV CODE 636 W HCPCS: Mod: JG | Performed by: INTERNAL MEDICINE

## 2019-10-17 PROCEDURE — 96372 THER/PROPH/DIAG INJ SC/IM: CPT

## 2019-10-17 RX ADMIN — OCTREOTIDE ACETATE 30 MG: KIT at 10:10

## 2019-10-20 ENCOUNTER — HOSPITAL ENCOUNTER (INPATIENT)
Facility: HOSPITAL | Age: 74
LOS: 5 days | Discharge: HOSPICE | DRG: 686 | End: 2019-10-25
Attending: EMERGENCY MEDICINE | Admitting: INTERNAL MEDICINE
Payer: MEDICARE

## 2019-10-20 DIAGNOSIS — K56.609 SBO (SMALL BOWEL OBSTRUCTION): ICD-10-CM

## 2019-10-20 DIAGNOSIS — K56.609 SMALL BOWEL OBSTRUCTION: Primary | ICD-10-CM

## 2019-10-20 DIAGNOSIS — R14.0 ABDOMINAL DISTENSION: ICD-10-CM

## 2019-10-20 DIAGNOSIS — C78.00: ICD-10-CM

## 2019-10-20 PROBLEM — E87.1 HYPONATREMIA: Status: ACTIVE | Noted: 2019-10-20

## 2019-10-20 PROBLEM — N17.9 AKI (ACUTE KIDNEY INJURY): Status: ACTIVE | Noted: 2019-10-20

## 2019-10-20 LAB
ABO + RH BLD: NORMAL
ALBUMIN SERPL BCP-MCNC: 2.8 G/DL (ref 3.5–5.2)
ALP SERPL-CCNC: 75 U/L (ref 55–135)
ALT SERPL W/O P-5'-P-CCNC: 7 U/L (ref 10–44)
ANION GAP SERPL CALC-SCNC: 7 MMOL/L (ref 8–16)
AST SERPL-CCNC: 11 U/L (ref 10–40)
BACTERIA #/AREA URNS HPF: ABNORMAL /HPF
BASOPHILS # BLD AUTO: 0.06 K/UL (ref 0–0.2)
BASOPHILS NFR BLD: 0.5 % (ref 0–1.9)
BILIRUB SERPL-MCNC: 0.8 MG/DL (ref 0.1–1)
BILIRUB UR QL STRIP: ABNORMAL
BLD GP AB SCN CELLS X3 SERPL QL: NORMAL
BUN SERPL-MCNC: 19 MG/DL (ref 8–23)
CALCIUM SERPL-MCNC: 7.3 MG/DL (ref 8.7–10.5)
CHLORIDE SERPL-SCNC: 104 MMOL/L (ref 95–110)
CLARITY UR: CLEAR
CO2 SERPL-SCNC: 15 MMOL/L (ref 23–29)
COLOR UR: YELLOW
CREAT SERPL-MCNC: 1.7 MG/DL (ref 0.5–1.4)
DIFFERENTIAL METHOD: ABNORMAL
EOSINOPHIL # BLD AUTO: 0 K/UL (ref 0–0.5)
EOSINOPHIL NFR BLD: 0.2 % (ref 0–8)
ERYTHROCYTE [DISTWIDTH] IN BLOOD BY AUTOMATED COUNT: 15.9 % (ref 11.5–14.5)
EST. GFR  (AFRICAN AMERICAN): 33.8 ML/MIN/1.73 M^2
EST. GFR  (NON AFRICAN AMERICAN): 29.3 ML/MIN/1.73 M^2
GLUCOSE SERPL-MCNC: 110 MG/DL (ref 70–110)
GLUCOSE UR QL STRIP: NEGATIVE
HCT VFR BLD AUTO: 39.2 % (ref 37–48.5)
HGB BLD-MCNC: 12.6 G/DL (ref 12–16)
HGB UR QL STRIP: ABNORMAL
HYALINE CASTS #/AREA URNS LPF: 3 /LPF
IMM GRANULOCYTES # BLD AUTO: 0.12 K/UL (ref 0–0.04)
IMM GRANULOCYTES NFR BLD AUTO: 1 % (ref 0–0.5)
INR PPP: 1.4 (ref 0.8–1.2)
KETONES UR QL STRIP: ABNORMAL
LEUKOCYTE ESTERASE UR QL STRIP: NEGATIVE
LYMPHOCYTES # BLD AUTO: 2.4 K/UL (ref 1–4.8)
LYMPHOCYTES NFR BLD: 20.2 % (ref 18–48)
MAGNESIUM SERPL-MCNC: 1.7 MG/DL (ref 1.6–2.6)
MCH RBC QN AUTO: 29.1 PG (ref 27–31)
MCHC RBC AUTO-ENTMCNC: 32.1 G/DL (ref 32–36)
MCV RBC AUTO: 91 FL (ref 82–98)
MICROSCOPIC COMMENT: ABNORMAL
MONOCYTES # BLD AUTO: 0.8 K/UL (ref 0.3–1)
MONOCYTES NFR BLD: 6.4 % (ref 4–15)
NEUTROPHILS # BLD AUTO: 8.5 K/UL (ref 1.8–7.7)
NEUTROPHILS NFR BLD: 71.7 % (ref 38–73)
NITRITE UR QL STRIP: NEGATIVE
NRBC BLD-RTO: 0 /100 WBC
PH UR STRIP: 6 [PH] (ref 5–8)
PLATELET # BLD AUTO: 513 K/UL (ref 150–350)
PMV BLD AUTO: 9.8 FL (ref 9.2–12.9)
POTASSIUM SERPL-SCNC: 4 MMOL/L (ref 3.5–5.1)
PROT SERPL-MCNC: 6.5 G/DL (ref 6–8.4)
PROT UR QL STRIP: ABNORMAL
PROTHROMBIN TIME: 15.5 SEC (ref 9–12.5)
RBC # BLD AUTO: 4.33 M/UL (ref 4–5.4)
RBC #/AREA URNS HPF: 10 /HPF (ref 0–4)
SODIUM SERPL-SCNC: 126 MMOL/L (ref 136–145)
SP GR UR STRIP: 1.02 (ref 1–1.03)
SQUAMOUS #/AREA URNS HPF: 5 /HPF
URN SPEC COLLECT METH UR: ABNORMAL
UROBILINOGEN UR STRIP-ACNC: NEGATIVE EU/DL
WBC # BLD AUTO: 11.8 K/UL (ref 3.9–12.7)
WBC #/AREA URNS HPF: 3 /HPF (ref 0–5)

## 2019-10-20 PROCEDURE — 63600175 PHARM REV CODE 636 W HCPCS: Performed by: EMERGENCY MEDICINE

## 2019-10-20 PROCEDURE — 81000 URINALYSIS NONAUTO W/SCOPE: CPT

## 2019-10-20 PROCEDURE — 93005 ELECTROCARDIOGRAM TRACING: CPT

## 2019-10-20 PROCEDURE — 25500020 PHARM REV CODE 255: Performed by: EMERGENCY MEDICINE

## 2019-10-20 PROCEDURE — 86850 RBC ANTIBODY SCREEN: CPT

## 2019-10-20 PROCEDURE — 85610 PROTHROMBIN TIME: CPT

## 2019-10-20 PROCEDURE — 96361 HYDRATE IV INFUSION ADD-ON: CPT

## 2019-10-20 PROCEDURE — 36415 COLL VENOUS BLD VENIPUNCTURE: CPT

## 2019-10-20 PROCEDURE — 74019 RADEX ABDOMEN 2 VIEWS: CPT | Mod: 26,,, | Performed by: RADIOLOGY

## 2019-10-20 PROCEDURE — 74019 XR ABDOMEN FLAT AND ERECT: ICD-10-PCS | Mod: 26,,, | Performed by: RADIOLOGY

## 2019-10-20 PROCEDURE — 74018 RADEX ABDOMEN 1 VIEW: CPT | Mod: 26,,, | Performed by: RADIOLOGY

## 2019-10-20 PROCEDURE — 96374 THER/PROPH/DIAG INJ IV PUSH: CPT

## 2019-10-20 PROCEDURE — 80053 COMPREHEN METABOLIC PANEL: CPT

## 2019-10-20 PROCEDURE — 85025 COMPLETE CBC W/AUTO DIFF WBC: CPT

## 2019-10-20 PROCEDURE — 74176 CT ABD & PELVIS W/O CONTRAST: CPT | Mod: TC

## 2019-10-20 PROCEDURE — 71045 X-RAY EXAM CHEST 1 VIEW: CPT | Mod: 26,,, | Performed by: RADIOLOGY

## 2019-10-20 PROCEDURE — 71045 XR CHEST 1 VIEW: ICD-10-PCS | Mod: 26,,, | Performed by: RADIOLOGY

## 2019-10-20 PROCEDURE — 71045 X-RAY EXAM CHEST 1 VIEW: CPT | Mod: TC,FY

## 2019-10-20 PROCEDURE — 74176 CT ABD & PELVIS W/O CONTRAST: CPT | Mod: 26,,, | Performed by: RADIOLOGY

## 2019-10-20 PROCEDURE — 21400001 HC TELEMETRY ROOM

## 2019-10-20 PROCEDURE — 63600175 PHARM REV CODE 636 W HCPCS: Performed by: INTERNAL MEDICINE

## 2019-10-20 PROCEDURE — 96372 THER/PROPH/DIAG INJ SC/IM: CPT

## 2019-10-20 PROCEDURE — 96376 TX/PRO/DX INJ SAME DRUG ADON: CPT

## 2019-10-20 PROCEDURE — 96375 TX/PRO/DX INJ NEW DRUG ADDON: CPT

## 2019-10-20 PROCEDURE — 99285 EMERGENCY DEPT VISIT HI MDM: CPT | Mod: 25

## 2019-10-20 PROCEDURE — 74018 RADEX ABDOMEN 1 VIEW: CPT | Mod: TC,FY

## 2019-10-20 PROCEDURE — 74018 XR NON-RADIOLOGIST PERFORMED NG/GASTRIC TUBE CHECK: ICD-10-PCS | Mod: 26,,, | Performed by: RADIOLOGY

## 2019-10-20 PROCEDURE — 74176 CT ABDOMEN PELVIS WITHOUT CONTRAST: ICD-10-PCS | Mod: 26,,, | Performed by: RADIOLOGY

## 2019-10-20 PROCEDURE — 83735 ASSAY OF MAGNESIUM: CPT

## 2019-10-20 PROCEDURE — 74019 RADEX ABDOMEN 2 VIEWS: CPT | Mod: TC,FY

## 2019-10-20 RX ORDER — CALCIUM GLUCONATE 98 MG/ML
2 INJECTION, SOLUTION INTRAVENOUS ONCE
Status: DISCONTINUED | OUTPATIENT
Start: 2019-10-20 | End: 2019-10-20

## 2019-10-20 RX ORDER — SODIUM CHLORIDE 9 MG/ML
1000 INJECTION, SOLUTION INTRAVENOUS
Status: DISCONTINUED | OUTPATIENT
Start: 2019-10-20 | End: 2019-10-20

## 2019-10-20 RX ORDER — ONDANSETRON 2 MG/ML
4 INJECTION INTRAMUSCULAR; INTRAVENOUS
Status: COMPLETED | OUTPATIENT
Start: 2019-10-20 | End: 2019-10-20

## 2019-10-20 RX ORDER — SODIUM CHLORIDE 9 MG/ML
INJECTION, SOLUTION INTRAVENOUS CONTINUOUS
Status: DISCONTINUED | OUTPATIENT
Start: 2019-10-20 | End: 2019-10-23

## 2019-10-20 RX ORDER — LORAZEPAM 2 MG/ML
0.5 INJECTION INTRAMUSCULAR 4 TIMES DAILY PRN
Status: DISCONTINUED | OUTPATIENT
Start: 2019-10-20 | End: 2019-10-25 | Stop reason: HOSPADM

## 2019-10-20 RX ORDER — SODIUM CHLORIDE 9 MG/ML
1000 INJECTION, SOLUTION INTRAVENOUS
Status: COMPLETED | OUTPATIENT
Start: 2019-10-20 | End: 2019-10-20

## 2019-10-20 RX ORDER — LORAZEPAM 2 MG/ML
1 INJECTION INTRAMUSCULAR
Status: COMPLETED | OUTPATIENT
Start: 2019-10-20 | End: 2019-10-20

## 2019-10-20 RX ORDER — ONDANSETRON 2 MG/ML
4 INJECTION INTRAMUSCULAR; INTRAVENOUS EVERY 6 HOURS PRN
Status: DISCONTINUED | OUTPATIENT
Start: 2019-10-20 | End: 2019-10-25 | Stop reason: HOSPADM

## 2019-10-20 RX ORDER — ACETAMINOPHEN 120 MG/1
325 SUPPOSITORY RECTAL EVERY 6 HOURS PRN
Status: DISCONTINUED | OUTPATIENT
Start: 2019-10-20 | End: 2019-10-20

## 2019-10-20 RX ORDER — ACETAMINOPHEN 120 MG/1
325 SUPPOSITORY RECTAL EVERY 6 HOURS PRN
Status: DISCONTINUED | OUTPATIENT
Start: 2019-10-20 | End: 2019-10-25 | Stop reason: HOSPADM

## 2019-10-20 RX ORDER — SODIUM CHLORIDE 0.9 % (FLUSH) 0.9 %
10 SYRINGE (ML) INJECTION
Status: DISCONTINUED | OUTPATIENT
Start: 2019-10-20 | End: 2019-10-25 | Stop reason: HOSPADM

## 2019-10-20 RX ADMIN — IOHEXOL 15 ML: 300 INJECTION, SOLUTION INTRAVENOUS at 03:10

## 2019-10-20 RX ADMIN — SODIUM CHLORIDE 100 ML/HR: 0.9 INJECTION, SOLUTION INTRAVENOUS at 08:10

## 2019-10-20 RX ADMIN — SODIUM CHLORIDE 1000 ML: 0.9 INJECTION, SOLUTION INTRAVENOUS at 11:10

## 2019-10-20 RX ADMIN — SODIUM CHLORIDE 1000 ML: 0.9 INJECTION, SOLUTION INTRAVENOUS at 02:10

## 2019-10-20 RX ADMIN — ONDANSETRON 4 MG: 2 INJECTION INTRAMUSCULAR; INTRAVENOUS at 06:10

## 2019-10-20 RX ADMIN — LORAZEPAM 1 MG: 2 INJECTION, SOLUTION INTRAMUSCULAR; INTRAVENOUS at 05:10

## 2019-10-20 RX ADMIN — ONDANSETRON 4 MG: 2 INJECTION INTRAMUSCULAR; INTRAVENOUS at 11:10

## 2019-10-20 RX ADMIN — CALCIUM GLUCONATE 2000 MG: 98 INJECTION, SOLUTION INTRAVENOUS at 09:10

## 2019-10-20 NOTE — ED PROVIDER NOTES
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    She is interviewed examined in room 5    Daughter reports some abdominal distension  Mild abdominal pain reported in the upper abdomen    Patient has some degree of dementia     She contributes little to the past medical history history of present illness or review of systems her daughter generally speaking for her.        Current Outpatient Medications:     cyanocobalamin-cobamamide (B12) 5,000-100 mcg Lozg, , Disp: , Rfl:     ferrous sulfate 325 (65 FE) MG EC tablet, Take 325 mg by mouth once daily., Disp: , Rfl:     Lactobacillus rhamnosus GG (CULTURELLE) 10 billion cell capsule, Take 1 capsule by mouth once daily., Disp: , Rfl:     loperamide HCl/simethicone (IMODIUM ADVANCED ORAL), Take by mouth., Disp: , Rfl:     megestrol (MEGACE) 400 mg/10 mL (40 mg/mL) Susp, Take 10 mLs (400 mg total) by mouth once daily., Disp: 240 mL, Rfl: 2    octreotide (SANDOSTATIN) 100 mcg/mL Soln, Inject 2 mLs (200 mcg total) into the vein once daily., Disp: 40 mL, Rfl: 0    ondansetron (ZOFRAN) 4 MG tablet, Take 4 mg by mouth every 8 (eight) hours as needed for Nausea., Disp: , Rfl:     pantoprazole (PROTONIX) 40 MG tablet, Take 1 tablet (40 mg total) by mouth once daily., Disp: 30 tablet, Rfl: 0    sucralfate (CARAFATE) 100 mg/mL suspension, Take 10 mLs (1 g total) by mouth 4 (four) times daily before meals and nightly., Disp: 1 Bottle, Rfl: 5          Review of patient's allergies indicates:   Allergen Reactions    Codeine Other (See Comments)     Pt shakes and hallucinates    Pcn [penicillins] Anxiety and Other (See  Comments)     Past Medical History:   Diagnosis Date    Anemia     Cancer     kidney    Dementia     Encounter for blood transfusion     2018    High cholesterol 2018    Hypertension     No Medication     Renal disorder     Wears dentures     Uppers     Past Surgical History:   Procedure Laterality Date     SECTION      COLONOSCOPY N/A 2018    Procedure: COLONOSCOPY;  Surgeon: Torres Son MD;  Location: [facility] ENDO;  Service: Endoscopy;  Laterality: N/A;    ESOPHAGOGASTRODUODENOSCOPY N/A 2018    Procedure: EGD (ESOPHAGOGASTRODUODENOSCOPY);  Surgeon: Torres Son MD;  Location: [facility] ENDO;  Service: Endoscopy;  Laterality: N/A;    EYE SURGERY      Rt eye Catarac    HYSTERECTOMY      INTRALUMINAL GASTROINTESTINAL TRACT IMAGING VIA CAPSULE N/A 2018    Procedure: IMAGING, GI TRACT, INTRALUMINAL, VIA CAPSULE;  Surgeon: Rocco Ross MD;  Location: [facility] ENDO;  Service: Endoscopy;  Laterality: N/A;    KIDNEY SURGERY Left 2018    Left kidney removed due to cancer    lasix surgery       Family History   Adopted: Yes     Social History     Tobacco Use    Smoking status: Former Smoker     Packs/day: 1.00     Years: 50.00     Pack years: 50.00     Last attempt to quit: 10/6/2018     Years since quittin.0    Smokeless tobacco: Never Used    Tobacco comment: pt has stopped; encouraged to remain stopped   Substance Use Topics    Alcohol use: Yes     Frequency: Never     Comment: occasional    Drug use: No     Review of Systems   Constitutional: Positive for appetite change and fatigue.   Respiratory: Negative.    Cardiovascular: Negative.    Gastrointestinal: Positive for abdominal distention, abdominal pain, nausea and vomiting. Negative for anal bleeding, blood in stool, constipation, diarrhea and rectal pain.   Genitourinary: Positive for decreased urine volume.   Musculoskeletal: Negative.    Skin: Negative.    Neurological: Negative for  dizziness, weakness, light-headedness and numbness.   Hematological: Negative.    All other systems reviewed and are negative.      Physical Exam     Initial Vitals   BP Pulse Resp Temp SpO2   10/20/19 1018 10/20/19 1018 10/20/19 1018 10/20/19 1018 10/20/19 1016   106/76 (!) 124 20 97.8 °F (36.6 °C) 100 %      MAP       --                Physical Exam    Nursing note and vitals reviewed.  Constitutional: She appears well-developed and well-nourished. She is not diaphoretic. No distress.   HENT:   Head: Normocephalic and atraumatic.   Nose: Nose normal.   Mouth/Throat: Oropharynx is clear and moist. No oropharyngeal exudate.   Eyes: Conjunctivae and EOM are normal. Pupils are equal, round, and reactive to light. Right eye exhibits no discharge. Left eye exhibits no discharge. No scleral icterus.   Neck: Normal range of motion. Neck supple.   Cardiovascular: Regular rhythm, S1 normal, S2 normal, normal heart sounds and intact distal pulses. Tachycardia present.  Exam reveals no gallop and no friction rub.    No murmur heard.  Pulmonary/Chest: Breath sounds normal. No respiratory distress. She has no wheezes. She has no rhonchi. She has no rales.   Abdominal: Soft. Bowel sounds are normal. She exhibits no distension and no mass. There is tenderness in the epigastric area. There is no rebound and no guarding.       Minimal distension with pain in the epigastric region   Musculoskeletal: Normal range of motion. She exhibits no edema or tenderness.   Lymphadenopathy:     She has no cervical adenopathy.   Neurological: She is alert and oriented to person, place, and time. She has normal strength. No cranial nerve deficit or sensory deficit.   Skin: Skin is warm and dry. Capillary refill takes less than 2 seconds. No rash noted. No erythema. No pallor.   Psychiatric: She has a normal mood and affect. Her behavior is normal. Judgment and thought content normal.         ED Course   Critical Care  Date/Time: 10/20/2019 5:00  PM  Performed by: Davy Eddy MD  Authorized by: Davy Eddy MD   Total critical care time (exclusive of procedural time) : 45 minutes        Labs Reviewed   CBC W/ AUTO DIFFERENTIAL - Abnormal; Notable for the following components:       Result Value    RDW 15.9 (*)     Platelets 513 (*)     Immature Granulocytes 1.0 (*)     Gran # (ANC) 8.5 (*)     Immature Grans (Abs) 0.12 (*)     All other components within normal limits   COMPREHENSIVE METABOLIC PANEL - Abnormal; Notable for the following components:    Sodium 126 (*)     CO2 15 (*)     Creatinine 1.7 (*)     Calcium 7.3 (*)     Albumin 2.8 (*)     ALT 7 (*)     Anion Gap 7 (*)     eGFR if  33.8 (*)     eGFR if non  29.3 (*)     All other components within normal limits    Narrative:     Recoll. [id number] by QUOC at 10/20/2019 11:26, reason: Specimen   hemolyzed   PROTIME-INR - Abnormal; Notable for the following components:    Prothrombin Time 15.5 (*)     INR 1.4 (*)     All other components within normal limits   URINALYSIS, REFLEX TO URINE CULTURE - Abnormal; Notable for the following components:    Protein, UA 1+ (*)     Ketones, UA 1+ (*)     Bilirubin (UA) 1+ (*)     Occult Blood UA 2+ (*)     All other components within normal limits    Narrative:     Preferred Collection Type->Urine, Clean Catch   URINALYSIS MICROSCOPIC - Abnormal; Notable for the following components:    RBC, UA 10 (*)     Bacteria Few (*)     Hyaline Casts, UA 3 (*)     All other components within normal limits    Narrative:     Preferred Collection Type->Urine, Clean Catch   TYPE & SCREEN          Imaging Results          X-Ray Abdomen Flat And Erect (Final result)  Result time 10/20/19 12:25:06    Final result by Lexy Wade MD (10/20/19 12:25:06)                 Impression:      Stomach appearing mildly distended.  Otherwise unremarkable intestinal gas pattern      Electronically signed by: Lexy Wade  MD  Date:    10/20/2019  Time:    12:25             Narrative:    EXAMINATION:  XR ABDOMEN FLAT AND ERECT    CLINICAL HISTORY:  Abdominal distension (gaseous)    TECHNIQUE:  Flat and erect AP views of the abdomen were performed.    COMPARISON:  10/14/2017    FINDINGS:  No free intraperitoneal air seen beneath the diaphragms.  Stomach is appears moderately distended with fluid with air-fluid level.  No abnormal distention of bowel.  Postoperative changes noted with sutures left side of the abdomen.                               X-Ray Chest 1 View (Final result)  Result time 10/20/19 12:26:14    Final result by Lexy Wade MD (10/20/19 12:26:14)                 Impression:      No acute cardiopulmonary disease.  Small right perihilar nodular opacification as detailed above      Electronically signed by: Lexy Wade MD  Date:    10/20/2019  Time:    12:26             Narrative:    EXAMINATION:  XR CHEST 1 VIEW    CLINICAL HISTORY:  Secondary malignant neoplasm of unspecified lung    TECHNIQUE:  Single frontal view of the chest was performed.    COMPARISON:  2/22/2019    FINDINGS:  The heart is not enlarged.  Lungs are clear of infiltrate.  No pleural effusion.  There is 9 mm nodular density right perihilar not definitely seen on the prior exam although note is made that CT 5/13/2019 did described pulmonary nodules                                 Medical Decision Making:   Clinical Tests:   Lab Tests: Ordered and Reviewed  Radiological Study: Ordered and Reviewed  ED Management:  Metastatic carcinoma as evident by CT May 2019    1. Interval progression of pulmonary metastatic disease with metastatic subcarinal lymphadenopathy.  2. Interval progression of hepatic metastatic disease.  3. Interval increase in size and number of enhancing mesenteric and pelvic soft tissue masses consistent with progression of metastatic disease.  4. Enlarging bilateral ovarian masses consistent with neoplasia.  5. Chronic  dermoid/teratoma of the left ovary.  6. Chronic hepatic cysts.  7. Perisplenic and perigastric varices consistent with portal hypertension.  8. Previous left adrenalectomy and nephrectomy.  9. Enlarging heterogeneously enhancing soft tissue mass of the left abdomen again consistent with either residual or recurrent neoplasia.  10. Bone demineralization.    Pts family has durable power of  however has not made hospice arrangements as of yet and desire to see what progression is present   I've related that without any intervention that there is likely significant progression since May -   The imaging will perhaps reveal if she is about to become acutely obstructed and need inpatient hospice v home hospice and thus it is ordered at this time  CT will be down until 4pm   Family understands         CT was obtained and reveals small bowel obstruction with transition point  NG tube has been placed  Low intermittent suction has been initiated  IV fluid maintenance will be provided  Patient is admitted to Dr. Becca Hudson will arrange case management and evaluate for inpatient hospice    Family aware  Family agrees with DNR status at this time                            Clinical Impression:       ICD-10-CM ICD-9-CM   1. Small bowel obstruction K56.609 560.9   2. Abdominal distension R14.0 787.3   3. Cancer with pulmonary metastases C78.00 197.0   4. SBO (small bowel obstruction) K56.609 560.9                                Davy Eddy MD  10/21/19 0612

## 2019-10-21 LAB
ANION GAP SERPL CALC-SCNC: 10 MMOL/L (ref 8–16)
BASOPHILS # BLD AUTO: 0.04 K/UL (ref 0–0.2)
BASOPHILS NFR BLD: 0.3 % (ref 0–1.9)
BUN SERPL-MCNC: 16 MG/DL (ref 8–23)
CALCIUM SERPL-MCNC: 7.6 MG/DL (ref 8.7–10.5)
CHLORIDE SERPL-SCNC: 110 MMOL/L (ref 95–110)
CO2 SERPL-SCNC: 14 MMOL/L (ref 23–29)
CREAT SERPL-MCNC: 1.4 MG/DL (ref 0.5–1.4)
DIFFERENTIAL METHOD: ABNORMAL
EOSINOPHIL # BLD AUTO: 0 K/UL (ref 0–0.5)
EOSINOPHIL NFR BLD: 0.1 % (ref 0–8)
ERYTHROCYTE [DISTWIDTH] IN BLOOD BY AUTOMATED COUNT: 15.7 % (ref 11.5–14.5)
EST. GFR  (AFRICAN AMERICAN): 42.7 ML/MIN/1.73 M^2
EST. GFR  (NON AFRICAN AMERICAN): 37 ML/MIN/1.73 M^2
GLUCOSE SERPL-MCNC: 104 MG/DL (ref 70–110)
HCT VFR BLD AUTO: 28.4 % (ref 37–48.5)
HGB BLD-MCNC: 8.8 G/DL (ref 12–16)
IMM GRANULOCYTES # BLD AUTO: 0.13 K/UL (ref 0–0.04)
IMM GRANULOCYTES NFR BLD AUTO: 0.8 % (ref 0–0.5)
LACTATE SERPL-SCNC: 0.6 MMOL/L (ref 0.5–2.2)
LYMPHOCYTES # BLD AUTO: 2 K/UL (ref 1–4.8)
LYMPHOCYTES NFR BLD: 12.9 % (ref 18–48)
MCH RBC QN AUTO: 27.9 PG (ref 27–31)
MCHC RBC AUTO-ENTMCNC: 31 G/DL (ref 32–36)
MCV RBC AUTO: 90 FL (ref 82–98)
MONOCYTES # BLD AUTO: 1 K/UL (ref 0.3–1)
MONOCYTES NFR BLD: 6.4 % (ref 4–15)
NEUTROPHILS # BLD AUTO: 12.3 K/UL (ref 1.8–7.7)
NEUTROPHILS NFR BLD: 79.5 % (ref 38–73)
NRBC BLD-RTO: 0 /100 WBC
PLATELET # BLD AUTO: 411 K/UL (ref 150–350)
PMV BLD AUTO: 8.9 FL (ref 9.2–12.9)
POCT GLUCOSE: 101 MG/DL (ref 70–110)
POTASSIUM SERPL-SCNC: 3.6 MMOL/L (ref 3.5–5.1)
RBC # BLD AUTO: 3.15 M/UL (ref 4–5.4)
SODIUM SERPL-SCNC: 134 MMOL/L (ref 136–145)
WBC # BLD AUTO: 15.48 K/UL (ref 3.9–12.7)

## 2019-10-21 PROCEDURE — 80048 BASIC METABOLIC PNL TOTAL CA: CPT

## 2019-10-21 PROCEDURE — S0030 INJECTION, METRONIDAZOLE: HCPCS | Performed by: HOSPITALIST

## 2019-10-21 PROCEDURE — 83605 ASSAY OF LACTIC ACID: CPT

## 2019-10-21 PROCEDURE — 99223 1ST HOSP IP/OBS HIGH 75: CPT | Mod: ,,, | Performed by: SURGERY

## 2019-10-21 PROCEDURE — 63600175 PHARM REV CODE 636 W HCPCS: Performed by: HOSPITALIST

## 2019-10-21 PROCEDURE — 87040 BLOOD CULTURE FOR BACTERIA: CPT

## 2019-10-21 PROCEDURE — 25000003 PHARM REV CODE 250: Performed by: HOSPITALIST

## 2019-10-21 PROCEDURE — 25000003 PHARM REV CODE 250: Performed by: INTERNAL MEDICINE

## 2019-10-21 PROCEDURE — 99223 PR INITIAL HOSPITAL CARE,LEVL III: ICD-10-PCS | Mod: ,,, | Performed by: SURGERY

## 2019-10-21 PROCEDURE — 85025 COMPLETE CBC W/AUTO DIFF WBC: CPT

## 2019-10-21 PROCEDURE — S0028 INJECTION, FAMOTIDINE, 20 MG: HCPCS | Performed by: INTERNAL MEDICINE

## 2019-10-21 PROCEDURE — 21400001 HC TELEMETRY ROOM

## 2019-10-21 PROCEDURE — 63600175 PHARM REV CODE 636 W HCPCS: Performed by: INTERNAL MEDICINE

## 2019-10-21 PROCEDURE — 36415 COLL VENOUS BLD VENIPUNCTURE: CPT

## 2019-10-21 RX ORDER — FAMOTIDINE 10 MG/ML
20 INJECTION INTRAVENOUS 2 TIMES DAILY
Status: DISCONTINUED | OUTPATIENT
Start: 2019-10-21 | End: 2019-10-25 | Stop reason: HOSPADM

## 2019-10-21 RX ORDER — METRONIDAZOLE 500 MG/100ML
500 INJECTION, SOLUTION INTRAVENOUS
Status: DISCONTINUED | OUTPATIENT
Start: 2019-10-21 | End: 2019-10-25

## 2019-10-21 RX ADMIN — FAMOTIDINE 20 MG: 10 INJECTION, SOLUTION INTRAVENOUS at 09:10

## 2019-10-21 RX ADMIN — METRONIDAZOLE 500 MG: 500 INJECTION, SOLUTION INTRAVENOUS at 04:10

## 2019-10-21 RX ADMIN — SODIUM CHLORIDE: 0.9 INJECTION, SOLUTION INTRAVENOUS at 08:10

## 2019-10-21 RX ADMIN — CEFTRIAXONE 1 G: 1 INJECTION, SOLUTION INTRAVENOUS at 02:10

## 2019-10-21 RX ADMIN — METRONIDAZOLE 500 MG: 500 INJECTION, SOLUTION INTRAVENOUS at 05:10

## 2019-10-21 RX ADMIN — ONDANSETRON 4 MG: 2 INJECTION INTRAMUSCULAR; INTRAVENOUS at 10:10

## 2019-10-21 RX ADMIN — METRONIDAZOLE 500 MG: 500 INJECTION, SOLUTION INTRAVENOUS at 09:10

## 2019-10-21 NOTE — SUBJECTIVE & OBJECTIVE
Interval History:  PATIENT HAD TEMP LAST P.M. METRONIDAZOLE AND ROCEPHIN WAS ORDERED.  WBC 17053 TODAY WITH LEFT SHIFT.  TOTAL OF 1600 OUT NG.  LONG DISCUSSION WITH THE PATIENT AND THE DAUGHTER PARTICULAR THE DAUGHTER CONCERNING FINDINGS PROGNOSIS AND POSSIBLE PROCEDURES THAT MAY NEED TO BE DONE.  PATIENT DID HAVE FLATUS THIS MORNING BUT HAS VERY HYPOACTIVE RARE BOWEL SOUNDS. SURGICAL CONSULTATION TODAY.    Review of Systems   Constitutional: Positive for activity change, appetite change and unexpected weight change. Negative for chills, diaphoresis, fatigue and fever.        METASTATIC CANCER   HENT: Negative for congestion, drooling, hearing loss and trouble swallowing.    Eyes: Negative for pain and visual disturbance.   Respiratory: Negative.  Negative for apnea, cough, choking, chest tightness, shortness of breath and wheezing.    Cardiovascular: Negative for chest pain, palpitations and leg swelling.   Gastrointestinal: Positive for nausea and vomiting. Negative for abdominal distention, abdominal pain, blood in stool, constipation and diarrhea.        MILD ABD DISTENTION WITH MILD MID EPIGASTRIC PAIN   Endocrine: Negative for polydipsia, polyphagia and polyuria.   Genitourinary: Positive for decreased urine volume. Negative for difficulty urinating, dysuria and flank pain.   Musculoskeletal: Negative for arthralgias, back pain, gait problem, joint swelling, neck pain and neck stiffness.   Skin: Negative for color change, rash and wound.   Allergic/Immunologic: Negative for environmental allergies, food allergies and immunocompromised state.   Neurological: Positive for weakness. Negative for dizziness, syncope, numbness and headaches.   Hematological: Negative for adenopathy.   Psychiatric/Behavioral: Negative for agitation, confusion and sleep disturbance. The patient is not nervous/anxious.      Objective:     Vital Signs (Most Recent):  Temp: 97 °F (36.1 °C) (10/21/19 0511)  Pulse: 70 (10/21/19  0511)  Resp: 20 (10/21/19 0007)  BP: (!) 151/83 (10/21/19 0511)  SpO2: 95 % (10/21/19 0511) Vital Signs (24h Range):  Temp:  [97 °F (36.1 °C)-101.4 °F (38.6 °C)] 97 °F (36.1 °C)  Pulse:  [] 70  Resp:  [16-20] 20  SpO2:  [95 %-100 %] 95 %  BP: ()/(58-85) 151/83     Weight: 65 kg (143 lb 4.8 oz)  Body mass index is 19.99 kg/m².    Intake/Output Summary (Last 24 hours) at 10/21/2019 0655  Last data filed at 10/21/2019 0546  Gross per 24 hour   Intake 2305.83 ml   Output 2950 ml   Net -644.17 ml      Physical Exam   Constitutional: She is oriented to person, place, and time. She appears well-developed and well-nourished.   HENT:   Head: Normocephalic and atraumatic.   Right Ear: External ear normal.   Left Ear: External ear normal.   Nose: Nose normal.   Eyes: Pupils are equal, round, and reactive to light. Conjunctivae, EOM and lids are normal.   Neck: Trachea normal, normal range of motion and full passive range of motion without pain. Neck supple.   Cardiovascular: Normal rate, regular rhythm, S1 normal, S2 normal, normal heart sounds, intact distal pulses and normal pulses.   TACHYCARDIA   Pulmonary/Chest: Effort normal and breath sounds normal.   Abdominal: Normal aorta.   MILD DISTENTION WITH MILD EPIGASTRIC PAIN VERY HYPOACTIVE BOWEL SOUNDS   Musculoskeletal: Normal range of motion.   Neurological: She is alert and oriented to person, place, and time. She has normal reflexes.   Skin: Skin is warm, dry and intact.   Psychiatric: She has a normal mood and affect. Her speech is normal and behavior is normal. Judgment and thought content normal. Cognition and memory are normal.   Nursing note and vitals reviewed.      Significant Labs:   BMP:   Recent Labs   Lab 10/20/19  1145 10/21/19  0518    104   * 134*   K 4.0 3.6    110   CO2 15* 14*   BUN 19 16   CREATININE 1.7* 1.4   CALCIUM 7.3* 7.6*   MG 1.7  --      CBC:   Recent Labs   Lab 10/20/19  1108 10/21/19  0518   WBC 11.80 15.48*    HGB 12.6 8.8*   HCT 39.2 28.4*   * 411*     All pertinent labs within the past 24 hours have been reviewed.    Significant Imaging: I have reviewed and interpreted all pertinent imaging results/findings within the past 24 hours.

## 2019-10-21 NOTE — H&P
[facility] Medicine  History & Physical    Patient Name: Oriana Tobar  MRN: [id number]  Admission Date: 10/20/2019  Attending Physician: Buck Hudson MD   Primary Care Provider: Buck Hudson MD         Patient information was obtained from patient, relative(s) and ER records.     Subjective:     Principal Problem:<principal problem not specified>    Chief Complaint:   Chief Complaint   Patient presents with    Nausea     worsening x1wk        HPI: HX METASTATIC CA WITH RECURRENT GASTRIC BLEED NOW WITH NAUSEA AND VOMITTING ONE WEEK KUB GASTRIC AIR FLUID LEVEL SBO, CT SBO  AIR FLUID LEVEL AND INCREASE IN NEOPLASTIC MASSES ALONG WITH R PULMONARY MASS    Past Medical History:   Diagnosis Date    Anemia     Cancer     kidney    Dementia     Encounter for blood transfusion     2018    High cholesterol 2018    Hypertension     No Medication     Renal disorder     Wears dentures     Uppers       Past Surgical History:   Procedure Laterality Date     SECTION      COLONOSCOPY N/A 2018    Procedure: COLONOSCOPY;  Surgeon: Torres Son MD;  Location: [facility];  Service: Endoscopy;  Laterality: N/A;    ESOPHAGOGASTRODUODENOSCOPY N/A 2018    Procedure: EGD (ESOPHAGOGASTRODUODENOSCOPY);  Surgeon: Torres Son MD;  Location: [facility];  Service: Endoscopy;  Laterality: N/A;    EYE SURGERY      Rt eye Catarac    HYSTERECTOMY      INTRALUMINAL GASTROINTESTINAL TRACT IMAGING VIA CAPSULE N/A 2018    Procedure: IMAGING, GI TRACT, INTRALUMINAL, VIA CAPSULE;  Surgeon: Rocco Ross MD;  Location: [facility] ENDO;  Service: Endoscopy;  Laterality: N/A;    KIDNEY SURGERY Left 2018    Left kidney removed due to cancer    lasix surgery         Review of patient's allergies indicates:   Allergen Reactions    Codeine Other (See Comments)     Pt shakes and hallucinates    Pcn [penicillins] Anxiety and  Other (See Comments)       No current facility-administered medications on file prior to encounter.      Current Outpatient Medications on File Prior to Encounter   Medication Sig    cyanocobalamin-cobamamide (B12) 5,000-100 mcg Lozg     ferrous sulfate 325 (65 FE) MG EC tablet Take 325 mg by mouth once daily.    Lactobacillus rhamnosus GG (CULTURELLE) 10 billion cell capsule Take 1 capsule by mouth once daily.    loperamide HCl/simethicone (IMODIUM ADVANCED ORAL) Take by mouth.    megestrol (MEGACE) 400 mg/10 mL (40 mg/mL) Susp Take 10 mLs (400 mg total) by mouth once daily.    octreotide (SANDOSTATIN) 100 mcg/mL Soln Inject 2 mLs (200 mcg total) into the vein once daily.    ondansetron (ZOFRAN) 4 MG tablet Take 4 mg by mouth every 8 (eight) hours as needed for Nausea.    pantoprazole (PROTONIX) 40 MG tablet Take 1 tablet (40 mg total) by mouth once daily.    sucralfate (CARAFATE) 100 mg/mL suspension Take 10 mLs (1 g total) by mouth 4 (four) times daily before meals and nightly.     Family History     None        Tobacco Use    Smoking status: Former Smoker     Packs/day: 1.00     Years: 50.00     Pack years: 50.00     Last attempt to quit: 10/6/2018     Years since quittin.0    Smokeless tobacco: Never Used    Tobacco comment: pt has stopped; encouraged to remain stopped   Substance and Sexual Activity    Alcohol use: Yes     Frequency: Never     Comment: occasional    Drug use: No    Sexual activity: Never     Review of Systems   Constitutional: Positive for activity change, appetite change and unexpected weight change. Negative for chills, diaphoresis, fatigue and fever.        METASTATIC CANCER   HENT: Negative for congestion, drooling, hearing loss and trouble swallowing.    Eyes: Negative for pain and visual disturbance.   Respiratory: Negative.  Negative for apnea, cough, choking, chest tightness, shortness of breath and wheezing.    Cardiovascular: Negative for chest pain, palpitations  and leg swelling.   Gastrointestinal: Positive for nausea and vomiting. Negative for abdominal distention, abdominal pain, blood in stool, constipation and diarrhea.        MILD ABD DISTENTION WITH MILD MID EPIGASTRIC PAIN   Endocrine: Negative for polydipsia, polyphagia and polyuria.   Genitourinary: Positive for decreased urine volume. Negative for difficulty urinating, dysuria and flank pain.   Musculoskeletal: Negative for arthralgias, back pain, gait problem, joint swelling, neck pain and neck stiffness.   Skin: Negative for color change, rash and wound.   Allergic/Immunologic: Negative for environmental allergies, food allergies and immunocompromised state.   Neurological: Positive for weakness. Negative for dizziness, syncope, numbness and headaches.   Hematological: Negative for adenopathy.   Psychiatric/Behavioral: Negative for agitation, confusion and sleep disturbance. The patient is not nervous/anxious.      Objective:     Vital Signs (Most Recent):  Temp: 100.2 °F (37.9 °C) (10/20/19 1933)  Pulse: (!) 121 (10/20/19 1933)  Resp: 16 (10/20/19 1933)  BP: (!) 167/72 (10/20/19 1933)  SpO2: 100 % (10/20/19 1933) Vital Signs (24h Range):  Temp:  [97.8 °F (36.6 °C)-100.2 °F (37.9 °C)] 100.2 °F (37.9 °C)  Pulse:  [] 121  Resp:  [16-20] 16  SpO2:  [98 %-100 %] 100 %  BP: ()/(58-85) 167/72     Weight: 64.4 kg (142 lb)  Body mass index is 19.8 kg/m².    Physical Exam   Constitutional: She is oriented to person, place, and time. She appears well-developed and well-nourished.   HENT:   Head: Normocephalic and atraumatic.   Right Ear: External ear normal.   Left Ear: External ear normal.   Nose: Nose normal.   Eyes: Pupils are equal, round, and reactive to light. Conjunctivae, EOM and lids are normal.   Neck: Trachea normal, normal range of motion and full passive range of motion without pain. Neck supple.   Cardiovascular: Normal rate, regular rhythm, S1 normal, S2 normal, normal heart sounds, intact  distal pulses and normal pulses.   TACHYCARDIA   Pulmonary/Chest: Effort normal and breath sounds normal.   Abdominal: Soft. Normal aorta and bowel sounds are normal.   MILD DISTENTION WITH MILD EPIGASTRIC PAIN   Musculoskeletal: Normal range of motion.   Neurological: She is alert and oriented to person, place, and time. She has normal reflexes.   Skin: Skin is warm, dry and intact.   Psychiatric: She has a normal mood and affect. Her speech is normal and behavior is normal. Judgment and thought content normal. Cognition and memory are normal.   Nursing note and vitals reviewed.        CRANIAL NERVES     CN III, IV, VI   Pupils are equal, round, and reactive to light.  Extraocular motions are normal.        Significant Labs:   BMP:   Recent Labs   Lab 10/20/19  1145      *   K 4.0      CO2 15*   BUN 19   CREATININE 1.7*   CALCIUM 7.3*     CBC:   Recent Labs   Lab 10/20/19  1108   WBC 11.80   HGB 12.6   HCT 39.2   *     CMP:   Recent Labs   Lab 10/20/19  1145   *   K 4.0      CO2 15*      BUN 19   CREATININE 1.7*   CALCIUM 7.3*   PROT 6.5   ALBUMIN 2.8*   BILITOT 0.8   ALKPHOS 75   AST 11   ALT 7*   ANIONGAP 7*   EGFRNONAA 29.3*     Magnesium: No results for input(s): MG in the last 48 hours.  All pertinent labs within the past 24 hours have been reviewed.    Significant Imaging: CT: I have reviewed all pertinent results/findings within the past 24 hours and my personal findings are:  SBO GASTRIC AIR FLUID LEVEL INCREASE IN NEOPLASTIC MASSES AND LARGE DERMOID PELVIS  CXR: I have reviewed all pertinent results/findings within the past 24 hours and my personal findings are:  RIGHT PULMONARY NODULE NEW FINDING  EKG: I have reviewed all pertinent results/findings within the past 24 hours and my personal findings are: PENDING  I have reviewed and interpreted all pertinent imaging results/findings within the past 24 hours.    Assessment/Plan:     Small bowel obstruction  SBO ON  KUB CT ADMIT IVFS RONDA GARCIA SUCTION SURGERY CONSULT        VTE Risk Mitigation (From admission, onward)         Ordered     IP VTE HIGH RISK PATIENT  Once      10/20/19 1923     Place RORY hose  Until discontinued      10/20/19 1923                   Buck Hudson MD  Department of Hospital Medicine   [facility]

## 2019-10-21 NOTE — HPI
HX METASTATIC CA WITH RECURRENT GASTRIC BLEED NOW WITH NAUSEA AND VOMITTING ONE WEEK KUB GASTRIC AIR FLUID LEVEL SBO, CT SBO  AIR FLUID LEVEL AND INCREASE IN NEOPLASTIC MASSES ALONG WITH R PULMONARY MASS.  PATIENT DID HAVE A FORMED STOOL PRIOR TO ARRIVAL TO THE ER.

## 2019-10-21 NOTE — PROGRESS NOTES
Admitted to the medical surgical unit from the ER with diagnosis of small bowel obstruction.  Telemetry monitoring in progress.  NGT placement confirmed and connected to YASMINE Ellis at bedside.

## 2019-10-21 NOTE — PROGRESS NOTES
[facility] Hospital Medicine  Progress Note    Patient Name: Oriana Tobar  MRN: [id number]  Patient Class: IP- Inpatient   Admission Date: 10/20/2019  Length of Stay: 1 days  Attending Physician: Buck Hudson MD  Primary Care Provider: Buck Hudson MD        Subjective:     Principal Problem:<principal problem not specified>        HPI:  HX METASTATIC CA WITH RECURRENT GASTRIC BLEED NOW WITH NAUSEA AND VOMITTING ONE WEEK KUB GASTRIC AIR FLUID LEVEL SBO, CT SBO  AIR FLUID LEVEL AND INCREASE IN NEOPLASTIC MASSES ALONG WITH R PULMONARY MASS.  PATIENT DID HAVE A FORMED STOOL PRIOR TO ARRIVAL TO THE ER.    Overview/Hospital Course:  LABS IVFS NG SUCTION SURGERY CONSULT.  10/21/2019.  PATIENT HAD A TEMP LAST P.M. FLAGYL AND ROCEPHIN WAS ADDED IV.  WBC THIS MORNING SLIGHTLY OF 15,000 WITH A LEFT SHIFT.  CHEMISTRIES ARE ACCEPTABLE THIS MORNING IS SODIUM  UP FROM 130.  CONTINUE IV FLUIDS CONTINUE IV SUCTION.  THE PATIENT DID HAVE FLATUS THIS MORNING.  SURGICAL CONSULTATION TODAY.  LONG DISCUSSION WITH THE DAUGHTER AND PATIENT CONCERNING THE FINDINGS AND POSSIBLE POOR PROGNOSIS.  WILL AWAIT SURGICAL CONSULTATION FOR FURTHER RECOMMENDATIONS.  KUB WAS ORAL CONTRAST WILL BE CONSIDERED.  PATIENT PUT TOTAL OUT OF 1600 LAST P.M. NG PERRY.    Interval History:  PATIENT HAD TEMP LAST P.M. METRONIDAZOLE AND ROCEPHIN WAS ORDERED.  WBC 45279 TODAY WITH LEFT SHIFT.  TOTAL OF 1600 OUT NG.  LONG DISCUSSION WITH THE PATIENT AND THE DAUGHTER PARTICULAR THE DAUGHTER CONCERNING FINDINGS PROGNOSIS AND POSSIBLE PROCEDURES THAT MAY NEED TO BE DONE.  PATIENT DID HAVE FLATUS THIS MORNING BUT HAS VERY HYPOACTIVE RARE BOWEL SOUNDS. SURGICAL CONSULTATION TODAY.    Review of Systems   Constitutional: Positive for activity change, appetite change and unexpected weight change. Negative for chills, diaphoresis, fatigue and fever.        METASTATIC CANCER   HENT: Negative for congestion,  drooling, hearing loss and trouble swallowing.    Eyes: Negative for pain and visual disturbance.   Respiratory: Negative.  Negative for apnea, cough, choking, chest tightness, shortness of breath and wheezing.    Cardiovascular: Negative for chest pain, palpitations and leg swelling.   Gastrointestinal: Positive for nausea and vomiting. Negative for abdominal distention, abdominal pain, blood in stool, constipation and diarrhea.        MILD ABD DISTENTION WITH MILD MID EPIGASTRIC PAIN   Endocrine: Negative for polydipsia, polyphagia and polyuria.   Genitourinary: Positive for decreased urine volume. Negative for difficulty urinating, dysuria and flank pain.   Musculoskeletal: Negative for arthralgias, back pain, gait problem, joint swelling, neck pain and neck stiffness.   Skin: Negative for color change, rash and wound.   Allergic/Immunologic: Negative for environmental allergies, food allergies and immunocompromised state.   Neurological: Positive for weakness. Negative for dizziness, syncope, numbness and headaches.   Hematological: Negative for adenopathy.   Psychiatric/Behavioral: Negative for agitation, confusion and sleep disturbance. The patient is not nervous/anxious.      Objective:     Vital Signs (Most Recent):  Temp: 97 °F (36.1 °C) (10/21/19 0511)  Pulse: 70 (10/21/19 0511)  Resp: 20 (10/21/19 0007)  BP: (!) 151/83 (10/21/19 0511)  SpO2: 95 % (10/21/19 0511) Vital Signs (24h Range):  Temp:  [97 °F (36.1 °C)-101.4 °F (38.6 °C)] 97 °F (36.1 °C)  Pulse:  [] 70  Resp:  [16-20] 20  SpO2:  [95 %-100 %] 95 %  BP: ()/(58-85) 151/83     Weight: 65 kg (143 lb 4.8 oz)  Body mass index is 19.99 kg/m².    Intake/Output Summary (Last 24 hours) at 10/21/2019 0655  Last data filed at 10/21/2019 0546  Gross per 24 hour   Intake 2305.83 ml   Output 2950 ml   Net -644.17 ml      Physical Exam   Constitutional: She is oriented to person, place, and time. She appears well-developed and well-nourished.   HENT:    Head: Normocephalic and atraumatic.   Right Ear: External ear normal.   Left Ear: External ear normal.   Nose: Nose normal.   Eyes: Pupils are equal, round, and reactive to light. Conjunctivae, EOM and lids are normal.   Neck: Trachea normal, normal range of motion and full passive range of motion without pain. Neck supple.   Cardiovascular: Normal rate, regular rhythm, S1 normal, S2 normal, normal heart sounds, intact distal pulses and normal pulses.   TACHYCARDIA   Pulmonary/Chest: Effort normal and breath sounds normal.   Abdominal: Normal aorta.   MILD DISTENTION WITH MILD EPIGASTRIC PAIN VERY HYPOACTIVE BOWEL SOUNDS   Musculoskeletal: Normal range of motion.   Neurological: She is alert and oriented to person, place, and time. She has normal reflexes.   Skin: Skin is warm, dry and intact.   Psychiatric: She has a normal mood and affect. Her speech is normal and behavior is normal. Judgment and thought content normal. Cognition and memory are normal.   Nursing note and vitals reviewed.      Significant Labs:   BMP:   Recent Labs   Lab 10/20/19  1145 10/21/19  0518    104   * 134*   K 4.0 3.6    110   CO2 15* 14*   BUN 19 16   CREATININE 1.7* 1.4   CALCIUM 7.3* 7.6*   MG 1.7  --      CBC:   Recent Labs   Lab 10/20/19  1108 10/21/19  0518   WBC 11.80 15.48*   HGB 12.6 8.8*   HCT 39.2 28.4*   * 411*     All pertinent labs within the past 24 hours have been reviewed.    Significant Imaging: I have reviewed and interpreted all pertinent imaging results/findings within the past 24 hours.      Assessment/Plan:      Hyponatremia  SODIUM IS IMPROVED  WITH NORMAL SALINE.  CONTINUE CURRENT MANAGEMENT.      SHAMAR (acute kidney injury)  WITH HYDRATION CREATININE IS IMPROVED TO 1.4.  CONTINUE CURRENT MANAGEMENT.      Small bowel obstruction  SBO ON KUB CT ADMIT IVFS RONDA GARCIA SUCTION SURGERY CONSULT  10/21/2019.  1600 CC OUT NG WISE.  TEMP LAST P.M. METRONIDAZOLE AND ROCEPHIN HAVE BEEN ADDED  IV.  WBC ELEVATED THIS MORNING 15.7 LEFT SHIFT LONG DISCUSSION WITH THE PATIENT AND MAINLY THE DAUGHTER.  EXPLAINED TO THE DAUGHTER SMALL-BOWEL OBSTRUCTION WHICH SHE UNDERSTANDS.  SHE IS ALSO AWARE THAT THIS MAY BE DUE TO THE PATIENT'S INCREASE IN HER ABDOMINAL AND PERITONEAL MASSES THAT MAY BE COMPROMISING HER BOWELS.  PATIENT ALSO HAS A LARGE DERMOID TUMOR IN THE PELVIS.  CONSIDERATION FOR KUB WITH ORAL CONTRAST BUT AWAIT SURGICAL OPINION AT THIS TIME.  THE DAUGHTER MAY BE RELUCTANT TO DO AGGRESSIVE PROCEDURES BECAUSE SHE RELIES HIGH ILL HER MOTHER IS.      VTE Risk Mitigation (From admission, onward)         Ordered     IP VTE HIGH RISK PATIENT  Once      10/20/19 1923     Place RORY hose  Until discontinued      10/20/19 1923                      Buck Hudson MD  Department of Hospital Medicine   [facility]

## 2019-10-21 NOTE — ASSESSMENT & PLAN NOTE
SBO ON KUB CT ADMIT IVFS RONDA GARCIA SUCTION SURGERY CONSULT  10/21/2019.  1600 CC OUT NG WISE.  TEMP LAST P.M. METRONIDAZOLE AND ROCEPHIN HAVE BEEN ADDED IV.  WBC ELEVATED THIS MORNING 15.7 LEFT SHIFT LONG DISCUSSION WITH THE PATIENT AND MAINLY THE DAUGHTER.  EXPLAINED TO THE DAUGHTER SMALL-BOWEL OBSTRUCTION WHICH SHE UNDERSTANDS.  SHE IS ALSO AWARE THAT THIS MAY BE DUE TO THE PATIENT'S INCREASE IN HER ABDOMINAL AND PERITONEAL MASSES THAT MAY BE COMPROMISING HER BOWELS.  PATIENT ALSO HAS A LARGE DERMOID TUMOR IN THE PELVIS.  CONSIDERATION FOR KUB WITH ORAL CONTRAST BUT AWAIT SURGICAL OPINION AT THIS TIME.  THE DAUGHTER MAY BE RELUCTANT TO DO AGGRESSIVE PROCEDURES BECAUSE SHE RELIES HIGH ILL HER MOTHER IS.

## 2019-10-21 NOTE — CONSULTS
DATE OF CONSULTATION:  10/21/2019.    REASON FOR CONSULTATION:  Bowel obstruction.    HISTORY OF PRESENT ILLNESS:  A 74-year-old female with known renal cell  carcinoma.  She has known metastatic disease to her small bowel and  peritoneum.  She was admitted from the Emergency Room yesterday.  She  presented to the Emergency Room with nausea and vomiting for the past week.   No diarrhea.  Constipation or obstipation has been present as well.  No  melena or hematochezia.  No hematemesis.  No fever or chills.  No  triggering issues.  No aggravating factors.  No alleviating factors.  There  has been increasing abdominal distention as well.  Since admission,  nasogastric tube was inserted and her distention has improved.    PAST MEDICAL HISTORY:  1.  Dementia.  2.  Renal cell carcinoma.  3.  Hypertension.  4.  Hysterectomy.  5.  Nephrectomy.  6.   section.    HOME MEDICATIONS:  B12.  Iron.  Probiotic.  Imodium.  Megace.  Sandostatin.   Zofran.  Protonix.  Carafate.    ALLERGIES:  CODEINE AND PENICILLIN.    FAMILY HISTORY:  Noncontributory.  No family history of any anesthetic  complications, bleeding disorders, inflammatory bowel disease, GI tract  malignancies, biliary tract disease or ulcer disease.    SOCIAL HISTORY:  Occasional consumer of alcohol.  Reformed smoker.  She  quit approximately 1 year ago.  No history of any illegal or recreational  drug use.  She is a .    REVIEW OF SYSTEMS:  As above, otherwise all 14 systems entirely negative.    PHYSICAL EXAMINATION:  GENERAL:  Elderly, debilitated, malnourished, cachectic female, resting  comfortably.  No evident acute distress.  Normothermic, normotensive and  normocardic.  HEENT: Normocephalic, atraumatic. Pupils are equal, round and reactive to  light. Extraocular muscles intact. Sclera anicteric. Tympanic membranes  intact, translucent, and nonbulging. Pharynx without erythema, edema,  exudate. or lesions. Mucous membranes are moist. Dentition is  fair.  NECK: Supple, full range of motion. No discomfort, limitations, or  tenderness. No palpable abnormalities. No JVD. No cervical masses, no  thyromegaly, no carotid bruits. Trachea midline.  HEART: Rate and rhythm without murmurs, gallops. or rubs. PMI nondisplaced.  Tones normal. Symmetrical distal pulses throughout. No bruits, pulsatile  masses, or thrills. No peripheral edema. No varicose veins.  LUNGS: Clear to auscultation without rales, rhonchi, or wheezes.  Respirations nonlabored. No accessory muscle use or retractions.  ABDOMEN:  Soft, nondistended.  Hypoactive bowel sounds.  No rushes or  tinkles.  Nontender.  No guarding or rebound.  No hepatosplenomegaly or  masses.  No ventral or inguinal hernias.  RECTAL: Heme negative without masses. Normal tone, nontender.  : No vaginal discharge or adnexal masses. Normal external genitalia.  LYMPHATICS: No adenopathy, cervical, supraclavicular, axillary, or  inguinal.  EXTREMITIES: No clubbing or cyanosis. No gross deformities. No atrophy.   SKIN: Warm and dry throughout without rashes, lesions, jaundice, or  induration.  BREASTS: Bilaterally no masses, skin or nipple changes, or nipple  discharge.  NEUROLOGIC: No gross deficits. Cranial nerves II through XII intact.  Sensory intact throughout to light touch and proprioception. Deep tendon  reflexes symmetrical at patella and biceps. No evidence of pathologic  reflexes.  MENTAL STATUS: Intact, alert, oriented to person, time, place and  situation. Appropriate. No evidence of memory deficits. Mood is appropriate  to situation. No evident hallucinations or delusions.    LABORATORY DATA:  CBC shows a leukocytosis with a white count of 15,500 and  a slight left shift with 80% neutrophils.  CBC also shows a normochromic  normocytic anemia with a hemoglobin of 8.8 mg/dL and a mild thrombocytosis  with a platelet count of 411,000.  PT on admission showed no suggestion of  factor deficit coagulopathy or  anticoagulation therapy.  Electrolytes  revealed mild hyponatremia, hypocarbia and hypocalcemia.  BUN and  creatinine shows evidence of CKD with a BUN of 16 mg/dL, creatinine 1.4  mg/dL and estimated GFR of 43 mL per minute.  Liver profile shows no  evidence of hepatocellular disease or biliary obstruction.  There is  evidence of malnutrition with hypoalbuminemia.    RADIOLOGIC DATA:  CT scan of the abdomen and pelvis shows evidence of a mid  small bowel obstruction with a transition zone.  There is evidence of small  bowel neoplasm consistent with a metastatic lesion as well as peritoneal  mass is also consistent with metastatic renal cell carcinoma.  There is  possible new pulmonary nodule.  There is evidence of diverticulosis without  diverticulitis.  Finally, there is evidence of cholelithiasis, but no  evidence of cholecystitis and a longstanding stable pelvic desmoid tumor.    IMPRESSION:  Small bowel obstruction likely due to metastatic renal cell  carcinoma.  Cannot definitively exclude primary small bowel cancer or other  metastatic disease, although extremely unlikely.  Known metastatic renal  cell carcinoma.  Peritoneal metastases.  Anemia.  Malnutrition.   Thrombocytosis.  Leukocytosis.  Cannot exclude carcinomatosis.  Options at  this time include but are not limited to continued nonoperative management,  hospice care, laparotomy.  Risks of laparotomy include, but not limited to  encountering carcinomatosis and a non-beneficial laparotomy with an  inability to relieve the obstruction.    COUNSELING:  Ms. Tobar' daughter was counseled regarding the results of  her evaluation.  Options were discussed including hospice care and  laparotomy.  Risks of laparotomy discussed included but were not limited to  finding of the abdomen encased with metastatic disease and unable to  accomplish anything beneficial for the patient with a laparotomy.  We also  discussed the possibility of creating fistulas  expediting an early death,  etc.  Questions were solicited and answered.  The entire conversation was  held in layman's terms.  At the conclusion of the conversation, she voiced  to desire to discuss the situation with her brothers and inform us of her  decision tomorrow or sometime soon.    Total face-to-face encounter time was 60 minutes, 40 minutes spent counseling as outlined/summarized above.    RECOMMENDATIONS:  Await family decision.  Continue nasogastric  decompression and intravenous fluids as well as all other comfort care  measures.  Further evaluation, management and recommendations will depend  upon clinical course.        BTA/HN dd: 10/21/2019 19:30:44 (CDT)   td: 10/22/2019 00:23:27 (CDT)  Doc ID #[id number]   Job ID #[id number]    CC:

## 2019-10-21 NOTE — HOSPITAL COURSE
LABS IVFS NG SUCTION SURGERY CONSULT.  10/21/2019.  PATIENT HAD A TEMP LAST P.M. FLAGYL AND ROCEPHIN WAS ADDED IV.  WBC THIS MORNING SLIGHTLY OF 15,000 WITH A LEFT SHIFT.  CHEMISTRIES ARE ACCEPTABLE THIS MORNING IS SODIUM  UP FROM 130.  CONTINUE IV FLUIDS CONTINUE IV SUCTION.  THE PATIENT DID HAVE FLATUS THIS MORNING.  SURGICAL CONSULTATION TODAY.  LONG DISCUSSION WITH THE DAUGHTER AND PATIENT CONCERNING THE FINDINGS AND POSSIBLE POOR PROGNOSIS.  WILL AWAIT SURGICAL CONSULTATION FOR FURTHER RECOMMENDATIONS.  KUB WAS ORAL CONTRAST WILL BE CONSIDERED.  PATIENT PUT TOTAL OUT OF 1600 LAST P.M. NG WISE.  10/22/2019.  Family has not made clear decision as to what to do since the patient is alert and talkative this morning.  Will check a KUB and will clamp NG suction for the patient to move around also will reconnect to document amount of volume output.  Laboratory values pending this morning.  Family to discuss with several hospice agencies possibly today to determine the course of action.  Surgical consultations contain on the chart and note was dictated however I talked with Dr. Obrien yesterday morning and he is discussed with the family that operative intervention may cause of patient's death.  Continue current level of care to family makes a decision.  10/23/2019  Reviewed Dr. Obrien is consult progress note.  The family does not desire surgery at this time.  I did mention with them possible PEG tube for care and comfort measures and decompression long-term.  The patient will be placed on hospice once discharged.  Family wishes talk to Dr. Obrien general surgery again today for making the final decision and disposition and discharge planning.  CBC is contain on the chart chemistries are pending at this time.  WBC is normal still with slight left shift.  Hematocrit slightly increased to 27 since yesterday.  Did give the patient liquids yesterday will repeat a cup suction today to see if the patient has any  measure will amount coming from the NG.  Patient is comfortable with no pain at this time.  Daughter states that she was upset last night with the prospects of dying.  Continue current level of care unless clinical course changes.  10/24/2019.  Dr. Obrien general surgery notes have been reviewed.  EGD PEG tube Friday.  Lab in a.m. INR CBC basic profile.  Patient has been confused.  Seroquel has been started 50 mg b.i.d...  Last CT of the head showed no metastatic disease in January 19.  Will discuss with family  CT scan of the head and consider ordering 1 at this time if the family so desires.  Patient is stable otherwise will continue current level of care.  The daughter feels that the patient's confusion has cleared up somewhat this morning.  Did not lie Seroquel to be given last p.m..  I agree with the daughter that the patient has improved from her confusion.  10/25/2019.  Patient is eating and is having bowel movements have bowel movement this morning.  Patient's plan is to talk with Dr. Obrien general surgery again this morning and decide on EGD and PEG tube.  Hematocrit is 26.  INR is 1.8 vitamin K has been ordered.  Patient's INR has gone up dramatically since admission.  Patient is on no meds to explain this.  May very well be related to malnutrition and involvement of the liver with her metastatic disease. Long discussion with the daughter and the patient concerning diagnosis care and treatment plain.  If the patient has a PEG tube placed probably go home in a.m. if this is not be done consider discharge this p.m..  Continue current level of care unless clinical course changes.  Long-term prognosis very poor.  Hospice at discharge.  10/25/ 2019 FAMILY AND PATIENT DENIED EGD PEG AND ARE READY FOR DISCHARGE WITH [facility].END STAGE CANCER.

## 2019-10-21 NOTE — PLAN OF CARE
10/21/19 1504   Medicare Message   Important Message from Medicare regarding Discharge Appeal Rights Given to patient/caregiver;Explained to patient/caregiver;Signed/date by patient/caregiver   Date IMM was signed 10/21/19   Time IMM was signed [id number]

## 2019-10-21 NOTE — PLAN OF CARE
Patient admitted from the ER this shift with a diagnosis of small bowel obstruction.  Family discussed POC with ER MD and determined that they would like to see if the SBO will resolve and if not patient may go onto inpatient hospice.  NGT in place with light brownish-greenish drainage with a foul odor.  No complaints of nausea/vomiting.  Receiving IVF and antibiotics.  Febrile overnight, temperature is now down.  Inititally ST on monitor but now NSR.  UOP good, once incontinent and once measurable.  General surgery consult this am.  Labs pending.  Skin is intact without issues.  No falls or injuries this shift.

## 2019-10-21 NOTE — PLAN OF CARE
10/21/19 1506   Discharge Assessment   Assessment Type Discharge Planning Assessment   Confirmed/corrected address and phone number on facesheet? Yes   Assessment information obtained from? Caregiver   Expected Length of Stay (days) 4   Communicated expected length of stay with patient/caregiver yes   Prior to hospitilization cognitive status: Alert/Oriented   Prior to hospitalization functional status: Needs Assistance;Assistive Equipment   Current cognitive status: Alert/Oriented   Current Functional Status: Needs Assistance   Lives With grandchild(samy);child(samy), adult  (Lives with Grand daughter Zoey Rodriguezrly  [phone]--with daughter also Maryjane Tobar [phone])   Able to Return to Prior Arrangements yes   Is patient able to care for self after discharge? No   Who are your caregiver(s) and their phone number(s)? Zoey Rodriguezrly [phone] and Maryjane Tobar [phone]   Patient's perception of discharge disposition hospice/home   Readmission Within the Last 30 Days no previous admission in last 30 days   Patient currently being followed by outpatient case management? No   Patient currently receives any other outside agency services? Yes   Name and contact number of agency or person providing outside services Nursing assistance per Nursing Management   Is it the patient/care giver preference to resume care with the current outside agency? Yes   Equipment Currently Used at Home walker, rolling;wheelchair   Do you have any problems affording any of your prescribed medications? No   Is the patient taking medications as prescribed? yes   Does the patient have transportation home? Yes   Transportation Anticipated family or friend will provide   Does the patient receive services at the [facility]? No   Discharge Plan A Home with family;Hospice/home   DME Needed Upon Discharge  none   Patient/Family in Agreement with Plan yes       Patient resting in bed with family at bedside.  Information  obtained from patient's daughter.  [facility] has nursing assistance from Nursing Management.  [facility] has wc and walker at home.  Daughter would like to meet with [facility] and [facility] for informational visits.  Denies any other needs at this time.  Case Management will continue to follow for further discharge needs.

## 2019-10-21 NOTE — SUBJECTIVE & OBJECTIVE
Past Medical History:   Diagnosis Date    Anemia     Cancer     kidney    Dementia     Encounter for blood transfusion     2018    High cholesterol 2018    Hypertension     No Medication     Renal disorder     Wears dentures     Uppers       Past Surgical History:   Procedure Laterality Date     SECTION      COLONOSCOPY N/A 2018    Procedure: COLONOSCOPY;  Surgeon: Torres Son MD;  Location: [facility] ENDO;  Service: Endoscopy;  Laterality: N/A;    ESOPHAGOGASTRODUODENOSCOPY N/A 2018    Procedure: EGD (ESOPHAGOGASTRODUODENOSCOPY);  Surgeon: Torres Son MD;  Location: [facility] ENDO;  Service: Endoscopy;  Laterality: N/A;    EYE SURGERY      Rt eye Catarac    HYSTERECTOMY      INTRALUMINAL GASTROINTESTINAL TRACT IMAGING VIA CAPSULE N/A 2018    Procedure: IMAGING, GI TRACT, INTRALUMINAL, VIA CAPSULE;  Surgeon: Rocco Ross MD;  Location: [facility] ENDO;  Service: Endoscopy;  Laterality: N/A;    KIDNEY SURGERY Left 2018    Left kidney removed due to cancer    lasix surgery         Review of patient's allergies indicates:   Allergen Reactions    Codeine Other (See Comments)     Pt shakes and hallucinates    Pcn [penicillins] Anxiety and Other (See Comments)       No current facility-administered medications on file prior to encounter.      Current Outpatient Medications on File Prior to Encounter   Medication Sig    cyanocobalamin-cobamamide (B12) 5,000-100 mcg Lozg     ferrous sulfate 325 (65 FE) MG EC tablet Take 325 mg by mouth once daily.    Lactobacillus rhamnosus GG (CULTURELLE) 10 billion cell capsule Take 1 capsule by mouth once daily.    loperamide HCl/simethicone (IMODIUM ADVANCED ORAL) Take by mouth.    megestrol (MEGACE) 400 mg/10 mL (40 mg/mL) Susp Take 10 mLs (400 mg total) by mouth once daily.    octreotide (SANDOSTATIN) 100 mcg/mL Soln Inject 2 mLs (200 mcg total) into the vein once daily.    ondansetron (ZOFRAN) 4 MG tablet Take  4 mg by mouth every 8 (eight) hours as needed for Nausea.    pantoprazole (PROTONIX) 40 MG tablet Take 1 tablet (40 mg total) by mouth once daily.    sucralfate (CARAFATE) 100 mg/mL suspension Take 10 mLs (1 g total) by mouth 4 (four) times daily before meals and nightly.     Family History     None        Tobacco Use    Smoking status: Former Smoker     Packs/day: 1.00     Years: 50.00     Pack years: 50.00     Last attempt to quit: 10/6/2018     Years since quittin.0    Smokeless tobacco: Never Used    Tobacco comment: pt has stopped; encouraged to remain stopped   Substance and Sexual Activity    Alcohol use: Yes     Frequency: Never     Comment: occasional    Drug use: No    Sexual activity: Never     Review of Systems   Constitutional: Positive for activity change, appetite change and unexpected weight change. Negative for chills, diaphoresis, fatigue and fever.        METASTATIC CANCER   HENT: Negative for congestion, drooling, hearing loss and trouble swallowing.    Eyes: Negative for pain and visual disturbance.   Respiratory: Negative.  Negative for apnea, cough, choking, chest tightness, shortness of breath and wheezing.    Cardiovascular: Negative for chest pain, palpitations and leg swelling.   Gastrointestinal: Positive for nausea and vomiting. Negative for abdominal distention, abdominal pain, blood in stool, constipation and diarrhea.        MILD ABD DISTENTION WITH MILD MID EPIGASTRIC PAIN   Endocrine: Negative for polydipsia, polyphagia and polyuria.   Genitourinary: Positive for decreased urine volume. Negative for difficulty urinating, dysuria and flank pain.   Musculoskeletal: Negative for arthralgias, back pain, gait problem, joint swelling, neck pain and neck stiffness.   Skin: Negative for color change, rash and wound.   Allergic/Immunologic: Negative for environmental allergies, food allergies and immunocompromised state.   Neurological: Positive for weakness. Negative for  dizziness, syncope, numbness and headaches.   Hematological: Negative for adenopathy.   Psychiatric/Behavioral: Negative for agitation, confusion and sleep disturbance. The patient is not nervous/anxious.      Objective:     Vital Signs (Most Recent):  Temp: 100.2 °F (37.9 °C) (10/20/19 1933)  Pulse: (!) 121 (10/20/19 1933)  Resp: 16 (10/20/19 1933)  BP: (!) 167/72 (10/20/19 1933)  SpO2: 100 % (10/20/19 1933) Vital Signs (24h Range):  Temp:  [97.8 °F (36.6 °C)-100.2 °F (37.9 °C)] 100.2 °F (37.9 °C)  Pulse:  [] 121  Resp:  [16-20] 16  SpO2:  [98 %-100 %] 100 %  BP: ()/(58-85) 167/72     Weight: 64.4 kg (142 lb)  Body mass index is 19.8 kg/m².    Physical Exam   Constitutional: She is oriented to person, place, and time. She appears well-developed and well-nourished.   HENT:   Head: Normocephalic and atraumatic.   Right Ear: External ear normal.   Left Ear: External ear normal.   Nose: Nose normal.   Eyes: Pupils are equal, round, and reactive to light. Conjunctivae, EOM and lids are normal.   Neck: Trachea normal, normal range of motion and full passive range of motion without pain. Neck supple.   Cardiovascular: Normal rate, regular rhythm, S1 normal, S2 normal, normal heart sounds, intact distal pulses and normal pulses.   TACHYCARDIA   Pulmonary/Chest: Effort normal and breath sounds normal.   Abdominal: Soft. Normal aorta and bowel sounds are normal.   MILD DISTENTION WITH MILD EPIGASTRIC PAIN   Musculoskeletal: Normal range of motion.   Neurological: She is alert and oriented to person, place, and time. She has normal reflexes.   Skin: Skin is warm, dry and intact.   Psychiatric: She has a normal mood and affect. Her speech is normal and behavior is normal. Judgment and thought content normal. Cognition and memory are normal.   Nursing note and vitals reviewed.        CRANIAL NERVES     CN III, IV, VI   Pupils are equal, round, and reactive to light.  Extraocular motions are normal.         Significant Labs:   BMP:   Recent Labs   Lab 10/20/19  1145      *   K 4.0      CO2 15*   BUN 19   CREATININE 1.7*   CALCIUM 7.3*     CBC:   Recent Labs   Lab 10/20/19  1108   WBC 11.80   HGB 12.6   HCT 39.2   *     CMP:   Recent Labs   Lab 10/20/19  1145   *   K 4.0      CO2 15*      BUN 19   CREATININE 1.7*   CALCIUM 7.3*   PROT 6.5   ALBUMIN 2.8*   BILITOT 0.8   ALKPHOS 75   AST 11   ALT 7*   ANIONGAP 7*   EGFRNONAA 29.3*     Magnesium: No results for input(s): MG in the last 48 hours.  All pertinent labs within the past 24 hours have been reviewed.    Significant Imaging: CT: I have reviewed all pertinent results/findings within the past 24 hours and my personal findings are:  SBO GASTRIC AIR FLUID LEVEL INCREASE IN NEOPLASTIC MASSES AND LARGE DERMOID PELVIS  CXR: I have reviewed all pertinent results/findings within the past 24 hours and my personal findings are:  RIGHT PULMONARY NODULE NEW FINDING  EKG: I have reviewed all pertinent results/findings within the past 24 hours and my personal findings are: PENDING  I have reviewed and interpreted all pertinent imaging results/findings within the past 24 hours.

## 2019-10-22 LAB
ALBUMIN SERPL BCP-MCNC: 2.1 G/DL (ref 3.5–5.2)
ALP SERPL-CCNC: 50 U/L (ref 55–135)
ALT SERPL W/O P-5'-P-CCNC: 5 U/L (ref 10–44)
ANION GAP SERPL CALC-SCNC: 9 MMOL/L (ref 8–16)
AST SERPL-CCNC: 9 U/L (ref 10–40)
BASOPHILS # BLD AUTO: 0.03 K/UL (ref 0–0.2)
BASOPHILS NFR BLD: 0.4 % (ref 0–1.9)
BILIRUB SERPL-MCNC: 0.4 MG/DL (ref 0.1–1)
BUN SERPL-MCNC: 13 MG/DL (ref 8–23)
CALCIUM SERPL-MCNC: 7.1 MG/DL (ref 8.7–10.5)
CHLORIDE SERPL-SCNC: 117 MMOL/L (ref 95–110)
CO2 SERPL-SCNC: 14 MMOL/L (ref 23–29)
CREAT SERPL-MCNC: 1.2 MG/DL (ref 0.5–1.4)
DIFFERENTIAL METHOD: ABNORMAL
EOSINOPHIL # BLD AUTO: 0.1 K/UL (ref 0–0.5)
EOSINOPHIL NFR BLD: 0.7 % (ref 0–8)
ERYTHROCYTE [DISTWIDTH] IN BLOOD BY AUTOMATED COUNT: 15.8 % (ref 11.5–14.5)
EST. GFR  (AFRICAN AMERICAN): 51.4 ML/MIN/1.73 M^2
EST. GFR  (NON AFRICAN AMERICAN): 44.6 ML/MIN/1.73 M^2
GLUCOSE SERPL-MCNC: 97 MG/DL (ref 70–110)
HCT VFR BLD AUTO: 26.5 % (ref 37–48.5)
HGB BLD-MCNC: 8.4 G/DL (ref 12–16)
IMM GRANULOCYTES # BLD AUTO: 0.11 K/UL (ref 0–0.04)
IMM GRANULOCYTES NFR BLD AUTO: 1.4 % (ref 0–0.5)
LYMPHOCYTES # BLD AUTO: 1.1 K/UL (ref 1–4.8)
LYMPHOCYTES NFR BLD: 13.6 % (ref 18–48)
MCH RBC QN AUTO: 28.7 PG (ref 27–31)
MCHC RBC AUTO-ENTMCNC: 31.7 G/DL (ref 32–36)
MCV RBC AUTO: 90 FL (ref 82–98)
MONOCYTES # BLD AUTO: 0.5 K/UL (ref 0.3–1)
MONOCYTES NFR BLD: 6.7 % (ref 4–15)
NEUTROPHILS # BLD AUTO: 6.3 K/UL (ref 1.8–7.7)
NEUTROPHILS NFR BLD: 77.2 % (ref 38–73)
NRBC BLD-RTO: 0 /100 WBC
PLATELET # BLD AUTO: 339 K/UL (ref 150–350)
PMV BLD AUTO: 8.9 FL (ref 9.2–12.9)
POTASSIUM SERPL-SCNC: 3.4 MMOL/L (ref 3.5–5.1)
PROT SERPL-MCNC: 5.1 G/DL (ref 6–8.4)
RBC # BLD AUTO: 2.93 M/UL (ref 4–5.4)
SODIUM SERPL-SCNC: 140 MMOL/L (ref 136–145)
WBC # BLD AUTO: 8.1 K/UL (ref 3.9–12.7)

## 2019-10-22 PROCEDURE — 36415 COLL VENOUS BLD VENIPUNCTURE: CPT

## 2019-10-22 PROCEDURE — 80053 COMPREHEN METABOLIC PANEL: CPT

## 2019-10-22 PROCEDURE — 21400001 HC TELEMETRY ROOM

## 2019-10-22 PROCEDURE — 99233 SBSQ HOSP IP/OBS HIGH 50: CPT | Mod: ,,, | Performed by: SURGERY

## 2019-10-22 PROCEDURE — 63600175 PHARM REV CODE 636 W HCPCS: Performed by: HOSPITALIST

## 2019-10-22 PROCEDURE — S0030 INJECTION, METRONIDAZOLE: HCPCS | Performed by: HOSPITALIST

## 2019-10-22 PROCEDURE — 25000003 PHARM REV CODE 250: Performed by: INTERNAL MEDICINE

## 2019-10-22 PROCEDURE — 85025 COMPLETE CBC W/AUTO DIFF WBC: CPT

## 2019-10-22 PROCEDURE — 25000003 PHARM REV CODE 250: Performed by: HOSPITALIST

## 2019-10-22 PROCEDURE — 63600175 PHARM REV CODE 636 W HCPCS: Performed by: INTERNAL MEDICINE

## 2019-10-22 PROCEDURE — S0028 INJECTION, FAMOTIDINE, 20 MG: HCPCS | Performed by: INTERNAL MEDICINE

## 2019-10-22 PROCEDURE — 99233 PR SUBSEQUENT HOSPITAL CARE,LEVL III: ICD-10-PCS | Mod: ,,, | Performed by: SURGERY

## 2019-10-22 RX ORDER — POTASSIUM CHLORIDE 7.45 MG/ML
10 INJECTION INTRAVENOUS ONCE
Status: COMPLETED | OUTPATIENT
Start: 2019-10-22 | End: 2019-10-22

## 2019-10-22 RX ADMIN — FAMOTIDINE 20 MG: 10 INJECTION, SOLUTION INTRAVENOUS at 09:10

## 2019-10-22 RX ADMIN — SODIUM CHLORIDE: 0.9 INJECTION, SOLUTION INTRAVENOUS at 09:10

## 2019-10-22 RX ADMIN — POTASSIUM CHLORIDE 10 MEQ: 10 INJECTION, SOLUTION INTRAVENOUS at 02:10

## 2019-10-22 RX ADMIN — METRONIDAZOLE 500 MG: 500 INJECTION, SOLUTION INTRAVENOUS at 05:10

## 2019-10-22 RX ADMIN — CALCIUM GLUCONATE: 98 INJECTION, SOLUTION INTRAVENOUS at 01:10

## 2019-10-22 RX ADMIN — METRONIDAZOLE 500 MG: 500 INJECTION, SOLUTION INTRAVENOUS at 09:10

## 2019-10-22 RX ADMIN — CEFTRIAXONE 1 G: 1 INJECTION, SOLUTION INTRAVENOUS at 02:10

## 2019-10-22 RX ADMIN — METRONIDAZOLE 500 MG: 500 INJECTION, SOLUTION INTRAVENOUS at 02:10

## 2019-10-22 NOTE — ASSESSMENT & PLAN NOTE
SODIUM IS IMPROVED  WITH NORMAL SALINE.  CONTINUE CURRENT MANAGEMENT.  10/22/2019.  Awaiting chemistries this morning.  Continue normal saline IV fluids.  I expect the sodium to be improved particularly with improvement yesterday.

## 2019-10-22 NOTE — PLAN OF CARE
10/22/19 1008   Discharge Reassessment   Assessment Type Discharge Planning Reassessment   Anticipated Discharge Disposition HospiceHome   Do you have any problems affording any of your prescribed medications? No   Discharge Plan A Home with family   Discharge Plan B Hospice/home   DME Needed Upon Discharge  none   Patient choice form signed by patient/caregiver Yes   Spoke to patient & her daughter. Patient's daughter states she would like to talk to [facility] about their services. Spoke to Ramya with [facility] & she will someone come talk to patient. Denies any other needs at this time.

## 2019-10-22 NOTE — PROGRESS NOTES
[facility] Hospital Medicine  Progress Note    Patient Name: Oriana Tobar  MRN: [id number]  Patient Class: IP- Inpatient   Admission Date: 10/20/2019  Length of Stay: 2 days  Attending Physician: Buck Hudson MD  Primary Care Provider: Buck Hudson MD        Subjective:     Principal Problem:Small bowel obstruction        HPI:  HX METASTATIC CA WITH RECURRENT GASTRIC BLEED NOW WITH NAUSEA AND VOMITTING ONE WEEK KUB GASTRIC AIR FLUID LEVEL SBO, CT SBO  AIR FLUID LEVEL AND INCREASE IN NEOPLASTIC MASSES ALONG WITH R PULMONARY MASS.  PATIENT DID HAVE A FORMED STOOL PRIOR TO ARRIVAL TO THE ER.    Overview/Hospital Course:  LABS IVFS NG SUCTION SURGERY CONSULT.  10/21/2019.  PATIENT HAD A TEMP LAST P.M. FLAGYL AND ROCEPHIN WAS ADDED IV.  WBC THIS MORNING SLIGHTLY OF 15,000 WITH A LEFT SHIFT.  CHEMISTRIES ARE ACCEPTABLE THIS MORNING IS SODIUM  UP FROM 130.  CONTINUE IV FLUIDS CONTINUE IV SUCTION.  THE PATIENT DID HAVE FLATUS THIS MORNING.  SURGICAL CONSULTATION TODAY.  LONG DISCUSSION WITH THE DAUGHTER AND PATIENT CONCERNING THE FINDINGS AND POSSIBLE POOR PROGNOSIS.  WILL AWAIT SURGICAL CONSULTATION FOR FURTHER RECOMMENDATIONS.  KUB WAS ORAL CONTRAST WILL BE CONSIDERED.  PATIENT PUT TOTAL OUT OF 1600 LAST P.M. NG WISE.  10/22/2019.  Family has not made clear decision as to what to do since the patient is alert and talkative this morning.  Will check a KUB and will clamp NG suction for the patient to move around also will reconnect to document amount of volume output.  Laboratory values pending this morning.  Family to discuss with several hospice agencies possibly today to determine the course of action.  Surgical consultations contain on the chart and note was dictated however I talked with Dr. Obrien yesterday morning and he is discussed with the family that operative intervention may cause of patient's death.  Continue current level of care to family  makes a decision.    Interval History:  Patient is a alert and talks with the family.  The daughter with the patient being alert cannot make a decision as to her course of action she will talk with hospice today.  I had discussed with the daughter previously in the office [facility] and she has been for the phone number of [facility] today I gave her the number to Yesenia Juan.  The daughter wishes to continue care for the patient since she is alert today.  She does understand the gravity of the situation.  And will discuss with hospice today but is not sure of her course of action yet.  Laboratory values are pending.  KUB will be ordered.  Patient is aware of Dr. Obrien is consult and opinion.  Surgery would not be in the best interest of the patient at this time.  Continue current level of care in to the daughter makes a decision.    Review of Systems   Constitutional: Positive for activity change, appetite change and unexpected weight change. Negative for chills, diaphoresis, fatigue and fever.        METASTATIC CANCER   HENT: Negative for congestion, drooling, hearing loss and trouble swallowing.    Eyes: Negative for pain and visual disturbance.   Respiratory: Negative.  Negative for apnea, cough, choking, chest tightness, shortness of breath and wheezing.    Cardiovascular: Negative for chest pain, palpitations and leg swelling.   Gastrointestinal: Positive for nausea and vomiting. Negative for abdominal distention, abdominal pain, blood in stool, constipation and diarrhea.        MILD ABD DISTENTION WITH MILD MID EPIGASTRIC PAIN   Endocrine: Negative for polydipsia, polyphagia and polyuria.   Genitourinary: Positive for decreased urine volume. Negative for difficulty urinating, dysuria and flank pain.   Musculoskeletal: Negative for arthralgias, back pain, gait problem, joint swelling, neck pain and neck stiffness.   Skin: Negative for color change, rash and wound.    Allergic/Immunologic: Negative for environmental allergies, food allergies and immunocompromised state.   Neurological: Positive for weakness. Negative for dizziness, syncope, numbness and headaches.   Hematological: Negative for adenopathy.   Psychiatric/Behavioral: Negative for agitation, confusion and sleep disturbance. The patient is not nervous/anxious.      Objective:     Vital Signs (Most Recent):  Temp: 97.1 °F (36.2 °C) (10/22/19 0619)  Pulse: 80 (10/22/19 0619)  Resp: 19 (10/22/19 0619)  BP: (!) 143/65 (10/22/19 0619)  SpO2: 100 % (10/22/19 0619) Vital Signs (24h Range):  Temp:  [97 °F (36.1 °C)-98.1 °F (36.7 °C)] 97.1 °F (36.2 °C)  Pulse:  [70-80] 80  Resp:  [15-19] 19  SpO2:  [95 %-100 %] 100 %  BP: (107-143)/(58-66) 143/65     Weight: 65 kg (143 lb 4.8 oz)  Body mass index is 19.99 kg/m².    Intake/Output Summary (Last 24 hours) at 10/22/2019 0713  Last data filed at 10/22/2019 0600  Gross per 24 hour   Intake 2733.33 ml   Output 750 ml   Net 1983.33 ml      Physical Exam   Constitutional: She is oriented to person, place, and time. She appears well-developed and well-nourished.   HENT:   Head: Normocephalic and atraumatic.   Right Ear: External ear normal.   Left Ear: External ear normal.   Nose: Nose normal.   NG tube in place   Eyes: Pupils are equal, round, and reactive to light. Conjunctivae, EOM and lids are normal.   Neck: Trachea normal, normal range of motion and full passive range of motion without pain. Neck supple.   Cardiovascular: Normal rate, regular rhythm, S1 normal, S2 normal, normal heart sounds, intact distal pulses and normal pulses.   Pulmonary/Chest: Effort normal and breath sounds normal.   Abdominal: Soft. Normal aorta.   Repair bowel sounds.   Musculoskeletal: Normal range of motion.   Cachexia muscle wasting   Neurological: She is alert and oriented to person, place, and time. She has normal reflexes.   Skin: Skin is warm, dry and intact.   Psychiatric: She has a normal mood  and affect. Her speech is normal and behavior is normal. Judgment and thought content normal. Cognition and memory are normal.   Nursing note and vitals reviewed.      Significant Labs:   CBC:   Recent Labs   Lab 10/20/19  1108 10/21/19  0518 10/22/19  0609   WBC 11.80 15.48* 8.10   HGB 12.6 8.8* 8.4*   HCT 39.2 28.4* 26.5*   * 411* 339     CMP:   Recent Labs   Lab 10/20/19  1145 10/21/19  0518   * 134*   K 4.0 3.6    110   CO2 15* 14*    104   BUN 19 16   CREATININE 1.7* 1.4   CALCIUM 7.3* 7.6*   PROT 6.5  --    ALBUMIN 2.8*  --    BILITOT 0.8  --    ALKPHOS 75  --    AST 11  --    ALT 7*  --    ANIONGAP 7* 10   EGFRNONAA 29.3* 37.0*     All pertinent labs within the past 24 hours have been reviewed.    Significant Imaging: None      Assessment/Plan:      * Small bowel obstruction  SBO ON KUB CT ADMIT IVFS RONDA GARCIA SUCTION SURGERY CONSULT  10/21/2019.  1600 CC OUT NG WISE.  TEMP LAST P.M. METRONIDAZOLE AND ROCEPHIN HAVE BEEN ADDED IV.  WBC ELEVATED THIS MORNING 15.7 LEFT SHIFT LONG DISCUSSION WITH THE PATIENT AND MAINLY THE DAUGHTER.  EXPLAINED TO THE DAUGHTER SMALL-BOWEL OBSTRUCTION WHICH SHE UNDERSTANDS.  SHE IS ALSO AWARE THAT THIS MAY BE DUE TO THE PATIENT'S INCREASE IN HER ABDOMINAL AND PERITONEAL MASSES THAT MAY BE COMPROMISING HER BOWELS.  PATIENT ALSO HAS A LARGE DERMOID TUMOR IN THE PELVIS.  CONSIDERATION FOR KUB WITH ORAL CONTRAST BUT AWAIT SURGICAL OPINION AT THIS TIME.  THE DAUGHTER MAY BE RELUCTANT TO DO AGGRESSIVE PROCEDURES BECAUSE SHE RELIES HIGH ILL HER MOTHER IS.  10/22/2019.  The daughter has not made a decision today on hospice.  The daughter is aware that surgery may cause the mother's demise.  KUB will be ordered today laboratory values will be monitored.  Continue current level of care to the daughter makes a decision as to her course of action concerning hospice and care.  The daughter even discussed TPN with me today and I told her she was not a candidate for TPN  because of a terminal diagnosis.     Hyponatremia  SODIUM IS IMPROVED  WITH NORMAL SALINE.  CONTINUE CURRENT MANAGEMENT.  10/22/2019.  Awaiting chemistries this morning.  Continue normal saline IV fluids.  I expect the sodium to be improved particularly with improvement yesterday.    SHAMAR (acute kidney injury)  WITH HYDRATION CREATININE IS IMPROVED TO 1.4.  CONTINUE CURRENT MANAGEMENT.  10/22/2019.  Chemistry pending this morning.  CBC shows the patient's hematocrit has dropped to 26 with hydration.  So therefore expect the creatinine to have improved.  Awaiting laboratory values this time.  Continue IV fluids patient is on NG suction is not taking anything in p.o. due to obstruction.  KUB has been ordered.      VTE Risk Mitigation (From admission, onward)         Ordered     IP VTE HIGH RISK PATIENT  Once      10/20/19 1923     Place RORY hose  Until discontinued      10/20/19 1923                      Buck Hudson MD  Department of Hospital Medicine   [facility]

## 2019-10-22 NOTE — PLAN OF CARE
10/22/19 1046   Discharge Reassessment   Assessment Type Discharge Planning Reassessment   Anticipated Discharge Disposition [facility] in to talk to patient's granddaughter & patient.

## 2019-10-22 NOTE — SUBJECTIVE & OBJECTIVE
Interval History:  Patient is a alert and talks with the family.  The daughter with the patient being alert cannot make a decision as to her course of action she will talk with hospice today.  I had discussed with the daughter previously in the office [facility] and she has been for the phone number of [facility] today I gave her the number to Yesenia Juan.  The daughter wishes to continue care for the patient since she is alert today.  She does understand the gravity of the situation.  And will discuss with hospice today but is not sure of her course of action yet.  Laboratory values are pending.  KUB will be ordered.  Patient is aware of Dr. Obrien is consult and opinion.  Surgery would not be in the best interest of the patient at this time.  Continue current level of care in to the daughter makes a decision.    Review of Systems   Constitutional: Positive for activity change, appetite change and unexpected weight change. Negative for chills, diaphoresis, fatigue and fever.        METASTATIC CANCER   HENT: Negative for congestion, drooling, hearing loss and trouble swallowing.    Eyes: Negative for pain and visual disturbance.   Respiratory: Negative.  Negative for apnea, cough, choking, chest tightness, shortness of breath and wheezing.    Cardiovascular: Negative for chest pain, palpitations and leg swelling.   Gastrointestinal: Positive for nausea and vomiting. Negative for abdominal distention, abdominal pain, blood in stool, constipation and diarrhea.        MILD ABD DISTENTION WITH MILD MID EPIGASTRIC PAIN   Endocrine: Negative for polydipsia, polyphagia and polyuria.   Genitourinary: Positive for decreased urine volume. Negative for difficulty urinating, dysuria and flank pain.   Musculoskeletal: Negative for arthralgias, back pain, gait problem, joint swelling, neck pain and neck stiffness.   Skin: Negative for color change, rash and wound.   Allergic/Immunologic: Negative for  environmental allergies, food allergies and immunocompromised state.   Neurological: Positive for weakness. Negative for dizziness, syncope, numbness and headaches.   Hematological: Negative for adenopathy.   Psychiatric/Behavioral: Negative for agitation, confusion and sleep disturbance. The patient is not nervous/anxious.      Objective:     Vital Signs (Most Recent):  Temp: 97.1 °F (36.2 °C) (10/22/19 0619)  Pulse: 80 (10/22/19 0619)  Resp: 19 (10/22/19 0619)  BP: (!) 143/65 (10/22/19 0619)  SpO2: 100 % (10/22/19 0619) Vital Signs (24h Range):  Temp:  [97 °F (36.1 °C)-98.1 °F (36.7 °C)] 97.1 °F (36.2 °C)  Pulse:  [70-80] 80  Resp:  [15-19] 19  SpO2:  [95 %-100 %] 100 %  BP: (107-143)/(58-66) 143/65     Weight: 65 kg (143 lb 4.8 oz)  Body mass index is 19.99 kg/m².    Intake/Output Summary (Last 24 hours) at 10/22/2019 0713  Last data filed at 10/22/2019 0600  Gross per 24 hour   Intake 2733.33 ml   Output 750 ml   Net 1983.33 ml      Physical Exam   Constitutional: She is oriented to person, place, and time. She appears well-developed and well-nourished.   HENT:   Head: Normocephalic and atraumatic.   Right Ear: External ear normal.   Left Ear: External ear normal.   Nose: Nose normal.   NG tube in place   Eyes: Pupils are equal, round, and reactive to light. Conjunctivae, EOM and lids are normal.   Neck: Trachea normal, normal range of motion and full passive range of motion without pain. Neck supple.   Cardiovascular: Normal rate, regular rhythm, S1 normal, S2 normal, normal heart sounds, intact distal pulses and normal pulses.   Pulmonary/Chest: Effort normal and breath sounds normal.   Abdominal: Soft. Normal aorta.   Repair bowel sounds.   Musculoskeletal: Normal range of motion.   Cachexia muscle wasting   Neurological: She is alert and oriented to person, place, and time. She has normal reflexes.   Skin: Skin is warm, dry and intact.   Psychiatric: She has a normal mood and affect. Her speech is normal and  behavior is normal. Judgment and thought content normal. Cognition and memory are normal.   Nursing note and vitals reviewed.      Significant Labs:   CBC:   Recent Labs   Lab 10/20/19  1108 10/21/19  0518 10/22/19  0609   WBC 11.80 15.48* 8.10   HGB 12.6 8.8* 8.4*   HCT 39.2 28.4* 26.5*   * 411* 339     CMP:   Recent Labs   Lab 10/20/19  1145 10/21/19  0518   * 134*   K 4.0 3.6    110   CO2 15* 14*    104   BUN 19 16   CREATININE 1.7* 1.4   CALCIUM 7.3* 7.6*   PROT 6.5  --    ALBUMIN 2.8*  --    BILITOT 0.8  --    ALKPHOS 75  --    AST 11  --    ALT 7*  --    ANIONGAP 7* 10   EGFRNONAA 29.3* 37.0*     All pertinent labs within the past 24 hours have been reviewed.    Significant Imaging: None

## 2019-10-22 NOTE — ASSESSMENT & PLAN NOTE
WITH HYDRATION CREATININE IS IMPROVED TO 1.4.  CONTINUE CURRENT MANAGEMENT.  10/22/2019.  Chemistry pending this morning.  CBC shows the patient's hematocrit has dropped to 26 with hydration.  So therefore expect the creatinine to have improved.  Awaiting laboratory values this time.  Continue IV fluids patient is on NG suction is not taking anything in p.o. due to obstruction.  KUB has been ordered.

## 2019-10-22 NOTE — PLAN OF CARE
Problem: Fall Injury Risk  Goal: Absence of Fall and Fall-Related Injury  Outcome: Ongoing, Progressing     Problem: Adult Inpatient Plan of Care  Goal: Plan of Care Review  Outcome: Ongoing, Progressing     Problem: Adult Inpatient Plan of Care  Goal: Optimal Comfort and Wellbeing  Outcome: Ongoing, Progressing     Problem: Skin Injury Risk Increased  Goal: Skin Health and Integrity  Outcome: Ongoing, Progressing     Problem: Fluid Deficit (Intestinal Obstruction)  Goal: Fluid Balance  Outcome: Ongoing, Progressing     Problem: Pain (Intestinal Obstruction)  Goal: Acceptable Pain Control  Outcome: Ongoing, Progressing

## 2019-10-22 NOTE — ASSESSMENT & PLAN NOTE
SBO ON KUB CT ADMIT IVFS EVELIOZITA RADHA SUCTION SURGERY CONSULT  10/21/2019.  1600 CC OUT NG WISE.  TEMP LAST P.M. METRONIDAZOLE AND ROCEPHIN HAVE BEEN ADDED IV.  WBC ELEVATED THIS MORNING 15.7 LEFT SHIFT LONG DISCUSSION WITH THE PATIENT AND MAINLY THE DAUGHTER.  EXPLAINED TO THE DAUGHTER SMALL-BOWEL OBSTRUCTION WHICH SHE UNDERSTANDS.  SHE IS ALSO AWARE THAT THIS MAY BE DUE TO THE PATIENT'S INCREASE IN HER ABDOMINAL AND PERITONEAL MASSES THAT MAY BE COMPROMISING HER BOWELS.  PATIENT ALSO HAS A LARGE DERMOID TUMOR IN THE PELVIS.  CONSIDERATION FOR KUB WITH ORAL CONTRAST BUT AWAIT SURGICAL OPINION AT THIS TIME.  THE DAUGHTER MAY BE RELUCTANT TO DO AGGRESSIVE PROCEDURES BECAUSE SHE RELIES HIGH ILL HER MOTHER IS.  10/22/2019.  The daughter has not made a decision today on hospice.  The daughter is aware that surgery may cause the mother's demise.  KUB will be ordered today laboratory values will be monitored.  Continue current level of care to the daughter makes a decision as to her course of action concerning hospice and care.  The daughter even discussed TPN with me today and I told her she was not a candidate for TPN because of a terminal diagnosis.

## 2019-10-22 NOTE — NURSING
4069 HL #22 STARTED R FA BY VIC MCKEON. HL R HAND DC BY VIC MCKEON. CLIENT TOLERATED WELL..HL TO BE CHANGE ON 10/26/19. NO SIGNS OF ACUTE DISTRESS.RADHA MORALES CLAMPED AS PER DR VELEZ.FAMILY AT BED SIDE.NO SIGNS OF ACUTE DISTRESS. MARU CASTRO RN

## 2019-10-23 ENCOUNTER — TELEPHONE (OUTPATIENT)
Dept: HEMATOLOGY/ONCOLOGY | Facility: CLINIC | Age: 74
End: 2019-10-23

## 2019-10-23 LAB
ALBUMIN SERPL BCP-MCNC: 2 G/DL (ref 3.5–5.2)
ALP SERPL-CCNC: 45 U/L (ref 55–135)
ALT SERPL W/O P-5'-P-CCNC: 6 U/L (ref 10–44)
AMMONIA PLAS-SCNC: 23 UMOL/L (ref 10–50)
ANION GAP SERPL CALC-SCNC: 5 MMOL/L (ref 8–16)
AST SERPL-CCNC: 9 U/L (ref 10–40)
BASOPHILS # BLD AUTO: 0.05 K/UL (ref 0–0.2)
BASOPHILS NFR BLD: 0.6 % (ref 0–1.9)
BILIRUB SERPL-MCNC: 0.6 MG/DL (ref 0.1–1)
BUN SERPL-MCNC: 8 MG/DL (ref 8–23)
CALCIUM SERPL-MCNC: 7.3 MG/DL (ref 8.7–10.5)
CHLORIDE SERPL-SCNC: 112 MMOL/L (ref 95–110)
CO2 SERPL-SCNC: 18 MMOL/L (ref 23–29)
CREAT SERPL-MCNC: 0.9 MG/DL (ref 0.5–1.4)
DIFFERENTIAL METHOD: ABNORMAL
EOSINOPHIL # BLD AUTO: 0.1 K/UL (ref 0–0.5)
EOSINOPHIL NFR BLD: 1 % (ref 0–8)
ERYTHROCYTE [DISTWIDTH] IN BLOOD BY AUTOMATED COUNT: 15.5 % (ref 11.5–14.5)
EST. GFR  (AFRICAN AMERICAN): >60 ML/MIN/1.73 M^2
EST. GFR  (NON AFRICAN AMERICAN): >60 ML/MIN/1.73 M^2
GLUCOSE SERPL-MCNC: 105 MG/DL (ref 70–110)
HCT VFR BLD AUTO: 27.3 % (ref 37–48.5)
HGB BLD-MCNC: 8.6 G/DL (ref 12–16)
IMM GRANULOCYTES # BLD AUTO: 0.1 K/UL (ref 0–0.04)
IMM GRANULOCYTES NFR BLD AUTO: 1.3 % (ref 0–0.5)
LYMPHOCYTES # BLD AUTO: 1.2 K/UL (ref 1–4.8)
LYMPHOCYTES NFR BLD: 14.9 % (ref 18–48)
MCH RBC QN AUTO: 28.3 PG (ref 27–31)
MCHC RBC AUTO-ENTMCNC: 31.5 G/DL (ref 32–36)
MCV RBC AUTO: 90 FL (ref 82–98)
MONOCYTES # BLD AUTO: 0.5 K/UL (ref 0.3–1)
MONOCYTES NFR BLD: 6.4 % (ref 4–15)
NEUTROPHILS # BLD AUTO: 6 K/UL (ref 1.8–7.7)
NEUTROPHILS NFR BLD: 75.8 % (ref 38–73)
NRBC BLD-RTO: 0 /100 WBC
PLATELET # BLD AUTO: 351 K/UL (ref 150–350)
PMV BLD AUTO: 8.8 FL (ref 9.2–12.9)
POTASSIUM SERPL-SCNC: 3.2 MMOL/L (ref 3.5–5.1)
PROT SERPL-MCNC: 4.9 G/DL (ref 6–8.4)
RBC # BLD AUTO: 3.04 M/UL (ref 4–5.4)
SODIUM SERPL-SCNC: 135 MMOL/L (ref 136–145)
WBC # BLD AUTO: 7.86 K/UL (ref 3.9–12.7)

## 2019-10-23 PROCEDURE — 99233 SBSQ HOSP IP/OBS HIGH 50: CPT | Mod: ,,, | Performed by: SURGERY

## 2019-10-23 PROCEDURE — 21400001 HC TELEMETRY ROOM

## 2019-10-23 PROCEDURE — 25000003 PHARM REV CODE 250: Performed by: HOSPITALIST

## 2019-10-23 PROCEDURE — S0030 INJECTION, METRONIDAZOLE: HCPCS | Performed by: HOSPITALIST

## 2019-10-23 PROCEDURE — 80053 COMPREHEN METABOLIC PANEL: CPT

## 2019-10-23 PROCEDURE — 63600175 PHARM REV CODE 636 W HCPCS: Performed by: HOSPITALIST

## 2019-10-23 PROCEDURE — 82140 ASSAY OF AMMONIA: CPT

## 2019-10-23 PROCEDURE — 63600175 PHARM REV CODE 636 W HCPCS: Performed by: INTERNAL MEDICINE

## 2019-10-23 PROCEDURE — 99233 PR SUBSEQUENT HOSPITAL CARE,LEVL III: ICD-10-PCS | Mod: ,,, | Performed by: SURGERY

## 2019-10-23 PROCEDURE — 85025 COMPLETE CBC W/AUTO DIFF WBC: CPT

## 2019-10-23 PROCEDURE — S0028 INJECTION, FAMOTIDINE, 20 MG: HCPCS | Performed by: INTERNAL MEDICINE

## 2019-10-23 PROCEDURE — 25000003 PHARM REV CODE 250: Performed by: INTERNAL MEDICINE

## 2019-10-23 PROCEDURE — 36415 COLL VENOUS BLD VENIPUNCTURE: CPT

## 2019-10-23 RX ORDER — POTASSIUM CHLORIDE 20 MEQ/15ML
40 SOLUTION ORAL 2 TIMES DAILY
Status: DISCONTINUED | OUTPATIENT
Start: 2019-10-23 | End: 2019-10-25 | Stop reason: HOSPADM

## 2019-10-23 RX ORDER — QUETIAPINE FUMARATE 25 MG/1
50 TABLET, FILM COATED ORAL 2 TIMES DAILY
Status: DISCONTINUED | OUTPATIENT
Start: 2019-10-23 | End: 2019-10-25 | Stop reason: HOSPADM

## 2019-10-23 RX ADMIN — FAMOTIDINE 20 MG: 10 INJECTION, SOLUTION INTRAVENOUS at 09:10

## 2019-10-23 RX ADMIN — METRONIDAZOLE 500 MG: 500 INJECTION, SOLUTION INTRAVENOUS at 09:10

## 2019-10-23 RX ADMIN — METRONIDAZOLE 500 MG: 500 INJECTION, SOLUTION INTRAVENOUS at 05:10

## 2019-10-23 RX ADMIN — POTASSIUM CHLORIDE 40 MEQ: 20 SOLUTION ORAL at 09:10

## 2019-10-23 RX ADMIN — LORAZEPAM 0.5 MG: 2 INJECTION INTRAMUSCULAR; INTRAVENOUS at 09:10

## 2019-10-23 RX ADMIN — LORAZEPAM 0.5 MG: 2 INJECTION INTRAMUSCULAR; INTRAVENOUS at 03:10

## 2019-10-23 RX ADMIN — METRONIDAZOLE 500 MG: 500 INJECTION, SOLUTION INTRAVENOUS at 01:10

## 2019-10-23 RX ADMIN — FAMOTIDINE 20 MG: 10 INJECTION, SOLUTION INTRAVENOUS at 10:10

## 2019-10-23 RX ADMIN — CEFTRIAXONE 1 G: 1 INJECTION, SOLUTION INTRAVENOUS at 01:10

## 2019-10-23 RX ADMIN — LORAZEPAM 0.5 MG: 2 INJECTION INTRAMUSCULAR; INTRAVENOUS at 01:10

## 2019-10-23 NOTE — TELEPHONE ENCOUNTER
Pts daughter, Maryjane, stopped by clinic with questions regarding plan of care while pt is admitted to hospital. She stated Dr. Tony is suggesting hospice but wanted to know if that was the right decision.  She is also stating he is recommending a PEG tube to remove air and help with potential pain.  She is asking your thoughts on that as well.

## 2019-10-23 NOTE — PLAN OF CARE
10/23/19 1104   Final Note   Assessment Type Final Discharge Note   Anticipated Discharge Disposition HospiceHome   What phone number can be called within the next 1-3 days to see how you are doing after discharge? [phone]   Hospital Follow Up  Appt(s) scheduled? Yes   Discharge plans and expectations educations in teach back method with documentation complete? Yes   Patient sitting on side of the bed; daughter at bedside. Patient trying to put on her clothes; states she's going home. She thinks her daughter is her sister. Daughter worried about maybe her ammonia level is off. Will have nurse speak to doctor about her condition. Daughter states she wants her to go home with [facility] hospice whenever she is ready for discharged. No other needs at this time.

## 2019-10-23 NOTE — PLAN OF CARE
Problem: Adult Inpatient Plan of Care  Goal: Plan of Care Review  Outcome: Ongoing, Progressing  Flowsheets (Taken 10/23/2019 0407)  Plan of Care Reviewed With: patient  Goal: Optimal Comfort and Wellbeing  Outcome: Ongoing, Progressing  Intervention: Provide Person-Centered Care  Flowsheets (Taken 10/23/2019 0407)  Trust Relationship/Rapport: care explained; choices provided; emotional support provided; empathic listening provided; thoughts/feelings acknowledged; reassurance provided; questions encouraged; questions answered

## 2019-10-23 NOTE — ASSESSMENT & PLAN NOTE
SBO ON KUB CT ADMIT IVFS RONDA RADHA SUCTION SURGERY CONSULT  10/21/2019.  1600 CC OUT NG WISE.  TEMP LAST P.M. METRONIDAZOLE AND ROCEPHIN HAVE BEEN ADDED IV.  WBC ELEVATED THIS MORNING 15.7 LEFT SHIFT LONG DISCUSSION WITH THE PATIENT AND MAINLY THE DAUGHTER.  EXPLAINED TO THE DAUGHTER SMALL-BOWEL OBSTRUCTION WHICH SHE UNDERSTANDS.  SHE IS ALSO AWARE THAT THIS MAY BE DUE TO THE PATIENT'S INCREASE IN HER ABDOMINAL AND PERITONEAL MASSES THAT MAY BE COMPROMISING HER BOWELS.  PATIENT ALSO HAS A LARGE DERMOID TUMOR IN THE PELVIS.  CONSIDERATION FOR KUB WITH ORAL CONTRAST BUT AWAIT SURGICAL OPINION AT THIS TIME.  THE DAUGHTER MAY BE RELUCTANT TO DO AGGRESSIVE PROCEDURES BECAUSE SHE RELIES HIGH ILL HER MOTHER IS.  10/22/2019.  The daughter has not made a decision today on hospice.  The daughter is aware that surgery may cause the mother's demise.  KUB will be ordered today laboratory values will be monitored.  Continue current level of care to the daughter makes a decision as to her course of action concerning hospice and care.  The daughter even discussed TPN with me today and I told her she was not a candidate for TPN because of a terminal diagnosis.  10/23/2019.  KUB yesterday showed no signs of bowel obstruction.  The family had a conference patient family does not desire surgeries this time.  I did mention them possibility of using the PEG tube is decompression for care and comfort.  Patient's will talk with Dr. Obrien again today and make the finals decision disposition and discharge planning.  The daughter is anxious about making appropriate decisions for her mother.  The daughter does have experience with home care in the past and has taken excellent care of her mother.  I informed her there were no right or wrong decisions at this time.  Laboratory values have been viewed.  Continue current level of care unless clinical course changes.

## 2019-10-23 NOTE — PROGRESS NOTES
[facility] Hospital Medicine  Progress Note    Patient Name: Oriana Tobar  MRN: [id number]  Patient Class: IP- Inpatient   Admission Date: 10/20/2019  Length of Stay: 3 days  Attending Physician: Buck Hudson MD  Primary Care Provider: Buck Hudson MD        Subjective:     Principal Problem:Small bowel obstruction        HPI:  HX METASTATIC CA WITH RECURRENT GASTRIC BLEED NOW WITH NAUSEA AND VOMITTING ONE WEEK KUB GASTRIC AIR FLUID LEVEL SBO, CT SBO  AIR FLUID LEVEL AND INCREASE IN NEOPLASTIC MASSES ALONG WITH R PULMONARY MASS.  PATIENT DID HAVE A FORMED STOOL PRIOR TO ARRIVAL TO THE ER.    Overview/Hospital Course:  LABS IVFS NG SUCTION SURGERY CONSULT.  10/21/2019.  PATIENT HAD A TEMP LAST P.M. FLAGYL AND ROCEPHIN WAS ADDED IV.  WBC THIS MORNING SLIGHTLY OF 15,000 WITH A LEFT SHIFT.  CHEMISTRIES ARE ACCEPTABLE THIS MORNING IS SODIUM  UP FROM 130.  CONTINUE IV FLUIDS CONTINUE IV SUCTION.  THE PATIENT DID HAVE FLATUS THIS MORNING.  SURGICAL CONSULTATION TODAY.  LONG DISCUSSION WITH THE DAUGHTER AND PATIENT CONCERNING THE FINDINGS AND POSSIBLE POOR PROGNOSIS.  WILL AWAIT SURGICAL CONSULTATION FOR FURTHER RECOMMENDATIONS.  KUB WAS ORAL CONTRAST WILL BE CONSIDERED.  PATIENT PUT TOTAL OUT OF 1600 LAST P.M. NG WISE.  10/22/2019.  Family has not made clear decision as to what to do since the patient is alert and talkative this morning.  Will check a KUB and will clamp NG suction for the patient to move around also will reconnect to document amount of volume output.  Laboratory values pending this morning.  Family to discuss with several hospice agencies possibly today to determine the course of action.  Surgical consultations contain on the chart and note was dictated however I talked with Dr. Obrien yesterday morning and he is discussed with the family that operative intervention may cause of patient's death.  Continue current level of care to family  makes a decision.  10/23/2019  Reviewed Dr. Obrien is consult progress note.  The family does not desire surgery at this time.  I did mention with them possible PEG tube for care and comfort measures and decompression long-term.  The patient will be placed on hospice once discharged.  Family wishes talk to Dr. Obrien general surgery again today for making the final decision and disposition and discharge planning.  CBC is contain on the chart chemistries are pending at this time.  WBC is normal still with slight left shift.  Hematocrit slightly increased to 27 since yesterday.  Did give the patient liquids yesterday will repeat a cup suction today to see if the patient has any measure will amount coming from the NG.  Patient is comfortable with no pain at this time.  Daughter states that she was upset last night with the prospects of dying.  Continue current level of care unless clinical course changes.    Interval History:  See hospital course note    Review of Systems   Constitutional: Positive for activity change, appetite change and unexpected weight change. Negative for chills, diaphoresis, fatigue and fever.        METASTATIC CANCER   HENT: Negative for congestion, drooling, hearing loss and trouble swallowing.    Eyes: Negative for pain and visual disturbance.   Respiratory: Negative.  Negative for apnea, cough, choking, chest tightness, shortness of breath and wheezing.    Cardiovascular: Negative for chest pain, palpitations and leg swelling.   Gastrointestinal: Positive for nausea and vomiting. Negative for abdominal distention, abdominal pain, blood in stool, constipation and diarrhea.        MILD ABD DISTENTION WITH MILD MID EPIGASTRIC PAIN   Endocrine: Negative for polydipsia, polyphagia and polyuria.   Genitourinary: Positive for decreased urine volume. Negative for difficulty urinating, dysuria and flank pain.   Musculoskeletal: Negative for arthralgias, back pain, gait problem, joint swelling, neck  pain and neck stiffness.   Skin: Negative for color change, rash and wound.   Allergic/Immunologic: Negative for environmental allergies, food allergies and immunocompromised state.   Neurological: Positive for weakness. Negative for dizziness, syncope, numbness and headaches.   Hematological: Negative for adenopathy.   Psychiatric/Behavioral: Negative for agitation, confusion and sleep disturbance. The patient is not nervous/anxious.      Objective:     Vital Signs (Most Recent):  Temp: 97.7 °F (36.5 °C) (10/23/19 0726)  Pulse: 70 (10/23/19 0726)  Resp: 16 (10/23/19 0726)  BP: (!) 143/65 (10/23/19 0726)  SpO2: 100 % (10/23/19 0726) Vital Signs (24h Range):  Temp:  [96 °F (35.6 °C)-97.7 °F (36.5 °C)] 97.7 °F (36.5 °C)  Pulse:  [58-78] 70  Resp:  [15-18] 16  SpO2:  [100 %] 100 %  BP: (136-163)/(60-75) 143/65     Weight: 65 kg (143 lb 4.8 oz)  Body mass index is 19.99 kg/m².    Intake/Output Summary (Last 24 hours) at 10/23/2019 0730  Last data filed at 10/23/2019 0131  Gross per 24 hour   Intake 1250 ml   Output 25 ml   Net 1225 ml      Physical Exam   Constitutional: She is oriented to person, place, and time. She appears well-developed and well-nourished.   HENT:   Head: Normocephalic and atraumatic.   Right Ear: External ear normal.   Left Ear: External ear normal.   Nose: Nose normal.   NG tube in place   Eyes: Pupils are equal, round, and reactive to light. Conjunctivae, EOM and lids are normal.   Neck: Trachea normal, normal range of motion and full passive range of motion without pain. Neck supple.   Cardiovascular: Normal rate, regular rhythm, S1 normal, S2 normal, normal heart sounds, intact distal pulses and normal pulses.   Pulmonary/Chest: Effort normal and breath sounds normal.   Abdominal: Soft. Normal aorta.   Repair bowel sounds.   Musculoskeletal: Normal range of motion.   Cachexia muscle wasting   Neurological: She is alert and oriented to person, place, and time. She has normal reflexes.   Skin:  Skin is warm, dry and intact.   Psychiatric: She has a normal mood and affect. Her speech is normal and behavior is normal. Judgment and thought content normal. Cognition and memory are normal.   Nursing note and vitals reviewed.      Significant Labs:   CBC:   Recent Labs   Lab 10/22/19  0609 10/23/19  0558   WBC 8.10 7.86   HGB 8.4* 8.6*   HCT 26.5* 27.3*    351*     CMP:   Recent Labs   Lab 10/22/19  0609 10/23/19  0558    135*   K 3.4* 3.2*   * 112*   CO2 14* 18*   GLU 97 105   BUN 13 8   CREATININE 1.2 0.9   CALCIUM 7.1* 7.3*   PROT 5.1* 4.9*   ALBUMIN 2.1* 2.0*   BILITOT 0.4 0.6   ALKPHOS 50* 45*   AST 9* 9*   ALT 5* 6*   ANIONGAP 9 5*   EGFRNONAA 44.6* >60.0     All pertinent labs within the past 24 hours have been reviewed.    Significant Imaging: KUB yesterday showed no signs of bowel obstruction      Assessment/Plan:      * Small bowel obstruction  SBO ON KUB CT ADMIT IVFS RONDA GARCIA SUCTION SURGERY CONSULT  10/21/2019.  1600 CC OUT NG WISE.  TEMP LAST P.M. METRONIDAZOLE AND ROCEPHIN HAVE BEEN ADDED IV.  WBC ELEVATED THIS MORNING 15.7 LEFT SHIFT LONG DISCUSSION WITH THE PATIENT AND MAINLY THE DAUGHTER.  EXPLAINED TO THE DAUGHTER SMALL-BOWEL OBSTRUCTION WHICH SHE UNDERSTANDS.  SHE IS ALSO AWARE THAT THIS MAY BE DUE TO THE PATIENT'S INCREASE IN HER ABDOMINAL AND PERITONEAL MASSES THAT MAY BE COMPROMISING HER BOWELS.  PATIENT ALSO HAS A LARGE DERMOID TUMOR IN THE PELVIS.  CONSIDERATION FOR KUB WITH ORAL CONTRAST BUT AWAIT SURGICAL OPINION AT THIS TIME.  THE DAUGHTER MAY BE RELUCTANT TO DO AGGRESSIVE PROCEDURES BECAUSE SHE RELIES HIGH ILL HER MOTHER IS.  10/22/2019.  The daughter has not made a decision today on hospice.  The daughter is aware that surgery may cause the mother's demise.  KUB will be ordered today laboratory values will be monitored.  Continue current level of care to the daughter makes a decision as to her course of action concerning hospice and care.  The daughter even  discussed TPN with me today and I told her she was not a candidate for TPN because of a terminal diagnosis.  10/23/2019.  KUB yesterday showed no signs of bowel obstruction.  The family had a conference patient family does not desire surgeries this time.  I did mention them possibility of using the PEG tube is decompression for care and comfort.  Patient's will talk with Dr. Obrien again today and make the finals decision disposition and discharge planning.  The daughter is anxious about making appropriate decisions for her mother.  The daughter does have experience with home care in the past and has taken excellent care of her mother.  I informed her there were no right or wrong decisions at this time.  Laboratory values have been viewed.  Continue current level of care unless clinical course changes.    Hyponatremia  SODIUM IS IMPROVED  WITH NORMAL SALINE.  CONTINUE CURRENT MANAGEMENT.  10/22/2019.  Awaiting chemistries this morning.  Continue normal saline IV fluids.  I expect the sodium to be improved particularly with improvement yesterday.    SHAMAR (acute kidney injury)  WITH HYDRATION CREATININE IS IMPROVED TO 1.4.  CONTINUE CURRENT MANAGEMENT.  10/22/2019.  Chemistry pending this morning.  CBC shows the patient's hematocrit has dropped to 26 with hydration.  So therefore expect the creatinine to have improved.  Awaiting laboratory values this time.  Continue IV fluids patient is on NG suction is not taking anything in p.o. due to obstruction.  KUB has been ordered.      VTE Risk Mitigation (From admission, onward)         Ordered     IP VTE HIGH RISK PATIENT  Once      10/20/19 1923     Place RORY hose  Until discontinued      10/20/19 1923                      Buck Hudson MD  Department of Hospital Medicine   [facility]

## 2019-10-23 NOTE — SUBJECTIVE & OBJECTIVE
Interval History:  See hospital course note    Review of Systems   Constitutional: Positive for activity change, appetite change and unexpected weight change. Negative for chills, diaphoresis, fatigue and fever.        METASTATIC CANCER   HENT: Negative for congestion, drooling, hearing loss and trouble swallowing.    Eyes: Negative for pain and visual disturbance.   Respiratory: Negative.  Negative for apnea, cough, choking, chest tightness, shortness of breath and wheezing.    Cardiovascular: Negative for chest pain, palpitations and leg swelling.   Gastrointestinal: Positive for nausea and vomiting. Negative for abdominal distention, abdominal pain, blood in stool, constipation and diarrhea.        MILD ABD DISTENTION WITH MILD MID EPIGASTRIC PAIN   Endocrine: Negative for polydipsia, polyphagia and polyuria.   Genitourinary: Positive for decreased urine volume. Negative for difficulty urinating, dysuria and flank pain.   Musculoskeletal: Negative for arthralgias, back pain, gait problem, joint swelling, neck pain and neck stiffness.   Skin: Negative for color change, rash and wound.   Allergic/Immunologic: Negative for environmental allergies, food allergies and immunocompromised state.   Neurological: Positive for weakness. Negative for dizziness, syncope, numbness and headaches.   Hematological: Negative for adenopathy.   Psychiatric/Behavioral: Negative for agitation, confusion and sleep disturbance. The patient is not nervous/anxious.      Objective:     Vital Signs (Most Recent):  Temp: 97.7 °F (36.5 °C) (10/23/19 0726)  Pulse: 70 (10/23/19 0726)  Resp: 16 (10/23/19 0726)  BP: (!) 143/65 (10/23/19 0726)  SpO2: 100 % (10/23/19 0726) Vital Signs (24h Range):  Temp:  [96 °F (35.6 °C)-97.7 °F (36.5 °C)] 97.7 °F (36.5 °C)  Pulse:  [58-78] 70  Resp:  [15-18] 16  SpO2:  [100 %] 100 %  BP: (136-163)/(60-75) 143/65     Weight: 65 kg (143 lb 4.8 oz)  Body mass index is 19.99 kg/m².    Intake/Output Summary (Last 24  hours) at 10/23/2019 0730  Last data filed at 10/23/2019 0131  Gross per 24 hour   Intake 1250 ml   Output 25 ml   Net 1225 ml      Physical Exam   Constitutional: She is oriented to person, place, and time. She appears well-developed and well-nourished.   HENT:   Head: Normocephalic and atraumatic.   Right Ear: External ear normal.   Left Ear: External ear normal.   Nose: Nose normal.   NG tube in place   Eyes: Pupils are equal, round, and reactive to light. Conjunctivae, EOM and lids are normal.   Neck: Trachea normal, normal range of motion and full passive range of motion without pain. Neck supple.   Cardiovascular: Normal rate, regular rhythm, S1 normal, S2 normal, normal heart sounds, intact distal pulses and normal pulses.   Pulmonary/Chest: Effort normal and breath sounds normal.   Abdominal: Soft. Normal aorta.   Repair bowel sounds.   Musculoskeletal: Normal range of motion.   Cachexia muscle wasting   Neurological: She is alert and oriented to person, place, and time. She has normal reflexes.   Skin: Skin is warm, dry and intact.   Psychiatric: She has a normal mood and affect. Her speech is normal and behavior is normal. Judgment and thought content normal. Cognition and memory are normal.   Nursing note and vitals reviewed.      Significant Labs:   CBC:   Recent Labs   Lab 10/22/19  0609 10/23/19  0558   WBC 8.10 7.86   HGB 8.4* 8.6*   HCT 26.5* 27.3*    351*     CMP:   Recent Labs   Lab 10/22/19  0609 10/23/19  0558    135*   K 3.4* 3.2*   * 112*   CO2 14* 18*   GLU 97 105   BUN 13 8   CREATININE 1.2 0.9   CALCIUM 7.1* 7.3*   PROT 5.1* 4.9*   ALBUMIN 2.1* 2.0*   BILITOT 0.4 0.6   ALKPHOS 50* 45*   AST 9* 9*   ALT 5* 6*   ANIONGAP 9 5*   EGFRNONAA 44.6* >60.0     All pertinent labs within the past 24 hours have been reviewed.    Significant Imaging: KUB yesterday showed no signs of bowel obstruction

## 2019-10-24 PROCEDURE — 21400001 HC TELEMETRY ROOM

## 2019-10-24 PROCEDURE — S0030 INJECTION, METRONIDAZOLE: HCPCS | Performed by: HOSPITALIST

## 2019-10-24 PROCEDURE — 25000003 PHARM REV CODE 250: Performed by: HOSPITALIST

## 2019-10-24 PROCEDURE — 99233 SBSQ HOSP IP/OBS HIGH 50: CPT | Mod: 57,,, | Performed by: SURGERY

## 2019-10-24 PROCEDURE — 63600175 PHARM REV CODE 636 W HCPCS: Performed by: HOSPITALIST

## 2019-10-24 PROCEDURE — S0028 INJECTION, FAMOTIDINE, 20 MG: HCPCS | Performed by: INTERNAL MEDICINE

## 2019-10-24 PROCEDURE — 99233 PR SUBSEQUENT HOSPITAL CARE,LEVL III: ICD-10-PCS | Mod: 57,,, | Performed by: SURGERY

## 2019-10-24 PROCEDURE — 25000003 PHARM REV CODE 250: Performed by: INTERNAL MEDICINE

## 2019-10-24 RX ADMIN — POTASSIUM CHLORIDE 40 MEQ: 20 SOLUTION ORAL at 09:10

## 2019-10-24 RX ADMIN — CEFTRIAXONE 1 G: 1 INJECTION, SOLUTION INTRAVENOUS at 01:10

## 2019-10-24 RX ADMIN — FAMOTIDINE 20 MG: 10 INJECTION, SOLUTION INTRAVENOUS at 09:10

## 2019-10-24 RX ADMIN — METRONIDAZOLE 500 MG: 500 INJECTION, SOLUTION INTRAVENOUS at 09:10

## 2019-10-24 RX ADMIN — METRONIDAZOLE 500 MG: 500 INJECTION, SOLUTION INTRAVENOUS at 05:10

## 2019-10-24 RX ADMIN — METRONIDAZOLE 500 MG: 500 INJECTION, SOLUTION INTRAVENOUS at 01:10

## 2019-10-24 NOTE — PHYSICIAN QUERY
PT Name: Oriana Tobar  MR #: [id number]    Physician Query Form - Nutrition Clarification     CDS/: Marleny Leach RN, CDS               Contact information: [email or web address]                                                                                                                        [phone]    This form is a permanent document in the medical record.     Query Date: October 24, 2019    By submitting this query, we are merely seeking further clarification of documentation.. Please utilize your independent clinical judgment when addressing the question(s) below.    The Medical record contains the following:   Indicators  Supporting Clinical Findings Location in Medical Record   x % of Estimated Energy Intake over a time frame from p.o., TF, or TPN Positive for activity change, appetite change     over the past week she has been doing poorly with repetitive nausea and vomiting.   H&P 10/20    ED Provider Note 10/20   x Weight Status over a time frame Positive for...unexpected weight change H&P 10/20   x Subcutaneous Fat and/or Muscle Loss Elderly, debilitated, malnourished, cachectic female    Cachexia muscle wasting  General Surgery Consult 10/21  HM PN 10/22    Fluid Accumulation or Edema      Reduced  Strength     x Wt / BMI / Usual Body Weight Body mass index is 19.99 kg/m².  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   General Surgery Consult 10/21     AND / ASPEN Clinical Characteristics (October 2011)  A minimum of two characteristics is recommended for diagnosing either moderate or severe malnutrition   Mild  Malnutrition Moderate Malnutrition Severe Malnutrition   Energy Intake from p.o., TF or TPN. < 75% intake of estimated energy needs for less than 7 days < 75% intake of estimated energy needs for greater than 7 days < 50% intake of estimated energy needs for > 5 days   Weight Loss 1-2% in 1 month  5% in 3 months  7.5% in 6 months  10% in 1 year 1-2 % in 1 week  5% in 1 month  7.5% in 3 months  10% in 6 months  20% in 1 year > 2% in 1 week  > 5% in 1 month  > 7.5% in 3 months  > 10% in 6 months  > 20% in 1 year   Physical Findings     None *Mild subcutaneous fat and/or muscle loss  *Mild fluid accumulation  *Stage II decubitus  *Surgical wound or non-healing wound *Mod/severe subcutaneous fat and/or muscle loss  *Mod/severe fluid accumulation  *Stage III or IV decubitus  *Non-healing surgical wound     Provider, please further specify the degree of malnutrition     [  ] Mild Protein-Calorie Malnutrition   [  ] Moderate Protein-Calorie Malnutrition   [ X ] Severe Protein-Calorie Malnutrition   [  ] Other Nutritional Diagnosis (please specify):    [  ] Other:    [  ] Clinically Undetermined       Please document in your progress notes daily for the duration of treatment until resolved and include in your discharge summary.

## 2019-10-24 NOTE — ASSESSMENT & PLAN NOTE
SBO ON KUB CT ADMIT IVFS RONDA RADHA SUCTION SURGERY CONSULT  10/21/2019.  1600 CC OUT NG WISE.  TEMP LAST P.M. METRONIDAZOLE AND ROCEPHIN HAVE BEEN ADDED IV.  WBC ELEVATED THIS MORNING 15.7 LEFT SHIFT LONG DISCUSSION WITH THE PATIENT AND MAINLY THE DAUGHTER.  EXPLAINED TO THE DAUGHTER SMALL-BOWEL OBSTRUCTION WHICH SHE UNDERSTANDS.  SHE IS ALSO AWARE THAT THIS MAY BE DUE TO THE PATIENT'S INCREASE IN HER ABDOMINAL AND PERITONEAL MASSES THAT MAY BE COMPROMISING HER BOWELS.  PATIENT ALSO HAS A LARGE DERMOID TUMOR IN THE PELVIS.  CONSIDERATION FOR KUB WITH ORAL CONTRAST BUT AWAIT SURGICAL OPINION AT THIS TIME.  THE DAUGHTER MAY BE RELUCTANT TO DO AGGRESSIVE PROCEDURES BECAUSE SHE RELIES HIGH ILL HER MOTHER IS.  10/22/2019.  The daughter has not made a decision today on hospice.  The daughter is aware that surgery may cause the mother's demise.  KUB will be ordered today laboratory values will be monitored.  Continue current level of care to the daughter makes a decision as to her course of action concerning hospice and care.  The daughter even discussed TPN with me today and I told her she was not a candidate for TPN because of a terminal diagnosis.  10/23/2019.  KUB yesterday showed no signs of bowel obstruction.  The family had a conference patient family does not desire surgeries this time.  I did mention them possibility of using the PEG tube is decompression for care and comfort.  Patient's will talk with Dr. Obrien again today and make the finals decision disposition and discharge planning.  The daughter is anxious about making appropriate decisions for her mother.  The daughter does have experience with home care in the past and has taken excellent care of her mother.  I informed her there were no right or wrong decisions at this time.  Laboratory values have been viewed.  Continue current level of care unless clinical course changes.  10/24/2019.  Dr. Obrien general surgeon notes have been reviewed.  The patient  have a PEG tube in EGD Friday.  Laboratory values in a.m. CBC BMP INR.  Patient pulls out an IV she has been heparin locked at this time.  Patient is taking in a diet without difficulty.  Exam bowel sounds positive abdomen is soft.  With the patient's effusion I have added Seroquel 50 b.i.d..  Will discuss with the family as CT scan of the head if the patient is able to perform.  Last CT of the head was in January 2019 no intracranial findings.  Patient had a bowel movement yesterday.  Patient is taking in a soft diet.  Continue current level of care unless clinical course changes.

## 2019-10-24 NOTE — ASSESSMENT & PLAN NOTE
WITH HYDRATION CREATININE IS IMPROVED TO 1.4.  CONTINUE CURRENT MANAGEMENT.  10/22/2019.  Chemistry pending this morning.  CBC shows the patient's hematocrit has dropped to 26 with hydration.  So therefore expect the creatinine to have improved.  Awaiting laboratory values this time.  Continue IV fluids patient is on NG suction is not taking anything in p.o. due to obstruction.  KUB has been ordered.  10/23/2019.  Creatinine continues to improve.  10/24/2019.  Patient pulls out her IV with heparin lock treat this time she remains confused and is on Seroquel 50 b.i.d..  CBC BMP and INR in a.m. patient will have a PEG tube and EGD in a.m..

## 2019-10-24 NOTE — PROGRESS NOTES
[facility] Hospital Medicine  Progress Note    Patient Name: Oriana Tobar  MRN: [id number]  Patient Class: IP- Inpatient   Admission Date: 10/20/2019  Length of Stay: 4 days  Attending Physician: Buck Hudson MD  Primary Care Provider: Buck Hudson MD        Subjective:     Principal Problem:Small bowel obstruction        HPI:  HX METASTATIC CA WITH RECURRENT GASTRIC BLEED NOW WITH NAUSEA AND VOMITTING ONE WEEK KUB GASTRIC AIR FLUID LEVEL SBO, CT SBO  AIR FLUID LEVEL AND INCREASE IN NEOPLASTIC MASSES ALONG WITH R PULMONARY MASS.  PATIENT DID HAVE A FORMED STOOL PRIOR TO ARRIVAL TO THE ER.    Overview/Hospital Course:  LABS IVFS NG SUCTION SURGERY CONSULT.  10/21/2019.  PATIENT HAD A TEMP LAST P.M. FLAGYL AND ROCEPHIN WAS ADDED IV.  WBC THIS MORNING SLIGHTLY OF 15,000 WITH A LEFT SHIFT.  CHEMISTRIES ARE ACCEPTABLE THIS MORNING IS SODIUM  UP FROM 130.  CONTINUE IV FLUIDS CONTINUE IV SUCTION.  THE PATIENT DID HAVE FLATUS THIS MORNING.  SURGICAL CONSULTATION TODAY.  LONG DISCUSSION WITH THE DAUGHTER AND PATIENT CONCERNING THE FINDINGS AND POSSIBLE POOR PROGNOSIS.  WILL AWAIT SURGICAL CONSULTATION FOR FURTHER RECOMMENDATIONS.  KUB WAS ORAL CONTRAST WILL BE CONSIDERED.  PATIENT PUT TOTAL OUT OF 1600 LAST P.M. NG WISE.  10/22/2019.  Family has not made clear decision as to what to do since the patient is alert and talkative this morning.  Will check a KUB and will clamp NG suction for the patient to move around also will reconnect to document amount of volume output.  Laboratory values pending this morning.  Family to discuss with several hospice agencies possibly today to determine the course of action.  Surgical consultations contain on the chart and note was dictated however I talked with Dr. Obrien yesterday morning and he is discussed with the family that operative intervention may cause of patient's death.  Continue current level of care to family  makes a decision.  10/23/2019  Reviewed Dr. Obrien is consult progress note.  The family does not desire surgery at this time.  I did mention with them possible PEG tube for care and comfort measures and decompression long-term.  The patient will be placed on hospice once discharged.  Family wishes talk to Dr. Obrien general surgery again today for making the final decision and disposition and discharge planning.  CBC is contain on the chart chemistries are pending at this time.  WBC is normal still with slight left shift.  Hematocrit slightly increased to 27 since yesterday.  Did give the patient liquids yesterday will repeat a cup suction today to see if the patient has any measure will amount coming from the NG.  Patient is comfortable with no pain at this time.  Daughter states that she was upset last night with the prospects of dying.  Continue current level of care unless clinical course changes.  10/24/2019.  Dr. Obrien general surgery notes have been reviewed.  EGD PEG tube Friday.  Lab in a.m. INR CBC basic profile.  Patient has been confused.  Seroquel has been started 50 mg b.i.d...  Last CT of the head showed no metastatic disease in January 19.  Will discuss with family  CT scan of the head and consider ordering 1 at this time if the family so desires.  Patient is stable otherwise will continue current level of care.    Interval History:  Patient continues with confusion.  Also the patient is restless.  I have started Seroquel 50 mg b.i.d..  Dr. Obrien general surgery notes have been reviewed PEG tube EGD Friday.  CBC BMP and INR in a.m..  Continue current level of care unless clinical course changes.    Review of Systems   Constitutional: Positive for activity change, appetite change and unexpected weight change. Negative for chills, diaphoresis, fatigue and fever.        METASTATIC CANCER   HENT: Negative for congestion, drooling, hearing loss and trouble swallowing.    Eyes: Negative for pain and  visual disturbance.   Respiratory: Negative.  Negative for apnea, cough, choking, chest tightness, shortness of breath and wheezing.    Cardiovascular: Negative for chest pain, palpitations and leg swelling.   Gastrointestinal: Positive for nausea and vomiting. Negative for abdominal distention, abdominal pain, blood in stool, constipation and diarrhea.        MILD ABD DISTENTION WITH MILD MID EPIGASTRIC PAIN   Endocrine: Negative for polydipsia, polyphagia and polyuria.   Genitourinary: Positive for decreased urine volume. Negative for difficulty urinating, dysuria and flank pain.   Musculoskeletal: Negative for arthralgias, back pain, gait problem, joint swelling, neck pain and neck stiffness.   Skin: Negative for color change, rash and wound.   Allergic/Immunologic: Negative for environmental allergies, food allergies and immunocompromised state.   Neurological: Positive for weakness. Negative for dizziness, syncope, numbness and headaches.   Hematological: Negative for adenopathy.   Psychiatric/Behavioral: Negative for agitation, confusion and sleep disturbance. The patient is not nervous/anxious.      Objective:     Vital Signs (Most Recent):  Temp: 97.5 °F (36.4 °C) (10/24/19 0410)  Pulse: 80 (10/24/19 0410)  Resp: 16 (10/24/19 0410)  BP: (!) 115/59 (10/24/19 0410)  SpO2: 100 % (10/24/19 0410) Vital Signs (24h Range):  Temp:  [96 °F (35.6 °C)-97.7 °F (36.5 °C)] 97.5 °F (36.4 °C)  Pulse:  [67-99] 80  Resp:  [15-16] 16  SpO2:  [98 %-100 %] 100 %  BP: (115-180)/(59-86) 115/59     Weight: 65 kg (143 lb 4.8 oz)  Body mass index is 19.99 kg/m².    Intake/Output Summary (Last 24 hours) at 10/24/2019 0655  Last data filed at 10/23/2019 1800  Gross per 24 hour   Intake 1030 ml   Output 70 ml   Net 960 ml      Physical Exam   Constitutional: She appears well-developed and well-nourished.   HENT:   Head: Normocephalic and atraumatic.   Right Ear: External ear normal.   Left Ear: External ear normal.   Nose: Nose normal.    Eyes: Pupils are equal, round, and reactive to light. Conjunctivae, EOM and lids are normal.   Neck: Trachea normal, normal range of motion and full passive range of motion without pain. Neck supple.   Cardiovascular: Normal rate, regular rhythm, S1 normal, S2 normal, normal heart sounds, intact distal pulses and normal pulses.   Pulmonary/Chest: Effort normal and breath sounds normal.   Abdominal: Soft. Normal aorta and bowel sounds are normal.   Musculoskeletal: Normal range of motion.   Neurological: She is alert. She has normal reflexes.   Confusion and restless   Skin: Skin is warm, dry and intact.   Psychiatric: She has a normal mood and affect. Her speech is normal. Cognition and memory are normal.   Patient is restless and confused and attempts to get out of bed on several occasions.   Nursing note and vitals reviewed.      Significant Labs:   CBC:   Recent Labs   Lab 10/23/19  0558   WBC 7.86   HGB 8.6*   HCT 27.3*   *     CMP:   Recent Labs   Lab 10/23/19  0558   *   K 3.2*   *   CO2 18*      BUN 8   CREATININE 0.9   CALCIUM 7.3*   PROT 4.9*   ALBUMIN 2.0*   BILITOT 0.6   ALKPHOS 45*   AST 9*   ALT 6*   ANIONGAP 5*   EGFRNONAA >60.0     All pertinent labs within the past 24 hours have been reviewed.    Significant Imaging: I have reviewed and interpreted all pertinent imaging results/findings within the past 24 hours.      Assessment/Plan:      * Small bowel obstruction  SBO ON KUB CT ADMIT SAIMAS RONDA GARCIA SUCTION SURGERY CONSULT  10/21/2019.  1600 CC OUT NG WISE.  TEMP LAST P.M. METRONIDAZOLE AND ROCEPHIN HAVE BEEN ADDED IV.  WBC ELEVATED THIS MORNING 15.7 LEFT SHIFT LONG DISCUSSION WITH THE PATIENT AND MAINLY THE DAUGHTER.  EXPLAINED TO THE DAUGHTER SMALL-BOWEL OBSTRUCTION WHICH SHE UNDERSTANDS.  SHE IS ALSO AWARE THAT THIS MAY BE DUE TO THE PATIENT'S INCREASE IN HER ABDOMINAL AND PERITONEAL MASSES THAT MAY BE COMPROMISING HER BOWELS.  PATIENT ALSO HAS A LARGE DERMOID TUMOR IN  THE PELVIS.  CONSIDERATION FOR KUB WITH ORAL CONTRAST BUT AWAIT SURGICAL OPINION AT THIS TIME.  THE DAUGHTER MAY BE RELUCTANT TO DO AGGRESSIVE PROCEDURES BECAUSE SHE RELIES HIGH ILL HER MOTHER IS.  10/22/2019.  The daughter has not made a decision today on hospice.  The daughter is aware that surgery may cause the mother's demise.  KUB will be ordered today laboratory values will be monitored.  Continue current level of care to the daughter makes a decision as to her course of action concerning hospice and care.  The daughter even discussed TPN with me today and I told her she was not a candidate for TPN because of a terminal diagnosis.  10/23/2019.  KUB yesterday showed no signs of bowel obstruction.  The family had a conference patient family does not desire surgeries this time.  I did mention them possibility of using the PEG tube is decompression for care and comfort.  Patient's will talk with Dr. Obrien again today and make the finals decision disposition and discharge planning.  The daughter is anxious about making appropriate decisions for her mother.  The daughter does have experience with home care in the past and has taken excellent care of her mother.  I informed her there were no right or wrong decisions at this time.  Laboratory values have been viewed.  Continue current level of care unless clinical course changes.  10/24/2019.  Dr. Obrien general surgeon notes have been reviewed.  The patient have a PEG tube in EGD Friday.  Laboratory values in a.m. CBC BMP INR.  Patient pulls out an IV she has been heparin locked at this time.  Patient is taking in a diet without difficulty.  Exam bowel sounds positive abdomen is soft.  With the patient's effusion I have added Seroquel 50 b.i.d..  Will discuss with the family as CT scan of the head if the patient is able to perform.  Last CT of the head was in January 2019 no intracranial findings.  Patient had a bowel movement yesterday.  Patient is taking in a  soft diet.  Continue current level of care unless clinical course changes.    Hyponatremia  SODIUM IS IMPROVED  WITH NORMAL SALINE.  CONTINUE CURRENT MANAGEMENT.  10/22/2019.  Awaiting chemistries this morning.  Continue normal saline IV fluids.  I expect the sodium to be improved particularly with improvement yesterday.  10/23/2019.  Lab has been reviewed.  10/24/2019.  Lab in a.m..  IV fluids have been put on heparin lock since patient pulls out her IVs.  Sodium is improved.  Laboratory values in a.m..  CBC BMP and INR.    SHAMAR (acute kidney injury)  WITH HYDRATION CREATININE IS IMPROVED TO 1.4.  CONTINUE CURRENT MANAGEMENT.  10/22/2019.  Chemistry pending this morning.  CBC shows the patient's hematocrit has dropped to 26 with hydration.  So therefore expect the creatinine to have improved.  Awaiting laboratory values this time.  Continue IV fluids patient is on NG suction is not taking anything in p.o. due to obstruction.  KUB has been ordered.  10/23/2019.  Creatinine continues to improve.  10/24/2019.  Patient pulls out her IV with heparin lock treat this time she remains confused and is on Seroquel 50 b.i.d..  CBC BMP and INR in a.m. patient will have a PEG tube and EGD in a.m..      VTE Risk Mitigation (From admission, onward)         Ordered     IP VTE HIGH RISK PATIENT  Once      10/20/19 1923     Place RORY hose  Until discontinued      10/20/19 1923                      Buck Hudson MD  Department of Hospital Medicine   [facility]

## 2019-10-24 NOTE — PLAN OF CARE
PT DEMEANOR WNL FOR THIS SHIFT, FAMILY CONTINUES TO BE AT BEDSIDE FOR SUPPORTIVE CARE, BED ALARM IN PLACE.

## 2019-10-24 NOTE — ASSESSMENT & PLAN NOTE
SODIUM IS IMPROVED  WITH NORMAL SALINE.  CONTINUE CURRENT MANAGEMENT.  10/22/2019.  Awaiting chemistries this morning.  Continue normal saline IV fluids.  I expect the sodium to be improved particularly with improvement yesterday.  10/23/2019.  Lab has been reviewed.  10/24/2019.  Lab in a.m..  IV fluids have been put on heparin lock since patient pulls out her IVs.  Sodium is improved.  Laboratory values in a.m..  CBC BMP and INR.

## 2019-10-24 NOTE — SUBJECTIVE & OBJECTIVE
Interval History:  Patient continues with confusion.  Also the patient is restless.  I have started Seroquel 50 mg b.i.d..  Dr. Obrien general surgery notes have been reviewed PEG tube EGD Friday.  CBC BMP and INR in a.m..  Continue current level of care unless clinical course changes.    Review of Systems   Constitutional: Positive for activity change, appetite change and unexpected weight change. Negative for chills, diaphoresis, fatigue and fever.        METASTATIC CANCER   HENT: Negative for congestion, drooling, hearing loss and trouble swallowing.    Eyes: Negative for pain and visual disturbance.   Respiratory: Negative.  Negative for apnea, cough, choking, chest tightness, shortness of breath and wheezing.    Cardiovascular: Negative for chest pain, palpitations and leg swelling.   Gastrointestinal: Positive for nausea and vomiting. Negative for abdominal distention, abdominal pain, blood in stool, constipation and diarrhea.        MILD ABD DISTENTION WITH MILD MID EPIGASTRIC PAIN   Endocrine: Negative for polydipsia, polyphagia and polyuria.   Genitourinary: Positive for decreased urine volume. Negative for difficulty urinating, dysuria and flank pain.   Musculoskeletal: Negative for arthralgias, back pain, gait problem, joint swelling, neck pain and neck stiffness.   Skin: Negative for color change, rash and wound.   Allergic/Immunologic: Negative for environmental allergies, food allergies and immunocompromised state.   Neurological: Positive for weakness. Negative for dizziness, syncope, numbness and headaches.   Hematological: Negative for adenopathy.   Psychiatric/Behavioral: Negative for agitation, confusion and sleep disturbance. The patient is not nervous/anxious.      Objective:     Vital Signs (Most Recent):  Temp: 97.5 °F (36.4 °C) (10/24/19 0410)  Pulse: 80 (10/24/19 0410)  Resp: 16 (10/24/19 0410)  BP: (!) 115/59 (10/24/19 0410)  SpO2: 100 % (10/24/19 0410) Vital Signs (24h Range):  Temp:  [96  °F (35.6 °C)-97.7 °F (36.5 °C)] 97.5 °F (36.4 °C)  Pulse:  [67-99] 80  Resp:  [15-16] 16  SpO2:  [98 %-100 %] 100 %  BP: (115-180)/(59-86) 115/59     Weight: 65 kg (143 lb 4.8 oz)  Body mass index is 19.99 kg/m².    Intake/Output Summary (Last 24 hours) at 10/24/2019 0655  Last data filed at 10/23/2019 1800  Gross per 24 hour   Intake 1030 ml   Output 70 ml   Net 960 ml      Physical Exam   Constitutional: She appears well-developed and well-nourished.   HENT:   Head: Normocephalic and atraumatic.   Right Ear: External ear normal.   Left Ear: External ear normal.   Nose: Nose normal.   Eyes: Pupils are equal, round, and reactive to light. Conjunctivae, EOM and lids are normal.   Neck: Trachea normal, normal range of motion and full passive range of motion without pain. Neck supple.   Cardiovascular: Normal rate, regular rhythm, S1 normal, S2 normal, normal heart sounds, intact distal pulses and normal pulses.   Pulmonary/Chest: Effort normal and breath sounds normal.   Abdominal: Soft. Normal aorta and bowel sounds are normal.   Musculoskeletal: Normal range of motion.   Neurological: She is alert. She has normal reflexes.   Confusion and restless   Skin: Skin is warm, dry and intact.   Psychiatric: She has a normal mood and affect. Her speech is normal. Cognition and memory are normal.   Patient is restless and confused and attempts to get out of bed on several occasions.   Nursing note and vitals reviewed.      Significant Labs:   CBC:   Recent Labs   Lab 10/23/19  0558   WBC 7.86   HGB 8.6*   HCT 27.3*   *     CMP:   Recent Labs   Lab 10/23/19  0558   *   K 3.2*   *   CO2 18*      BUN 8   CREATININE 0.9   CALCIUM 7.3*   PROT 4.9*   ALBUMIN 2.0*   BILITOT 0.6   ALKPHOS 45*   AST 9*   ALT 6*   ANIONGAP 5*   EGFRNONAA >60.0     All pertinent labs within the past 24 hours have been reviewed.    Significant Imaging: I have reviewed and interpreted all pertinent imaging results/findings within  the past 24 hours.

## 2019-10-24 NOTE — PROGRESS NOTES
[facility]  General Surgery  Progress Note  10/23/2019    Patient Name: Oriana Tobar  MRN: [id number]  Admission Date: 10/20/2019  Hospital Day: 4      Interval History:  No evident complaints.  Mildly confused..  No nausea or vomiting.  Bowel movement today.  No pain.    Vital Signs:  Afebrile.  Good vital signs. Good room air oxygen saturations.    I/O:  Inaccurate  UOP:  Good  NG:  Bilious/25 cc    Exam:   Gen - Comfortable.  Nontoxic.  No acute distress.  CV - Regular rate and rhythm.  Good perfusion.  Intact distal pulses.  No Edema.  Pulm - Clear to auscultation.  Nonlabored.  No rales, wheezes, rhonci.  Abdomen - Soft.  Nontender.  Nondistended.  Normoactive normopitched bowel sounds.  No guarding/rebound.  Extremities - No edema.  No cords.  No tenderness.  Integument - No rashes.  No lesions.  No jaundice.  No decubiti  Lymphatic - No adenopathy cervical, supraclavicular, axillary, inguinal  Neuro - No focal deficits  Neck - No JVD.  No cervical masses.  Trachea midline. No thyromegaly.      Lab Results:  No leukocytosis.  Stable anemia.  Mild thrombocytosis.  Mild hypokalemia, hypocarbia, hyperchloremia, hypocalcemia.  Renal function improved.  Euglycemic.  No evidence of hepatocellular disease or biliary obstruction.    Radiology Results:  No new radiology studies/results    Assessment:  Probable high-grade partial small-bowel obstruction. Obstruction likely related to metastatic renal cell carcinoma or carcinomatosis.  Cannot exclude other metastatic disease or primary gastrointestinal neoplasm.  Cannot exclude abdominal adhesive disease.  Overall poor long-term prognosis.  Known metastatic renal cell carcinoma to small intestines and peritoneum.  Probable metastatic disease to the lungs.  Cannot exclude carcinomatosis.    Counseling/Medical Decision Making:  Ms. Tobar and daughter were again counseled regarding options.  Today Ms. Tobar is slightly  confused.  Dr. Hudson had spoken to the family and Ms. Tobar earlier today.  They desire insertion of a gastrostomy tube for decompression to control gastric distension, discomfort, nausea, and vomiting.  Risks and benefits of a PEG tube were discussed.  Presence of carcinomatosis may prevent a successful PEG and may also produce complications of the PEG tube after insertion. The possibility that malignant cells could progress to the abdominal wall and skin through the tube tract was outlined in details.  Questions solicited and answered.  They voiced understanding.  They persistent a request to proceed with insertion of PEG.    Total face to face encounter time was 40 minutes, 30 minutes spent counseling as outlined/summarized above.    Plan:  EGD with PEG Friday.    Recommendations:  Continue present medical management and supportive care pending patient/family decision.        Marvin Obrien MD FACS

## 2019-10-24 NOTE — ASSESSMENT & PLAN NOTE
SBO ON KUB CT ADMIT IVFS RONDA RADHA SUCTION SURGERY CONSULT  10/21/2019.  1600 CC OUT NG WISE.  TEMP LAST P.M. METRONIDAZOLE AND ROCEPHIN HAVE BEEN ADDED IV.  WBC ELEVATED THIS MORNING 15.7 LEFT SHIFT LONG DISCUSSION WITH THE PATIENT AND MAINLY THE DAUGHTER.  EXPLAINED TO THE DAUGHTER SMALL-BOWEL OBSTRUCTION WHICH SHE UNDERSTANDS.  SHE IS ALSO AWARE THAT THIS MAY BE DUE TO THE PATIENT'S INCREASE IN HER ABDOMINAL AND PERITONEAL MASSES THAT MAY BE COMPROMISING HER BOWELS.  PATIENT ALSO HAS A LARGE DERMOID TUMOR IN THE PELVIS.  CONSIDERATION FOR KUB WITH ORAL CONTRAST BUT AWAIT SURGICAL OPINION AT THIS TIME.  THE DAUGHTER MAY BE RELUCTANT TO DO AGGRESSIVE PROCEDURES BECAUSE SHE RELIES HIGH ILL HER MOTHER IS.  10/22/2019.  The daughter has not made a decision today on hospice.  The daughter is aware that surgery may cause the mother's demise.  KUB will be ordered today laboratory values will be monitored.  Continue current level of care to the daughter makes a decision as to her course of action concerning hospice and care.  The daughter even discussed TPN with me today and I told her she was not a candidate for TPN because of a terminal diagnosis.  10/23/2019.  KUB yesterday showed no signs of bowel obstruction.  The family had a conference patient family does not desire surgeries this time.  I did mention them possibility of using the PEG tube is decompression for care and comfort.  Patient's will talk with Dr. Obrien again today and make the finals decision disposition and discharge planning.  The daughter is anxious about making appropriate decisions for her mother.  The daughter does have experience with home care in the past and has taken excellent care of her mother.  I informed her there were no right or wrong decisions at this time.  Laboratory values have been viewed.  Continue current level of care unless clinical course changes.  10/24/2019.  Dr. Obrien general surgeon notes have been reviewed.  The patient  have a PEG tube in EGD Friday.  Laboratory values in a.m. CBC BMP INR.  Patient pulls out an IV she has been heparin locked at this time.  Patient is taking in a diet without difficulty.  Exam bowel sounds positive abdomen is soft.  With the patient's effusion I have added Seroquel 50 b.i.d..  Will discuss with the family as CT scan of the head if the patient is able to perform.  Last CT of the head was in January 2019 no intracranial findings.  Patient had a bowel movement yesterday.  Patient is taking in a soft diet.  Continue current level of care unless clinical course changes.  The daughter feels the patient has cleared her confusion and did not allow Seroquel to be given last p.m.  10/25/2019.  Patient is eating have an bowel movements.  Awaiting discussed with Dr. Obrien EGD PEG tube.  INR is 1.8 vitamin K has been ordered.  Calcium is 6.8 calcium will be ordered.  Hematocrit is 26.  Patient if has a PEG tube placed will most likely go home tomorrow if no PEG tube is placed consider discharge later today.  Continue current level of care unless clinical course changes.  Long discussion with the patient family concerning current diagnosis and treatment plan.

## 2019-10-24 NOTE — PLAN OF CARE
10/24/19 1446   Discharge Reassessment   Assessment Type Discharge Planning Reassessment   Anticipated Discharge Disposition HospiceHome   Do you have any problems affording any of your prescribed medications? No   Discharge Plan A Hospice/home   DME Needed Upon Discharge  none   Post-Acute Status   Post-Acute Authorization Home Health/Hospice   Home Health/Hospice Status Set-up Complete   Discharge Delays (!) Other  (Patient scheduled for PEG on Friday & whenever she is medically stable will be discharged)   Patient still a little confused. Family at bedside. Patient scheduled for peg tube tomorrow. Plan is to go home with [facility] hospice when discharged. Will continue to follow.

## 2019-10-24 NOTE — PROGRESS NOTES
[facility] - Med Surg  General Surgery  Progress Note  10/24/2019    Patient Name: Oriana Tobar  MRN: [id number]  Admission Date: 10/20/2019  Hospital Day: 5      Interval History:  No evident complaints.  Mildly confused..  No nausea or vomiting.  Bowel movement yesterday and today.  No pain.    Vital Signs:  Afebrile.  Good vital signs. Good room air oxygen saturations.    I/O:  Inaccurate  UOP:  Good  NG:  Bilious/70 cc    Exam:   Gen - Comfortable.  Nontoxic.  No acute distress.  CV - Regular rate and rhythm.  Good perfusion.  Intact distal pulses.  No Edema.  Pulm - Clear to auscultation.  Nonlabored.  No rales, wheezes, rhonci.  Abdomen - Soft.  Nontender.  Nondistended.  Normoactive normopitched bowel sounds.  No guarding/rebound.  Extremities - No edema.  No cords.  No tenderness.  Integument - No rashes.  No lesions.  No jaundice.  No decubiti  Lymphatic - No adenopathy cervical, supraclavicular, axillary, inguinal  Neuro - No focal deficits  Neck - No JVD.  No cervical masses.  Trachea midline. No thyromegaly.      Lab Results:  No new lab results    Radiology Results:  No new radiology studies/results    Assessment:  Probable high-grade partial small-bowel obstruction. Obstruction likely related to metastatic renal cell carcinoma or carcinomatosis.  Cannot exclude other metastatic disease or primary gastrointestinal neoplasm.  Cannot exclude abdominal adhesive disease.  Overall poor long-term prognosis.  Known metastatic renal cell carcinoma to small intestines and peritoneum.  Probable metastatic disease to the lungs.  Cannot exclude carcinomatosis.    Counseling/Medical Decision Making:  Ms. Tobar and daughter were again counseled regarding options.  Hospice care was encouraged and recommended.  Today Ms. Tobar is slightly confused.  Dr. Hudson had spoken to the family and Ms. Tobar earlier today.  They desire insertion of a gastrostomy tube for  decompression to control gastric distension, discomfort, nausea, and vomiting.  Risks and benefits of a PEG tube were discussed.  Presence of carcinomatosis may prevent a successful PEG and may also produce complications of the PEG tube after insertion. The possibility that malignant cells could progress to the abdominal wall and skin through the tube tract was outlined in details.  Questions solicited and answered.  They voiced understanding.  They persistent in requesting to proceed with insertion of PEG.    Total face to face encounter time was 40 minutes, 30 minutes spent counseling as outlined/summarized above.    Plan:  EGD with PEG tomorrow.    Recommendations:  Continue present medical management and supportive care pending patient/family decision.        Marvin Obrien MD FACS

## 2019-10-24 NOTE — NURSING
Awakened while administering medications.  Up to BSC with assistance of daughter.  Bed linens and gown/sock changed.  Returned to bed after voiding snd returned to sleep.  No complaints voiced from patient or daughter.

## 2019-10-25 VITALS
BODY MASS INDEX: 20.06 KG/M2 | HEIGHT: 71 IN | OXYGEN SATURATION: 100 % | SYSTOLIC BLOOD PRESSURE: 148 MMHG | WEIGHT: 143.31 LBS | HEART RATE: 63 BPM | TEMPERATURE: 98 F | DIASTOLIC BLOOD PRESSURE: 65 MMHG | RESPIRATION RATE: 18 BRPM

## 2019-10-25 PROBLEM — E83.51 HYPOCALCEMIA: Status: RESOLVED | Noted: 2019-10-25 | Resolved: 2019-10-25

## 2019-10-25 PROBLEM — E87.1 HYPONATREMIA: Status: RESOLVED | Noted: 2019-10-20 | Resolved: 2019-10-25

## 2019-10-25 PROBLEM — K56.609 SMALL BOWEL OBSTRUCTION: Status: RESOLVED | Noted: 2019-10-20 | Resolved: 2019-10-25

## 2019-10-25 PROBLEM — N17.9 AKI (ACUTE KIDNEY INJURY): Status: RESOLVED | Noted: 2019-10-20 | Resolved: 2019-10-25

## 2019-10-25 PROBLEM — E83.51 HYPOCALCEMIA: Status: ACTIVE | Noted: 2019-10-25

## 2019-10-25 LAB
ANION GAP SERPL CALC-SCNC: 6 MMOL/L (ref 8–16)
BASOPHILS # BLD AUTO: 0.06 K/UL (ref 0–0.2)
BASOPHILS NFR BLD: 0.9 % (ref 0–1.9)
BUN SERPL-MCNC: <5 MG/DL (ref 8–23)
CALCIUM SERPL-MCNC: 6.8 MG/DL (ref 8.7–10.5)
CHLORIDE SERPL-SCNC: 116 MMOL/L (ref 95–110)
CO2 SERPL-SCNC: 17 MMOL/L (ref 23–29)
CREAT SERPL-MCNC: 0.8 MG/DL (ref 0.5–1.4)
DIFFERENTIAL METHOD: ABNORMAL
EOSINOPHIL # BLD AUTO: 0 K/UL (ref 0–0.5)
EOSINOPHIL NFR BLD: 0.6 % (ref 0–8)
ERYTHROCYTE [DISTWIDTH] IN BLOOD BY AUTOMATED COUNT: 15.8 % (ref 11.5–14.5)
EST. GFR  (AFRICAN AMERICAN): >60 ML/MIN/1.73 M^2
EST. GFR  (NON AFRICAN AMERICAN): >60 ML/MIN/1.73 M^2
GLUCOSE SERPL-MCNC: 117 MG/DL (ref 70–110)
HCT VFR BLD AUTO: 26 % (ref 37–48.5)
HGB BLD-MCNC: 8.3 G/DL (ref 12–16)
IMM GRANULOCYTES # BLD AUTO: 0.17 K/UL (ref 0–0.04)
IMM GRANULOCYTES NFR BLD AUTO: 2.5 % (ref 0–0.5)
INR PPP: 1.8 (ref 0.8–1.2)
LYMPHOCYTES # BLD AUTO: 1.7 K/UL (ref 1–4.8)
LYMPHOCYTES NFR BLD: 24.7 % (ref 18–48)
MCH RBC QN AUTO: 28.1 PG (ref 27–31)
MCHC RBC AUTO-ENTMCNC: 31.9 G/DL (ref 32–36)
MCV RBC AUTO: 88 FL (ref 82–98)
MONOCYTES # BLD AUTO: 0.6 K/UL (ref 0.3–1)
MONOCYTES NFR BLD: 8.6 % (ref 4–15)
NEUTROPHILS # BLD AUTO: 4.3 K/UL (ref 1.8–7.7)
NEUTROPHILS NFR BLD: 62.7 % (ref 38–73)
NRBC BLD-RTO: 0 /100 WBC
PLATELET # BLD AUTO: 318 K/UL (ref 150–350)
PMV BLD AUTO: 9.1 FL (ref 9.2–12.9)
POTASSIUM SERPL-SCNC: 4.2 MMOL/L (ref 3.5–5.1)
PROTHROMBIN TIME: 19.7 SEC (ref 9–12.5)
RBC # BLD AUTO: 2.95 M/UL (ref 4–5.4)
SODIUM SERPL-SCNC: 139 MMOL/L (ref 136–145)
WBC # BLD AUTO: 6.87 K/UL (ref 3.9–12.7)

## 2019-10-25 PROCEDURE — 63600175 PHARM REV CODE 636 W HCPCS: Performed by: HOSPITALIST

## 2019-10-25 PROCEDURE — 36415 COLL VENOUS BLD VENIPUNCTURE: CPT

## 2019-10-25 PROCEDURE — S0030 INJECTION, METRONIDAZOLE: HCPCS | Performed by: HOSPITALIST

## 2019-10-25 PROCEDURE — 25000003 PHARM REV CODE 250: Performed by: HOSPITALIST

## 2019-10-25 PROCEDURE — 63600175 PHARM REV CODE 636 W HCPCS: Performed by: INTERNAL MEDICINE

## 2019-10-25 PROCEDURE — 80048 BASIC METABOLIC PNL TOTAL CA: CPT

## 2019-10-25 PROCEDURE — 99233 PR SUBSEQUENT HOSPITAL CARE,LEVL III: ICD-10-PCS | Mod: ,,, | Performed by: SURGERY

## 2019-10-25 PROCEDURE — A4216 STERILE WATER/SALINE, 10 ML: HCPCS | Performed by: INTERNAL MEDICINE

## 2019-10-25 PROCEDURE — S0028 INJECTION, FAMOTIDINE, 20 MG: HCPCS | Performed by: INTERNAL MEDICINE

## 2019-10-25 PROCEDURE — 85025 COMPLETE CBC W/AUTO DIFF WBC: CPT

## 2019-10-25 PROCEDURE — 99233 SBSQ HOSP IP/OBS HIGH 50: CPT | Mod: ,,, | Performed by: SURGERY

## 2019-10-25 PROCEDURE — 85610 PROTHROMBIN TIME: CPT

## 2019-10-25 PROCEDURE — 25000003 PHARM REV CODE 250: Performed by: INTERNAL MEDICINE

## 2019-10-25 RX ORDER — PHYTONADIONE 10 MG/ML
10 INJECTION, EMULSION INTRAMUSCULAR; INTRAVENOUS; SUBCUTANEOUS ONCE
Status: COMPLETED | OUTPATIENT
Start: 2019-10-25 | End: 2019-10-25

## 2019-10-25 RX ORDER — CALCIUM GLUCONATE 98 MG/ML
4 INJECTION, SOLUTION INTRAVENOUS ONCE
Status: DISCONTINUED | OUTPATIENT
Start: 2019-10-25 | End: 2019-10-25

## 2019-10-25 RX ADMIN — FAMOTIDINE 20 MG: 10 INJECTION, SOLUTION INTRAVENOUS at 09:10

## 2019-10-25 RX ADMIN — PHYTONADIONE 10 MG: 10 INJECTION, EMULSION INTRAMUSCULAR; INTRAVENOUS; SUBCUTANEOUS at 07:10

## 2019-10-25 RX ADMIN — METRONIDAZOLE 500 MG: 500 INJECTION, SOLUTION INTRAVENOUS at 02:10

## 2019-10-25 RX ADMIN — CEFTRIAXONE 1 G: 1 INJECTION, SOLUTION INTRAVENOUS at 03:10

## 2019-10-25 RX ADMIN — CALCIUM GLUCONATE 4 G: 98 INJECTION, SOLUTION INTRAVENOUS at 09:10

## 2019-10-25 RX ADMIN — Medication 10 ML: at 09:10

## 2019-10-25 NOTE — NURSING
D/C HOME VIA POV WITH DAUGHTER. [facility] TO F/U. CONDITION FAIR. D/C INSTRUCTIONS GIVEN TO DAUGHTER AND VERBALIZED UNDERSTANDING.

## 2019-10-25 NOTE — PLAN OF CARE
PAGE 2 PROVIDED TO PATIENT/FAMILY.       10/25/19 0910   Medicare Message   Important Message from Medicare regarding Discharge Appeal Rights Other (comments);Given to patient/caregiver;Explained to patient/caregiver;Signed/date by patient/caregiver  (PATIENT DISCHARGED TO HOSPICE)   Date IMM was signed 10/25/19   Time IMM was signed 1040

## 2019-10-25 NOTE — ASSESSMENT & PLAN NOTE
WITH HYDRATION CREATININE IS IMPROVED TO 1.4.  CONTINUE CURRENT MANAGEMENT.  10/22/2019.  Chemistry pending this morning.  CBC shows the patient's hematocrit has dropped to 26 with hydration.  So therefore expect the creatinine to have improved.  Awaiting laboratory values this time.  Continue IV fluids patient is on NG suction is not taking anything in p.o. due to obstruction.  KUB has been ordered.  10/23/2019.  Creatinine continues to improve.  10/24/2019.  Patient pulls out her IV with heparin lock treat this time she remains confused and is on Seroquel 50 b.i.d..  CBC BMP and INR in a.m. patient will have a PEG tube and EGD in a.m..  10/25/2019.  Creatinine 0.8 indicating resolution of acute kidney injury and rehydration.

## 2019-10-25 NOTE — ASSESSMENT & PLAN NOTE
SODIUM IS IMPROVED  WITH NORMAL SALINE.  CONTINUE CURRENT MANAGEMENT.  10/22/2019.  Awaiting chemistries this morning.  Continue normal saline IV fluids.  I expect the sodium to be improved particularly with improvement yesterday.  10/23/2019.  Lab has been reviewed.  10/24/2019.  Lab in a.m..  IV fluids have been put on heparin lock since patient pulls out her IVs.  Sodium is improved.  Laboratory values in a.m..  CBC BMP and INR.  10/25/2019.  INR is 1.8 vitamin K has been ordered.  Calcium 6.8 calcium is been ordered.  Sodium and potassium are within normal limits at this time so hyponatremia is resolved.

## 2019-10-25 NOTE — PROGRESS NOTES
[facility] - Med Surg Hospital Medicine  Progress Note    Patient Name: Oriana Tobar  MRN: [id number]  Patient Class: IP- Inpatient   Admission Date: 10/20/2019  Length of Stay: 5 days  Attending Physician: Buck Hudson MD  Primary Care Provider: Buck Hudson MD        Subjective:     Principal Problem:Small bowel obstruction        HPI:  HX METASTATIC CA WITH RECURRENT GASTRIC BLEED NOW WITH NAUSEA AND VOMITTING ONE WEEK KUB GASTRIC AIR FLUID LEVEL SBO, CT SBO  AIR FLUID LEVEL AND INCREASE IN NEOPLASTIC MASSES ALONG WITH R PULMONARY MASS.  PATIENT DID HAVE A FORMED STOOL PRIOR TO ARRIVAL TO THE ER.    Overview/Hospital Course:  LABS IVFS NG SUCTION SURGERY CONSULT.  10/21/2019.  PATIENT HAD A TEMP LAST P.M. FLAGYL AND ROCEPHIN WAS ADDED IV.  WBC THIS MORNING SLIGHTLY OF 15,000 WITH A LEFT SHIFT.  CHEMISTRIES ARE ACCEPTABLE THIS MORNING IS SODIUM  UP FROM 130.  CONTINUE IV FLUIDS CONTINUE IV SUCTION.  THE PATIENT DID HAVE FLATUS THIS MORNING.  SURGICAL CONSULTATION TODAY.  LONG DISCUSSION WITH THE DAUGHTER AND PATIENT CONCERNING THE FINDINGS AND POSSIBLE POOR PROGNOSIS.  WILL AWAIT SURGICAL CONSULTATION FOR FURTHER RECOMMENDATIONS.  KUB WAS ORAL CONTRAST WILL BE CONSIDERED.  PATIENT PUT TOTAL OUT OF 1600 LAST P.M. NG WISE.  10/22/2019.  Family has not made clear decision as to what to do since the patient is alert and talkative this morning.  Will check a KUB and will clamp NG suction for the patient to move around also will reconnect to document amount of volume output.  Laboratory values pending this morning.  Family to discuss with several hospice agencies possibly today to determine the course of action.  Surgical consultations contain on the chart and note was dictated however I talked with Dr. Obrien yesterday morning and he is discussed with the family that operative intervention may cause of patient's death.  Continue current level of care to family  makes a decision.  10/23/2019  Reviewed Dr. Obrien is consult progress note.  The family does not desire surgery at this time.  I did mention with them possible PEG tube for care and comfort measures and decompression long-term.  The patient will be placed on hospice once discharged.  Family wishes talk to Dr. Micah lantigua again today for making the final decision and disposition and discharge planning.  CBC is contain on the chart chemistries are pending at this time.  WBC is normal still with slight left shift.  Hematocrit slightly increased to 27 since yesterday.  Did give the patient liquids yesterday will repeat a cup suction today to see if the patient has any measure will amount coming from the NG.  Patient is comfortable with no pain at this time.  Daughter states that she was upset last night with the prospects of dying.  Continue current level of care unless clinical course changes.  10/24/2019.  Dr. Obrien general surgery notes have been reviewed.  EGD PEG tube Friday.  Lab in a.m. INR CBC basic profile.  Patient has been confused.  Seroquel has been started 50 mg b.i.d...  Last CT of the head showed no metastatic disease in January 19.  Will discuss with family  CT scan of the head and consider ordering 1 at this time if the family so desires.  Patient is stable otherwise will continue current level of care.  The daughter feels that the patient's confusion has cleared up somewhat this morning.  Did not lie Seroquel to be given last p.m..  I agree with the daughter that the patient has improved from her confusion.  10/25/2019.  Patient is eating and is having bowel movements have bowel movement this morning.  Patient's plan is to talk with Dr. Micah lantigua again this morning and decide on EGD and PEG tube.  Hematocrit is 26.  INR is 1.8 vitamin K has been ordered.  Patient's INR has gone up dramatically since admission.  Patient is on no meds to explain this.  May very well be  related to malnutrition and involvement of the liver with her metastatic disease. Long discussion with the daughter and the patient concerning diagnosis care and treatment plain.  If the patient has a PEG tube placed probably go home in a.m. if this is not be done consider discharge this p.m..  Continue current level of care unless clinical course changes.  Long-term prognosis very poor.  Hospice at discharge.    Interval History:  Patient is eating and having bowel movements without difficulty BM this a.m..  Awaiting to talk with Dr. Obrien general surgery concerning PEG and EGD this a.m..  Laboratory values have been reviewed matter crit is 26 INR is 1.8 vitamin K has been ordered.    Review of Systems   Constitutional: Positive for activity change, appetite change and unexpected weight change. Negative for chills, diaphoresis, fatigue and fever.        METASTATIC CANCER   HENT: Negative for congestion, drooling, hearing loss and trouble swallowing.    Eyes: Negative for pain and visual disturbance.   Respiratory: Negative.  Negative for apnea, cough, choking, chest tightness, shortness of breath and wheezing.    Cardiovascular: Negative for chest pain, palpitations and leg swelling.   Gastrointestinal: Positive for nausea and vomiting. Negative for abdominal distention, abdominal pain, blood in stool, constipation and diarrhea.        MILD ABD DISTENTION WITH MILD MID EPIGASTRIC PAIN   Endocrine: Negative for polydipsia, polyphagia and polyuria.   Genitourinary: Positive for decreased urine volume. Negative for difficulty urinating, dysuria and flank pain.   Musculoskeletal: Negative for arthralgias, back pain, gait problem, joint swelling, neck pain and neck stiffness.   Skin: Negative for color change, rash and wound.   Allergic/Immunologic: Negative for environmental allergies, food allergies and immunocompromised state.   Neurological: Positive for weakness. Negative for dizziness, syncope, numbness and  headaches.   Hematological: Negative for adenopathy.   Psychiatric/Behavioral: Negative for agitation, confusion and sleep disturbance. The patient is not nervous/anxious.      Objective:     Vital Signs (Most Recent):  Temp: 97.2 °F (36.2 °C) (10/25/19 0441)  Pulse: 72 (10/25/19 0441)  Resp: 16 (10/25/19 0441)  BP: (!) 111/56 (10/25/19 0441)  SpO2: 99 % (10/25/19 0441) Vital Signs (24h Range):  Temp:  [97.2 °F (36.2 °C)-98.4 °F (36.9 °C)] 97.2 °F (36.2 °C)  Pulse:  [69-81] 72  Resp:  [16-20] 16  SpO2:  [99 %-100 %] 99 %  BP: (111-144)/(56-66) 111/56     Weight: 65 kg (143 lb 4.8 oz)  Body mass index is 19.99 kg/m².    Intake/Output Summary (Last 24 hours) at 10/25/2019 0700  Last data filed at 10/25/2019 0600  Gross per 24 hour   Intake 1380 ml   Output --   Net 1380 ml      Physical Exam   Constitutional: She is oriented to person, place, and time. She appears well-developed and well-nourished.   HENT:   Head: Normocephalic and atraumatic.   Right Ear: External ear normal.   Left Ear: External ear normal.   Nose: Nose normal.   Eyes: Pupils are equal, round, and reactive to light. Conjunctivae, EOM and lids are normal.   Neck: Trachea normal, normal range of motion and full passive range of motion without pain. Neck supple.   Cardiovascular: Normal rate, regular rhythm, S1 normal, S2 normal, normal heart sounds, intact distal pulses and normal pulses.   Pulmonary/Chest: Effort normal and breath sounds normal.   Abdominal: Soft. Normal aorta and bowel sounds are normal. She exhibits distension.   Minimal distention bowel sounds are normal.  The patient is in no pain.   Musculoskeletal: Normal range of motion.   Muscle wasting expected with current diagnosis metastatic cancer   Neurological: She is alert and oriented to person, place, and time. She has normal reflexes.   Skin: Skin is warm, dry and intact.   Psychiatric: She has a normal mood and affect. Her speech is normal and behavior is normal. Judgment and  thought content normal. Cognition and memory are normal.   Nursing note and vitals reviewed.      Significant Labs:   BMP:   Recent Labs   Lab 10/25/19  0555   *      K 4.2   *   CO2 17*   BUN <5*   CREATININE 0.8   CALCIUM 6.8*     CBC:   Recent Labs   Lab 10/25/19  0555   WBC 6.87   HGB 8.3*   HCT 26.0*        CMP:   Recent Labs   Lab 10/25/19  0555      K 4.2   *   CO2 17*   *   BUN <5*   CREATININE 0.8   CALCIUM 6.8*   ANIONGAP 6*   EGFRNONAA >60.0     Coagulation:   Recent Labs   Lab 10/25/19  0555   INR 1.8*     All pertinent labs within the past 24 hours have been reviewed.    Significant Imaging: I have reviewed and interpreted all pertinent imaging results/findings within the past 24 hours.      Assessment/Plan:      * Small bowel obstruction  SBO ON KUB CT ADMIT SAIMAS RONDA GARCIA SUCTION SURGERY CONSULT  10/21/2019.  1600 CC OUT NG WISE.  TEMP LAST P.M. METRONIDAZOLE AND ROCEPHIN HAVE BEEN ADDED IV.  WBC ELEVATED THIS MORNING 15.7 LEFT SHIFT LONG DISCUSSION WITH THE PATIENT AND MAINLY THE DAUGHTER.  EXPLAINED TO THE DAUGHTER SMALL-BOWEL OBSTRUCTION WHICH SHE UNDERSTANDS.  SHE IS ALSO AWARE THAT THIS MAY BE DUE TO THE PATIENT'S INCREASE IN HER ABDOMINAL AND PERITONEAL MASSES THAT MAY BE COMPROMISING HER BOWELS.  PATIENT ALSO HAS A LARGE DERMOID TUMOR IN THE PELVIS.  CONSIDERATION FOR KUB WITH ORAL CONTRAST BUT AWAIT SURGICAL OPINION AT THIS TIME.  THE DAUGHTER MAY BE RELUCTANT TO DO AGGRESSIVE PROCEDURES BECAUSE SHE RELIES HIGH ILL HER MOTHER IS.  10/22/2019.  The daughter has not made a decision today on hospice.  The daughter is aware that surgery may cause the mother's demise.  KUB will be ordered today laboratory values will be monitored.  Continue current level of care to the daughter makes a decision as to her course of action concerning hospice and care.  The daughter even discussed TPN with me today and I told her she was not a candidate for TPN because of a  terminal diagnosis.  10/23/2019.  KUB yesterday showed no signs of bowel obstruction.  The family had a conference patient family does not desire surgeries this time.  I did mention them possibility of using the PEG tube is decompression for care and comfort.  Patient's will talk with Dr. Obrien again today and make the finals decision disposition and discharge planning.  The daughter is anxious about making appropriate decisions for her mother.  The daughter does have experience with home care in the past and has taken excellent care of her mother.  I informed her there were no right or wrong decisions at this time.  Laboratory values have been viewed.  Continue current level of care unless clinical course changes.  10/24/2019.  Dr. Obrien general surgeon notes have been reviewed.  The patient have a PEG tube in EGD Friday.  Laboratory values in a.m. CBC BMP INR.  Patient pulls out an IV she has been heparin locked at this time.  Patient is taking in a diet without difficulty.  Exam bowel sounds positive abdomen is soft.  With the patient's effusion I have added Seroquel 50 b.i.d..  Will discuss with the family as CT scan of the head if the patient is able to perform.  Last CT of the head was in January 2019 no intracranial findings.  Patient had a bowel movement yesterday.  Patient is taking in a soft diet.  Continue current level of care unless clinical course changes.  The daughter feels the patient has cleared her confusion and did not allow Seroquel to be given last p.m.  10/25/2019.  Patient is eating have an bowel movements.  Awaiting discussed with Dr. Obrien EGD PEG tube.  INR is 1.8 vitamin K has been ordered.  Calcium is 6.8 calcium will be ordered.  Hematocrit is 26.  Patient if has a PEG tube placed will most likely go home tomorrow if no PEG tube is placed consider discharge later today.  Continue current level of care unless clinical course changes.  Long discussion with the patient family  concerning current diagnosis and treatment plan.    Hyponatremia  SODIUM IS IMPROVED  WITH NORMAL SALINE.  CONTINUE CURRENT MANAGEMENT.  10/22/2019.  Awaiting chemistries this morning.  Continue normal saline IV fluids.  I expect the sodium to be improved particularly with improvement yesterday.  10/23/2019.  Lab has been reviewed.  10/24/2019.  Lab in a.m..  IV fluids have been put on heparin lock since patient pulls out her IVs.  Sodium is improved.  Laboratory values in a.m..  CBC BMP and INR.  10/25/2019.  INR is 1.8 vitamin K has been ordered.  Calcium 6.8 calcium is been ordered.  Sodium and potassium are within normal limits at this time so hyponatremia is resolved.    SHAMAR (acute kidney injury)  WITH HYDRATION CREATININE IS IMPROVED TO 1.4.  CONTINUE CURRENT MANAGEMENT.  10/22/2019.  Chemistry pending this morning.  CBC shows the patient's hematocrit has dropped to 26 with hydration.  So therefore expect the creatinine to have improved.  Awaiting laboratory values this time.  Continue IV fluids patient is on NG suction is not taking anything in p.o. due to obstruction.  KUB has been ordered.  10/23/2019.  Creatinine continues to improve.  10/24/2019.  Patient pulls out her IV with heparin lock treat this time she remains confused and is on Seroquel 50 b.i.d..  CBC BMP and INR in a.m. patient will have a PEG tube and EGD in a.m..  10/25/2019.  Creatinine 0.8 indicating resolution of acute kidney injury and rehydration.      VTE Risk Mitigation (From admission, onward)         Ordered     IP VTE HIGH RISK PATIENT  Once      10/20/19 1923     Place RORY hose  Until discontinued      10/20/19 1923                      Buck Hudson MD  Department of Hospital Medicine   [facility] - Med Surg

## 2019-10-25 NOTE — SUBJECTIVE & OBJECTIVE
Interval History:  Patient is eating and having bowel movements without difficulty BM this a.m..  Awaiting to talk with Dr. Obrien general surgery concerning PEG and EGD this a.m..  Laboratory values have been reviewed matter crit is 26 INR is 1.8 vitamin K has been ordered.    Review of Systems   Constitutional: Positive for activity change, appetite change and unexpected weight change. Negative for chills, diaphoresis, fatigue and fever.        METASTATIC CANCER   HENT: Negative for congestion, drooling, hearing loss and trouble swallowing.    Eyes: Negative for pain and visual disturbance.   Respiratory: Negative.  Negative for apnea, cough, choking, chest tightness, shortness of breath and wheezing.    Cardiovascular: Negative for chest pain, palpitations and leg swelling.   Gastrointestinal: Positive for nausea and vomiting. Negative for abdominal distention, abdominal pain, blood in stool, constipation and diarrhea.        MILD ABD DISTENTION WITH MILD MID EPIGASTRIC PAIN   Endocrine: Negative for polydipsia, polyphagia and polyuria.   Genitourinary: Positive for decreased urine volume. Negative for difficulty urinating, dysuria and flank pain.   Musculoskeletal: Negative for arthralgias, back pain, gait problem, joint swelling, neck pain and neck stiffness.   Skin: Negative for color change, rash and wound.   Allergic/Immunologic: Negative for environmental allergies, food allergies and immunocompromised state.   Neurological: Positive for weakness. Negative for dizziness, syncope, numbness and headaches.   Hematological: Negative for adenopathy.   Psychiatric/Behavioral: Negative for agitation, confusion and sleep disturbance. The patient is not nervous/anxious.      Objective:     Vital Signs (Most Recent):  Temp: 97.2 °F (36.2 °C) (10/25/19 0441)  Pulse: 72 (10/25/19 0441)  Resp: 16 (10/25/19 0441)  BP: (!) 111/56 (10/25/19 0441)  SpO2: 99 % (10/25/19 0441) Vital Signs (24h Range):  Temp:  [97.2 °F (36.2  °C)-98.4 °F (36.9 °C)] 97.2 °F (36.2 °C)  Pulse:  [69-81] 72  Resp:  [16-20] 16  SpO2:  [99 %-100 %] 99 %  BP: (111-144)/(56-66) 111/56     Weight: 65 kg (143 lb 4.8 oz)  Body mass index is 19.99 kg/m².    Intake/Output Summary (Last 24 hours) at 10/25/2019 0700  Last data filed at 10/25/2019 0600  Gross per 24 hour   Intake 1380 ml   Output --   Net 1380 ml      Physical Exam   Constitutional: She is oriented to person, place, and time. She appears well-developed and well-nourished.   HENT:   Head: Normocephalic and atraumatic.   Right Ear: External ear normal.   Left Ear: External ear normal.   Nose: Nose normal.   Eyes: Pupils are equal, round, and reactive to light. Conjunctivae, EOM and lids are normal.   Neck: Trachea normal, normal range of motion and full passive range of motion without pain. Neck supple.   Cardiovascular: Normal rate, regular rhythm, S1 normal, S2 normal, normal heart sounds, intact distal pulses and normal pulses.   Pulmonary/Chest: Effort normal and breath sounds normal.   Abdominal: Soft. Normal aorta and bowel sounds are normal. She exhibits distension.   Minimal distention bowel sounds are normal.  The patient is in no pain.   Musculoskeletal: Normal range of motion.   Muscle wasting expected with current diagnosis metastatic cancer   Neurological: She is alert and oriented to person, place, and time. She has normal reflexes.   Skin: Skin is warm, dry and intact.   Psychiatric: She has a normal mood and affect. Her speech is normal and behavior is normal. Judgment and thought content normal. Cognition and memory are normal.   Nursing note and vitals reviewed.      Significant Labs:   BMP:   Recent Labs   Lab 10/25/19  0555   *      K 4.2   *   CO2 17*   BUN <5*   CREATININE 0.8   CALCIUM 6.8*     CBC:   Recent Labs   Lab 10/25/19  0555   WBC 6.87   HGB 8.3*   HCT 26.0*        CMP:   Recent Labs   Lab 10/25/19  0555      K 4.2   *   CO2 17*   GLU  117*   BUN <5*   CREATININE 0.8   CALCIUM 6.8*   ANIONGAP 6*   EGFRNONAA >60.0     Coagulation:   Recent Labs   Lab 10/25/19  0555   INR 1.8*     All pertinent labs within the past 24 hours have been reviewed.    Significant Imaging: I have reviewed and interpreted all pertinent imaging results/findings within the past 24 hours.

## 2019-10-25 NOTE — DISCHARGE SUMMARY
[facility] Hospital Medicine  Discharge Summary      Patient Name: Oriana Tobar  MRN: [id number]  Admission Date: 10/20/2019  Hospital Length of Stay: 5 days  Discharge Date and Time:  10/25/2019 3:26 PM  Attending Physician: Buck Hudson MD   Discharging Provider: Buck Hudson MD  Primary Care Provider: Buck Hudson MD      HPI:   HX METASTATIC CA WITH RECURRENT GASTRIC BLEED NOW WITH NAUSEA AND VOMITTING ONE WEEK KUB GASTRIC AIR FLUID LEVEL SBO, CT SBO  AIR FLUID LEVEL AND INCREASE IN NEOPLASTIC MASSES ALONG WITH R PULMONARY MASS.  PATIENT DID HAVE A FORMED STOOL PRIOR TO ARRIVAL TO THE ER.    Procedure(s) (LRB):  EGD, WITH PEG TUBE INSERTION (N/A)      Hospital Course:   LABS IVFS NG SUCTION SURGERY CONSULT.  10/21/2019.  PATIENT HAD A TEMP LAST P.M. FLAGYL AND ROCEPHIN WAS ADDED IV.  WBC THIS MORNING SLIGHTLY OF 15,000 WITH A LEFT SHIFT.  CHEMISTRIES ARE ACCEPTABLE THIS MORNING IS SODIUM  UP FROM 130.  CONTINUE IV FLUIDS CONTINUE IV SUCTION.  THE PATIENT DID HAVE FLATUS THIS MORNING.  SURGICAL CONSULTATION TODAY.  LONG DISCUSSION WITH THE DAUGHTER AND PATIENT CONCERNING THE FINDINGS AND POSSIBLE POOR PROGNOSIS.  WILL AWAIT SURGICAL CONSULTATION FOR FURTHER RECOMMENDATIONS.  KUB WAS ORAL CONTRAST WILL BE CONSIDERED.  PATIENT PUT TOTAL OUT OF 1600 LAST P.M. NG WISE.  10/22/2019.  Family has not made clear decision as to what to do since the patient is alert and talkative this morning.  Will check a KUB and will clamp NG suction for the patient to move around also will reconnect to document amount of volume output.  Laboratory values pending this morning.  Family to discuss with several hospice agencies possibly today to determine the course of action.  Surgical consultations contain on the chart and note was dictated however I talked with Dr. Obrien yesterday morning and he is discussed with the family that operative intervention may cause of  patient's death.  Continue current level of care to family makes a decision.  10/23/2019  Reviewed Dr. Obrien is consult progress note.  The family does not desire surgery at this time.  I did mention with them possible PEG tube for care and comfort measures and decompression long-term.  The patient will be placed on hospice once discharged.  Family wishes talk to Dr. Micah lantigua again today for making the final decision and disposition and discharge planning.  CBC is contain on the chart chemistries are pending at this time.  WBC is normal still with slight left shift.  Hematocrit slightly increased to 27 since yesterday.  Did give the patient liquids yesterday will repeat a cup suction today to see if the patient has any measure will amount coming from the NG.  Patient is comfortable with no pain at this time.  Daughter states that she was upset last night with the prospects of dying.  Continue current level of care unless clinical course changes.  10/24/2019.  Dr. Obrien general surgery notes have been reviewed.  EGD PEG tube Friday.  Lab in a.m. INR CBC basic profile.  Patient has been confused.  Seroquel has been started 50 mg b.i.d...  Last CT of the head showed no metastatic disease in January 19.  Will discuss with family  CT scan of the head and consider ordering 1 at this time if the family so desires.  Patient is stable otherwise will continue current level of care.  The daughter feels that the patient's confusion has cleared up somewhat this morning.  Did not lie Seroquel to be given last p.m..  I agree with the daughter that the patient has improved from her confusion.  10/25/2019.  Patient is eating and is having bowel movements have bowel movement this morning.  Patient's plan is to talk with Dr. Micah lantigua again this morning and decide on EGD and PEG tube.  Hematocrit is 26.  INR is 1.8 vitamin K has been ordered.  Patient's INR has gone up dramatically since admission.   Patient is on no meds to explain this.  May very well be related to malnutrition and involvement of the liver with her metastatic disease. Long discussion with the daughter and the patient concerning diagnosis care and treatment plain.  If the patient has a PEG tube placed probably go home in a.m. if this is not be done consider discharge this p.m..  Continue current level of care unless clinical course changes.  Long-term prognosis very poor.  Hospice at discharge.  10/25/ 2019 FAMILY AND PATIENT DENIED EGD PEG AND ARE READY FOR DISCHARGE WITH [facility].END STAGE CANCER.     Consults:   Consults (From admission, onward)        Status Ordering Provider     Inpatient consult to Case Management  Once     Provider:  (Not yet assigned)    Acknowledged ADIN TEJADA     Inpatient consult to General surgery  Once     Provider:  Marvin Obrien MD    Completed ADIN TEJADA          No new Assessment & Plan notes have been filed under this hospital service since the last note was generated.  Service: Hospital Medicine    Final Active Diagnoses:      Problems Resolved During this Admission:    Diagnosis Date Noted Date Resolved POA    PRINCIPAL PROBLEM:  Small bowel obstruction [K56.609] 10/20/2019 10/25/2019 Yes    Hypocalcemia [E83.51] 10/25/2019 10/25/2019 Yes    SHAMAR (acute kidney injury) [N17.9] 10/20/2019 10/25/2019 Yes    Hyponatremia [E87.1] 10/20/2019 10/25/2019 Yes       Discharged Condition: fair    Disposition: Hospice/Home    Follow Up:  Follow-up Information     Adin Tejada MD.    Specialties:  Hospitalist, Family Medicine, Internal Medicine, Cardiology  Contact information:  [address] [address] [phone]                 Patient Instructions:   No discharge procedures on file.    Significant Diagnostic Studies: Labs:   BMP:   Recent Labs   Lab 10/25/19  0555   *      K 4.2   *   CO2 17*   BUN <5*   CREATININE 0.8   CALCIUM 6.8*   , CMP   Recent Labs    Lab 10/25/19  0555      K 4.2   *   CO2 17*   *   BUN <5*   CREATININE 0.8   CALCIUM 6.8*   ANIONGAP 6*   ESTGFRAFRICA >60.0   EGFRNONAA >60.0   , CBC   Recent Labs   Lab 10/25/19  0555   WBC 6.87   HGB 8.3*   HCT 26.0*       and All labs within the past 24 hours have been reviewed    Pending Diagnostic Studies:     None         Medications:  Reconciled Home Medications:      Medication List      CONTINUE taking these medications    B12 5,000-100 mcg Lozg  Generic drug:  cyanocobalamin-cobamamide     ferrous sulfate 325 (65 FE) MG EC tablet  Take 325 mg by mouth once daily.     IMODIUM ADVANCED ORAL  Take by mouth.     Lactobacillus rhamnosus GG 10 billion cell capsule  Commonly known as:  CULTURELLE  Take 1 capsule by mouth once daily.     ondansetron 4 MG tablet  Commonly known as:  ZOFRAN  Take 4 mg by mouth every 8 (eight) hours as needed for Nausea.     pantoprazole 40 MG tablet  Commonly known as:  PROTONIX  Take 1 tablet (40 mg total) by mouth once daily.        STOP taking these medications    megestrol 400 mg/10 mL (40 mg/mL) Susp  Commonly known as:  MEGACE     octreotide 100 mcg/mL Soln  Commonly known as:  SANDOSTATIN     sucralfate 100 mg/mL suspension  Commonly known as:  CARAFATE            Indwelling Lines/Drains at time of discharge:   Lines/Drains/Airways     None                 Time spent on the discharge of patient: 35 minutes  Patient was seen and examined on the date of discharge and determined to be suitable for discharge.         Buck Hudson MD  Department of Hospital Medicine  [facility]

## 2019-10-25 NOTE — PROGRESS NOTES
[facility]  General Surgery  Progress Note  10/25/2019    Patient Name: Oriana Tobar  MRN: [id number]  Admission Date: 10/20/2019  Hospital Day: 6      Interval History:  No complaints.  Less confused..  No nausea or vomiting.  Passing stool and flatus.  No pain.  Tolerating diet.    Vital Signs:  Afebrile.  Good vital signs. Good room air oxygen saturations.    I/O:  Inaccurate  UOP:  Good    Exam:   Gen - Comfortable.  Nontoxic.  No acute distress.  CV - Regular rate and rhythm.  Good perfusion.  Intact distal pulses.  No Edema.  Pulm - Clear to auscultation.  Nonlabored.  No rales, wheezes, rhonci.  Abdomen - Soft.  Nontender.  Nondistended.  Normoactive normopitched bowel sounds.  No guarding/rebound.  Extremities - No edema.  No cords.  No tenderness.  Integument - No rashes.  No lesions.  No jaundice.  No decubiti  Lymphatic - No adenopathy cervical, supraclavicular, axillary, inguinal  Neuro - No focal deficits  Neck - No JVD.  No cervical masses.  Trachea midline. No thyromegaly.      Lab Results:  No leukocytosis.  Stable anemia.  No thrombocytopenia.  Mildly elevated PT.  Mild hyperchloremia.  Mild hypocarbia.  Resolved hyponatremia and hypokalemia.  Mild hypocalcemia, possibly corrects.  BUN and creatinine good.  Mild hyperglycemia.    Radiology Results:  No new radiology studies/results    Assessment:  Probable high-grade partial small-bowel obstruction. Obstruction likely related to metastatic renal cell carcinoma or carcinomatosis.  Cannot exclude other metastatic disease or primary gastrointestinal neoplasm.  Cannot exclude abdominal adhesive disease.  Overall poor long-term prognosis.  Known metastatic renal cell carcinoma to small intestines and peritoneum.  Probable metastatic disease to the lungs.  Cannot exclude carcinomatosis.    Counseling/Medical Decision Making:  Ms. Tobar and daughter were again counseled regarding options.  Hospice care was  again encouraged and recommended.  Risks and benefits of a PEG tube were discussed again.  They were informed that placement of a PEG tube at a later date will remain an option if declined at this time.  Questions solicited and answered.  They voiced understanding.  They request to defer insertion of the PEG tube at this time but will reconsider if she develops intractable vomiting.    Total face to face encounter time was 40 minutes, 30 minutes spent counseling as outlined/summarized above.    Plan:  Procedure canceled.  Will proceed with PEG if symptoms warrant.  Will sign off at this time.  Available as needed.    Recommendations:  Continue present medical management and supportive care pending patient/family decision.  Reconsult General surgery as needed.          Marvin Obrien MD FACS

## 2019-10-25 NOTE — PLAN OF CARE
Patient being discharged to home in care of family with hospice. Patient/family advised by Rossy with [employer], part of the [employer] [place] program that this patient will no longer receive sitter services with [facility]. Attempted to call Rossy at [phone] and left message prior to lunch. No call back received, called again just prior to discharge and Rossy would not provide any information, citing HIPAA, declining to allow this RN to get KATHRYN, insisting that she will call the client and did not give any further information other than, in general, patients may qualify for a number of services while on Medicaid [place] and this is up to the  assigned through Medicaid [place]. Patient being followed by PCP Dr. Hudson with hospice, as such, no discharge follow-up specifically scheduled. No further needs identified at this time.       10/25/19 1501   Final Note   Assessment Type Discharge Planning Reassessment   Anticipated Discharge Disposition HospiceHome   What phone number can be called within the next 1-3 days to see how you are doing after discharge? [phone]   Hospital Follow Up  Appt(s) scheduled? Yes   Discharge plans and expectations educations in teach back method with documentation complete? Yes

## 2019-10-26 LAB
BACTERIA BLD CULT: NORMAL
BACTERIA BLD CULT: NORMAL

## 2019-10-30 ENCOUNTER — OFFICE VISIT (OUTPATIENT)
Dept: HEMATOLOGY/ONCOLOGY | Facility: CLINIC | Age: 74
End: 2019-10-30
Payer: MEDICARE

## 2019-10-30 VITALS
WEIGHT: 142 LBS | BODY MASS INDEX: 19.88 KG/M2 | HEIGHT: 71 IN | HEART RATE: 123 BPM | RESPIRATION RATE: 18 BRPM | OXYGEN SATURATION: 99 % | DIASTOLIC BLOOD PRESSURE: 86 MMHG | TEMPERATURE: 99 F | SYSTOLIC BLOOD PRESSURE: 140 MMHG

## 2019-10-30 DIAGNOSIS — C78.7 LIVER METASTASES: ICD-10-CM

## 2019-10-30 DIAGNOSIS — C16.2 MALIGNANT NEOPLASM OF BODY OF STOMACH: ICD-10-CM

## 2019-10-30 DIAGNOSIS — R64 CACHEXIA: ICD-10-CM

## 2019-10-30 DIAGNOSIS — Z51.5 END OF LIFE CARE: ICD-10-CM

## 2019-10-30 DIAGNOSIS — C64.9 RENAL CELL CARCINOMA, UNSPECIFIED LATERALITY: ICD-10-CM

## 2019-10-30 DIAGNOSIS — R62.7 FAILURE TO THRIVE IN ADULT: Primary | ICD-10-CM

## 2019-10-30 DIAGNOSIS — C78.00 MALIGNANT NEOPLASM METASTATIC TO LUNG, UNSPECIFIED LATERALITY: ICD-10-CM

## 2019-10-30 DIAGNOSIS — R53.1 WEAKNESS: ICD-10-CM

## 2019-10-30 PROCEDURE — 3077F SYST BP >= 140 MM HG: CPT | Mod: CPTII,S$GLB,, | Performed by: INTERNAL MEDICINE

## 2019-10-30 PROCEDURE — 3288F PR FALLS RISK ASSESSMENT DOCUMENTED: ICD-10-PCS | Mod: CPTII,S$GLB,, | Performed by: INTERNAL MEDICINE

## 2019-10-30 PROCEDURE — 99215 PR OFFICE/OUTPT VISIT, EST, LEVL V, 40-54 MIN: ICD-10-PCS | Mod: GW,S$GLB,, | Performed by: INTERNAL MEDICINE

## 2019-10-30 PROCEDURE — 99497 PR ADVNCD CARE PLAN 30 MIN: ICD-10-PCS | Mod: GW,,, | Performed by: INTERNAL MEDICINE

## 2019-10-30 PROCEDURE — 3288F FALL RISK ASSESSMENT DOCD: CPT | Mod: CPTII,S$GLB,, | Performed by: INTERNAL MEDICINE

## 2019-10-30 PROCEDURE — 99215 OFFICE O/P EST HI 40 MIN: CPT | Mod: GW,S$GLB,, | Performed by: INTERNAL MEDICINE

## 2019-10-30 PROCEDURE — 3079F PR MOST RECENT DIASTOLIC BLOOD PRESSURE 80-89 MM HG: ICD-10-PCS | Mod: CPTII,S$GLB,, | Performed by: INTERNAL MEDICINE

## 2019-10-30 PROCEDURE — 3079F DIAST BP 80-89 MM HG: CPT | Mod: CPTII,S$GLB,, | Performed by: INTERNAL MEDICINE

## 2019-10-30 PROCEDURE — 99999 PR PBB SHADOW E&M-EST. PATIENT-LVL III: ICD-10-PCS | Mod: PBBFAC,,, | Performed by: INTERNAL MEDICINE

## 2019-10-30 PROCEDURE — 3077F PR MOST RECENT SYSTOLIC BLOOD PRESSURE >= 140 MM HG: ICD-10-PCS | Mod: CPTII,S$GLB,, | Performed by: INTERNAL MEDICINE

## 2019-10-30 PROCEDURE — 99497 ADVNCD CARE PLAN 30 MIN: CPT | Mod: GW,,, | Performed by: INTERNAL MEDICINE

## 2019-10-30 PROCEDURE — 1101F PR PT FALLS ASSESS DOC 0-1 FALLS W/OUT INJ PAST YR: ICD-10-PCS | Mod: CPTII,S$GLB,, | Performed by: INTERNAL MEDICINE

## 2019-10-30 PROCEDURE — 99999 PR PBB SHADOW E&M-EST. PATIENT-LVL III: CPT | Mod: PBBFAC,,, | Performed by: INTERNAL MEDICINE

## 2019-10-30 PROCEDURE — 1101F PT FALLS ASSESS-DOCD LE1/YR: CPT | Mod: CPTII,S$GLB,, | Performed by: INTERNAL MEDICINE

## 2019-10-30 RX ORDER — MORPHINE SULFATE ORAL SOLUTION 10 MG/5ML
.5-1 SOLUTION ORAL
COMMUNITY

## 2019-10-30 RX ORDER — DOCUSATE SODIUM 100 MG/1
100 CAPSULE, LIQUID FILLED ORAL 2 TIMES DAILY
COMMUNITY

## 2019-10-30 RX ORDER — LORAZEPAM 0.5 MG/1
0.25 TABLET ORAL EVERY 6 HOURS PRN
COMMUNITY

## 2019-10-30 RX ORDER — FERROUS SULFATE 325(65) MG
1 TABLET ORAL DAILY
Refills: 1 | COMMUNITY
Start: 2019-09-28

## 2019-10-30 RX ORDER — HYDROCODONE BITARTRATE AND ACETAMINOPHEN 5; 325 MG/1; MG/1
1 TABLET ORAL EVERY 6 HOURS PRN
COMMUNITY

## 2019-10-30 NOTE — LETTER
October 31, 2019      Rocco Ross MD  [address]  [address]  [place] [address]           [facility]  [address] [place] [address]  Phone: [phone]          Patient: Oriana Tobar   MR Number: [id number]   YOB: 1945   Date of Visit: 10/30/2019       Dear Dr. Rocco Ross:    Thank you for referring Oriana Tobar to me for evaluation. Attached you will find relevant portions of my assessment and plan of care.    If you have questions, please do not hesitate to call me. I look forward to following Oriana Tobar along with you.    Sincerely,    Lauren Irene MD    Enclosure  CC:  No Recipients    If you would like to receive this communication electronically, please contact [email or web address] or [phone] to request more information on [employer] access.    For providers and/or their staff who would like to refer a patient to [facility], please contact us through our one-stop-shop provider referral line, [facility], at [phone].    If you feel you have received this communication in error or would no longer like to receive these types of communications, please e-mail [email or web address]

## 2019-10-30 NOTE — PROGRESS NOTES
CC  I  Feel exhausted    Oriana Tobar is a 74 y.o.    Pt with recurrent renal cell carcinoma. She has  Had no recent tissue biopsy since 2018 when she was diagnosed with RCC via biopsy of a left upper quadrant peritoneal mass  Patient did not take treatment for malignancy at that time per her own choice.  She then developed GI hemorrhaging was found to have a gastric mass consistent with carcinoma.  Latest scans revealed diffuse metastatic disease to lung, liver , peritoneum with bilateral ovarian masses.   History completed 2 doses of IV iron and  Sandostatin  She lives with her family and is eating better.  No further evidence of melena or hematochezia  She is requiring Zofran p.r.n. nausea and probiotics to help with intermittent diarrhea    Patient not ambulating on her own continued weight loss extreme fatigue seen in the emergency room  Sandostatin injections were helping control GI bleed  She is tired and anemia progressed with lower iron levels       Past Medical History:   Diagnosis Date    Anemia     Cancer     kidney    Dementia     Encounter for blood transfusion     11/4/2018    High cholesterol 06/23/2018    Hypertension     No Medication     Renal disorder     Wears dentures     Uppers         Current Outpatient Medications:     cyanocobalamin-cobamamide (B12) 5,000-100 mcg Lodavieg, , Disp: , Rfl:     docusate sodium (COLACE) 100 MG capsule, Take 100 mg by mouth 2 (two) times daily., Disp: , Rfl:     ferrous sulfate (FEOSOL) 325 mg (65 mg iron) Tab tablet, Take 1 tablet by mouth once daily., Disp: , Rfl: 1    HYDROcodone-acetaminophen (NORCO) 5-325 mg per tablet, Take 1 tablet by mouth every 6 (six) hours as needed for Pain., Disp: , Rfl:     Lactobacillus rhamnosus GG (CULTURELLE) 10 billion cell capsule, Take 1 capsule by mouth once daily., Disp: , Rfl:     loperamide HCl/simethicone (IMODIUM ADVANCED ORAL), Take by mouth., Disp: , Rfl:     LORazepam (ATIVAN) 0.5 MG tablet,  "Take 0.25 mg by mouth every 6 (six) hours as needed for Anxiety., Disp: , Rfl:     morphine 10 mg/5 mL solution, Take 0.5-1 mg by mouth every 2 (two) hours as needed for Pain., Disp: , Rfl:     ondansetron (ZOFRAN) 4 MG tablet, Take 4 mg by mouth every 8 (eight) hours as needed for Nausea., Disp: , Rfl:     pantoprazole (PROTONIX) 40 MG tablet, Take 1 tablet (40 mg total) by mouth once daily., Disp: 30 tablet, Rfl: 0  Review of patient's allergies indicates:   Allergen Reactions    Codeine Other (See Comments)     Pt shakes and hallucinates    Pcn [penicillins] Anxiety and Other (See Comments)     Social History     Tobacco Use    Smoking status: Former Smoker     Packs/day: 1.00     Years: 50.00     Pack years: 50.00     Last attempt to quit: 10/6/2018     Years since quittin.0    Smokeless tobacco: Never Used    Tobacco comment: pt has stopped; encouraged to remain stopped   Substance Use Topics    Alcohol use: Yes     Frequency: Never     Comment: occasional    Drug use: No     Family History   Adopted: Yes       CONSTITUTIONAL: No fevers, chills, night sweats,  + weight loss weakness unable to ambulate without assistance  SKIN: no rashes or itching  ENT: No headaches, head trauma, vision changes, or eye pain  LYMPH NODES: None noticed   CV: No chest pain, palpitations.   RESP:+ dyspnea on exertion,  no cough, wheezing, or hemoptysis  GI: + nausea, no emesis,  Stable diarrhea, no constipation, melena, hematochezia, pain.   No further vomiting with zofran  No further diarrhea with imodium   : No dysuria, hematuria, urgency, or frequency   HEME: No easy bruising, bleeding problems  PSYCHIATRIC: No depression, anxiety, psychosis, hallucinations.  NEURO: No seizures, memory loss, slight weakness  MSK: No arthralgias or joint swelling         BP (!) 140/86   Pulse (!) 123   Temp 98.9 °F (37.2 °C) (Oral)   Resp 18   Ht 5' 11" (1.803 m)   Wt 64.4 kg (142 lb)   LMP  (LMP Unknown)   SpO2 99%   BMI " 19.80 kg/m²     Gen:  Patient listless temporal wasting cachectic  Psych:  Extremely flat affect  Eyes: Pupils round and non dilated, EOM intact no nystagmus  Nose: Nares patent  Neck: suppple, no JVD, trachea midline    Lungs: CTAB, no wheezes no fremitus   CV: S1S2 with RR Tachycardia , No mrg  Abd: soft, NTND, + BS, No HSM, no ascites  Extr: No CCE, MEJIA decreased strength   Skin: intact, no lesions noted  Rheum: No joint swelling    Lab Results   Component Value Date    WBC 6.87 10/25/2019    HGB 8.3 (L) 10/25/2019    HCT 26.0 (L) 10/25/2019    MCV 88 10/25/2019     10/25/2019     CMP  Sodium   Date Value Ref Range Status   10/25/2019 139 136 - 145 mmol/L Final     Potassium   Date Value Ref Range Status   10/25/2019 4.2 3.5 - 5.1 mmol/L Final     Chloride   Date Value Ref Range Status   10/25/2019 116 (H) 95 - 110 mmol/L Final     CO2   Date Value Ref Range Status   10/25/2019 17 (L) 23 - 29 mmol/L Final     Glucose   Date Value Ref Range Status   10/25/2019 117 (H) 70 - 110 mg/dL Final     BUN, Bld   Date Value Ref Range Status   10/25/2019 <5 (L) 8 - 23 mg/dL Final     Creatinine   Date Value Ref Range Status   10/25/2019 0.8 0.5 - 1.4 mg/dL Final     Calcium   Date Value Ref Range Status   10/25/2019 6.8 (LL) 8.7 - 10.5 mg/dL Final     Comment:     Calcium critical result(s) called and verbal readback obtained from   MIKE Lundberg, 10/25/2019 06:59       Total Protein   Date Value Ref Range Status   10/23/2019 4.9 (L) 6.0 - 8.4 g/dL Final     Albumin   Date Value Ref Range Status   10/23/2019 2.0 (L) 3.5 - 5.2 g/dL Final     Total Bilirubin   Date Value Ref Range Status   10/23/2019 0.6 0.1 - 1.0 mg/dL Final     Comment:     For infants and newborns, interpretation of results should be based  on gestational age, weight and in agreement with clinical  observations.  Premature Infant recommended reference ranges:  Up to 24 hours.............<8.0 mg/dL  Up to 48 hours............<12.0 mg/dL  3-5  days..................<15.0 mg/dL  6-29 days.................<15.0 mg/dL       Alkaline Phosphatase   Date Value Ref Range Status   10/23/2019 45 (L) 55 - 135 U/L Final     AST   Date Value Ref Range Status   10/23/2019 9 (L) 10 - 40 U/L Final     ALT   Date Value Ref Range Status   10/23/2019 6 (L) 10 - 44 U/L Final     Anion Gap   Date Value Ref Range Status   10/25/2019 6 (L) 8 - 16 mmol/L Final     eGFR if    Date Value Ref Range Status   10/25/2019 >60.0 >60 mL/min/1.73 m^2 Final     eGFR if non    Date Value Ref Range Status   10/25/2019 >60.0 >60 mL/min/1.73 m^2 Final     Comment:     Calculation used to obtain the estimated glomerular filtration  rate (eGFR) is the CKD-EPI equation.      CT Chest Abdomen Pelvis W W/O Contrast (XPD)   Order: [id number]   Status:  Final result   Visible to patient:  Yes (Patient Portal) Next appt:  None Dx:  Gastric carcinoma; Renal cell carcino...   Details     Reading Physician Reading Date Result Priority   Ramin Singh MD 5/13/2019 STAT      Narrative     EXAMINATION:  CT CHEST ABDOMEN PELVIS W W/O CONTRAST (XPD)    CLINICAL HISTORY:  RCC;Malignant neoplasm of stomach, unspecified    TECHNIQUE:  Low dose axial, sagittal and coronal reformations were performed from the thoracic inlet to the pubic symphysis following the IV administration of 75 mL of Omnipaque 350.  The chest images are with  contrast and the abdomen images are with and without contrast.  30 mL of oral Omnipaque was given.    COMPARISON:  CT abdomen pelvis 10/23/2018.  CT chest 01/13/2016.    FINDINGS:  There are numerous bilateral soft tissue pulmonary nodules ranging in size from 0.3-1.7 cm consistent with pulmonary metastatic disease.  These have increased in size and number when compared to the patient's previous examination.  No focal consolidation.  Pulmonary vascularity is unremarkable.    No significant pleural or pericardial effusions.  No suspicious  endobronchial lesion.  There are peripherally enhancing subcarinal masses measuring 1.8 and 1.5 cm most consistent with metastatic lymphadenopathy.  These are new compared to the chest CT from 2016.  No significant hilar, mediastinal or axillary lymphadenopathy.    The liver and spleen are normal in size and attenuation.  Numerous chronic nonenhancing hepatic cysts.  Within the dome of the right hepatic lobe there are poorly defined round hypoattenuating nodules with subtle peripheral enhancement which have increased in size over the interval currently measuring up to 1.3 cm in diameter.  These are suspicious for hepatic metastatic disease.  There has been interval development of a poorly defined enhancing 2.2 cm nodule of the right hepatic lobe as well as a smaller 1.4 cm partially exophytic enhancing nodule of the inferior right hepatic lobe consistent with metastatic disease.  No perihepatic ascites.  Gallbladder is distended with dependent calcified gallstones.  Pancreas is normal in size and attenuation.  There are perisplenic and perigastric varices consistent with underlying portal hypertension.    Patient is status post prior left nephrectomy and adrenalectomy.  The right kidney is normal in size and enhances homogeneously.  Within the left mid abdomen there is an enlarging peripherally enhancing soft tissue attenuation mass currently measuring 6.4 cm AP x 4.1 cm transverse by 6.2 cm cc (previously measuring 3.7 cm AP x 3.7 cm transverse by 4.1 cm cc).  This is consistent with either residual or recurrent neoplasia.    Large amount of stool throughout the colon.  Scattered diverticula within the colon.  No mesenteric inflammatory changes.  Surgical clips within the distal colon from previous partial colectomy.  No CT evidence for bowel obstruction.    The bladder is partially distended.  The uterus is normal in size and attenuation.  There are bilateral heterogeneously enhancing soft tissue masses of the  right and left ovary the largest on the right measuring 7.4 cm and largest on left measuring 3.4 cm.  This is most consistent with ovarian neoplasia.  There is a stable left ovarian dermoid/teratoma.    There are numerous enhancing soft tissue masses of the mesentery which have increased in number and size over the interval consistent with metastatic disease.  The largest mass measures 4.6 cm AP x 5.5 cm transverse within the midline anterior lower abdomen and appears to abut and invade adjacent small bowel.  No dilated loops of small bowel to suggest resulting obstruction.  New smaller peripherally enhancing soft tissue masses within the pelvis the largest measuring 2.6 cm in diameter also consistent with metastatic disease.    Mild thoracolumbar dextroscoliosis.  Mild bone demineralization.      Impression       1. Interval progression of pulmonary metastatic disease with metastatic subcarinal lymphadenopathy.  2. Interval progression of hepatic metastatic disease.  3. Interval increase in size and number of enhancing mesenteric and pelvic soft tissue masses consistent with progression of metastatic disease.  4. Enlarging bilateral ovarian masses consistent with neoplasia.  5. Chronic dermoid/teratoma of the left ovary.  6. Chronic hepatic cysts.  7. Perisplenic and perigastric varices consistent with portal hypertension.  8. Previous left adrenalectomy and nephrectomy.  9. Enlarging heterogeneously enhancing soft tissue mass of the left abdomen again consistent with either residual or recurrent neoplasia.  10. Bone demineralization.      Electronically signed by: Ramin Singh  Date: 05/13/2019  Time: 10:12             Last Resulted: 05/13/19 1           Post IV iron last week     Failure to thrive in adult    End of life care    Renal cell carcinoma, unspecified laterality    Malignant neoplasm metastatic to lung, unspecified laterality    Liver metastases    Malignant neoplasm of body of  stomach    Cachexia    Weakness            Pt with stable RCC yet recent bleeding from gastric carcinoma  Current ECOG of 4  No need for further scans.  Patient has an extremely poor performance status and agrees to comfort measures only.  She will not be offered any further injections.  She is  already with Hospice   No pain discussed end of life care and what to expect patient and her daughter thanked me  I was should could have done more for her and I explained that she is with an extremely caring group of people in hospice   she is weak and needs assistance   Thank you for allowing me to evaluate and participate in the care of this pleasant patient. Please fell free to call me with any questions or concerns.    [place]arm[place],  Lauren Irene MD    This note was dictated with Dragon and briefly proofread. Please excuse any sentences that may be unclear or words misspelled    Advance Care Planning     Power of   I initiated the process of advance care planning today and explained the importance of this process to the patient.  I introduced the concept of advance directives to the patient, as well. Then the patient received detailed information about the importance of designating a Health Care Power of  (HCPOA). She was also instructed to communicate with this person about their wishes for future healthcare, should she become sick and lose decision-making capacity. The patient has not previously appointed a HCPOA.   I spent a total time of 30 minutes discussing this issue with the patient.         Living Will  During this visit, I engaged the patient  in the advance care planning process.  The patient and I reviewed the role for advance directives and their purpose in directing future healthcare if the patient's unable to speak for him/herself.  At this point in time, the patient does have full decision-making capacity.  We discussed different extreme health states that she could experience, and reviewed  what kind of medical care she would want in those situations.  The patient communicated that if she were comatose and had little chance of a meaningful recovery, she would want machines/life-sustaining treatments used.   I spent a total of  30 minutes engaging the patient in this advance care planning discussion.

## 2020-02-04 NOTE — TELEPHONE ENCOUNTER
Message left notifying patient that Dr Irene would like to see her in clinic in 2 weeks. Patient instructed to call [phone] to schedule appt.    Plan: Watch for recurrence. Detail Level: Detailed

## 2020-02-08 NOTE — NURSING
Patient's daughter, Maryjane Tobar returned call regarding patient's future procedure, CT guided Retroperitoneal biopsy, scheduled Thursday, August 30th @10:00. Instructed to arrive no later than 8:30 am to outpatient registration for required PT/INR and CBC labs, then upstairs for pre-op and IV insertion. Patient is not currently taking aspirin or any blood thinners. Instructed to have nothing to eat or drink after midnight the night before the procedure except, a sip of water with essential medications the morning of.  Instructed patient to bathe the night before and morning of procedure with anti bacterial soap or Hibiclens. Instructed for to patient to make arrangements in advance for transportation home by a responsible adult. Patient's daughter, Maryjane, was educated on all additional pre-op instructions per policy. Radiology nurse phone number given if any questions arise. Verbalized understanding.   no

## 2020-04-06 NOTE — ASSESSMENT & PLAN NOTE
Patient is GI blood loss black tar stools.  Will replace her hematocrit.  May require up to 4 units.  Will monitor any signs of heart failure will give the patient Lasix.  Have replaced her magnesium at this time.  Patient is very poor historian confusion dementia cannot give you a good history.  Continue current level of care adjustments depending on clinical changes.     Palliative Care

## 2020-08-23 NOTE — PLAN OF CARE
Please enter patient's family history based on Medicare wellness visit paperwork.    Thank you, Joseline   Problem: Anemia (Adult)  Goal: Identify Related Risk Factors and Signs and Symptoms  Related risk factors and signs and symptoms are identified upon initiation of Human [employer] Clinical Practice Guideline (CPG)  Outcome: Ongoing (interventions implemented as appropriate)   10/18/18 1219   Anemia   Related Risk Factors (Anemia) bleeding;chronic illness   Signs and Symptoms (Anemia) fatigue   LATE ENTRY. ARLETTE,RN
